# Patient Record
Sex: FEMALE | Race: WHITE | NOT HISPANIC OR LATINO | Employment: OTHER | URBAN - METROPOLITAN AREA
[De-identification: names, ages, dates, MRNs, and addresses within clinical notes are randomized per-mention and may not be internally consistent; named-entity substitution may affect disease eponyms.]

---

## 2017-04-03 ENCOUNTER — HOSPITAL ENCOUNTER (OUTPATIENT)
Dept: URGENT CARE | Facility: CLINIC | Age: 52
Discharge: HOME | End: 2017-04-03
Attending: PREVENTIVE MEDICINE | Admitting: PREVENTIVE MEDICINE

## 2017-04-04 ENCOUNTER — HOSPITAL ENCOUNTER (OUTPATIENT)
Dept: FAMILY MEDICINE CLINIC | Facility: CLINIC | Age: 52
Setting detail: SPECIMEN
Discharge: HOME | End: 2017-04-04
Attending: PREVENTIVE MEDICINE | Admitting: PREVENTIVE MEDICINE

## 2017-04-04 LAB
ALBUMIN SERPL-MCNC: 3.9 G/DL (ref 3.5–4.8)
ALBUMIN/GLOB SERPL: 1.2 {RATIO} (ref 1–1.7)
ALP SERPL-CCNC: 67 IU/L (ref 32–91)
ALT SERPL-CCNC: 19 IU/L (ref 14–54)
ANION GAP SERPL CALC-SCNC: 12.4 MMOL/L (ref 10–20)
AST SERPL-CCNC: 19 IU/L (ref 15–41)
BASOPHILS # BLD AUTO: 0.1 10*3/UL (ref 0–0.2)
BASOPHILS NFR BLD AUTO: 1 % (ref 0–2)
BILIRUB SERPL-MCNC: 0.5 MG/DL (ref 0.3–1.2)
BUN SERPL-MCNC: 13 MG/DL (ref 8–20)
BUN/CREAT SERPL: 18.6 (ref 5.4–26.2)
CALCIUM SERPL-MCNC: 9.5 MG/DL (ref 8.9–10.3)
CHLORIDE SERPL-SCNC: 105 MMOL/L (ref 101–111)
CHOLEST SERPL-MCNC: 249 MG/DL
CHOLEST/HDLC SERPL: 4.3 {RATIO}
CONV CO2: 26 MMOL/L (ref 22–32)
CONV LDL CHOLESTEROL DIRECT: 173 MG/DL (ref 0–100)
CONV TOTAL PROTEIN: 7.1 G/DL (ref 6.1–7.9)
CREAT UR-MCNC: 0.7 MG/DL (ref 0.4–1)
DIFFERENTIAL METHOD BLD: (no result)
EOSINOPHIL # BLD AUTO: 0.1 10*3/UL (ref 0–0.3)
EOSINOPHIL # BLD AUTO: 2 % (ref 0–3)
ERYTHROCYTE [DISTWIDTH] IN BLOOD BY AUTOMATED COUNT: 12.9 % (ref 11.5–14.5)
GLOBULIN UR ELPH-MCNC: 3.2 G/DL (ref 2.5–3.8)
GLUCOSE SERPL-MCNC: 91 MG/DL (ref 65–99)
HCT VFR BLD AUTO: 37.7 % (ref 35–49)
HDLC SERPL-MCNC: 58 MG/DL
HGB BLD-MCNC: 13.3 G/DL (ref 12–15)
LDLC/HDLC SERPL: 3 {RATIO}
LIPID INTERPRETATION: ABNORMAL
LYMPHOCYTES # BLD AUTO: 2.3 10*3/UL (ref 0.8–4.8)
LYMPHOCYTES NFR BLD AUTO: 30 % (ref 18–42)
MCH RBC QN AUTO: 35.8 PG (ref 26–32)
MCHC RBC AUTO-ENTMCNC: 35.2 G/DL (ref 32–36)
MCV RBC AUTO: 101.6 FL (ref 80–94)
MONOCYTES # BLD AUTO: 0.6 10*3/UL (ref 0.1–1.3)
MONOCYTES NFR BLD AUTO: 8 % (ref 2–11)
NEUTROPHILS # BLD AUTO: 4.5 10*3/UL (ref 2.3–8.6)
NEUTROPHILS NFR BLD AUTO: 59 % (ref 50–75)
NRBC BLD AUTO-RTO: 0 /100{WBCS}
NRBC/RBC NFR BLD MANUAL: 0 10*3/UL
PLATELET # BLD AUTO: 385 10*3/UL (ref 150–450)
PMV BLD AUTO: 7.3 FL (ref 7.4–10.4)
POTASSIUM SERPL-SCNC: 4.4 MMOL/L (ref 3.6–5.1)
RBC # BLD AUTO: 3.71 10*6/UL (ref 4–5.4)
SODIUM SERPL-SCNC: 139 MMOL/L (ref 136–144)
TRIGL SERPL-MCNC: 115 MG/DL
TSH SERPL-ACNC: 1.7 UIU/ML (ref 0.34–5.6)
VIT B12 SERPL-MCNC: 538 PG/ML (ref 180–914)
VLDLC SERPL CALC-MCNC: 17.5 MG/DL
WBC # BLD AUTO: 7.5 10*3/UL (ref 4.5–11.5)

## 2017-04-05 ENCOUNTER — HOSPITAL ENCOUNTER (OUTPATIENT)
Dept: PHYSICAL THERAPY | Facility: HOSPITAL | Age: 52
Setting detail: RECURRING SERIES
Discharge: HOME | End: 2017-05-11
Attending: PREVENTIVE MEDICINE | Admitting: PREVENTIVE MEDICINE

## 2017-04-05 LAB
MEV IGG SER IA-ACNC: NORMAL
MUV IGG SER IA-ACNC: NORMAL
RUBV AB SER QL: NORMAL

## 2017-04-19 ENCOUNTER — HOSPITAL ENCOUNTER (OUTPATIENT)
Dept: FAMILY MEDICINE CLINIC | Facility: CLINIC | Age: 52
Setting detail: SPECIMEN
Discharge: HOME | End: 2017-04-19
Attending: PREVENTIVE MEDICINE | Admitting: PREVENTIVE MEDICINE

## 2017-04-19 LAB
MAGNESIUM SERPL-MCNC: 2.1 MG/DL (ref 1.8–2.5)
URATE SERPL-MCNC: 5 MG/DL (ref 2.6–8)

## 2017-05-22 ENCOUNTER — HOSPITAL ENCOUNTER (OUTPATIENT)
Dept: FAMILY MEDICINE CLINIC | Facility: CLINIC | Age: 52
Setting detail: SPECIMEN
Discharge: HOME | End: 2017-05-22
Attending: PREVENTIVE MEDICINE | Admitting: PREVENTIVE MEDICINE

## 2017-05-22 LAB
CHOLEST SERPL-MCNC: 249 MG/DL
CHOLEST/HDLC SERPL: 4.6 {RATIO}
CONV LDL CHOLESTEROL DIRECT: 173 MG/DL (ref 0–100)
FERRITIN SERPL-MCNC: 142 NG/ML (ref 11–307)
FOLATE SERPL-MCNC: >24.8 NG/ML (ref 5.9–24.8)
HDLC SERPL-MCNC: 54 MG/DL
IRON SATN MFR SERPL: 69 % (ref 15–50)
IRON SERPL-MCNC: 195 UG/DL (ref 28–170)
LDLC/HDLC SERPL: 3.2 {RATIO}
LIPID INTERPRETATION: ABNORMAL
MAGNESIUM UR-MCNC: 2.07 % (ref 0.5–1.5)
RETICS/RBC NFR MANUAL: 0.07 10*6/UL
TIBC SERPL-MCNC: 284 UG/DL (ref 228–428)
TRIGL SERPL-MCNC: 148 MG/DL
VIT B12 SERPL-MCNC: 541 PG/ML (ref 180–914)
VLDLC SERPL CALC-MCNC: 22.8 MG/DL

## 2017-06-22 ENCOUNTER — HOSPITAL ENCOUNTER (OUTPATIENT)
Dept: SLEEP MEDICINE | Facility: HOSPITAL | Age: 52
Discharge: HOME | End: 2017-06-22
Attending: PSYCHIATRY & NEUROLOGY | Admitting: PSYCHIATRY & NEUROLOGY

## 2017-07-03 ENCOUNTER — HOSPITAL ENCOUNTER (OUTPATIENT)
Dept: FAMILY MEDICINE CLINIC | Facility: CLINIC | Age: 52
Setting detail: SPECIMEN
Discharge: HOME | End: 2017-07-03
Attending: PREVENTIVE MEDICINE | Admitting: PREVENTIVE MEDICINE

## 2017-07-03 LAB
ALBUMIN SERPL-MCNC: 4 G/DL (ref 3.5–4.8)
ALBUMIN/GLOB SERPL: 1.5 {RATIO} (ref 1–1.7)
ALP SERPL-CCNC: 61 IU/L (ref 32–91)
ALT SERPL-CCNC: 15 IU/L (ref 14–54)
ANION GAP SERPL CALC-SCNC: 12.4 MMOL/L (ref 10–20)
AST SERPL-CCNC: 21 IU/L (ref 15–41)
BASOPHILS # BLD AUTO: 0.1 10*3/UL (ref 0–0.2)
BASOPHILS NFR BLD AUTO: 1 % (ref 0–2)
BILIRUB SERPL-MCNC: 0.5 MG/DL (ref 0.3–1.2)
BUN SERPL-MCNC: 13 MG/DL (ref 8–20)
BUN/CREAT SERPL: 16.3 (ref 5.4–26.2)
CALCIUM SERPL-MCNC: 9.4 MG/DL (ref 8.9–10.3)
CHLORIDE SERPL-SCNC: 106 MMOL/L (ref 101–111)
CONV CO2: 24 MMOL/L (ref 22–32)
CONV TOTAL PROTEIN: 6.7 G/DL (ref 6.1–7.9)
CREAT UR-MCNC: 0.8 MG/DL (ref 0.4–1)
DIFFERENTIAL METHOD BLD: (no result)
EOSINOPHIL # BLD AUTO: 0.2 10*3/UL (ref 0–0.3)
EOSINOPHIL # BLD AUTO: 3 % (ref 0–3)
ERYTHROCYTE [DISTWIDTH] IN BLOOD BY AUTOMATED COUNT: 12.7 % (ref 11.5–14.5)
FERRITIN SERPL-MCNC: 126 NG/ML (ref 11–307)
FOLATE SERPL-MCNC: >24.8 NG/ML (ref 5.9–24.8)
GLOBULIN UR ELPH-MCNC: 2.7 G/DL (ref 2.5–3.8)
GLUCOSE SERPL-MCNC: 92 MG/DL (ref 65–99)
HCT VFR BLD AUTO: 38.4 % (ref 35–49)
HGB BLD-MCNC: 13.1 G/DL (ref 12–15)
IRON SATN MFR SERPL: 55 % (ref 15–50)
IRON SERPL-MCNC: 154 UG/DL (ref 28–170)
LYMPHOCYTES # BLD AUTO: 2.7 10*3/UL (ref 0.8–4.8)
LYMPHOCYTES NFR BLD AUTO: 45 % (ref 18–42)
MAGNESIUM UR-MCNC: 1.67 % (ref 0.5–1.5)
MCH RBC QN AUTO: 35.2 PG (ref 26–32)
MCHC RBC AUTO-ENTMCNC: 34.1 G/DL (ref 32–36)
MCV RBC AUTO: 103.1 FL (ref 80–94)
MONOCYTES # BLD AUTO: 0.5 10*3/UL (ref 0.1–1.3)
MONOCYTES NFR BLD AUTO: 9 % (ref 2–11)
NEUTROPHILS # BLD AUTO: 2.5 10*3/UL (ref 2.3–8.6)
NEUTROPHILS NFR BLD AUTO: 42 % (ref 50–75)
NRBC BLD AUTO-RTO: 0 /100{WBCS}
NRBC/RBC NFR BLD MANUAL: 0 10*3/UL
PLATELET # BLD AUTO: 368 10*3/UL (ref 150–450)
PMV BLD AUTO: 7.8 FL (ref 7.4–10.4)
POTASSIUM SERPL-SCNC: 4.4 MMOL/L (ref 3.6–5.1)
RBC # BLD AUTO: 3.72 10*6/UL (ref 4–5.4)
RETICS/RBC NFR MANUAL: 0.06 10*6/UL
SODIUM SERPL-SCNC: 138 MMOL/L (ref 136–144)
TIBC SERPL-MCNC: 281 UG/DL (ref 228–428)
TSH SERPL-ACNC: 1.68 UIU/ML (ref 0.34–5.6)
VIT B12 SERPL-MCNC: 491 PG/ML (ref 180–914)
WBC # BLD AUTO: 5.9 10*3/UL (ref 4.5–11.5)

## 2017-07-05 LAB
CHOLEST SERPL-MCNC: 217 MG/DL
CHOLEST/HDLC SERPL: 3.8 {RATIO}
CONV LDL CHOLESTEROL DIRECT: 144 MG/DL (ref 0–100)
HDLC SERPL-MCNC: 57 MG/DL
LDLC/HDLC SERPL: 2.5 {RATIO}
LIPID INTERPRETATION: ABNORMAL
TRIGL SERPL-MCNC: 119 MG/DL
VLDLC SERPL CALC-MCNC: 15.2 MG/DL

## 2017-07-22 ENCOUNTER — HOSPITAL ENCOUNTER (OUTPATIENT)
Dept: SLEEP MEDICINE | Facility: HOSPITAL | Age: 52
Discharge: HOME | End: 2017-07-22
Attending: PSYCHIATRY & NEUROLOGY | Admitting: PSYCHIATRY & NEUROLOGY

## 2017-08-15 ENCOUNTER — HOSPITAL ENCOUNTER (OUTPATIENT)
Dept: SLEEP MEDICINE | Facility: HOSPITAL | Age: 52
Discharge: HOME | End: 2017-08-15
Attending: PSYCHIATRY & NEUROLOGY | Admitting: PSYCHIATRY & NEUROLOGY

## 2017-08-24 ENCOUNTER — HOSPITAL ENCOUNTER (OUTPATIENT)
Dept: FAMILY MEDICINE CLINIC | Facility: CLINIC | Age: 52
Setting detail: SPECIMEN
Discharge: HOME | End: 2017-08-24
Attending: PREVENTIVE MEDICINE | Admitting: PREVENTIVE MEDICINE

## 2017-08-24 LAB
BASOPHILS # BLD AUTO: 0 10*3/UL (ref 0–0.2)
BASOPHILS NFR BLD AUTO: 1 % (ref 0–2)
CHOLEST SERPL-MCNC: 238 MG/DL
CHOLEST/HDLC SERPL: 4 {RATIO}
CONV LDL CHOLESTEROL DIRECT: 166 MG/DL (ref 0–100)
DIFFERENTIAL METHOD BLD: (no result)
EOSINOPHIL # BLD AUTO: 0.2 10*3/UL (ref 0–0.3)
EOSINOPHIL # BLD AUTO: 3 % (ref 0–3)
ERYTHROCYTE [DISTWIDTH] IN BLOOD BY AUTOMATED COUNT: 12.5 % (ref 11.5–14.5)
FOLATE SERPL-MCNC: >24.8 NG/ML (ref 5.9–24.8)
GGT SERPL-CCNC: 26 [IU]/L (ref 7–50)
HCT VFR BLD AUTO: 41.3 % (ref 35–49)
HDLC SERPL-MCNC: 59 MG/DL
HGB BLD-MCNC: 14.3 G/DL (ref 12–15)
LDLC/HDLC SERPL: 2.8 {RATIO}
LIPID INTERPRETATION: ABNORMAL
LYMPHOCYTES # BLD AUTO: 2.5 10*3/UL (ref 0.8–4.8)
LYMPHOCYTES NFR BLD AUTO: 43 % (ref 18–42)
MCH RBC QN AUTO: 35.6 PG (ref 26–32)
MCHC RBC AUTO-ENTMCNC: 34.6 G/DL (ref 32–36)
MCV RBC AUTO: 102.9 FL (ref 80–94)
MONOCYTES # BLD AUTO: 0.5 10*3/UL (ref 0.1–1.3)
MONOCYTES NFR BLD AUTO: 9 % (ref 2–11)
NEUTROPHILS # BLD AUTO: 2.5 10*3/UL (ref 2.3–8.6)
NEUTROPHILS NFR BLD AUTO: 44 % (ref 50–75)
NRBC BLD AUTO-RTO: 0 /100{WBCS}
NRBC/RBC NFR BLD MANUAL: 0 10*3/UL
PLATELET # BLD AUTO: 403 10*3/UL (ref 150–450)
PMV BLD AUTO: 7.4 FL (ref 7.4–10.4)
RBC # BLD AUTO: 4.01 10*6/UL (ref 4–5.4)
TRIGL SERPL-MCNC: 109 MG/DL
VIT B12 SERPL-MCNC: 486 PG/ML (ref 180–914)
VLDLC SERPL CALC-MCNC: 13 MG/DL
WBC # BLD AUTO: 5.7 10*3/UL (ref 4.5–11.5)

## 2018-05-11 ENCOUNTER — HOSPITAL ENCOUNTER (OUTPATIENT)
Dept: FAMILY MEDICINE CLINIC | Facility: CLINIC | Age: 53
Setting detail: SPECIMEN
Discharge: HOME | End: 2018-05-11
Attending: PREVENTIVE MEDICINE | Admitting: PREVENTIVE MEDICINE

## 2018-05-11 LAB
ALBUMIN SERPL-MCNC: 4.3 G/DL (ref 3.5–4.8)
ALBUMIN/GLOB SERPL: 1.3 {RATIO} (ref 1–1.7)
ALP SERPL-CCNC: 68 IU/L (ref 32–91)
ALT SERPL-CCNC: 20 IU/L (ref 14–54)
ANION GAP SERPL CALC-SCNC: 11.1 MMOL/L (ref 10–20)
AST SERPL-CCNC: 23 IU/L (ref 15–41)
BASOPHILS # BLD AUTO: 0.1 10*3/UL (ref 0–0.2)
BASOPHILS NFR BLD AUTO: 1 % (ref 0–2)
BILIRUB SERPL-MCNC: 0.5 MG/DL (ref 0.3–1.2)
BUN SERPL-MCNC: 11 MG/DL (ref 8–20)
BUN/CREAT SERPL: 13.8 (ref 5.4–26.2)
CALCIUM SERPL-MCNC: 9.9 MG/DL (ref 8.9–10.3)
CHLORIDE SERPL-SCNC: 103 MMOL/L (ref 101–111)
CONV CO2: 26 MMOL/L (ref 22–32)
CONV TOTAL PROTEIN: 7.5 G/DL (ref 6.1–7.9)
CREAT UR-MCNC: 0.8 MG/DL (ref 0.4–1)
DIFFERENTIAL METHOD BLD: (no result)
EOSINOPHIL # BLD AUTO: 0.1 10*3/UL (ref 0–0.3)
EOSINOPHIL # BLD AUTO: 2 % (ref 0–3)
ERYTHROCYTE [DISTWIDTH] IN BLOOD BY AUTOMATED COUNT: 12.9 % (ref 11.5–14.5)
ERYTHROCYTE [SEDIMENTATION RATE] IN BLOOD BY WESTERGREN METHOD: 42 MM/HR (ref 0–30)
GLOBULIN UR ELPH-MCNC: 3.2 G/DL (ref 2.5–3.8)
GLUCOSE SERPL-MCNC: 88 MG/DL (ref 65–99)
HCT VFR BLD AUTO: 41 % (ref 35–49)
HGB BLD-MCNC: 14.1 G/DL (ref 12–15)
LYMPHOCYTES # BLD AUTO: 2.2 10*3/UL (ref 0.8–4.8)
LYMPHOCYTES NFR BLD AUTO: 36 % (ref 18–42)
MCH RBC QN AUTO: 35.3 PG (ref 26–32)
MCHC RBC AUTO-ENTMCNC: 34.5 G/DL (ref 32–36)
MCV RBC AUTO: 102.5 FL (ref 80–94)
MONOCYTES # BLD AUTO: 0.5 10*3/UL (ref 0.1–1.3)
MONOCYTES NFR BLD AUTO: 8 % (ref 2–11)
NEUTROPHILS # BLD AUTO: 3.2 10*3/UL (ref 2.3–8.6)
NEUTROPHILS NFR BLD AUTO: 53 % (ref 50–75)
NRBC BLD AUTO-RTO: 0 /100{WBCS}
NRBC/RBC NFR BLD MANUAL: 0 10*3/UL
PLATELET # BLD AUTO: 440 10*3/UL (ref 150–450)
PMV BLD AUTO: 7.2 FL (ref 7.4–10.4)
POTASSIUM SERPL-SCNC: 4.1 MMOL/L (ref 3.6–5.1)
RBC # BLD AUTO: 4 10*6/UL (ref 4–5.4)
SODIUM SERPL-SCNC: 136 MMOL/L (ref 136–144)
WBC # BLD AUTO: 6.2 10*3/UL (ref 4.5–11.5)

## 2018-05-14 ENCOUNTER — HOSPITAL ENCOUNTER (OUTPATIENT)
Dept: FAMILY MEDICINE CLINIC | Facility: CLINIC | Age: 53
Setting detail: SPECIMEN
Discharge: HOME | End: 2018-05-14
Attending: PREVENTIVE MEDICINE | Admitting: PREVENTIVE MEDICINE

## 2018-05-14 LAB
GGT SERPL-CCNC: 32 [IU]/L (ref 7–50)
HAV IGM SERPL QL IA: NONREACTIVE
HBV CORE IGM SERPL QL IA: NONREACTIVE
HBV SURFACE AG SERPL QL IA: NONREACTIVE
HCV AB SER DONR QL: NORMAL
HCV AB SER DONR QL: NORMAL

## 2019-02-14 ENCOUNTER — HOSPITAL ENCOUNTER (OUTPATIENT)
Dept: FAMILY MEDICINE CLINIC | Facility: CLINIC | Age: 54
Setting detail: SPECIMEN
Discharge: HOME | End: 2019-02-14
Attending: PREVENTIVE MEDICINE | Admitting: PREVENTIVE MEDICINE

## 2019-02-14 LAB
BACTERIA SPEC AEROBE CULT: NORMAL
CASTS URNS QL MICRO: ABNORMAL /[LPF]
CONV BACTERIA IN URINE MICRO: NEGATIVE
CONV HYALINE CASTS IN URINE MICRO: 2 /[LPF] (ref 0–5)
CONV SMALL ROUND CELLS: ABNORMAL /[HPF]
Lab: NORMAL
MICRO REPORT STATUS: NORMAL
RBC #/AREA URNS HPF: 1 /[HPF] (ref 0–3)
SPECIMEN SOURCE: NORMAL
SPERM URNS QL MICRO: ABNORMAL /[HPF]
SQUAMOUS SPT QL MICRO: 4 /[HPF] (ref 0–5)
UNIDENT CRYS URNS QL MICRO: ABNORMAL /[HPF]
WBC #/AREA URNS HPF: 9 /[HPF] (ref 0–5)
YEAST SPEC QL WET PREP: ABNORMAL /[HPF]

## 2019-02-18 ENCOUNTER — HOSPITAL ENCOUNTER (OUTPATIENT)
Dept: GENERAL RADIOLOGY | Facility: HOSPITAL | Age: 54
Discharge: HOME | End: 2019-02-18
Attending: PREVENTIVE MEDICINE | Admitting: PREVENTIVE MEDICINE

## 2019-02-25 ENCOUNTER — HOSPITAL ENCOUNTER (OUTPATIENT)
Dept: LAB | Facility: HOSPITAL | Age: 54
Discharge: HOME | End: 2019-02-25
Attending: PREVENTIVE MEDICINE | Admitting: PREVENTIVE MEDICINE

## 2019-02-25 LAB
B PERT DNA SPEC QL NAA+PROBE: NOT DETECTED
C PNEUM DNA NPH QL NAA+NON-PROBE: NOT DETECTED
CONV RESPNL SPECIMEN: NORMAL
FLUAV H1 2009 PAND RNA NPH QL NAA+PROBE: NOT DETECTED
FLUAV H1 HA GENE NPH QL NAA+PROBE: NOT DETECTED
FLUAV H3 RNA NPH QL NAA+PROBE: NOT DETECTED
FLUAV SUBTYP SPEC NAA+PROBE: NOT DETECTED
FLUBV RNA ISLT QL NAA+PROBE: NOT DETECTED
HADV DNA SPEC NAA+PROBE: NOT DETECTED
HCOV 229E RNA SPEC QL NAA+PROBE: NOT DETECTED
HCOV HKU1 RNA SPEC QL NAA+PROBE: NOT DETECTED
HCOV NL63 RNA SPEC QL NAA+PROBE: NOT DETECTED
HCOV OC43 RNA SPEC QL NAA+PROBE: NOT DETECTED
HMPV RNA NPH QL NAA+NON-PROBE: NOT DETECTED
HPIV1 RNA ISLT QL NAA+PROBE: NOT DETECTED
HPIV2 RNA SPEC QL NAA+PROBE: NOT DETECTED
HPIV3 RNA NPH QL NAA+PROBE: NOT DETECTED
HPIV4 P GENE NPH QL NAA+PROBE: NOT DETECTED
M PNEUMO IGG SER IA-ACNC: NOT DETECTED
RHINOVIRUS RNA SPEC NAA+PROBE: NOT DETECTED
RSV RNA NPH QL NAA+NON-PROBE: NOT DETECTED

## 2019-02-26 ENCOUNTER — HOSPITAL ENCOUNTER (OUTPATIENT)
Dept: CT IMAGING | Facility: HOSPITAL | Age: 54
Discharge: HOME | End: 2019-02-26
Attending: PREVENTIVE MEDICINE | Admitting: PREVENTIVE MEDICINE

## 2019-02-26 LAB — CREAT BLDA-MCNC: 0.8 MG/DL (ref 0.6–1.3)

## 2019-02-27 ENCOUNTER — HOSPITAL ENCOUNTER (OUTPATIENT)
Dept: FAMILY MEDICINE CLINIC | Facility: CLINIC | Age: 54
Setting detail: SPECIMEN
Discharge: HOME | End: 2019-02-27
Attending: PREVENTIVE MEDICINE | Admitting: PREVENTIVE MEDICINE

## 2019-02-27 LAB
BACTERIA SPEC AEROBE CULT: NORMAL
Lab: NORMAL
MICRO REPORT STATUS: NORMAL
SPECIMEN SOURCE: NORMAL

## 2019-03-05 ENCOUNTER — HOSPITAL ENCOUNTER (OUTPATIENT)
Dept: FAMILY MEDICINE CLINIC | Facility: CLINIC | Age: 54
Setting detail: SPECIMEN
Discharge: HOME | End: 2019-03-05
Attending: PREVENTIVE MEDICINE | Admitting: PREVENTIVE MEDICINE

## 2019-03-05 LAB
ALBUMIN SERPL-MCNC: 4 G/DL (ref 3.5–4.8)
ALBUMIN/GLOB SERPL: 1.3 {RATIO} (ref 1–1.7)
ALP SERPL-CCNC: 74 IU/L (ref 32–91)
ALT SERPL-CCNC: 21 IU/L (ref 14–54)
ANION GAP SERPL CALC-SCNC: 15.1 MMOL/L (ref 10–20)
AST SERPL-CCNC: 25 IU/L (ref 15–41)
BASOPHILS # BLD AUTO: 0 10*3/UL (ref 0–0.2)
BASOPHILS NFR BLD AUTO: 0 % (ref 0–2)
BILIRUB SERPL-MCNC: 0.8 MG/DL (ref 0.3–1.2)
BUN SERPL-MCNC: 10 MG/DL (ref 8–20)
BUN/CREAT SERPL: 12.5 (ref 5.4–26.2)
CALCIUM SERPL-MCNC: 9.5 MG/DL (ref 8.9–10.3)
CHLORIDE SERPL-SCNC: 99 MMOL/L (ref 101–111)
CHOLEST SERPL-MCNC: 271 MG/DL
CHOLEST/HDLC SERPL: 4.9 {RATIO}
CONV CO2: 23 MMOL/L (ref 22–32)
CONV LDL CHOLESTEROL DIRECT: 209 MG/DL (ref 0–100)
CONV TOTAL PROTEIN: 7.1 G/DL (ref 6.1–7.9)
CREAT UR-MCNC: 0.8 MG/DL (ref 0.4–1)
DIFFERENTIAL METHOD BLD: (no result)
EOSINOPHIL # BLD AUTO: 0 % (ref 0–3)
EOSINOPHIL # BLD AUTO: 0 10*3/UL (ref 0–0.3)
ERYTHROCYTE [DISTWIDTH] IN BLOOD BY AUTOMATED COUNT: 12.7 % (ref 11.5–14.5)
ERYTHROCYTE [SEDIMENTATION RATE] IN BLOOD BY WESTERGREN METHOD: 35 MM/HR (ref 0–30)
GLOBULIN UR ELPH-MCNC: 3.1 G/DL (ref 2.5–3.8)
GLUCOSE SERPL-MCNC: 95 MG/DL (ref 65–99)
HCT VFR BLD AUTO: 41.6 % (ref 35–49)
HDLC SERPL-MCNC: 55 MG/DL
HGB BLD-MCNC: 14.1 G/DL (ref 12–15)
LDLC/HDLC SERPL: 3.8 {RATIO}
LIPID INTERPRETATION: ABNORMAL
LYMPHOCYTES # BLD AUTO: 1.8 10*3/UL (ref 0.8–4.8)
LYMPHOCYTES NFR BLD AUTO: 39 % (ref 18–42)
MAGNESIUM SERPL-MCNC: 2 MG/DL (ref 1.8–2.5)
MCH RBC QN AUTO: 35 PG (ref 26–32)
MCHC RBC AUTO-ENTMCNC: 33.9 G/DL (ref 32–36)
MCV RBC AUTO: 103 FL (ref 80–94)
MONOCYTES # BLD AUTO: 0.6 10*3/UL (ref 0.1–1.3)
MONOCYTES NFR BLD AUTO: 13 % (ref 2–11)
NEUTROPHILS # BLD AUTO: 2.3 10*3/UL (ref 2.3–8.6)
NEUTROPHILS NFR BLD AUTO: 48 % (ref 50–75)
NRBC BLD AUTO-RTO: 0 /100{WBCS}
NRBC/RBC NFR BLD MANUAL: 0 10*3/UL
PLATELET # BLD AUTO: 592 10*3/UL (ref 150–450)
PMV BLD AUTO: 6.8 FL (ref 7.4–10.4)
POTASSIUM SERPL-SCNC: 4.1 MMOL/L (ref 3.6–5.1)
RBC # BLD AUTO: 4.04 10*6/UL (ref 4–5.4)
SODIUM SERPL-SCNC: 133 MMOL/L (ref 136–144)
TRIGL SERPL-MCNC: 155 MG/DL
VLDLC SERPL CALC-MCNC: 6.8 MG/DL
WBC # BLD AUTO: 4.7 10*3/UL (ref 4.5–11.5)

## 2019-03-13 ENCOUNTER — HOSPITAL ENCOUNTER (OUTPATIENT)
Dept: ONCOLOGY | Facility: CLINIC | Age: 54
Setting detail: INFUSION SERIES
Discharge: HOME | End: 2019-03-13
Attending: INTERNAL MEDICINE | Admitting: INTERNAL MEDICINE

## 2019-03-13 ENCOUNTER — HOSPITAL ENCOUNTER (OUTPATIENT)
Dept: ONCOLOGY | Facility: HOSPITAL | Age: 54
Discharge: HOME | End: 2019-03-13
Attending: INTERNAL MEDICINE | Admitting: INTERNAL MEDICINE

## 2019-03-13 ENCOUNTER — CLINICAL SUPPORT (OUTPATIENT)
Dept: ONCOLOGY | Facility: HOSPITAL | Age: 54
End: 2019-03-13

## 2019-03-13 LAB
ALBUMIN SERPL-MCNC: 3.9 G/DL (ref 3.5–4.8)
ALBUMIN/GLOB SERPL: 1.5 {RATIO} (ref 1–1.7)
ALP SERPL-CCNC: 71 IU/L (ref 32–91)
ALT SERPL-CCNC: 19 IU/L (ref 14–54)
ANION GAP SERPL CALC-SCNC: 16.5 MMOL/L (ref 10–20)
AST SERPL-CCNC: 19 IU/L (ref 15–41)
BILIRUB SERPL-MCNC: 0.5 MG/DL (ref 0.3–1.2)
BUN SERPL-MCNC: 12 MG/DL (ref 8–20)
BUN/CREAT SERPL: 17.1 (ref 5.4–26.2)
CALCIUM SERPL-MCNC: 10 MG/DL (ref 8.9–10.3)
CHLORIDE SERPL-SCNC: 99 MMOL/L (ref 101–111)
CONV CO2: 25 MMOL/L (ref 22–32)
CONV TOTAL PROTEIN: 6.5 G/DL (ref 6.1–7.9)
CREAT UR-MCNC: 0.7 MG/DL (ref 0.4–1)
GLOBULIN UR ELPH-MCNC: 2.6 G/DL (ref 2.5–3.8)
GLUCOSE SERPL-MCNC: 99 MG/DL (ref 65–99)
IRON SATN MFR SERPL: 61 % (ref 15–50)
IRON SERPL-MCNC: 156 UG/DL (ref 28–170)
POTASSIUM SERPL-SCNC: 4.5 MMOL/L (ref 3.6–5.1)
SODIUM SERPL-SCNC: 136 MMOL/L (ref 136–144)
TIBC SERPL-MCNC: 257 UG/DL (ref 228–428)

## 2019-03-13 NOTE — PROGRESS NOTES
PATIENTS ONCOLOGY RECORD LOCATED IN [facility]      Subjective     Name:  MAURISIO TAPIA     Date:  2019  Address:  [address] [address] [facility] [address] [facility] [place] [address]  Home: [unfilled]  :  1965 AGE:  53 y.o.        RECORDS OBTAINED:  Patients Oncology Record is located in [facility]

## 2019-03-27 ENCOUNTER — HOSPITAL ENCOUNTER (OUTPATIENT)
Dept: OTHER | Facility: HOSPITAL | Age: 54
Discharge: HOME | End: 2019-03-27
Attending: INTERNAL MEDICINE | Admitting: INTERNAL MEDICINE

## 2019-03-27 ENCOUNTER — HOSPITAL ENCOUNTER (OUTPATIENT)
Dept: ONCOLOGY | Facility: CLINIC | Age: 54
Setting detail: INFUSION SERIES
Discharge: HOME | End: 2019-03-27
Attending: INTERNAL MEDICINE | Admitting: INTERNAL MEDICINE

## 2019-03-27 ENCOUNTER — CLINICAL SUPPORT (OUTPATIENT)
Dept: ONCOLOGY | Facility: HOSPITAL | Age: 54
End: 2019-03-27

## 2019-03-27 NOTE — PROGRESS NOTES
PATIENTS ONCOLOGY RECORD LOCATED IN [facility]      Subjective     Name:  MAURISIO TAPIA     Date:  2019  Address:  [address] [address] [place] [address]  Home: [unfilled]  :  1965 AGE:  53 y.o.        RECORDS OBTAINED:  Patients Oncology Record is located in [facility]

## 2019-05-23 ENCOUNTER — HOSPITAL ENCOUNTER (OUTPATIENT)
Dept: MAMMOGRAPHY | Facility: HOSPITAL | Age: 54
Discharge: HOME | End: 2019-05-23
Attending: PREVENTIVE MEDICINE | Admitting: PREVENTIVE MEDICINE

## 2019-06-04 ENCOUNTER — HOSPITAL ENCOUNTER (OUTPATIENT)
Dept: MAMMOGRAPHY | Facility: HOSPITAL | Age: 54
Discharge: HOME | End: 2019-06-04
Attending: PREVENTIVE MEDICINE | Admitting: PREVENTIVE MEDICINE

## 2019-06-06 ENCOUNTER — HOSPITAL ENCOUNTER (OUTPATIENT)
Dept: ULTRASOUND IMAGING | Facility: HOSPITAL | Age: 54
Discharge: HOME | End: 2019-06-06
Attending: PREVENTIVE MEDICINE | Admitting: PREVENTIVE MEDICINE

## 2019-07-08 ENCOUNTER — TELEPHONE (OUTPATIENT)
Dept: ONCOLOGY | Facility: CLINIC | Age: 54
End: 2019-07-08

## 2019-07-08 DIAGNOSIS — E83.119 HEMOCHROMATOSIS, UNSPECIFIED HEMOCHROMATOSIS TYPE: Primary | ICD-10-CM

## 2019-07-08 NOTE — TELEPHONE ENCOUNTER
Patient states she has appt on 7-10-19 for lab draw wants to know if she needs to fast.  Chart reviewed.  Patient does not need to be fasting.  Attempted to contact patient but had to LM on VM asking patient to call back. girish Ballesterosa

## 2019-07-10 ENCOUNTER — LAB (OUTPATIENT)
Dept: LAB | Facility: HOSPITAL | Age: 54
End: 2019-07-10

## 2019-07-10 DIAGNOSIS — E83.119 HEMOCHROMATOSIS, UNSPECIFIED HEMOCHROMATOSIS TYPE: ICD-10-CM

## 2019-07-10 LAB
ALBUMIN SERPL-MCNC: 3.9 G/DL (ref 3.5–4.8)
ALBUMIN/GLOB SERPL: 1.3 G/DL (ref 1–1.7)
ALP SERPL-CCNC: 63 U/L (ref 32–91)
ALT SERPL W P-5'-P-CCNC: 17 U/L (ref 14–54)
ANION GAP SERPL CALCULATED.3IONS-SCNC: 13.8 MMOL/L (ref 5–15)
AST SERPL-CCNC: 14 U/L (ref 15–41)
BILIRUB SERPL-MCNC: 0.6 MG/DL (ref 0.3–1.2)
BUN BLD-MCNC: 22 MG/DL (ref 8–20)
BUN/CREAT SERPL: 27.5 (ref 5.4–26.2)
CALCIUM SPEC-SCNC: 9.5 MG/DL (ref 8.9–10.3)
CHLORIDE SERPL-SCNC: 105 MMOL/L (ref 101–111)
CO2 SERPL-SCNC: 23 MMOL/L (ref 22–32)
CREAT BLD-MCNC: 0.8 MG/DL (ref 0.4–1)
FERRITIN SERPL-MCNC: 119 NG/ML (ref 11–307)
GFR SERPL CREATININE-BSD FRML MDRD: 74 ML/MIN/1.73
GLOBULIN UR ELPH-MCNC: 3.1 GM/DL (ref 2.5–3.8)
GLUCOSE BLD-MCNC: 112 MG/DL (ref 65–99)
IRON 24H UR-MRATE: 115 MCG/DL (ref 28–170)
IRON SATN MFR SERPL: 40 % (ref 15–50)
POTASSIUM BLD-SCNC: 3.8 MMOL/L (ref 3.6–5.1)
PROT SERPL-MCNC: 7 G/DL (ref 6.1–7.9)
SODIUM BLD-SCNC: 138 MMOL/L (ref 136–144)
TIBC SERPL-MCNC: 284 MCG/DL (ref 228–428)
TRANSFERRIN SERPL-MCNC: 203 MG/DL (ref 190–380)

## 2019-07-10 PROCEDURE — 84466 ASSAY OF TRANSFERRIN: CPT | Performed by: NURSE PRACTITIONER

## 2019-07-10 PROCEDURE — 80053 COMPREHEN METABOLIC PANEL: CPT | Performed by: NURSE PRACTITIONER

## 2019-07-10 PROCEDURE — 36415 COLL VENOUS BLD VENIPUNCTURE: CPT | Performed by: INTERNAL MEDICINE

## 2019-07-10 PROCEDURE — 83540 ASSAY OF IRON: CPT | Performed by: NURSE PRACTITIONER

## 2019-07-10 PROCEDURE — 82728 ASSAY OF FERRITIN: CPT | Performed by: NURSE PRACTITIONER

## 2019-07-17 ENCOUNTER — OFFICE VISIT (OUTPATIENT)
Dept: ORTHOPEDIC SURGERY | Facility: CLINIC | Age: 54
End: 2019-07-17

## 2019-07-17 VITALS
WEIGHT: 211 LBS | HEIGHT: 59 IN | BODY MASS INDEX: 42.54 KG/M2 | SYSTOLIC BLOOD PRESSURE: 118 MMHG | HEART RATE: 74 BPM | DIASTOLIC BLOOD PRESSURE: 78 MMHG

## 2019-07-17 DIAGNOSIS — M17.11 PRIMARY OSTEOARTHRITIS OF RIGHT KNEE: ICD-10-CM

## 2019-07-17 DIAGNOSIS — M25.561 ACUTE PAIN OF RIGHT KNEE: Primary | ICD-10-CM

## 2019-07-17 DIAGNOSIS — M25.531 RIGHT WRIST PAIN: ICD-10-CM

## 2019-07-17 PROCEDURE — 20610 DRAIN/INJ JOINT/BURSA W/O US: CPT | Performed by: ORTHOPAEDIC SURGERY

## 2019-07-17 PROCEDURE — 99203 OFFICE O/P NEW LOW 30 MIN: CPT | Performed by: ORTHOPAEDIC SURGERY

## 2019-07-17 RX ORDER — OMEGA-3 FATTY ACIDS/FISH OIL 300-1000MG
CAPSULE ORAL
COMMUNITY
Start: 2017-04-03 | End: 2019-09-09

## 2019-07-17 RX ORDER — TRIAMCINOLONE ACETONIDE 40 MG/ML
80 INJECTION, SUSPENSION INTRA-ARTICULAR; INTRAMUSCULAR ONCE
Status: COMPLETED | OUTPATIENT
Start: 2019-07-17 | End: 2019-07-17

## 2019-07-17 RX ORDER — CYANOCOBALAMIN (VITAMIN B-12) 500 MCG
500 TABLET ORAL 3 TIMES WEEKLY
COMMUNITY
Start: 2017-08-28 | End: 2021-05-26

## 2019-07-17 RX ORDER — RANITIDINE 150 MG/1
TABLET ORAL EVERY 24 HOURS
COMMUNITY
Start: 2019-03-08 | End: 2020-06-18

## 2019-07-17 RX ORDER — NAPROXEN SODIUM 220 MG
TABLET ORAL
COMMUNITY
Start: 2017-04-03 | End: 2019-09-09

## 2019-07-17 RX ORDER — ACETAMINOPHEN 500 MG
500 TABLET ORAL EVERY 4 HOURS PRN
COMMUNITY
Start: 2017-04-03

## 2019-07-17 RX ORDER — VORTIOXETINE 10 MG/1
TABLET, FILM COATED ORAL
Refills: 3 | COMMUNITY
Start: 2019-06-18 | End: 2019-07-23 | Stop reason: SDUPTHER

## 2019-07-17 RX ADMIN — TRIAMCINOLONE ACETONIDE 80 MG: 40 INJECTION, SUSPENSION INTRA-ARTICULAR; INTRAMUSCULAR at 10:16

## 2019-07-17 NOTE — PROGRESS NOTES
"     Patient ID: Merissa Yusuf is a 54 y.o. female.    Chief Complaint:    Chief Complaint   Patient presents with   • Right Knee - Consult   • Consult     right knee pain       HPI:  Merissa is a 54-year-old female here with about 6 months of right knee pain.  There is no injury.  She has pain over the medial joint line with reduced range of motion.  There are no mechanical complaints.  Pain is dull and a 5/10 and worse with walking or bending.  Naproxen has not helped much.  She also has pain to the right thumb area.  It is worse with typing.  It also has not responded to oral medication.  It is dull and a 3/10  Past Medical History:   Diagnosis Date   • Arthritis    • COPD (chronic obstructive pulmonary disease) (CMS/HCC)    • Depression    • Hereditary hemochromatosis (CMS/HCC)    • Iron overload    • Macrocytosis    • Seasonal allergies    • Sleep apnea        Past Surgical History:   Procedure Laterality Date   • BLADDER SUSPENSION     • BREAST BIOPSY Right    • PARTIAL HYSTERECTOMY         Family History   Problem Relation Age of Onset   • Coronary artery disease Other    • Diabetes Other    • Dementia Other           Social History     Occupational History   • Not on file   Tobacco Use   • Smoking status: Never Smoker   Substance and Sexual Activity   • Alcohol use: Yes     Frequency: Never     Comment: rarely   • Drug use: No   • Sexual activity: Not on file      Review of Systems   Cardiovascular: Negative for chest pain.   Musculoskeletal: Positive for arthralgias.       Objective:    /78   Pulse 74   Ht 149.9 cm (59\")   Wt 95.7 kg (211 lb)   BMI 42.62 kg/m²     Physical Examination:  She is a pleasant female in no distress. She is alert and oriented x3 and appears her stated age.  Right knee demonstrates no scars and no atrophy.  There is a trace effusion with medial joint line tenderness.  Range of motion is 0 to 110 degrees.  No instability with a positive medial Yin.Sensory and motor " exam are intact all distributions. Dorsalis pedis and posterior tibialis pulses are palpable and capillary refill is less than two seconds to all digits    Right thumb demonstrates no scars and no atrophy.  There are no areas of tenderness.  Finkelstein and CMC grind are negative.  She has full range of motion of the wrist and digits.Sensory and motor exam are intact all distributions. Radial pulse is palpable and capillary refill is less than two seconds to all digits    Imaging:  Moderate degenerative joint disease    Assessment:  Right knee pain from arthritis  Right thumb pain    Plan: Treatment options discussed  I recommend injection after today's eval.  Risks and benefits were discussed. Under sterile technique and written consent I injected 80mg of Kenalog and 2cc of 1% Lidocaine plain into the knee. It was well tolerated. Postinjection instructions were given.  If no better call to proceed with Visco supplementation or MRI    Recommend topical medication for the thumb, see me as needed

## 2019-07-23 RX ORDER — VORTIOXETINE 10 MG/1
TABLET, FILM COATED ORAL
Qty: 30 TABLET | Refills: 0 | Status: SHIPPED | OUTPATIENT
Start: 2019-07-23 | End: 2019-09-05 | Stop reason: SINTOL

## 2019-07-24 ENCOUNTER — OFFICE VISIT (OUTPATIENT)
Dept: ONCOLOGY | Facility: CLINIC | Age: 54
End: 2019-07-24

## 2019-07-24 ENCOUNTER — APPOINTMENT (OUTPATIENT)
Dept: LAB | Facility: HOSPITAL | Age: 54
End: 2019-07-24

## 2019-07-24 VITALS
SYSTOLIC BLOOD PRESSURE: 117 MMHG | BODY MASS INDEX: 41.33 KG/M2 | WEIGHT: 205 LBS | HEIGHT: 59 IN | RESPIRATION RATE: 18 BRPM | DIASTOLIC BLOOD PRESSURE: 78 MMHG | TEMPERATURE: 97.7 F | HEART RATE: 65 BPM

## 2019-07-24 DIAGNOSIS — E83.19 IRON OVERLOAD: ICD-10-CM

## 2019-07-24 DIAGNOSIS — D64.9 ANEMIA, UNSPECIFIED TYPE: Primary | ICD-10-CM

## 2019-07-24 PROCEDURE — 99214 OFFICE O/P EST MOD 30 MIN: CPT | Performed by: INTERNAL MEDICINE

## 2019-07-24 PROCEDURE — G0463 HOSPITAL OUTPT CLINIC VISIT: HCPCS | Performed by: INTERNAL MEDICINE

## 2019-07-26 NOTE — PROGRESS NOTES
Hematology/Oncology Outpatient Follow Up    Merissa Yusuf  1965    Primary Care Physician: Melissa Duarte MD  Referring Physician: Melissa Duarte MD    Chief complaint:  Hereditary hemochromatosis  Iron overload  Macrocytosis  History of Present Illness:   Ms. Yusuf has a history of COPD and also depression.  Laboratory   work up was obtained in March 2019 which was abnormal.  Patient was sent to the [facility] and seen initially on 3/13/19.   · 3/5/19 - WBC 4.7, hemoglobin 14.1, MCV elevated to 103 and platelet count of 592,000.  Vitamin D   20.  Sed rate 35.  TSH 1.62.  Vitamin B12 476.  Creatinine 0.8.    · 3/13/19 - ZACKERY-2 mutation negative.  Hemochromatosis gene mutation, homozygous mutation in H63D.  · 3/13/19 - Vitamin B12 507.  Ferritin 178.  Creatinine 0.7.  Iron level 156.  Iron binding   capacity 257.  Percentage iron saturation 61.   2.  6/6/2019 abnormal mammogram with a mass in the right breast which was biopsied which was benign breast tissue with fibrocystic changes no malignancy identified      Past Medical History:   Diagnosis Date   • Arthritis    • COPD (chronic obstructive pulmonary disease) (CMS/HCC)    • Depression    • Hereditary hemochromatosis (CMS/HCC)    • Iron overload    • Macrocytosis    • Seasonal allergies    • Sleep apnea        Past Surgical History:   Procedure Laterality Date   • BLADDER SUSPENSION     • BREAST BIOPSY Right    • PARTIAL HYSTERECTOMY           Current Outpatient Medications:   •  acetaminophen (TYLENOL) 500 MG tablet, TYLENOL EXTRA STRENGTH 500 MG TABS, Disp: , Rfl:   •  CBD (cannabidiol) oral oil, Take  by mouth., Disp: , Rfl:   •  Cholecalciferol (VITAMIN D-3) 5000 units tablet, Daily., Disp: , Rfl:   •  cyanocobalamin (VITAMIN B-12) 500 MCG tablet, B-12 500 MCG TABS, Disp: , Rfl:   •  Ibuprofen (ADVIL) 200 MG capsule, ADVIL 200 MG CAPS, Disp: , Rfl:   •  naproxen sodium (ALEVE) 220 MG tablet, NAPROXEN SODIUM 220 MG  TABS, Disp: , Rfl:   •  raNITIdine (ZANTAC) 150 MG tablet, Daily., Disp: , Rfl:   •  Sodium Bicarbonate-Citric Acid 9888-0451 MG effervescent tablet, KAYKAY-SELTZER HEARTBURN 8973-4999 MG TBEF, Disp: , Rfl:   •  TRINTELLIX 10 MG tablet, TAKE 1 TABLET BY MOUTH DAILY, Disp: 30 tablet, Rfl: 0    Allergies   Allergen Reactions   • Amoxicillin Swelling   • Erythromycin GI Intolerance   • Escitalopram Anxiety   • Prozac  [Fluoxetine Hcl] Rash       Family History   Problem Relation Age of Onset   • Coronary artery disease Other    • Diabetes Other    • Dementia Other        Cancer-related family history is not on file.    Social History     Tobacco Use   • Smoking status: Never Smoker   Substance Use Topics   • Alcohol use: Yes     Frequency: Never     Comment: rarely   • Drug use: No       I have reviewed the history of present illness, past medical history, family history, social history, lab results, all notes and other records since the patient was last seen on 7/8/2019    SUBJECTIVE:    Patient is in office for follow-up.  Has back pain doing well otherwise no fevers night sweats or weight loss      ROS:       Review of Systems   Constitutional: Negative for fever.   HENT: Negative for nosebleeds and trouble swallowing.    Eyes: Negative for visual disturbance.   Respiratory: Negative for cough, shortness of breath and wheezing.    Cardiovascular: Negative for chest pain.   Gastrointestinal: Negative for abdominal pain and blood in stool.   Endocrine: Negative for cold intolerance.   Genitourinary: Negative for dysuria and hematuria.   Musculoskeletal: Negative for joint swelling.   Skin: Negative for rash.   Allergic/Immunologic: Negative for environmental allergies.   Neurological: Negative for seizures.   Hematological: Does not bruise/bleed easily.   Psychiatric/Behavioral: The patient is not nervous/anxious.          Objective:       Vitals:    07/24/19 1303   BP: 117/78   Pulse: 65   Resp: 18   Temp: 97.7 °F  "(36.5 °C)   Weight: 93 kg (205 lb)   Height: 149.9 cm (59\")   PainSc:   2   PainLoc: Head  Comment: 3 day headache         PHYSICAL EXAM:       Physical Exam   Constitutional: She is oriented to person, place, and time. No distress.   HENT:   Head: Normocephalic and atraumatic.   Eyes: Conjunctivae and EOM are normal. Right eye exhibits no discharge. Left eye exhibits no discharge. No scleral icterus.   Neck: Normal range of motion. Neck supple. No thyromegaly present.   Cardiovascular: Normal rate, regular rhythm and normal heart sounds. Exam reveals no gallop and no friction rub.   Pulmonary/Chest: Effort normal. No stridor. No respiratory distress. She has no wheezes.   Abdominal: Soft. Bowel sounds are normal. She exhibits no mass. There is no tenderness. There is no rebound and no guarding.   Musculoskeletal: Normal range of motion. She exhibits no tenderness.   Lymphadenopathy:     She has no cervical adenopathy.   Neurological: She is alert and oriented to person, place, and time. She exhibits normal muscle tone.   Skin: Skin is warm. No rash noted. She is not diaphoretic. No erythema.   Psychiatric: She has a normal mood and affect. Her behavior is normal.   Nursing note and vitals reviewed.       RECENT LABS:     WBC   Date Value Ref Range Status   03/05/2019 4.7 4.5 - 11.5 10*3/uL Final     RBC   Date Value Ref Range Status   03/05/2019 4.04 4.00 - 5.40 10*6/uL Final     Hemoglobin   Date Value Ref Range Status   03/05/2019 14.1 12.0 - 15.0 g/dL Final     Hematocrit   Date Value Ref Range Status   03/05/2019 41.6 35 - 49 % Final     MCV   Date Value Ref Range Status   03/05/2019 103.0 (H) 80 - 94 fL Final     MCH   Date Value Ref Range Status   03/05/2019 35.0 (H) 26 - 32 pg Final     MCHC   Date Value Ref Range Status   03/05/2019 33.9 32 - 36 g/dL Final     RDW   Date Value Ref Range Status   03/05/2019 12.7 11.5 - 14.5 % Final     MPV   Date Value Ref Range Status   03/05/2019 6.8 (L) 7.4 - 10.4 fL " Final     Platelets   Date Value Ref Range Status   03/05/2019 592 (H) 150 - 450 10*3/uL Final     Neutrophil Rel %   Date Value Ref Range Status   03/05/2019 48 (L) 50 - 75 % Final     Lymphocyte Rel %   Date Value Ref Range Status   03/05/2019 39 18 - 42 % Final     Monocyte Rel %   Date Value Ref Range Status   03/05/2019 13 (H) 2 - 11 % Final     Eosinophil Rel %   Date Value Ref Range Status   03/05/2019 0 0 - 3 % Final     Basophil Rel %   Date Value Ref Range Status   03/05/2019 0 0 - 2 % Final     Neutrophils Absolute   Date Value Ref Range Status   03/05/2019 2.3 2.3 - 8.6 10*3/uL Final     Lymphocytes Absolute   Date Value Ref Range Status   03/05/2019 1.8 0.8 - 4.8 10*3/uL Final     Monocytes Absolute   Date Value Ref Range Status   03/05/2019 0.6 0.1 - 1.3 10*3/uL Final     Eosinophils Absolute   Date Value Ref Range Status   03/05/2019 0.0 0.0 - 0.3 10*3/uL Final     Basophils Absolute   Date Value Ref Range Status   03/05/2019 0.0 0 - 0.2 10*3/uL Final     nRBC   Date Value Ref Range Status   03/05/2019 0 0 /100[WBCs] Final       Lab Results   Component Value Date    GLUCOSE 112 (H) 07/10/2019    BUN 22 (H) 07/10/2019    CREATININE 0.80 07/10/2019    EGFRIFNONA 74 07/10/2019    EGFRIFAFRI >60 02/26/2019    BCR 27.5 (H) 07/10/2019    K 3.8 07/10/2019    CO2 23.0 07/10/2019    CALCIUM 9.5 07/10/2019    ALBUMIN 3.90 07/10/2019    LABIL2 1.5 03/13/2019    AST 14 (L) 07/10/2019    ALT 17 07/10/2019         Assessment/Plan      ASSESSMENT:     1. Hereditary hemochromatosis  2. Iron overload  3. Macrocytosis  4. ECOG 1    PLAN:      1. Reviewed the laboratory results with the patient iron studies from 7/10/2019 shows a ferritin level of 119 iron of 115 iron saturation 40 iron binding capacity 284 she does not require phlebotomies at this time will repeat labs in 6 months.  2. No evidence of iron overload at this time no phlebotomies.  3.   The new B12 macrocytosis is improving  4.    I will see her back in my  office in 6 months with prior labs    I have reviewed labs results, imaging, vitals, and medications with the patient today.     I counselled Merissa of the risks of continuing to use tobacco and cessation.                This report was compiled using Dragon voice recognition software. I have made every effort to proof read this document; however, typographical errors may persist.

## 2019-09-05 ENCOUNTER — OFFICE VISIT (OUTPATIENT)
Dept: FAMILY MEDICINE CLINIC | Facility: CLINIC | Age: 54
End: 2019-09-05

## 2019-09-05 VITALS
BODY MASS INDEX: 41.2 KG/M2 | TEMPERATURE: 98.3 F | SYSTOLIC BLOOD PRESSURE: 127 MMHG | DIASTOLIC BLOOD PRESSURE: 85 MMHG | WEIGHT: 204.4 LBS | RESPIRATION RATE: 16 BRPM | HEART RATE: 68 BPM | OXYGEN SATURATION: 98 % | HEIGHT: 59 IN

## 2019-09-05 DIAGNOSIS — K43.6 VENTRAL HERNIA WITH OBSTRUCTION AND WITHOUT GANGRENE: Primary | ICD-10-CM

## 2019-09-05 PROBLEM — R41.3 MEMORY LOSS: Status: ACTIVE | Noted: 2017-04-03

## 2019-09-05 PROBLEM — F41.1 ANXIETY STATE: Status: ACTIVE | Noted: 2019-03-08

## 2019-09-05 PROBLEM — E66.9 OBESITY WITH BODY MASS INDEX 30 OR GREATER: Status: ACTIVE | Noted: 2017-04-03

## 2019-09-05 PROBLEM — E78.5 HYPERLIPIDEMIA: Status: ACTIVE | Noted: 2017-04-03

## 2019-09-05 PROBLEM — R92.8 OTHER ABNORMAL AND INCONCLUSIVE FINDINGS ON DIAGNOSTIC IMAGING OF BREAST: Status: ACTIVE | Noted: 2019-05-24

## 2019-09-05 PROBLEM — R91.8 LUNG MASS: Status: ACTIVE | Noted: 2019-02-18

## 2019-09-05 PROBLEM — E66.9 OBESITY: Status: ACTIVE | Noted: 2017-04-03

## 2019-09-05 PROBLEM — H91.90 HEARING DEFICIT: Status: ACTIVE | Noted: 2017-04-03

## 2019-09-05 PROBLEM — R79.89 ABNORMAL COMPLETE BLOOD COUNT: Status: ACTIVE | Noted: 2017-04-04

## 2019-09-05 PROBLEM — E55.9 VITAMIN D DEFICIENCY: Status: ACTIVE | Noted: 2019-03-04

## 2019-09-05 PROBLEM — M54.50 LOW BACK PAIN: Status: ACTIVE | Noted: 2017-05-15

## 2019-09-05 PROBLEM — F41.8 MIXED ANXIETY DEPRESSIVE DISORDER: Status: ACTIVE | Noted: 2017-04-03

## 2019-09-05 PROBLEM — G47.33 OBSTRUCTIVE SLEEP APNEA: Status: ACTIVE | Noted: 2017-05-04

## 2019-09-05 PROBLEM — D75.839 THROMBOCYTOSIS: Status: ACTIVE | Noted: 2019-03-05

## 2019-09-05 PROBLEM — M25.569 KNEE PAIN: Status: ACTIVE | Noted: 2019-05-21

## 2019-09-05 PROBLEM — R03.0 ELEVATED BLOOD PRESSURE READING WITHOUT DIAGNOSIS OF HYPERTENSION: Status: ACTIVE | Noted: 2017-04-03

## 2019-09-05 PROCEDURE — 99214 OFFICE O/P EST MOD 30 MIN: CPT | Performed by: PREVENTIVE MEDICINE

## 2019-09-05 NOTE — PROGRESS NOTES
"Subjective   Merissa Yusuf is a 54 y.o. female presents for   Chief Complaint   Patient presents with   • Depression     discuss medications   • Spasms     back      RLS while on Antidepressant and has stopped and depression back.  No SI and HI  Health Maintenance Due   Topic Date Due   • TDAP/TD VACCINES (1 - Tdap) 07/14/1982   • ZOSTER VACCINE (1 of 2) 07/14/2013   • PAP SMEAR  03/13/2019   • INFLUENZA VACCINE  08/01/2019       History of Present Illness     Vitals:    09/05/19 0914 09/05/19 0919   BP: 132/82 127/85   BP Location: Right arm Left arm   Patient Position: Sitting Sitting   Cuff Size: Adult Adult   Pulse: 68    Resp: 16    Temp: 98.3 °F (36.8 °C)    TempSrc: Oral    SpO2: 98%    Weight: 92.7 kg (204 lb 6.4 oz)    Height: 149.9 cm (59\")        Current Outpatient Medications on File Prior to Visit   Medication Sig Dispense Refill   • acetaminophen (TYLENOL) 500 MG tablet TYLENOL EXTRA STRENGTH 500 MG TABS     • CBD (cannabidiol) oral oil Take  by mouth.     • Cholecalciferol (VITAMIN D-3) 5000 units tablet Daily.     • cyanocobalamin (VITAMIN B-12) 500 MCG tablet B-12 500 MCG TABS     • Ibuprofen (ADVIL) 200 MG capsule ADVIL 200 MG CAPS     • naproxen sodium (ALEVE) 220 MG tablet NAPROXEN SODIUM 220 MG TABS     • raNITIdine (ZANTAC) 150 MG tablet Daily.     • Sodium Bicarbonate-Citric Acid 7722-9901 MG effervescent tablet KAYKAY-SELTZER HEARTBURN 3421-3985 MG TBEF     • TRINTELLIX 10 MG tablet TAKE 1 TABLET BY MOUTH DAILY 30 tablet 0     No current facility-administered medications on file prior to visit.        The following portions of the patient's history were reviewed and updated as appropriate: allergies, current medications, past family history, past medical history, past social history, past surgical history and problem list.    Review of Systems   Constitutional: Negative.    HENT: Positive for hearing loss. Negative for sinus pressure and sore throat.    Eyes: Negative.    Respiratory: " Negative.  Negative for cough.    Cardiovascular: Negative.    Gastrointestinal: Negative.    Endocrine: Negative.    Genitourinary: Negative.    Musculoskeletal: Positive for myalgias.   Skin: Negative.    Allergic/Immunologic: Positive for environmental allergies.   Neurological: Negative.    Hematological: Negative.    Psychiatric/Behavioral: Positive for depressed mood. The patient is nervous/anxious.        Objective   Physical Exam   Constitutional: She is oriented to person, place, and time. She appears well-developed.   Obese-continue weightloss with WW   HENT:   Head: Normocephalic and atraumatic.   Eyes: Conjunctivae and EOM are normal. Pupils are equal, round, and reactive to light.   Neck: Normal range of motion.   Cardiovascular: Normal rate and regular rhythm.   Pulmonary/Chest: Effort normal and breath sounds normal.   Abdominal: Soft. Bowel sounds are normal. A hernia is present.   Musculoskeletal: Normal range of motion.   Lymphadenopathy:     She has no cervical adenopathy.   Neurological: She is alert and oriented to person, place, and time.   Skin: Skin is warm and dry.   Psychiatric: She has a normal mood and affect.   Nursing note and vitals reviewed.    PHQ-9 Total Score:      Assessment/Plan   There are no diagnoses linked to this encounter.    There are no Patient Instructions on file for this visit.

## 2019-09-05 NOTE — PATIENT INSTRUCTIONS
Get Measles shot [facility] now.    Consider SHINGRIX, Pneumonia 23  and flu in  2 months after measels

## 2019-09-06 ENCOUNTER — TELEPHONE (OUTPATIENT)
Dept: ORTHOPEDIC SURGERY | Facility: CLINIC | Age: 54
End: 2019-09-06

## 2019-09-09 ENCOUNTER — TELEPHONE (OUTPATIENT)
Dept: FAMILY MEDICINE CLINIC | Facility: CLINIC | Age: 54
End: 2019-09-09

## 2019-09-09 NOTE — TELEPHONE ENCOUNTER
Patient called and states that the pharmacy does not have the starter packet of her new antidepressant meds. She states they only have the 10mgs. She want to know you would just send that in for her so she could go ahead and start the meds or she is going to have to wait to start it. Please advise.

## 2019-09-10 NOTE — TELEPHONE ENCOUNTER
She is not going to be able to take due to the after 2 hours having diarrhea and vomitting with it

## 2019-09-12 DIAGNOSIS — F32.A DEPRESSION, UNSPECIFIED DEPRESSION TYPE: Primary | ICD-10-CM

## 2019-09-12 NOTE — TELEPHONE ENCOUNTER
Spoke with pt. She has to call and get her  fixed with us being pcm. And then I can do auth for psych. Stated understood.

## 2019-09-13 ENCOUNTER — TELEPHONE (OUTPATIENT)
Dept: ORTHOPEDIC SURGERY | Facility: CLINIC | Age: 54
End: 2019-09-13

## 2019-09-13 NOTE — TELEPHONE ENCOUNTER
Spoke with patient. Wants to wait tell after vacation to maybe proceed with inj. Right know BC/BS is still listed as primary and it is in active. Is in process of trying to change. Will call if knee is worse after vacation if wants to proceed.

## 2019-09-23 ENCOUNTER — OFFICE VISIT (OUTPATIENT)
Dept: SURGERY | Facility: CLINIC | Age: 54
End: 2019-09-23

## 2019-09-23 DIAGNOSIS — K42.9 UMBILICAL HERNIA WITHOUT OBSTRUCTION AND WITHOUT GANGRENE: Primary | ICD-10-CM

## 2019-09-23 PROCEDURE — 99204 OFFICE O/P NEW MOD 45 MIN: CPT | Performed by: SURGERY

## 2019-09-24 NOTE — PROGRESS NOTES
GENERAL SURGERY CONSULTATION NOTE    Consult requested by: Dr. Sommer    Patient Care Team:  Melissa Duarte MD as PCP - General  Melissa Duarte MD as PCP - Family Medicine    Reason for consult: ventral hernia    Subjective     Patient is a 54 y.o. female presents with a bulge which is been present in the supraumbilical area for the last 2 to 3 years.  Patient has noticed this bulge when she looks down towards her feet, but it was not necessarily bothering her until a few months ago.  She had been meaning to ask her primary care physician about this area, but continually forgot.  She reports that sometimes when she bends over this area causes her discomfort.  She also was concerned because a few weeks ago she had a episode of nausea, vomiting, and watery diarrhea.  She was told by her primary care physician that this could be a ventral hernia and that she should see a surgeon to discuss surgical intervention for the area.  In her bellybutton, the patient also has a swelling protrusion which is less noticeable due to her body habitus.  This area is painful to palpation.    Review of Systems   Constitutional: Negative for appetite change, chills and fever.   HENT: Negative for congestion and sore throat.    Respiratory: Positive for shortness of breath. Negative for cough.    Cardiovascular: Positive for leg swelling. Negative for chest pain and palpitations.   Gastrointestinal: Positive for abdominal distention, abdominal pain, constipation and diarrhea. Negative for nausea, vomiting and GERD.   Genitourinary: Negative for difficulty urinating, dysuria and frequency.   Musculoskeletal: Positive for arthralgias and back pain.   Skin: Negative for rash and skin lesions.   Neurological: Negative for dizziness, seizures and memory problem.   Hematological: Negative for adenopathy. Does not bruise/bleed easily.   Psychiatric/Behavioral: Negative for sleep disturbance and depressed mood.        History  Past  Medical History:   Diagnosis Date   • Arthritis    • COPD (chronic obstructive pulmonary disease) (CMS/HCC)    • Depression    • Hereditary hemochromatosis (CMS/HCC)    • Iron overload    • Macrocytosis    • Seasonal allergies    • Sleep apnea      Past Surgical History:   Procedure Laterality Date   • BLADDER SUSPENSION     • BREAST BIOPSY Right    • PARTIAL HYSTERECTOMY       Family History   Problem Relation Age of Onset   • Coronary artery disease Other    • Diabetes Other    • Dementia Other    • Diabetes Mother    • Cancer Mother    • Heart disease Father    • Liver disease Father    • Hypertension Father    • Cancer Brother      Social History     Tobacco Use   • Smoking status: Never Smoker   • Smokeless tobacco: Never Used   Substance Use Topics   • Alcohol use: Yes     Frequency: Never     Drinks per session: 1 or 2     Binge frequency: Never     Comment: rarely   • Drug use: No       (Not in a hospital admission)  Allergies:  Amoxicillin; Erythromycin; Escitalopram; and Prozac  [fluoxetine hcl]    Objective     Vital Signs       Physical Exam   Constitutional: She is oriented to person, place, and time. She appears well-developed and well-nourished.   HENT:   Head: Normocephalic and atraumatic.   Eyes: Pupils are equal, round, and reactive to light.   Neck: Normal range of motion.   Cardiovascular: Normal rate and regular rhythm.   Pulmonary/Chest: Effort normal and breath sounds normal.   Abdominal: Soft. She exhibits no distension. There is tenderness in the epigastric area. No hernia.       Obese   Musculoskeletal: Normal range of motion. She exhibits no edema.   Lymphadenopathy:     She has no cervical adenopathy.     She has no axillary adenopathy.   Neurological: She is alert and oriented to person, place, and time.   Skin: Skin is warm and dry. No rash noted.   Psychiatric: She has a normal mood and affect.       Results Review:   Lab Results (last 24 hours)     ** No results found for the last 24  "hours. **        No radiology results for the last day      I reviewed the patient's new imaging results and agree with the interpretation.  I reviewed the patient's other test results and agree with the interpretation    Assessment/Plan     Active Problems:  Umbilical hernia    There is concern for possible epigastric hernia along with the patient's umbilical hernia.  We discussed that she clearly does have an umbilical hernia, and that this will not resolve on its own.  I would be happy to repair her umbilical hernia.  We discussed that if the defect is greater than 2 cm, then mesh would likely need to be used to prevent recurrence in the future.  Because there is concern of a possible epigastric hernia, will perform a CT scan of the abdomen and pelvis to rule out additional hernias other than the umbilical hernia.  Once the CT scan is performed, we can proceed with surgical intervention on her umbilical hernia.      Reuben Molina MD  09/24/19  9:36 AM    CT ABD PELVIS reviewed. Demonstrated right paraumbilical hernia approximately 3 cm. D/w patient, will perform open umbilical hernia repair with possible mesh. Patient was initially reluctant for me to use mesh \"with on-going lawsuits\", but after I described that this mesh is #1 a different mesh and #2 used in a different location #3 for a different indication, she was accepting of potential mesh placement. We discussed that if the hernia is >2cm primary repair portends a higher risk of recurrence.  Will schedule at her convenience.    Reuben Molina MD  9/27/2019  11:07 AM      "

## 2019-09-27 ENCOUNTER — HOSPITAL ENCOUNTER (OUTPATIENT)
Dept: CT IMAGING | Facility: HOSPITAL | Age: 54
Discharge: HOME | End: 2019-09-27
Admitting: SURGERY

## 2019-09-27 DIAGNOSIS — K43.9 VENTRAL HERNIA WITHOUT OBSTRUCTION OR GANGRENE: Primary | ICD-10-CM

## 2019-09-27 DIAGNOSIS — N83.201 BILATERAL OVARIAN CYSTS: ICD-10-CM

## 2019-09-27 DIAGNOSIS — N83.202 BILATERAL OVARIAN CYSTS: ICD-10-CM

## 2019-09-27 DIAGNOSIS — K42.9 UMBILICAL HERNIA WITHOUT OBSTRUCTION AND WITHOUT GANGRENE: ICD-10-CM

## 2019-09-27 PROCEDURE — 74176 CT ABD & PELVIS W/O CONTRAST: CPT

## 2019-09-27 RX ORDER — SODIUM CHLORIDE 9 MG/ML
100 INJECTION, SOLUTION INTRAVENOUS CONTINUOUS
Status: CANCELLED | OUTPATIENT
Start: 2019-09-27

## 2019-09-27 RX ORDER — CHLORHEXIDINE GLUCONATE 0.12 MG/ML
15 RINSE ORAL EVERY 12 HOURS SCHEDULED
Status: CANCELLED | OUTPATIENT
Start: 2019-09-27

## 2019-10-07 ENCOUNTER — APPOINTMENT (OUTPATIENT)
Dept: PREADMISSION TESTING | Facility: HOSPITAL | Age: 54
End: 2019-10-07

## 2020-01-07 ENCOUNTER — TELEPHONE (OUTPATIENT)
Dept: ONCOLOGY | Facility: CLINIC | Age: 55
End: 2020-01-07

## 2020-01-08 ENCOUNTER — LAB (OUTPATIENT)
Dept: LAB | Facility: HOSPITAL | Age: 55
End: 2020-01-08

## 2020-01-08 DIAGNOSIS — D64.9 ANEMIA, UNSPECIFIED TYPE: ICD-10-CM

## 2020-01-08 DIAGNOSIS — E83.19 IRON OVERLOAD: ICD-10-CM

## 2020-01-08 LAB
BASOPHILS # BLD AUTO: 0.06 10*3/MM3 (ref 0–0.2)
BASOPHILS NFR BLD AUTO: 0.9 % (ref 0–1.5)
DEPRECATED RDW RBC AUTO: 45.3 FL (ref 37–54)
EOSINOPHIL # BLD AUTO: 0.2 10*3/MM3 (ref 0–0.4)
EOSINOPHIL NFR BLD AUTO: 3 % (ref 0.3–6.2)
ERYTHROCYTE [DISTWIDTH] IN BLOOD BY AUTOMATED COUNT: 12.1 % (ref 12.3–15.4)
FERRITIN SERPL-MCNC: 304.4 NG/ML (ref 13–150)
HCT VFR BLD AUTO: 41.2 % (ref 34–46.6)
HGB BLD-MCNC: 13.9 G/DL (ref 12–15.9)
IRON 24H UR-MRATE: 177 MCG/DL (ref 37–145)
IRON SATN MFR SERPL: 56 % (ref 20–50)
LYMPHOCYTES # BLD AUTO: 2.28 10*3/MM3 (ref 0.7–3.1)
LYMPHOCYTES NFR BLD AUTO: 34.5 % (ref 19.6–45.3)
MCH RBC QN AUTO: 34.9 PG (ref 26.6–33)
MCHC RBC AUTO-ENTMCNC: 33.7 G/DL (ref 31.5–35.7)
MCV RBC AUTO: 103.5 FL (ref 79–97)
MONOCYTES # BLD AUTO: 0.67 10*3/MM3 (ref 0.1–0.9)
MONOCYTES NFR BLD AUTO: 10.1 % (ref 5–12)
NEUTROPHILS # BLD AUTO: 3.4 10*3/MM3 (ref 1.7–7)
NEUTROPHILS NFR BLD AUTO: 51.5 % (ref 42.7–76)
PLATELET # BLD AUTO: 382 10*3/MM3 (ref 140–450)
PMV BLD AUTO: 8.5 FL (ref 6–12)
RBC # BLD AUTO: 3.98 10*6/MM3 (ref 3.77–5.28)
TIBC SERPL-MCNC: 316 MCG/DL (ref 298–536)
TRANSFERRIN SERPL-MCNC: 212 MG/DL (ref 200–360)
WBC NRBC COR # BLD: 6.61 10*3/MM3 (ref 3.4–10.8)

## 2020-01-08 PROCEDURE — 85025 COMPLETE CBC W/AUTO DIFF WBC: CPT

## 2020-01-08 PROCEDURE — 82728 ASSAY OF FERRITIN: CPT | Performed by: NURSE PRACTITIONER

## 2020-01-08 PROCEDURE — 36415 COLL VENOUS BLD VENIPUNCTURE: CPT

## 2020-01-08 PROCEDURE — 83540 ASSAY OF IRON: CPT | Performed by: NURSE PRACTITIONER

## 2020-01-08 PROCEDURE — 84466 ASSAY OF TRANSFERRIN: CPT | Performed by: NURSE PRACTITIONER

## 2020-01-15 ENCOUNTER — OFFICE VISIT (OUTPATIENT)
Dept: PSYCHIATRY | Facility: CLINIC | Age: 55
End: 2020-01-15

## 2020-01-15 DIAGNOSIS — F41.8 MIXED ANXIETY DEPRESSIVE DISORDER: Primary | ICD-10-CM

## 2020-01-15 PROCEDURE — 90792 PSYCH DIAG EVAL W/MED SRVCS: CPT | Performed by: PHYSICIAN ASSISTANT

## 2020-01-15 RX ORDER — BUSPIRONE HYDROCHLORIDE 10 MG/1
TABLET ORAL
Qty: 60 TABLET | Refills: 3 | Status: SHIPPED | OUTPATIENT
Start: 2020-01-15 | End: 2020-03-18 | Stop reason: SDUPTHER

## 2020-01-15 NOTE — PROGRESS NOTES
Subjective   Merissa Yusuf is a 54 y.o.white female who presents today for initial evaluation     Chief Complaint:  Anxiety and depression    History of Present Illness:   Dr. Sommer referred her here for anxiety and depression, did [facility] in March 2019  Mom has Alzheimer's demenita, patient lives the closest to her, has 6 siblings  Her  is a disabled vet, they are doing family counseling at the [facility] now  She has two kids and 5 grandkids  Anxiety, feels like she can't catch her breath 3/10 on average but gets up to 6/10 quickly  Depression 3/10  Low motivation, low energy  Denies SI/HI  No tiff or hypomania  No hospitalizations  NO suicide attempts  Trintellix 10mg was helpful but gave her restless legs    The following portions of the patient's history were reviewed and updated as appropriate: allergies, current medications, past family history, past medical history, past social history, past surgical history and problem list.    PAST PSYCHIATRIC HISTORY  Axis I  Affective/Bipoloar Disorder, Anxiety/Panic Disorder  Axis II  None    PAST OUTPATIENT TREATMENT  Diagnosis treated:  Affective Disorder, Anxiety/Panic Disorder  Treatment Type:  Family Therapy, Medication Management  Prior Psychiatric Medications:  Lexapro increased anxiety  Prozac, hives  Trintellix helped but gave her restless legs  Buspar   Viibryd, could not take  Support Groups:  None  Sequelae Of Mental Disorder:  social isolation, family disruption, emotional distress      Interval History  No Change    Side Effects  Restless legs      Past Medical History:  Past Medical History:   Diagnosis Date   • Arthritis    • COPD (chronic obstructive pulmonary disease) (CMS/HCC)    • Depression    • Hereditary hemochromatosis (CMS/HCC)    • Iron overload    • Macrocytosis    • Seasonal allergies    • Sleep apnea        Social History:  Social History     Socioeconomic History   • Marital status:      Spouse name: Not on file   • Number  of children: Not on file   • Years of education: Not on file   • Highest education level: Not on file   Tobacco Use   • Smoking status: Never Smoker   • Smokeless tobacco: Never Used   Substance and Sexual Activity   • Alcohol use: Yes     Frequency: Never     Drinks per session: 1 or 2     Binge frequency: Never     Comment: rarely   • Drug use: No   • Sexual activity: Never       Family History:  Family History   Problem Relation Age of Onset   • Coronary artery disease Other    • Diabetes Other    • Dementia Other    • Diabetes Mother    • Cancer Mother    • Heart disease Father    • Liver disease Father    • Hypertension Father    • Cancer Brother        Past Surgical History:  Past Surgical History:   Procedure Laterality Date   • BLADDER SUSPENSION     • BREAST BIOPSY Right    • PARTIAL HYSTERECTOMY         Problem List:  Patient Active Problem List   Diagnosis   • Abnormal complete blood count   • Anxiety state   • Elevated blood pressure reading without diagnosis of hypertension   • Hearing deficit   • Hyperlipidemia   • Knee pain   • Low back pain   • Lung mass   • Memory loss   • Mixed anxiety depressive disorder   • Obesity with body mass index 30 or greater   • Obesity   • Other abnormal and inconclusive findings on diagnostic imaging of breast   • Obstructive sleep apnea   • Thrombocytosis (CMS/HCC)   • Vitamin D deficiency   • Umbilical hernia without obstruction and without gangrene       Allergy:   Allergies   Allergen Reactions   • Amoxicillin Swelling   • Erythromycin GI Intolerance   • Escitalopram Anxiety   • Prozac  [Fluoxetine Hcl] Rash        Discontinued Medications:  There are no discontinued medications.    Current Medications:   Current Outpatient Medications   Medication Sig Dispense Refill   • acetaminophen (TYLENOL) 500 MG tablet TYLENOL EXTRA STRENGTH 500 MG TABS     • busPIRone (BUSPAR) 10 MG tablet Take one tablet twice a day as needed for anxiety 60 tablet 3   • CBD (cannabidiol)  oral oil Take  by mouth.     • Cholecalciferol (VITAMIN D-3) 5000 units tablet Daily.     • cyanocobalamin (VITAMIN B-12) 500 MCG tablet B-12 500 MCG TABS     • diclofenac (VOLTAREN) 1 % gel gel Apply 4 g topically to the appropriate area as directed 4 (Four) Times a Day As Needed.     • raNITIdine (ZANTAC) 150 MG tablet Daily.     • Sodium Bicarbonate-Citric Acid 5481-2510 MG effervescent tablet KAYKAY-SELTZER HEARTBURN 4661-3249 MG TBEF       No current facility-administered medications for this visit.          Review of Symptoms:    Psychiatric/Behavioral: Negative for agitation, behavioral problems, confusion, decreased concentration, dysphoric mood, hallucinations, self-injury, sleep disturbance and suicidal ideas. The patient is not nervous/anxious and is not hyperactive.        Physical Exam:   not currently breastfeeding.    Mental Status Exam:   Hygiene:   good  Cooperation:  Cooperative  Eye Contact:  Good  Psychomotor Behavior:  Appropriate  Affect:  Appropriate  Mood: anxious  Hopelessness: Denies  Speech:  Normal  Thought Process:  Goal directed  Thought Content:  Normal  Suicidal:  None  Homicidal:  None  Hallucinations:  None  Delusion:  None  Memory:  Intact  Orientation:  Person, Place, Time and Situation  Reliability:  good  Insight:  Good  Judgement:  Good  Impulse Control:  Good  Physical/Medical Issues:  No        PHQ-9 Depression Screening  Little interest or pleasure in doing things? 2   Feeling down, depressed, or hopeless? 2   Trouble falling or staying asleep, or sleeping too much? 2   Feeling tired or having little energy? 2   Poor appetite or overeating? 0   Feeling bad about yourself - or that you are a failure or have let yourself or your family down? 1   Trouble concentrating on things, such as reading the newspaper or watching television? 1   Moving or speaking so slowly that other people could have noticed? Or the opposite - being so fidgety or restless that you have been moving around  a lot more than usual? 0   Thoughts that you would be better off dead, or of hurting yourself in some way? 0   PHQ-9 Total Score 10   If you checked off any problems, how difficult have these problems made it for you to do your work, take care of things at home, or get along with other people? Somewhat difficult           Never smoker    I advised Merissa of the risks of tobacco use.     Lab Results:   Lab on 01/08/2020   Component Date Value Ref Range Status   • Ferritin 01/08/2020 304.40* 13.00 - 150.00 ng/mL Final   • Iron 01/08/2020 177* 37 - 145 mcg/dL Final   • Iron Saturation 01/08/2020 56* 20 - 50 % Final   • Transferrin 01/08/2020 212  200 - 360 mg/dL Final   • TIBC 01/08/2020 316  298 - 536 mcg/dL Final   • WBC 01/08/2020 6.61  3.40 - 10.80 10*3/mm3 Final   • RBC 01/08/2020 3.98  3.77 - 5.28 10*6/mm3 Final   • Hemoglobin 01/08/2020 13.9  12.0 - 15.9 g/dL Final   • Hematocrit 01/08/2020 41.2  34.0 - 46.6 % Final   • MCV 01/08/2020 103.5* 79.0 - 97.0 fL Final   • MCH 01/08/2020 34.9* 26.6 - 33.0 pg Final   • MCHC 01/08/2020 33.7  31.5 - 35.7 g/dL Final   • RDW 01/08/2020 12.1* 12.3 - 15.4 % Final   • RDW-SD 01/08/2020 45.3  37.0 - 54.0 fl Final   • MPV 01/08/2020 8.5  6.0 - 12.0 fL Final   • Platelets 01/08/2020 382  140 - 450 10*3/mm3 Final   • Neutrophil % 01/08/2020 51.5  42.7 - 76.0 % Final   • Lymphocyte % 01/08/2020 34.5  19.6 - 45.3 % Final   • Monocyte % 01/08/2020 10.1  5.0 - 12.0 % Final   • Eosinophil % 01/08/2020 3.0  0.3 - 6.2 % Final   • Basophil % 01/08/2020 0.9  0.0 - 1.5 % Final   • Neutrophils, Absolute 01/08/2020 3.40  1.70 - 7.00 10*3/mm3 Final   • Lymphocytes, Absolute 01/08/2020 2.28  0.70 - 3.10 10*3/mm3 Final   • Monocytes, Absolute 01/08/2020 0.67  0.10 - 0.90 10*3/mm3 Final   • Eosinophils, Absolute 01/08/2020 0.20  0.00 - 0.40 10*3/mm3 Final   • Basophils, Absolute 01/08/2020 0.06  0.00 - 0.20 10*3/mm3 Final       Assessment/Plan   Problems Addressed this Visit        Other    Mixed  anxiety depressive disorder - Primary    Relevant Medications    busPIRone (BUSPAR) 10 MG tablet          Visit Diagnoses:    ICD-10-CM ICD-9-CM   1. Mixed anxiety depressive disorder F41.8 300.4       TREATMENT PLAN/GOALS: Continue supportive psychotherapy efforts and medications as indicated. Treatment and medication options discussed during today's visit. Patient ackowledged and verbally consented to continue with current treatment plan and was educated on the importance of compliance with treatment and follow-up appointments.    MEDICATION ISSUES:  INSPECT reviewed as expected  Discussed medication options and treatment plan of prescribed medication as well as the risks, benefits, and side effects including potential falls, possible impaired driving and metabolic adversities among others. Patient is agreeable to call the office with any worsening of symptoms or onset of side effects. Patient is agreeable to call 911 or go to the nearest ER should he/she begin having SI/HI. No medication side effects or related complaints today.     Patient is very nervous about taking medications, worries about side effects.  She did well on Trintellix but at 10 mg had restless legs.  Since she is familiar with the medication, she would rather try it again but only at 5 mg to see if it is both helpful and does not have the restless leg issues.  If she has restless legs even at 5 mg, will change her to Pristiq 25 mg daily.  BuSpar 10 mg 1 tablet twice daily as needed for the anxiety.  Dr. Julian badillo had given her the 7-1/2 mg tablets to take as needed in the past and was fairly helpful.  She will call in 3 weeks with a progress report and follow-up in 8 weeks.    MEDS ORDERED DURING VISIT:  New Medications Ordered This Visit   Medications   • busPIRone (BUSPAR) 10 MG tablet     Sig: Take one tablet twice a day as needed for anxiety     Dispense:  60 tablet     Refill:  3       Return in about 2 months (around 3/15/2020).          This document has been electronically signed by Laisha Mendiola PA-C  January 15, 2020 3:10 PM

## 2020-01-22 ENCOUNTER — OFFICE VISIT (OUTPATIENT)
Dept: LAB | Facility: HOSPITAL | Age: 55
End: 2020-01-22

## 2020-01-22 ENCOUNTER — OFFICE VISIT (OUTPATIENT)
Dept: ONCOLOGY | Facility: CLINIC | Age: 55
End: 2020-01-22

## 2020-01-22 VITALS
DIASTOLIC BLOOD PRESSURE: 79 MMHG | SYSTOLIC BLOOD PRESSURE: 140 MMHG | TEMPERATURE: 97.7 F | RESPIRATION RATE: 24 BRPM | HEART RATE: 75 BPM | WEIGHT: 203.2 LBS | HEIGHT: 59 IN | BODY MASS INDEX: 40.96 KG/M2

## 2020-01-22 DIAGNOSIS — E83.119 HEMOCHROMATOSIS, UNSPECIFIED HEMOCHROMATOSIS TYPE: ICD-10-CM

## 2020-01-22 DIAGNOSIS — G62.9 NEUROPATHY: Primary | ICD-10-CM

## 2020-01-22 DIAGNOSIS — D64.9 ANEMIA, UNSPECIFIED TYPE: Primary | ICD-10-CM

## 2020-01-22 LAB
BASOPHILS # BLD AUTO: 0.02 10*3/MM3 (ref 0–0.2)
BASOPHILS NFR BLD AUTO: 0.3 % (ref 0–1.5)
DEPRECATED RDW RBC AUTO: 46.4 FL (ref 37–54)
EOSINOPHIL # BLD AUTO: 0.08 10*3/MM3 (ref 0–0.4)
EOSINOPHIL NFR BLD AUTO: 1.2 % (ref 0.3–6.2)
ERYTHROCYTE [DISTWIDTH] IN BLOOD BY AUTOMATED COUNT: 12.4 % (ref 12.3–15.4)
HBA1C MFR BLD: 5.2 % (ref 3.5–5.6)
HCT VFR BLD AUTO: 39.9 % (ref 34–46.6)
HGB BLD-MCNC: 14.4 G/DL (ref 12–15.9)
LYMPHOCYTES # BLD AUTO: 2.11 10*3/MM3 (ref 0.7–3.1)
LYMPHOCYTES NFR BLD AUTO: 31.2 % (ref 19.6–45.3)
MCH RBC QN AUTO: 36.1 PG (ref 26.6–33)
MCHC RBC AUTO-ENTMCNC: 36.1 G/DL (ref 31.5–35.7)
MCV RBC AUTO: 100 FL (ref 79–97)
MONOCYTES # BLD AUTO: 0.61 10*3/MM3 (ref 0.1–0.9)
MONOCYTES NFR BLD AUTO: 9 % (ref 5–12)
NEUTROPHILS # BLD AUTO: 3.95 10*3/MM3 (ref 1.7–7)
NEUTROPHILS NFR BLD AUTO: 58.3 % (ref 42.7–76)
PLATELET # BLD AUTO: 418 10*3/MM3 (ref 140–450)
PMV BLD AUTO: 8.7 FL (ref 6–12)
RBC # BLD AUTO: 3.99 10*6/MM3 (ref 3.77–5.28)
WBC NRBC COR # BLD: 6.77 10*3/MM3 (ref 3.4–10.8)

## 2020-01-22 PROCEDURE — 83036 HEMOGLOBIN GLYCOSYLATED A1C: CPT | Performed by: NURSE PRACTITIONER

## 2020-01-22 PROCEDURE — 99214 OFFICE O/P EST MOD 30 MIN: CPT | Performed by: INTERNAL MEDICINE

## 2020-01-22 PROCEDURE — 85025 COMPLETE CBC W/AUTO DIFF WBC: CPT

## 2020-01-22 PROCEDURE — 36415 COLL VENOUS BLD VENIPUNCTURE: CPT

## 2020-01-22 NOTE — PROGRESS NOTES
Hematology/Oncology Outpatient Follow Up    Merissa Yusuf  1965    Primary Care Physician: Melissa Duarte MD  Referring Physician: Melissa Duarte MD  Chief complaint:  Hereditary hemochromatosis, homozygous mutation in H63D.  Iron overload  Macrocytosis  History of Present Illness:   · Ms. Yusuf has a history of COPD and also depression.  Laboratory work up was obtained in March 2019 which was abnormal.  Patient was sent to the [facility] and seen initially on 3/13/19.   · 3/5/19 - WBC 4.7, hemoglobin 14.1, MCV elevated to 103 and platelet count of 592,000.  Vitamin D  20.  Sed rate 35.  TSH 1.62.  Vitamin B12 476.  Creatinine 0.8.    · 3/13/19 - ZACKERY-2 mutation negative.  Hemochromatosis gene mutation, homozygous mutation in H63D.  · 3/13/19 - Vitamin B12 507.  Ferritin 178.  Creatinine 0.7.  Iron level 156.  Iron binding  capacity 257.  Percentage iron saturation 61.   · 1/8/2020 Ferritin 304 iron 177 iron saturation 56 TIBC 316  ·   · 2.  6/6/2019 abnormal mammogram with a mass in the right breast which was biopsied which was benign breast tissue with fibrocystic changes no malignancy identified      Past Medical History:   Diagnosis Date   • Arthritis    • COPD (chronic obstructive pulmonary disease) (CMS/HCC)    • Depression    • Hereditary hemochromatosis (CMS/HCC)    • Iron overload    • Macrocytosis    • Seasonal allergies    • Sleep apnea        Past Surgical History:   Procedure Laterality Date   • BLADDER SUSPENSION     • BREAST BIOPSY Right    • SUBTOTAL HYSTERECTOMY           Current Outpatient Medications:   •  acetaminophen (TYLENOL) 500 MG tablet, TYLENOL EXTRA STRENGTH 500 MG TABS, Disp: , Rfl:   •  busPIRone (BUSPAR) 10 MG tablet, Take one tablet twice a day as needed for anxiety, Disp: 60 tablet, Rfl: 3  •  diclofenac (VOLTAREN) 1 % gel gel, Apply 4 g topically to the appropriate area as directed 4 (Four) Times a Day As Needed., Disp: , Rfl:   •   Vortioxetine HBr (TRINTELLIX) 5 MG tablet, Take 5 mg by mouth Daily., Disp: , Rfl:   •  CBD (cannabidiol) oral oil, Take  by mouth., Disp: , Rfl:   •  Cholecalciferol (VITAMIN D-3) 5000 units tablet, Daily., Disp: , Rfl:   •  cyanocobalamin (VITAMIN B-12) 500 MCG tablet, B-12 500 MCG TABS, Disp: , Rfl:   •  raNITIdine (ZANTAC) 150 MG tablet, Daily., Disp: , Rfl:   •  Sodium Bicarbonate-Citric Acid 3836-9650 MG effervescent tablet, KAYKAY-SELTZER HEARTBURN 4271-7298 MG TBEF, Disp: , Rfl:     Allergies   Allergen Reactions   • Amoxicillin Swelling   • Erythromycin GI Intolerance   • Escitalopram Anxiety   • Prozac  [Fluoxetine Hcl] Rash       Family History   Problem Relation Age of Onset   • Coronary artery disease Other    • Diabetes Other    • Dementia Other    • Diabetes Mother    • Cancer Mother    • Heart disease Father    • Liver disease Father    • Hypertension Father    • Cancer Brother        Cancer-related family history includes Cancer in her brother and mother.    Social History     Tobacco Use   • Smoking status: Never Smoker   • Smokeless tobacco: Never Used   Substance Use Topics   • Alcohol use: Yes     Frequency: Never     Drinks per session: 1 or 2     Binge frequency: Never     Comment: rarely   • Drug use: No       I have reviewed the history of present illness, past medical history, family history, social history, lab results, all notes and other records since the patient was last seen on 7/8/2019    SUBJECTIVE:    Patient is in office for follow-up.  Has back pain doing well otherwise no fevers night sweats or weight loss      ROS:       Review of Systems   Constitutional: Negative for fever.   HENT: Negative for nosebleeds and trouble swallowing.    Eyes: Negative for visual disturbance.   Respiratory: Negative for cough, shortness of breath and wheezing.    Cardiovascular: Negative for chest pain.   Gastrointestinal: Negative for abdominal pain and blood in stool.   Endocrine: Negative for cold  "intolerance.   Genitourinary: Negative for dysuria and hematuria.   Musculoskeletal: Negative for joint swelling.   Skin: Negative for rash.   Allergic/Immunologic: Negative for environmental allergies.   Neurological: Negative for seizures.   Hematological: Does not bruise/bleed easily.   Psychiatric/Behavioral: The patient is not nervous/anxious.          Objective:       Vitals:    01/22/20 1036   BP: 140/79   Pulse: 75   Resp: 24   Temp: 97.7 °F (36.5 °C)   Weight: 92.2 kg (203 lb 3.2 oz)   Height: 149.9 cm (59\")         PHYSICAL EXAM:       Physical Exam   Constitutional: She is oriented to person, place, and time. No distress.   Moderately built well-nourished   HENT:   Head: Normocephalic and atraumatic.   Eyes: Conjunctivae and EOM are normal. Right eye exhibits no discharge. Left eye exhibits no discharge. No scleral icterus.   Neck: Normal range of motion. Neck supple. No thyromegaly present.   Cardiovascular: Normal rate, regular rhythm and normal heart sounds. Exam reveals no gallop and no friction rub.   Pulmonary/Chest: Effort normal. No stridor. No respiratory distress. She has no wheezes.   Abdominal: Soft. Bowel sounds are normal. She exhibits no mass. There is no tenderness. There is no rebound and no guarding.   Soft distended no palpable organomegaly   Musculoskeletal: Normal range of motion. She exhibits no tenderness.   Trace bilateral lower extremity edema   Lymphadenopathy:     She has no cervical adenopathy.   Neurological: She is alert and oriented to person, place, and time. She exhibits normal muscle tone.   Skin: Skin is warm. No rash noted. She is not diaphoretic. No erythema.   Psychiatric: She has a normal mood and affect. Her behavior is normal.   Nursing note and vitals reviewed.       RECENT LABS:     WBC   Date Value Ref Range Status   01/22/2020 6.77 3.40 - 10.80 10*3/mm3 Final     RBC   Date Value Ref Range Status   01/22/2020 3.99 3.77 - 5.28 10*6/mm3 Final     Hemoglobin "   Date Value Ref Range Status   01/22/2020 14.4 12.0 - 15.9 g/dL Final     Hematocrit   Date Value Ref Range Status   01/22/2020 39.9 34.0 - 46.6 % Final     MCV   Date Value Ref Range Status   01/22/2020 100.0 (H) 79.0 - 97.0 fL Final     MCH   Date Value Ref Range Status   01/22/2020 36.1 (H) 26.6 - 33.0 pg Final     MCHC   Date Value Ref Range Status   01/22/2020 36.1 (H) 31.5 - 35.7 g/dL Final     RDW   Date Value Ref Range Status   01/22/2020 12.4 12.3 - 15.4 % Final     RDW-SD   Date Value Ref Range Status   01/22/2020 46.4 37.0 - 54.0 fl Final     MPV   Date Value Ref Range Status   01/22/2020 8.7 6.0 - 12.0 fL Final     Platelets   Date Value Ref Range Status   01/22/2020 418 140 - 450 10*3/mm3 Final     Neutrophil %   Date Value Ref Range Status   01/22/2020 58.3 42.7 - 76.0 % Final     Lymphocyte %   Date Value Ref Range Status   01/22/2020 31.2 19.6 - 45.3 % Final     Monocyte %   Date Value Ref Range Status   01/22/2020 9.0 5.0 - 12.0 % Final     Eosinophil %   Date Value Ref Range Status   01/22/2020 1.2 0.3 - 6.2 % Final     Basophil %   Date Value Ref Range Status   01/22/2020 0.3 0.0 - 1.5 % Final     Neutrophils, Absolute   Date Value Ref Range Status   01/22/2020 3.95 1.70 - 7.00 10*3/mm3 Final     Lymphocytes, Absolute   Date Value Ref Range Status   01/22/2020 2.11 0.70 - 3.10 10*3/mm3 Final     Monocytes, Absolute   Date Value Ref Range Status   01/22/2020 0.61 0.10 - 0.90 10*3/mm3 Final     Eosinophils, Absolute   Date Value Ref Range Status   01/22/2020 0.08 0.00 - 0.40 10*3/mm3 Final     Basophils, Absolute   Date Value Ref Range Status   01/22/2020 0.02 0.00 - 0.20 10*3/mm3 Final     nRBC   Date Value Ref Range Status   03/05/2019 0 0 /100[WBCs] Final       Lab Results   Component Value Date    GLUCOSE 112 (H) 07/10/2019    BUN 22 (H) 07/10/2019    CREATININE 0.80 07/10/2019    EGFRIFNONA 74 07/10/2019    EGFRIFAFRI >60 02/26/2019    BCR 27.5 (H) 07/10/2019    K 3.8 07/10/2019    CO2 23.0  07/10/2019    CALCIUM 9.5 07/10/2019    ALBUMIN 3.90 07/10/2019    LABIL2 1.5 03/13/2019    AST 14 (L) 07/10/2019    ALT 17 07/10/2019         Assessment/Plan      ASSESSMENT:     1. Hereditary hemochromatosis  2. Iron overload  3. Macrocytosis  4. ECOG 1    PLAN:      1. Reviewed the laboratory results with the patient iron studies from January 2020 shows increased ferritin level and iron level.  Patient does admit to hemochromatosis but only homozygous with mutation H63D which does not manifest hemochromatosis disease.   2. Phlebotomy is not indicated at this time.    3. No evidence of iron overload at this time no phlebotomies.  3.   Normal B12 macrocytosis is improving  4.   Check hemoglobin A1c  4.   I will see her back in my office in 6 months with prior iron studies.  I have reviewed labs results, imaging, vitals, and medications with the patient today.     I counselled Merissa of the risks of continuing to use tobacco and cessation.                This report was compiled using Dragon voice recognition software. I have made every effort to proof read this document; however, typographical errors may persist.

## 2020-02-11 ENCOUNTER — OFFICE VISIT (OUTPATIENT)
Dept: NEUROLOGY | Facility: CLINIC | Age: 55
End: 2020-02-11

## 2020-02-11 ENCOUNTER — TELEPHONE (OUTPATIENT)
Dept: PSYCHIATRY | Facility: CLINIC | Age: 55
End: 2020-02-11

## 2020-02-11 VITALS
BODY MASS INDEX: 41.77 KG/M2 | HEIGHT: 59 IN | WEIGHT: 207.2 LBS | HEART RATE: 73 BPM | SYSTOLIC BLOOD PRESSURE: 124 MMHG | DIASTOLIC BLOOD PRESSURE: 77 MMHG

## 2020-02-11 DIAGNOSIS — G47.33 OBSTRUCTIVE SLEEP APNEA: Primary | ICD-10-CM

## 2020-02-11 DIAGNOSIS — E66.01 CLASS 3 SEVERE OBESITY DUE TO EXCESS CALORIES WITH BODY MASS INDEX (BMI) OF 40.0 TO 44.9 IN ADULT, UNSPECIFIED WHETHER SERIOUS COMORBIDITY PRESENT (HCC): ICD-10-CM

## 2020-02-11 DIAGNOSIS — F41.8 MIXED ANXIETY DEPRESSIVE DISORDER: Primary | ICD-10-CM

## 2020-02-11 PROCEDURE — 99213 OFFICE O/P EST LOW 20 MIN: CPT | Performed by: PSYCHIATRY & NEUROLOGY

## 2020-02-11 NOTE — TELEPHONE ENCOUNTER
PATIENT STATES THAT TRINTELLIX IS NOT WORKING FOR HER AND GIVING HER RESTLESS LEG. SHE WOULD LIKE TO SWITCH TO SOMETHING ELSE IF POSSIBLE

## 2020-02-11 NOTE — PROGRESS NOTES
Sleep medicine follow-up visit    Merissa Yusuf   1965  54 y.o. female   DATE OF SERVICE: 2/11/2020     Yearly f/u for central sleep apnea,, complex sleep apnea on auto bipap SV.     pt. doing well with pap compliance. Patient uses a nasal mask mask and goes through [employer] for supplies. on auto bipapsv , max pressure 25, epap 10-15, ps 0-15, auto rate      Mild memory impairment, improved with pap therapy but worse recently with stress, still forgets easy, patient mother has alzheimers. unchanged    On NPSG at [facility] , 08/15/2017 patient had Severe obstructive sleep apnea syndrome with apnea-hypopnea index of 28.8 per sleep hour, minimum SpO2 of 85%    The compliance data reviewed and the patient is on PAP therapy at 10-15epap ps 0-15. Max 25 cm/H2O     compliance 16% usage for more than 4 hours with an average usage of 2 hours 36 minutes. AHI down to 3.4   .  The patient's hypersomnia also resolved with [place] Sleepiness Scale score of 3 with CPAP therapy.    Review of Systems   Constitutional: Negative for appetite change and fatigue.   HENT: Negative for sinus pressure and sinus pain.    Eyes: Negative for redness and itching.   Respiratory: Negative for cough and shortness of breath.    Cardiovascular: Negative for chest pain and palpitations.   Gastrointestinal: Negative for constipation and diarrhea.   Endocrine: Negative for cold intolerance and heat intolerance.   Genitourinary: Negative for difficulty urinating and frequency.   Musculoskeletal: Positive for back pain. Negative for neck pain.   Allergic/Immunologic: Negative for environmental allergies.   Neurological: Positive for numbness. Negative for dizziness, tremors, seizures, syncope, facial asymmetry, speech difficulty, weakness, light-headedness and headaches.   Psychiatric/Behavioral: Negative for agitation and confusion.     I reviewed and addressed ROS entered by MA.        The following portions of the patient's history were reviewed and  updated as appropriate: allergies, current medications, past family history, past medical history, past social history, past surgical history and problem list.      Family History   Problem Relation Age of Onset   • Coronary artery disease Other    • Diabetes Other    • Dementia Other    • Diabetes Mother    • Cancer Mother    • Heart disease Father    • Liver disease Father    • Hypertension Father    • Cancer Brother        Past Medical History:   Diagnosis Date   • Arthritis    • COPD (chronic obstructive pulmonary disease) (CMS/HCC)    • Depression    • Hereditary hemochromatosis (CMS/HCC)    • Iron overload    • Macrocytosis    • Seasonal allergies    • Sleep apnea        Social History     Socioeconomic History   • Marital status:      Spouse name: Not on file   • Number of children: Not on file   • Years of education: Not on file   • Highest education level: Not on file   Tobacco Use   • Smoking status: Never Smoker   • Smokeless tobacco: Never Used   Substance and Sexual Activity   • Alcohol use: Yes     Frequency: Never     Drinks per session: 1 or 2     Binge frequency: Never     Comment: rarely   • Drug use: No   • Sexual activity: Never         Current Outpatient Medications:   •  acetaminophen (TYLENOL) 500 MG tablet, TYLENOL EXTRA STRENGTH 500 MG TABS, Disp: , Rfl:   •  busPIRone (BUSPAR) 10 MG tablet, Take one tablet twice a day as needed for anxiety, Disp: 60 tablet, Rfl: 3  •  CBD (cannabidiol) oral oil, Take  by mouth., Disp: , Rfl:   •  Cholecalciferol (VITAMIN D-3) 5000 units tablet, Daily., Disp: , Rfl:   •  cyanocobalamin (VITAMIN B-12) 500 MCG tablet, B-12 500 MCG TABS, Disp: , Rfl:   •  diclofenac (VOLTAREN) 1 % gel gel, Apply 4 g topically to the appropriate area as directed 4 (Four) Times a Day As Needed., Disp: , Rfl:   •  raNITIdine (ZANTAC) 150 MG tablet, Daily., Disp: , Rfl:   •  Sodium Bicarbonate-Citric Acid 3664-0053 MG effervescent tablet, KAYKAY-SELTZER HEARTBURN 8268-7520 MG  TBEF, Disp: , Rfl:   •  Vortioxetine HBr (TRINTELLIX) 5 MG tablet, Take 5 mg by mouth Daily., Disp: , Rfl:     Allergies   Allergen Reactions   • Amoxicillin Swelling   • Erythromycin GI Intolerance   • Escitalopram Anxiety   • Prozac  [Fluoxetine Hcl] Rash        PHYSICAL EXAMINATION:  Vitals:    02/11/20 1538   BP: 124/77   Pulse: 73      Body mass index is 41.85 kg/m².       HEENT: Normal.    CARDIAC: Normal.   LUNGS: Clear to auscultation.   EXTREMITIES: No edema.     EPWORTH SLEEPINESS SCALE  Sitting and reading  0  WatchingTV  3  Sitting, inactive, in a public place  0  As a passenger in a car for 1 hour w/o a break  0  Lying down to rest in the afternoon  0  Sitting and talking to someone  0  Sitting quietly after a lunch  0  In a car, while stopped for traffic or a light  0  Total 3          IMPRESSION:     Patient with complex sleep apnea, with good control of events with bipap SV  Pt encouraged to use at least 4 hours per night    Obesity  Pt encouraged to lose weight    Fu in one year    This document has been electronically signed by Joseph Seipel, MD on February 11, 2020 4:36 PM

## 2020-02-12 RX ORDER — DESVENLAFAXINE 25 MG/1
25 TABLET, EXTENDED RELEASE ORAL EVERY MORNING
Qty: 30 TABLET | Refills: 3 | Status: SHIPPED | OUTPATIENT
Start: 2020-02-12 | End: 2020-06-02

## 2020-02-12 NOTE — TELEPHONE ENCOUNTER
She can stop the Trintellix.  I have sent a prescription of Pristiq 25 mg to take every morning instead.  After a month, if she has not noticed any improvement, she can call and we can increase it to 50 mg

## 2020-02-13 ENCOUNTER — TELEPHONE (OUTPATIENT)
Dept: NEUROLOGY | Facility: CLINIC | Age: 55
End: 2020-02-13

## 2020-02-13 DIAGNOSIS — G47.33 OBSTRUCTIVE SLEEP APNEA: Primary | ICD-10-CM

## 2020-02-13 NOTE — TELEPHONE ENCOUNTER
LEFT MESSAGE TO STOP TRINTELLIX AND START PRISTIQ THAT WAS SENT TO PHARMACY AND CAN BE INCREASED IN A COUPLE WEEKS IF NEEDED

## 2020-02-28 ENCOUNTER — TELEPHONE (OUTPATIENT)
Dept: SURGERY | Facility: CLINIC | Age: 55
End: 2020-02-28

## 2020-02-28 NOTE — TELEPHONE ENCOUNTER
Thank you.    Contacted pt and explained that Dr Molina would like to re-eval before scheduled due to the 5 month time frame.      Patient was agreeable.  Scheduled for Monday 3/2/20

## 2020-02-28 NOTE — TELEPHONE ENCOUNTER
I would like to see her back in my office to re-evaluate her umbilical hernia prior to proceeding to the OR, since it has been 5 months since I last saw her.

## 2020-02-28 NOTE — TELEPHONE ENCOUNTER
Patient contacted the office asking to reschedule surgery.  Pt was scheduled for surgery Sept 2019, but had to cancel.      Is it ok to rescheduled surgery, or would you like to see the patient in the office prior to scheduling?       Please note, a new surgery case request will have to be re-entered.     Please advice.    Thank you

## 2020-03-02 ENCOUNTER — HOSPITAL ENCOUNTER (OUTPATIENT)
Facility: HOSPITAL | Age: 55
Setting detail: HOSPITAL OUTPATIENT SURGERY
End: 2020-03-02
Attending: SURGERY | Admitting: SURGERY

## 2020-03-02 ENCOUNTER — TELEPHONE (OUTPATIENT)
Dept: SURGERY | Facility: CLINIC | Age: 55
End: 2020-03-02

## 2020-03-02 ENCOUNTER — OFFICE VISIT (OUTPATIENT)
Dept: SURGERY | Facility: CLINIC | Age: 55
End: 2020-03-02

## 2020-03-02 VITALS
OXYGEN SATURATION: 98 % | DIASTOLIC BLOOD PRESSURE: 77 MMHG | WEIGHT: 205.8 LBS | BODY MASS INDEX: 41.49 KG/M2 | HEART RATE: 60 BPM | TEMPERATURE: 97 F | SYSTOLIC BLOOD PRESSURE: 135 MMHG | HEIGHT: 59 IN

## 2020-03-02 DIAGNOSIS — K42.9 UMBILICAL HERNIA WITHOUT OBSTRUCTION AND WITHOUT GANGRENE: Primary | ICD-10-CM

## 2020-03-02 PROBLEM — H25.813 COMBINED FORMS OF AGE-RELATED CATARACT, BILATERAL: Status: ACTIVE | Noted: 2020-03-02

## 2020-03-02 PROBLEM — H52.4 BILATERAL PRESBYOPIA: Status: ACTIVE | Noted: 2020-03-02

## 2020-03-02 PROBLEM — H16.421 CORNEAL PANNUS OF RIGHT EYE: Status: ACTIVE | Noted: 2020-03-02

## 2020-03-02 PROBLEM — H16.429 CORNEAL PANNUS: Status: ACTIVE | Noted: 2017-11-21

## 2020-03-02 PROBLEM — H02.883 MEIBOMIAN GLAND DYSFUNCTION (MGD) OF BOTH EYES: Status: ACTIVE | Noted: 2017-11-21

## 2020-03-02 PROBLEM — H02.886 MEIBOMIAN GLAND DYSFUNCTION (MGD) OF BOTH EYES: Status: ACTIVE | Noted: 2017-11-21

## 2020-03-02 PROCEDURE — 99213 OFFICE O/P EST LOW 20 MIN: CPT | Performed by: SURGERY

## 2020-03-02 RX ORDER — CHLORHEXIDINE GLUCONATE 0.12 MG/ML
15 RINSE ORAL EVERY 12 HOURS SCHEDULED
Status: CANCELLED | OUTPATIENT
Start: 2020-03-02

## 2020-03-02 RX ORDER — SODIUM CHLORIDE 9 MG/ML
100 INJECTION, SOLUTION INTRAVENOUS CONTINUOUS
Status: CANCELLED | OUTPATIENT
Start: 2020-03-02

## 2020-03-02 RX ORDER — LORATADINE 10 MG/1
1 CAPSULE, LIQUID FILLED ORAL DAILY PRN
COMMUNITY
End: 2021-05-26

## 2020-03-04 NOTE — PROGRESS NOTES
GENERAL SURGERY PROGRESS NOTE    3/4/2020    Patient Care Team:  Melissa Duarte MD as PCP - General    Chief Complaint: Umbilical hernia    Subjective   HPI: Patient is a 54 y.o. female presents with a bulge which is been present in the supraumbilical area for the last 2 to 3 years.  Patient has noticed this bulge when she looks down towards her feet, but it was not necessarily bothering her until a few months ago.  She had been meaning to ask her primary care physician about this area, but continually forgot.  She reports that sometimes when she bends over this area causes her discomfort.  She also was concerned because a few weeks ago she had a episode of nausea, vomiting, and watery diarrhea.  She was told by her primary care physician that this could be a ventral hernia and that she should see a surgeon to discuss surgical intervention for the area.  In her bellybutton, the patient also has a swelling protrusion which is less noticeable due to her body habitus.  This area is painful to palpation.  A CT scan of the abdomen and pelvis was performed which demonstrated an approximately 3 cm umbilical hernia.  The patient was scheduled for surgery, but had to reschedule due to personal issues.  It had been greater than 3 months since I last saw the patient so I asked her to please return to the office so that we could further discuss her surgery in detail in a face-to-face manner.    Interval History:   Patient has not had any new symptoms since she last saw me.  No evidence of obstruction, the area does not appear to have gotten any larger to her.    Objective     Vital Signs       Physical Exam   Constitutional: She is oriented to person, place, and time. She appears well-developed and well-nourished.   HENT:   Head: Normocephalic and atraumatic.   Eyes: Pupils are equal, round, and reactive to light.   Neck: Normal range of motion.   Cardiovascular: Normal rate and regular rhythm.   Pulmonary/Chest: Effort  normal and breath sounds normal.   Abdominal: Soft. She exhibits no distension. There is no tenderness. A hernia is present.   Abdomen is obese, umbilical hernia mildly tender to palpation   Musculoskeletal: Normal range of motion. She exhibits no edema.   Lymphadenopathy:     She has no cervical adenopathy.     She has no axillary adenopathy.   Neurological: She is alert and oriented to person, place, and time.   Skin: Skin is warm and dry. No rash noted.   Psychiatric: She has a normal mood and affect.   Vitals reviewed.       Results Review:    Lab Results (last 24 hours)     ** No results found for the last 24 hours. **        Imaging Results (Last 24 Hours)     ** No results found for the last 24 hours. **           I have reviewed the above results and noted them below    Medication Review:    Current Outpatient Medications:   •  acetaminophen (TYLENOL) 500 MG tablet, TYLENOL EXTRA STRENGTH 500 MG TABS, Disp: , Rfl:   •  busPIRone (BUSPAR) 10 MG tablet, Take one tablet twice a day as needed for anxiety, Disp: 60 tablet, Rfl: 3  •  Cholecalciferol (VITAMIN D-3) 5000 units tablet, Daily., Disp: , Rfl:   •  cyanocobalamin (VITAMIN B-12) 500 MCG tablet, B-12 500 MCG TABS, Disp: , Rfl:   •  Desvenlafaxine Succinate ER 25 MG tablet sustained-release 24 hour, Take 1 tablet by mouth Every Morning., Disp: 30 tablet, Rfl: 3  •  diclofenac (VOLTAREN) 1 % gel gel, Apply 4 g topically to the appropriate area as directed 4 (Four) Times a Day As Needed., Disp: , Rfl:   •  Loratadine 10 MG capsule, Take  by mouth., Disp: , Rfl:   •  raNITIdine (ZANTAC) 150 MG tablet, Daily., Disp: , Rfl:   •  Sodium Bicarbonate-Citric Acid 5381-7065 MG effervescent tablet, KAYKAY-SELTZER HEARTBURN 4016-8105 MG TBEF, Disp: , Rfl:   •  CBD (cannabidiol) oral oil, Take  by mouth., Disp: , Rfl:     Assessment/Plan     Active Problems:  Umbilical hernia    Patient was extremely concerned about the use of mesh when I met her the first time.  We  further discussed the risk, benefits, and alternatives to hernia repair, and the indications for possible mesh placement.  It is unclear to me right now whether or not I will need to use mesh however we did discuss that if the size of her hernia is greater than 2 cm there is a benefit to using mesh to bolster the repair in order to prevent recurrence in the future.  The patient understands this and agrees to proceed to the operating room for open umbilical hernia repair with possible mesh.  The risk, benefits, and alternatives were discussed which include bleeding, infection, pain, recurrence, and mesh infection.    Reuben Molina MD  03/04/20  3:03 PM

## 2020-03-11 ENCOUNTER — HOSPITAL ENCOUNTER (OUTPATIENT)
Dept: GENERAL RADIOLOGY | Facility: HOSPITAL | Age: 55
Discharge: HOME | End: 2020-03-11
Admitting: SURGERY

## 2020-03-11 ENCOUNTER — APPOINTMENT (OUTPATIENT)
Dept: PREADMISSION TESTING | Facility: HOSPITAL | Age: 55
End: 2020-03-11

## 2020-03-11 VITALS
DIASTOLIC BLOOD PRESSURE: 81 MMHG | WEIGHT: 209 LBS | TEMPERATURE: 97.9 F | SYSTOLIC BLOOD PRESSURE: 119 MMHG | BODY MASS INDEX: 42.13 KG/M2 | HEIGHT: 59 IN | RESPIRATION RATE: 14 BRPM | OXYGEN SATURATION: 96 % | HEART RATE: 75 BPM

## 2020-03-11 DIAGNOSIS — K42.9 UMBILICAL HERNIA WITHOUT OBSTRUCTION AND WITHOUT GANGRENE: ICD-10-CM

## 2020-03-11 LAB
ABO GROUP BLD: NORMAL
ALBUMIN SERPL-MCNC: 4.2 G/DL (ref 3.5–5.2)
ALBUMIN/GLOB SERPL: 1.4 G/DL
ALP SERPL-CCNC: 73 U/L (ref 39–117)
ALT SERPL W P-5'-P-CCNC: 17 U/L (ref 1–33)
ANION GAP SERPL CALCULATED.3IONS-SCNC: 12 MMOL/L (ref 5–15)
AST SERPL-CCNC: 19 U/L (ref 1–32)
BASOPHILS # BLD AUTO: 0.1 10*3/MM3 (ref 0–0.2)
BASOPHILS NFR BLD AUTO: 1.2 % (ref 0–1.5)
BILIRUB SERPL-MCNC: 0.3 MG/DL (ref 0.2–1.2)
BILIRUB UR QL STRIP: NEGATIVE
BLD GP AB SCN SERPL QL: NEGATIVE
BUN BLD-MCNC: 13 MG/DL (ref 6–20)
BUN/CREAT SERPL: 18.3 (ref 7–25)
CALCIUM SPEC-SCNC: 9.9 MG/DL (ref 8.6–10.5)
CHLORIDE SERPL-SCNC: 103 MMOL/L (ref 98–107)
CLARITY UR: ABNORMAL
CO2 SERPL-SCNC: 24 MMOL/L (ref 22–29)
COLOR UR: YELLOW
CREAT BLD-MCNC: 0.71 MG/DL (ref 0.57–1)
DEPRECATED RDW RBC AUTO: 45.9 FL (ref 37–54)
EOSINOPHIL # BLD AUTO: 0.2 10*3/MM3 (ref 0–0.4)
EOSINOPHIL NFR BLD AUTO: 3.5 % (ref 0.3–6.2)
ERYTHROCYTE [DISTWIDTH] IN BLOOD BY AUTOMATED COUNT: 12.8 % (ref 12.3–15.4)
GFR SERPL CREATININE-BSD FRML MDRD: 85 ML/MIN/1.73
GLOBULIN UR ELPH-MCNC: 2.9 GM/DL
GLUCOSE BLD-MCNC: 91 MG/DL (ref 65–99)
GLUCOSE UR STRIP-MCNC: NEGATIVE MG/DL
HCT VFR BLD AUTO: 41.1 % (ref 34–46.6)
HGB BLD-MCNC: 14.5 G/DL (ref 12–15.9)
HGB UR QL STRIP.AUTO: NEGATIVE
KETONES UR QL STRIP: NEGATIVE
LEUKOCYTE ESTERASE UR QL STRIP.AUTO: NEGATIVE
LYMPHOCYTES # BLD AUTO: 1.9 10*3/MM3 (ref 0.7–3.1)
LYMPHOCYTES NFR BLD AUTO: 31.6 % (ref 19.6–45.3)
MCH RBC QN AUTO: 36.2 PG (ref 26.6–33)
MCHC RBC AUTO-ENTMCNC: 35.2 G/DL (ref 31.5–35.7)
MCV RBC AUTO: 102.8 FL (ref 79–97)
MONOCYTES # BLD AUTO: 0.5 10*3/MM3 (ref 0.1–0.9)
MONOCYTES NFR BLD AUTO: 8.9 % (ref 5–12)
NEUTROPHILS # BLD AUTO: 3.3 10*3/MM3 (ref 1.7–7)
NEUTROPHILS NFR BLD AUTO: 54.8 % (ref 42.7–76)
NITRITE UR QL STRIP: NEGATIVE
NRBC BLD AUTO-RTO: 0 /100 WBC (ref 0–0.2)
PH UR STRIP.AUTO: 5.5 [PH] (ref 5–8)
PLATELET # BLD AUTO: 427 10*3/MM3 (ref 140–450)
PMV BLD AUTO: 7.2 FL (ref 6–12)
POTASSIUM BLD-SCNC: 4.3 MMOL/L (ref 3.5–5.2)
PROT SERPL-MCNC: 7.1 G/DL (ref 6–8.5)
PROT UR QL STRIP: NEGATIVE
RBC # BLD AUTO: 4 10*6/MM3 (ref 3.77–5.28)
RH BLD: POSITIVE
SODIUM BLD-SCNC: 139 MMOL/L (ref 136–145)
SP GR UR STRIP: >=1.03 (ref 1–1.03)
T&S EXPIRATION DATE: NORMAL
UROBILINOGEN UR QL STRIP: ABNORMAL
WBC NRBC COR # BLD: 5.9 10*3/MM3 (ref 3.4–10.8)

## 2020-03-11 PROCEDURE — 85025 COMPLETE CBC W/AUTO DIFF WBC: CPT | Performed by: SURGERY

## 2020-03-11 PROCEDURE — 93010 ELECTROCARDIOGRAM REPORT: CPT | Performed by: INTERNAL MEDICINE

## 2020-03-11 PROCEDURE — 86900 BLOOD TYPING SEROLOGIC ABO: CPT | Performed by: SURGERY

## 2020-03-11 PROCEDURE — 86901 BLOOD TYPING SEROLOGIC RH(D): CPT | Performed by: SURGERY

## 2020-03-11 PROCEDURE — 86850 RBC ANTIBODY SCREEN: CPT | Performed by: SURGERY

## 2020-03-11 PROCEDURE — 36415 COLL VENOUS BLD VENIPUNCTURE: CPT

## 2020-03-11 PROCEDURE — 86900 BLOOD TYPING SEROLOGIC ABO: CPT

## 2020-03-11 PROCEDURE — 80053 COMPREHEN METABOLIC PANEL: CPT | Performed by: SURGERY

## 2020-03-11 PROCEDURE — 81003 URINALYSIS AUTO W/O SCOPE: CPT | Performed by: SURGERY

## 2020-03-11 PROCEDURE — 71046 X-RAY EXAM CHEST 2 VIEWS: CPT

## 2020-03-11 PROCEDURE — 86901 BLOOD TYPING SEROLOGIC RH(D): CPT

## 2020-03-11 PROCEDURE — 93005 ELECTROCARDIOGRAM TRACING: CPT

## 2020-03-12 ENCOUNTER — TELEPHONE (OUTPATIENT)
Dept: SURGERY | Facility: CLINIC | Age: 55
End: 2020-03-12

## 2020-03-12 NOTE — TELEPHONE ENCOUNTER
Patient had pre op testing yesterday for Umbilical hernia repair on 03/19/20. Her PCP called her and told her that her EKG was abnormal and that she needed to see her cardiologist for clearance. She wants to know if we agree with that. I told patient she would need to be cleared bu cardiology before surgery. She will call to get an appointment today.

## 2020-03-13 ENCOUNTER — TELEPHONE (OUTPATIENT)
Dept: SURGERY | Facility: CLINIC | Age: 55
End: 2020-03-13

## 2020-03-13 NOTE — TELEPHONE ENCOUNTER
Spoke with pt.  She has asked to cancel her surgery for now.  She is still working on getting clearance for surgery.  Patient states that she wants to wait until the Virus is better in the area.     Patient will call back to reschedule.

## 2020-03-18 ENCOUNTER — OFFICE VISIT (OUTPATIENT)
Dept: PSYCHIATRY | Facility: CLINIC | Age: 55
End: 2020-03-18

## 2020-03-18 DIAGNOSIS — F41.8 MIXED ANXIETY DEPRESSIVE DISORDER: ICD-10-CM

## 2020-03-18 PROCEDURE — 99213 OFFICE O/P EST LOW 20 MIN: CPT | Performed by: PHYSICIAN ASSISTANT

## 2020-03-18 RX ORDER — BUSPIRONE HYDROCHLORIDE 15 MG/1
TABLET ORAL
Qty: 90 TABLET | Refills: 2 | Status: SHIPPED | OUTPATIENT
Start: 2020-03-18 | End: 2020-09-15

## 2020-03-18 NOTE — PROGRESS NOTES
Subjective   Merissa Yusuf is a 54 y.o.white female who presents today for follow up    Chief Complaint:  Anxiety and depression    History of Present Illness:   Feels overwhelmed with caring for everyone else, her , her grandkids while her daughter works from home during COVID, her mother  Mom has Alzheimer's demenita, patient lives the closest to her, has 6 siblings  Anxiety 6/10, buspar does help  Depression 5/10  She is tolerating Pristiq 25mg well, does not want to increase, not crying like she was  Energy and motivation have improved.  Denies SI/HI    The following portions of the patient's history were reviewed and updated as appropriate: allergies, current medications, past family history, past medical history, past social history, past surgical history and problem list.    PAST PSYCHIATRIC HISTORY  Axis I  Affective/Bipoloar Disorder, Anxiety/Panic Disorder  Axis II  None    PAST OUTPATIENT TREATMENT  Diagnosis treated:  Affective Disorder, Anxiety/Panic Disorder  Treatment Type:  Family Therapy, Medication Management  No hospitalizations  Prior Psychiatric Medications:  Lexapro increased anxiety  Prozac, hives  Trintellix helped but gave her restless legs  Buspar   Viibryd, could not take  Pristiq  Support Groups:  None  Sequelae Of Mental Disorder:  social isolation, family disruption, emotional distress      Interval History  No Change    Side Effects  Restless legs    Past psychiatric history was reviewed and compared to 1/15/2020 visit and appropriate updates were made.    Past Medical History:  Past Medical History:   Diagnosis Date   • Arthritis    • COPD (chronic obstructive pulmonary disease) (CMS/HCC)    • Depression    • GERD (gastroesophageal reflux disease)    • Hereditary hemochromatosis (CMS/HCC)    • Iron overload    • Macrocytosis    • Seasonal allergies    • Sleep apnea        Social History:  Social History     Socioeconomic History   • Marital status:      Spouse name:  Not on file   • Number of children: Not on file   • Years of education: Not on file   • Highest education level: Not on file   Tobacco Use   • Smoking status: Never Smoker   • Smokeless tobacco: Never Used   Substance and Sexual Activity   • Alcohol use: Yes     Frequency: Never     Drinks per session: 1 or 2     Binge frequency: Never     Comment: rarely   • Drug use: No   • Sexual activity: Defer       Family History:  Family History   Problem Relation Age of Onset   • Coronary artery disease Other    • Diabetes Other    • Dementia Other    • Diabetes Mother    • Cancer Mother    • Heart disease Father    • Liver disease Father    • Hypertension Father    • Cancer Brother        Past Surgical History:  Past Surgical History:   Procedure Laterality Date   • BLADDER SUSPENSION     • BREAST BIOPSY Right    • SUBTOTAL HYSTERECTOMY     • TUBAL ABDOMINAL LIGATION     • WISDOM TOOTH EXTRACTION         Problem List:  Patient Active Problem List   Diagnosis   • Abnormal complete blood count   • Anxiety state   • Elevated blood pressure reading without diagnosis of hypertension   • Hearing deficit   • Hyperlipidemia   • Knee pain   • Low back pain   • Lung mass   • Memory loss   • Mixed anxiety depressive disorder   • Obesity with body mass index 30 or greater   • Obesity   • Other abnormal and inconclusive findings on diagnostic imaging of breast   • Obstructive sleep apnea   • Thrombocytosis (CMS/HCC)   • Vitamin D deficiency   • Umbilical hernia without obstruction and without gangrene   • Combined forms of age-related cataract, bilateral   • Convergence insufficiency   • Corneal pannus of right eye   • Corneal pannus   • Meibomian gland dysfunction (MGD) of both eyes   • Bilateral presbyopia   • Presbyopia   • Vitreous floaters       Allergy:   Allergies   Allergen Reactions   • Amoxicillin Swelling   • Erythromycin GI Intolerance   • Escitalopram Anxiety   • Prozac  [Fluoxetine Hcl] Rash   • Penicillin G Rash         Discontinued Medications:  Medications Discontinued During This Encounter   Medication Reason   • busPIRone (BUSPAR) 10 MG tablet Reorder       Current Medications:   Current Outpatient Medications   Medication Sig Dispense Refill   • acetaminophen (TYLENOL) 500 MG tablet TYLENOL EXTRA STRENGTH 500 MG TABS     • busPIRone (BUSPAR) 15 MG tablet Take one tablet three times a day for anxiety 90 tablet 2   • Calcium Carbonate Antacid (TUMS PO) Take 2,000 mg by mouth At Night As Needed.     • CBD (cannabidiol) oral oil Take  by mouth.     • Cholecalciferol (VITAMIN D-3) 5000 units tablet Daily.     • cyanocobalamin (VITAMIN B-12) 500 MCG tablet B-12 500 MCG TABS     • Desvenlafaxine Succinate ER 25 MG tablet sustained-release 24 hour Take 1 tablet by mouth Every Morning. 30 tablet 3   • diclofenac (VOLTAREN) 1 % gel gel Apply 4 g topically to the appropriate area as directed 4 (Four) Times a Day As Needed.     • Loratadine 10 MG capsule Take 1 tablet by mouth Daily As Needed.     • Phenylephrine-APAP-guaiFENesin (MUCINEX FAST-MAX COLD & SINUS) -400 MG/20ML liquid Take 20 mL by mouth 2 (Two) Times a Day.     • raNITIdine (ZANTAC) 150 MG tablet Daily.     • Sodium Bicarbonate-Citric Acid 3544-8518 MG effervescent tablet KAYKAY-SELTZER HEARTBURN 7731-2406 MG TBEF       No current facility-administered medications for this visit.          Review of Symptoms:    Psychiatric/Behavioral: Negative for agitation, behavioral problems, confusion, decreased concentration, dysphoric mood, hallucinations, self-injury, sleep disturbance and suicidal ideas. The patient is nervous/anxious and is not hyperactive.        Physical Exam:   not currently breastfeeding.    Mental Status Exam:   Hygiene:   good  Cooperation:  Cooperative  Eye Contact:  Good  Psychomotor Behavior:  Appropriate  Affect:  Appropriate  Mood: anxious  Hopelessness: Denies  Speech:  Normal  Thought Process:  Goal directed  Thought Content:  Normal  Suicidal:   None  Homicidal:  None  Hallucinations:  None  Delusion:  None  Memory:  Intact  Orientation:  Person, Place, Time and Situation  Reliability:  good  Insight:  Good  Judgement:  Good  Impulse Control:  Good  Physical/Medical Issues:  No      Mental status exam was reviewed and compared to 1/15/2020 visit and no changes were necessary, the exam was the same.    PHQ-9 Depression Screening  Little interest or pleasure in doing things? 1   Feeling down, depressed, or hopeless? 2   Trouble falling or staying asleep, or sleeping too much? 2   Feeling tired or having little energy? 2   Poor appetite or overeating? 0   Feeling bad about yourself - or that you are a failure or have let yourself or your family down? 1   Trouble concentrating on things, such as reading the newspaper or watching television? 1   Moving or speaking so slowly that other people could have noticed? Or the opposite - being so fidgety or restless that you have been moving around a lot more than usual? 0   Thoughts that you would be better off dead, or of hurting yourself in some way? 0   PHQ-9 Total Score 9   If you checked off any problems, how difficult have these problems made it for you to do your work, take care of things at home, or get along with other people? Somewhat difficult           Never smoker    I advised Merissa of the risks of tobacco use.     Lab Results:   Appointment on 03/11/2020   Component Date Value Ref Range Status   • Color, UA 03/11/2020 Yellow  Yellow, Straw Final   • Appearance, UA 03/11/2020 Hazy* Clear Final    Result checked    • pH, UA 03/11/2020 5.5  5.0 - 8.0 Final   • Specific Gravity, UA 03/11/2020 >=1.030  1.005 - 1.030 Final   • Glucose, UA 03/11/2020 Negative  Negative Final   • Ketones, UA 03/11/2020 Negative  Negative Final   • Bilirubin, UA 03/11/2020 Negative  Negative Final   • Blood, UA 03/11/2020 Negative  Negative Final   • Protein, UA 03/11/2020 Negative  Negative Final   • Leuk Esterase, UA 03/11/2020  Negative  Negative Final   • Nitrite, UA 03/11/2020 Negative  Negative Final   • Urobilinogen, UA 03/11/2020 0.2 E.U./dL  0.2 - 1.0 E.U./dL Final   • Glucose 03/11/2020 91  65 - 99 mg/dL Final   • BUN 03/11/2020 13  6 - 20 mg/dL Final   • Creatinine 03/11/2020 0.71  0.57 - 1.00 mg/dL Final   • Sodium 03/11/2020 139  136 - 145 mmol/L Final   • Potassium 03/11/2020 4.3  3.5 - 5.2 mmol/L Final   • Chloride 03/11/2020 103  98 - 107 mmol/L Final   • CO2 03/11/2020 24.0  22.0 - 29.0 mmol/L Final   • Calcium 03/11/2020 9.9  8.6 - 10.5 mg/dL Final   • Total Protein 03/11/2020 7.1  6.0 - 8.5 g/dL Final   • Albumin 03/11/2020 4.20  3.50 - 5.20 g/dL Final   • ALT (SGPT) 03/11/2020 17  1 - 33 U/L Final   • AST (SGOT) 03/11/2020 19  1 - 32 U/L Final   • Alkaline Phosphatase 03/11/2020 73  39 - 117 U/L Final   • Total Bilirubin 03/11/2020 0.3  0.2 - 1.2 mg/dL Final   • eGFR Non African Amer 03/11/2020 85  >60 mL/min/1.73 Final   • Globulin 03/11/2020 2.9  gm/dL Final   • A/G Ratio 03/11/2020 1.4  g/dL Final   • BUN/Creatinine Ratio 03/11/2020 18.3  7.0 - 25.0 Final   • Anion Gap 03/11/2020 12.0  5.0 - 15.0 mmol/L Final   • ABO Type 03/11/2020 A   Final   • RH type 03/11/2020 Positive   Final   • Antibody Screen 03/11/2020 Negative   Final   • T&S Expiration Date 03/11/2020 3/21/2020 11:59:00 PM   Final   • WBC 03/11/2020 5.90  3.40 - 10.80 10*3/mm3 Final   • RBC 03/11/2020 4.00  3.77 - 5.28 10*6/mm3 Final   • Hemoglobin 03/11/2020 14.5  12.0 - 15.9 g/dL Final   • Hematocrit 03/11/2020 41.1  34.0 - 46.6 % Final   • MCV 03/11/2020 102.8* 79.0 - 97.0 fL Final   • MCH 03/11/2020 36.2* 26.6 - 33.0 pg Final   • MCHC 03/11/2020 35.2  31.5 - 35.7 g/dL Final   • RDW 03/11/2020 12.8  12.3 - 15.4 % Final   • RDW-SD 03/11/2020 45.9  37.0 - 54.0 fl Final   • MPV 03/11/2020 7.2  6.0 - 12.0 fL Final   • Platelets 03/11/2020 427  140 - 450 10*3/mm3 Final   • Neutrophil % 03/11/2020 54.8  42.7 - 76.0 % Final   • Lymphocyte % 03/11/2020 31.6  19.6  - 45.3 % Final   • Monocyte % 03/11/2020 8.9  5.0 - 12.0 % Final   • Eosinophil % 03/11/2020 3.5  0.3 - 6.2 % Final   • Basophil % 03/11/2020 1.2  0.0 - 1.5 % Final   • Neutrophils, Absolute 03/11/2020 3.30  1.70 - 7.00 10*3/mm3 Final   • Lymphocytes, Absolute 03/11/2020 1.90  0.70 - 3.10 10*3/mm3 Final   • Monocytes, Absolute 03/11/2020 0.50  0.10 - 0.90 10*3/mm3 Final   • Eosinophils, Absolute 03/11/2020 0.20  0.00 - 0.40 10*3/mm3 Final   • Basophils, Absolute 03/11/2020 0.10  0.00 - 0.20 10*3/mm3 Final   • nRBC 03/11/2020 0.0  0.0 - 0.2 /100 WBC Final   Office Visit on 01/22/2020   Component Date Value Ref Range Status   • WBC 01/22/2020 6.77  3.40 - 10.80 10*3/mm3 Final   • RBC 01/22/2020 3.99  3.77 - 5.28 10*6/mm3 Final   • Hemoglobin 01/22/2020 14.4  12.0 - 15.9 g/dL Final   • Hematocrit 01/22/2020 39.9  34.0 - 46.6 % Final   • MCV 01/22/2020 100.0* 79.0 - 97.0 fL Final   • MCH 01/22/2020 36.1* 26.6 - 33.0 pg Final   • MCHC 01/22/2020 36.1* 31.5 - 35.7 g/dL Final   • RDW 01/22/2020 12.4  12.3 - 15.4 % Final   • RDW-SD 01/22/2020 46.4  37.0 - 54.0 fl Final   • MPV 01/22/2020 8.7  6.0 - 12.0 fL Final   • Platelets 01/22/2020 418  140 - 450 10*3/mm3 Final   • Neutrophil % 01/22/2020 58.3  42.7 - 76.0 % Final   • Lymphocyte % 01/22/2020 31.2  19.6 - 45.3 % Final   • Monocyte % 01/22/2020 9.0  5.0 - 12.0 % Final   • Eosinophil % 01/22/2020 1.2  0.3 - 6.2 % Final   • Basophil % 01/22/2020 0.3  0.0 - 1.5 % Final   • Neutrophils, Absolute 01/22/2020 3.95  1.70 - 7.00 10*3/mm3 Final   • Lymphocytes, Absolute 01/22/2020 2.11  0.70 - 3.10 10*3/mm3 Final   • Monocytes, Absolute 01/22/2020 0.61  0.10 - 0.90 10*3/mm3 Final   • Eosinophils, Absolute 01/22/2020 0.08  0.00 - 0.40 10*3/mm3 Final   • Basophils, Absolute 01/22/2020 0.02  0.00 - 0.20 10*3/mm3 Final   Office Visit on 01/22/2020   Component Date Value Ref Range Status   • Hemoglobin A1C 01/22/2020 5.2  3.5 - 5.6 % Final   Lab on 01/08/2020   Component Date Value Ref  Range Status   • Ferritin 01/08/2020 304.40* 13.00 - 150.00 ng/mL Final   • Iron 01/08/2020 177* 37 - 145 mcg/dL Final   • Iron Saturation 01/08/2020 56* 20 - 50 % Final   • Transferrin 01/08/2020 212  200 - 360 mg/dL Final   • TIBC 01/08/2020 316  298 - 536 mcg/dL Final   • WBC 01/08/2020 6.61  3.40 - 10.80 10*3/mm3 Final   • RBC 01/08/2020 3.98  3.77 - 5.28 10*6/mm3 Final   • Hemoglobin 01/08/2020 13.9  12.0 - 15.9 g/dL Final   • Hematocrit 01/08/2020 41.2  34.0 - 46.6 % Final   • MCV 01/08/2020 103.5* 79.0 - 97.0 fL Final   • MCH 01/08/2020 34.9* 26.6 - 33.0 pg Final   • MCHC 01/08/2020 33.7  31.5 - 35.7 g/dL Final   • RDW 01/08/2020 12.1* 12.3 - 15.4 % Final   • RDW-SD 01/08/2020 45.3  37.0 - 54.0 fl Final   • MPV 01/08/2020 8.5  6.0 - 12.0 fL Final   • Platelets 01/08/2020 382  140 - 450 10*3/mm3 Final   • Neutrophil % 01/08/2020 51.5  42.7 - 76.0 % Final   • Lymphocyte % 01/08/2020 34.5  19.6 - 45.3 % Final   • Monocyte % 01/08/2020 10.1  5.0 - 12.0 % Final   • Eosinophil % 01/08/2020 3.0  0.3 - 6.2 % Final   • Basophil % 01/08/2020 0.9  0.0 - 1.5 % Final   • Neutrophils, Absolute 01/08/2020 3.40  1.70 - 7.00 10*3/mm3 Final   • Lymphocytes, Absolute 01/08/2020 2.28  0.70 - 3.10 10*3/mm3 Final   • Monocytes, Absolute 01/08/2020 0.67  0.10 - 0.90 10*3/mm3 Final   • Eosinophils, Absolute 01/08/2020 0.20  0.00 - 0.40 10*3/mm3 Final   • Basophils, Absolute 01/08/2020 0.06  0.00 - 0.20 10*3/mm3 Final       Assessment/Plan   Problems Addressed this Visit        Other    Mixed anxiety depressive disorder    Relevant Medications    busPIRone (BUSPAR) 15 MG tablet          Visit Diagnoses:    ICD-10-CM ICD-9-CM   1. Mixed anxiety depressive disorder F41.8 300.4       TREATMENT PLAN/GOALS: Continue supportive psychotherapy efforts and medications as indicated. Treatment and medication options discussed during today's visit. Patient ackowledged and verbally consented to continue with current treatment plan and was  educated on the importance of compliance with treatment and follow-up appointments.    MEDICATION ISSUES:  INSPECT reviewed as expected  Discussed medication options and treatment plan of prescribed medication as well as the risks, benefits, and side effects including potential falls, possible impaired driving and metabolic adversities among others. Patient is agreeable to call the office with any worsening of symptoms or onset of side effects. Patient is agreeable to call 911 or go to the nearest ER should he/she begin having SI/HI. No medication side effects or related complaints today.     Patient is doing better since starting Pristiq and BuSpar.  She does not want to increase the Pristiq dosage today.  She does think an increase of the BuSpar might help.  Continue Pristiq 25 mg daily, no refills needed.  Increase BuSpar to 15 mg tablets, take one tab 3 times daily    MEDS ORDERED DURING VISIT:  New Medications Ordered This Visit   Medications   • busPIRone (BUSPAR) 15 MG tablet     Sig: Take one tablet three times a day for anxiety     Dispense:  90 tablet     Refill:  2       Return in about 3 months (around 6/18/2020).         This document has been electronically signed by Laisha Mendiola PA-C  March 18, 2020 12:41

## 2020-03-19 ENCOUNTER — OFFICE VISIT (OUTPATIENT)
Dept: CARDIOLOGY | Facility: CLINIC | Age: 55
End: 2020-03-19

## 2020-03-19 VITALS
HEART RATE: 91 BPM | DIASTOLIC BLOOD PRESSURE: 82 MMHG | BODY MASS INDEX: 41.12 KG/M2 | SYSTOLIC BLOOD PRESSURE: 132 MMHG | OXYGEN SATURATION: 98 % | HEIGHT: 59 IN | WEIGHT: 204 LBS

## 2020-03-19 DIAGNOSIS — E78.5 HYPERLIPIDEMIA, UNSPECIFIED HYPERLIPIDEMIA TYPE: Primary | ICD-10-CM

## 2020-03-19 DIAGNOSIS — E66.01 CLASS 3 SEVERE OBESITY DUE TO EXCESS CALORIES WITH BODY MASS INDEX (BMI) OF 40.0 TO 44.9 IN ADULT, UNSPECIFIED WHETHER SERIOUS COMORBIDITY PRESENT (HCC): ICD-10-CM

## 2020-03-19 DIAGNOSIS — F41.1 ANXIETY STATE: ICD-10-CM

## 2020-03-19 DIAGNOSIS — G47.33 OBSTRUCTIVE SLEEP APNEA: ICD-10-CM

## 2020-03-19 PROCEDURE — 93000 ELECTROCARDIOGRAM COMPLETE: CPT | Performed by: NURSE PRACTITIONER

## 2020-03-19 PROCEDURE — 99213 OFFICE O/P EST LOW 20 MIN: CPT | Performed by: NURSE PRACTITIONER

## 2020-03-19 NOTE — PROGRESS NOTES
[facility] CARDIOLOGY      REASON FOR FOLLOW-UP:  Chest pain-noncardiac  Abnormal EKG  Presurgical cardiac risk assessment to undergo hernia repair          Chief Complaint   Patient presents with   • Abnormal ECG     Pre-op  clearance for umbilical hernia  repair (no clearance request sent yet).  Occ episodes of chest discomfort.         Dear Dr. Sommer,      History of Present Illness     It was my pleasure to see Merissa in the office today.  As you are aware, she is a 54-year-old  female with no known history of coronary occlusive disease.  She had complete ischemic work-up performed March 2019 with nuclear stress testing demonstrating resting inferior apical defect likely technical factors.  No evidence of inducible ischemia.  2D echocardiogram performed at that time showed normal LV function with EF 55-60%.  Evidence of impaired relaxation-grade 1, trace mitral regurgitation.  She was referred to the office for evaluation further episodes of chest discomfort and abnormal EKG findings.     The patient reports in the last 6 weeks she has been having multiple episodes of chest pressure and burning sensation.  This lasts for several hours at a time.  She denies any shortness of breath, dyspnea with exertion, orthopnea or PND.  She has had no dizziness, lightheadedness, presyncopal or syncopal episodes.  She states she has been prescribed BuSpar and that dosage was increased to 15 mg 3 times daily.  She admits to increased environmental stressors at home and with family causing her significant anxiety.  She has had 24 hour Holter monitor in the past with no significant pathology.  EKG performed in the office today shows nonspecific anterior septal T wave changes that are unchanged from last EKG in 2018.  Her blood pressure is under excellent control at 132/82.    Assessment:  Chest discomfort-noncardiac  Abnormal EKG-unchanged from prior  Anxiety  Hemochromatosis    Plan:  I  discussed with patient some stress management strategies.  Has been in the room will also encourage stress management.  I do not believe the patient needs any further ischemic work-up and her symptoms are likely noncardiac.  Recommend aggressive risk factor modifications and healthy lifestyle changes.  We will see the patient back in 6 months or sooner if needed.        The following portions of the patient's history were reviewed and updated as appropriate: allergies, current medications, past family history, past medical history, past social history, past surgical history and problem list.    REVIEW OF SYSTEMS:    Review of Systems   Cardiovascular:        Chest pressure   All other systems reviewed and are negative.      Vitals:    03/19/20 1500   BP: 132/82   Pulse: 91   SpO2: 98%         PHYSICAL EXAM:    General: Alert, cooperative, no distress, appears stated age  Head:  Normocephalic, atraumatic, mucous membranes moist  Eyes:  Conjunctiva/corneas clear, EOM's intact     Neck:  Supple,  no JVD or bruit     Lungs: Clear to auscultation bilaterally, no wheezes rhonchi rales are noted  Chest wall: No tenderness  Musculoskeletal:   Ambulates freely without assistance  Heart::  Regular rate and rhythm, S1 and S2 normal, no murmur, rub or gallop  Abdomen: Soft, non-tender, nondistended bowel sounds active, no abdominal bruit  Extremities: No cyanosis, clubbing, or edema   Pulses: 2+ and symmetric all extremities  Skin:  No rashes or lesions  Neuro/psych: A&O x3. CN II through XII are grossly intact with appropriate affect        Past Medical History:   Diagnosis Date   • Arthritis    • COPD (chronic obstructive pulmonary disease) (CMS/HCC)    • Depression    • GERD (gastroesophageal reflux disease)    • Hereditary hemochromatosis (CMS/HCC)    • Iron overload    • Macrocytosis    • Seasonal allergies    • Sleep apnea        Past Surgical History:   Procedure Laterality Date   • BLADDER SUSPENSION     • BREAST BIOPSY  Right    • SUBTOTAL HYSTERECTOMY     • TUBAL ABDOMINAL LIGATION     • WISDOM TOOTH EXTRACTION           Current Outpatient Medications:   •  acetaminophen (TYLENOL) 500 MG tablet, TYLENOL EXTRA STRENGTH 500 MG TABS, Disp: , Rfl:   •  busPIRone (BUSPAR) 15 MG tablet, Take one tablet three times a day for anxiety, Disp: 90 tablet, Rfl: 2  •  Calcium Carbonate Antacid (TUMS PO), Take 2,000 mg by mouth At Night As Needed., Disp: , Rfl:   •  CBD (cannabidiol) oral oil, Take  by mouth., Disp: , Rfl:   •  Cholecalciferol (VITAMIN D-3) 5000 units tablet, Daily., Disp: , Rfl:   •  cyanocobalamin (VITAMIN B-12) 500 MCG tablet, B-12 500 MCG TABS, Disp: , Rfl:   •  Desvenlafaxine Succinate ER 25 MG tablet sustained-release 24 hour, Take 1 tablet by mouth Every Morning., Disp: 30 tablet, Rfl: 3  •  diclofenac (VOLTAREN) 1 % gel gel, Apply 4 g topically to the appropriate area as directed 4 (Four) Times a Day As Needed., Disp: , Rfl:   •  Loratadine 10 MG capsule, Take 1 tablet by mouth Daily As Needed., Disp: , Rfl:   •  Phenylephrine-APAP-guaiFENesin (MUCINEX FAST-MAX COLD & SINUS) -400 MG/20ML liquid, Take 20 mL by mouth 2 (Two) Times a Day., Disp: , Rfl:   •  raNITIdine (ZANTAC) 150 MG tablet, Daily., Disp: , Rfl:   •  Sodium Bicarbonate-Citric Acid 3406-9278 MG effervescent tablet, KAYKAY-SELTZER HEARTBURN 5823-2150 MG TBEF, Disp: , Rfl:     Allergies   Allergen Reactions   • Amoxicillin Swelling   • Erythromycin GI Intolerance   • Escitalopram Anxiety   • Prozac  [Fluoxetine Hcl] Rash   • Penicillin G Rash       Family History   Problem Relation Age of Onset   • Coronary artery disease Other    • Diabetes Other    • Dementia Other    • Diabetes Mother    • Cancer Mother    • Heart disease Father    • Liver disease Father    • Hypertension Father    • Cancer Brother        Social History     Tobacco Use   • Smoking status: Never Smoker   • Smokeless tobacco: Never Used   Substance Use Topics   • Alcohol use: Yes      Frequency: Never     Drinks per session: 1 or 2     Binge frequency: Never     Comment: rarely           Current Electrocardiogram:    ECG 12 Lead  Date/Time: 3/19/2020 4:31 PM  Performed by: Preeti Mason APRN  Authorized by: Preeti Mason APRN   Comparison: compared with previous ECG   Similar to previous ECG  Comparison to previous ECG: Previous EKG in 2018-unchanged  Rhythm: sinus rhythm  BPM: 91                CHARLINE Torers  03/19/20  16:31

## 2020-03-20 ENCOUNTER — TELEPHONE (OUTPATIENT)
Dept: SURGERY | Facility: CLINIC | Age: 55
End: 2020-03-20

## 2020-03-20 NOTE — TELEPHONE ENCOUNTER
PT LM ON VM TO PLEASE RETURN HER CALL IN REF TO CARDIAC CLEARANCE.  CALLED PT AND LM ON HER VM TO PLEASE RETURN MY CALL.

## 2020-05-19 ENCOUNTER — TELEMEDICINE (OUTPATIENT)
Dept: PSYCHIATRY | Facility: CLINIC | Age: 55
End: 2020-05-19

## 2020-05-19 DIAGNOSIS — F41.8 MIXED ANXIETY DEPRESSIVE DISORDER: Primary | ICD-10-CM

## 2020-05-19 PROCEDURE — 99213 OFFICE O/P EST LOW 20 MIN: CPT | Performed by: PHYSICIAN ASSISTANT

## 2020-05-19 NOTE — PROGRESS NOTES
Subjective   Merissa Yusuf is a 54 y.o.white female who presents today for follow up via video x 15 mintues    Chief Complaint:  Anxiety and depression    History of Present Illness:   She is doing very well  She went to her sister's home in [place] for a wk but otherwise has stayed put during COVID isolation  Mom has Alzheimer's demenita, they had to put her in [place] in March, now recovering from COVID, moved her step-dad in with her and that has decreased her anxiety.  Patient lives the closest to Mom, has 6 siblings  Anxiety 2/10, has decreased her Buspar to one daily since she has not had to worry about her mom and step-dad  Depression 0/10  Denies SI/HI  She is tolerating Pristiq 25mg well, does not want to increase  Energy and motivation are good    The following portions of the patient's history were reviewed and updated as appropriate: allergies, current medications, past family history, past medical history, past social history, past surgical history and problem list.    PAST PSYCHIATRIC HISTORY  Axis I  Affective/Bipoloar Disorder, Anxiety/Panic Disorder  Axis II  None    PAST OUTPATIENT TREATMENT  Diagnosis treated:  Affective Disorder, Anxiety/Panic Disorder  Treatment Type:  Family Therapy, Medication Management  No hospitalizations  Prior Psychiatric Medications:  Lexapro increased anxiety  Prozac, hives  Trintellix helped but gave her restless legs  Buspar   Viibryd, could not take  Pristiq  Support Groups:  None  Sequelae Of Mental Disorder:  social isolation, family disruption, emotional distress      Interval History  Improved    Side Effects  Restless legs with Trintellix    Past psychiatric history was reviewed and compared to 3/18/2020 visit and appropriate updates were made.    Past Medical History:  Past Medical History:   Diagnosis Date   • Arthritis    • COPD (chronic obstructive pulmonary disease) (CMS/[facility])    • Depression    • GERD (gastroesophageal reflux disease)    •  Hereditary hemochromatosis (CMS/HCC)    • Iron overload    • Macrocytosis    • Seasonal allergies    • Sleep apnea        Social History:  Social History     Socioeconomic History   • Marital status:      Spouse name: Not on file   • Number of children: Not on file   • Years of education: Not on file   • Highest education level: Not on file   Tobacco Use   • Smoking status: Never Smoker   • Smokeless tobacco: Never Used   Substance and Sexual Activity   • Alcohol use: Yes     Frequency: Never     Drinks per session: 1 or 2     Binge frequency: Never     Comment: rarely   • Drug use: No   • Sexual activity: Defer       Family History:  Family History   Problem Relation Age of Onset   • Coronary artery disease Other    • Diabetes Other    • Dementia Other    • Diabetes Mother    • Cancer Mother    • Heart disease Father    • Liver disease Father    • Hypertension Father    • Cancer Brother        Past Surgical History:  Past Surgical History:   Procedure Laterality Date   • BLADDER SUSPENSION     • BREAST BIOPSY Right    • SUBTOTAL HYSTERECTOMY     • TUBAL ABDOMINAL LIGATION     • WISDOM TOOTH EXTRACTION         Problem List:  Patient Active Problem List   Diagnosis   • Abnormal complete blood count   • Anxiety state   • Elevated blood pressure reading without diagnosis of hypertension   • Hearing deficit   • Hyperlipidemia   • Knee pain   • Low back pain   • Lung mass   • Memory loss   • Mixed anxiety depressive disorder   • Obesity with body mass index 30 or greater   • Obesity   • Other abnormal and inconclusive findings on diagnostic imaging of breast   • Obstructive sleep apnea   • Thrombocytosis (CMS/HCC)   • Vitamin D deficiency   • Umbilical hernia without obstruction and without gangrene   • Combined forms of age-related cataract, bilateral   • Convergence insufficiency   • Corneal pannus of right eye   • Corneal pannus   • Meibomian gland dysfunction (MGD) of both eyes   • Bilateral presbyopia   •  Presbyopia   • Vitreous floaters       Allergy:   Allergies   Allergen Reactions   • Amoxicillin Swelling   • Erythromycin GI Intolerance   • Escitalopram Anxiety   • Prozac  [Fluoxetine Hcl] Rash   • Penicillin G Rash        Discontinued Medications:  There are no discontinued medications.    Current Medications:   Current Outpatient Medications   Medication Sig Dispense Refill   • acetaminophen (TYLENOL) 500 MG tablet TYLENOL EXTRA STRENGTH 500 MG TABS     • busPIRone (BUSPAR) 15 MG tablet Take one tablet three times a day for anxiety 90 tablet 2   • Calcium Carbonate Antacid (TUMS PO) Take 2,000 mg by mouth At Night As Needed.     • CBD (cannabidiol) oral oil Take  by mouth.     • Cholecalciferol (VITAMIN D-3) 5000 units tablet Daily.     • cyanocobalamin (VITAMIN B-12) 500 MCG tablet B-12 500 MCG TABS     • Desvenlafaxine Succinate ER 25 MG tablet sustained-release 24 hour Take 1 tablet by mouth Every Morning. 30 tablet 3   • diclofenac (VOLTAREN) 1 % gel gel Apply 4 g topically to the appropriate area as directed 4 (Four) Times a Day As Needed.     • Loratadine 10 MG capsule Take 1 tablet by mouth Daily As Needed.     • Phenylephrine-APAP-guaiFENesin (MUCINEX FAST-MAX COLD & SINUS) -400 MG/20ML liquid Take 20 mL by mouth 2 (Two) Times a Day.     • raNITIdine (ZANTAC) 150 MG tablet Daily.     • Sodium Bicarbonate-Citric Acid 0332-5599 MG effervescent tablet KAYKAY-SELTZER HEARTBURN 4926-2223 MG TBEF       No current facility-administered medications for this visit.          Review of Symptoms:    Psychiatric/Behavioral: Negative for agitation, behavioral problems, confusion, decreased concentration, dysphoric mood, hallucinations, self-injury, sleep disturbance and suicidal ideas. The patient not nervous/anxious and is not hyperactive.        Physical Exam:   not currently breastfeeding.    Mental Status Exam:   Hygiene:   good  Cooperation:  Cooperative  Eye Contact:  Good  Psychomotor Behavior:   Appropriate  Affect:  Appropriate  Mood: Normal  Hopelessness: Denies  Speech:  Normal  Thought Process:  Goal directed  Thought Content:  Normal  Suicidal:  None  Homicidal:  None  Hallucinations:  None  Delusion:  None  Memory:  Intact  Orientation:  Person, Place, Time and Situation  Reliability:  good  Insight:  Good  Judgement:  Good  Impulse Control:  Good  Physical/Medical Issues:  No      Mental status exam was reviewed and compared to 3/18/20 visit and appropriate updates were made.    PHQ-9 Depression Screening  Little interest or pleasure in doing things? 0   Feeling down, depressed, or hopeless? 0   Trouble falling or staying asleep, or sleeping too much?     Feeling tired or having little energy?     Poor appetite or overeating?     Feeling bad about yourself - or that you are a failure or have let yourself or your family down?     Trouble concentrating on things, such as reading the newspaper or watching television?     Moving or speaking so slowly that other people could have noticed? Or the opposite - being so fidgety or restless that you have been moving around a lot more than usual?     Thoughts that you would be better off dead, or of hurting yourself in some way?     PHQ-9 Total Score 0   If you checked off any problems, how difficult have these problems made it for you to do your work, take care of things at home, or get along with other people?             Never smoker    I advised Merissa of the risks of tobacco use.     Lab Results:   Appointment on 03/11/2020   Component Date Value Ref Range Status   • Color, UA 03/11/2020 Yellow  Yellow, Straw Final   • Appearance, UA 03/11/2020 Hazy* Clear Final    Result checked    • pH, UA 03/11/2020 5.5  5.0 - 8.0 Final   • Specific Gravity, UA 03/11/2020 >=1.030  1.005 - 1.030 Final   • Glucose, UA 03/11/2020 Negative  Negative Final   • Ketones, UA 03/11/2020 Negative  Negative Final   • Bilirubin, UA 03/11/2020 Negative  Negative Final   • Blood, UA  03/11/2020 Negative  Negative Final   • Protein, UA 03/11/2020 Negative  Negative Final   • Leuk Esterase, UA 03/11/2020 Negative  Negative Final   • Nitrite, UA 03/11/2020 Negative  Negative Final   • Urobilinogen, UA 03/11/2020 0.2 E.U./dL  0.2 - 1.0 E.U./dL Final   • Glucose 03/11/2020 91  65 - 99 mg/dL Final   • BUN 03/11/2020 13  6 - 20 mg/dL Final   • Creatinine 03/11/2020 0.71  0.57 - 1.00 mg/dL Final   • Sodium 03/11/2020 139  136 - 145 mmol/L Final   • Potassium 03/11/2020 4.3  3.5 - 5.2 mmol/L Final   • Chloride 03/11/2020 103  98 - 107 mmol/L Final   • CO2 03/11/2020 24.0  22.0 - 29.0 mmol/L Final   • Calcium 03/11/2020 9.9  8.6 - 10.5 mg/dL Final   • Total Protein 03/11/2020 7.1  6.0 - 8.5 g/dL Final   • Albumin 03/11/2020 4.20  3.50 - 5.20 g/dL Final   • ALT (SGPT) 03/11/2020 17  1 - 33 U/L Final   • AST (SGOT) 03/11/2020 19  1 - 32 U/L Final   • Alkaline Phosphatase 03/11/2020 73  39 - 117 U/L Final   • Total Bilirubin 03/11/2020 0.3  0.2 - 1.2 mg/dL Final   • eGFR Non African Amer 03/11/2020 85  >60 mL/min/1.73 Final   • Globulin 03/11/2020 2.9  gm/dL Final   • A/G Ratio 03/11/2020 1.4  g/dL Final   • BUN/Creatinine Ratio 03/11/2020 18.3  7.0 - 25.0 Final   • Anion Gap 03/11/2020 12.0  5.0 - 15.0 mmol/L Final   • ABO Type 03/11/2020 A   Final   • RH type 03/11/2020 Positive   Final   • Antibody Screen 03/11/2020 Negative   Final   • T&S Expiration Date 03/11/2020 3/21/2020 11:59:00 PM   Final   • WBC 03/11/2020 5.90  3.40 - 10.80 10*3/mm3 Final   • RBC 03/11/2020 4.00  3.77 - 5.28 10*6/mm3 Final   • Hemoglobin 03/11/2020 14.5  12.0 - 15.9 g/dL Final   • Hematocrit 03/11/2020 41.1  34.0 - 46.6 % Final   • MCV 03/11/2020 102.8* 79.0 - 97.0 fL Final   • MCH 03/11/2020 36.2* 26.6 - 33.0 pg Final   • MCHC 03/11/2020 35.2  31.5 - 35.7 g/dL Final   • RDW 03/11/2020 12.8  12.3 - 15.4 % Final   • RDW-SD 03/11/2020 45.9  37.0 - 54.0 fl Final   • MPV 03/11/2020 7.2  6.0 - 12.0 fL Final   • Platelets 03/11/2020  427  140 - 450 10*3/mm3 Final   • Neutrophil % 03/11/2020 54.8  42.7 - 76.0 % Final   • Lymphocyte % 03/11/2020 31.6  19.6 - 45.3 % Final   • Monocyte % 03/11/2020 8.9  5.0 - 12.0 % Final   • Eosinophil % 03/11/2020 3.5  0.3 - 6.2 % Final   • Basophil % 03/11/2020 1.2  0.0 - 1.5 % Final   • Neutrophils, Absolute 03/11/2020 3.30  1.70 - 7.00 10*3/mm3 Final   • Lymphocytes, Absolute 03/11/2020 1.90  0.70 - 3.10 10*3/mm3 Final   • Monocytes, Absolute 03/11/2020 0.50  0.10 - 0.90 10*3/mm3 Final   • Eosinophils, Absolute 03/11/2020 0.20  0.00 - 0.40 10*3/mm3 Final   • Basophils, Absolute 03/11/2020 0.10  0.00 - 0.20 10*3/mm3 Final   • nRBC 03/11/2020 0.0  0.0 - 0.2 /100 WBC Final       Assessment/Plan   Problems Addressed this Visit        Other    Mixed anxiety depressive disorder - Primary          Visit Diagnoses:    ICD-10-CM ICD-9-CM   1. Mixed anxiety depressive disorder F41.8 300.4       TREATMENT PLAN/GOALS: Continue supportive psychotherapy efforts and medications as indicated. Treatment and medication options discussed during today's visit. Patient ackowledged and verbally consented to continue with current treatment plan and was educated on the importance of compliance with treatment and follow-up appointments.    MEDICATION ISSUES:  INSPECT reviewed as expected  Discussed medication options and treatment plan of prescribed medication as well as the risks, benefits, and side effects including potential falls, possible impaired driving and metabolic adversities among others. Patient is agreeable to call the office with any worsening of symptoms or onset of side effects. Patient is agreeable to call 911 or go to the nearest ER should he/she begin having SI/HI. No medication side effects or related complaints today.     Patient is doing great with Pristiq 25mg daily and taking Buspar 15mg once daily, no need for changes and no refills needed.      MEDS ORDERED DURING VISIT:  No orders of the defined types were  placed in this encounter.      Return in about 6 months (around 11/19/2020) for Recheck.         This document has been electronically signed by Laisha Mendiola PA-C  May 19, 2020 10:04

## 2020-06-02 DIAGNOSIS — F41.8 MIXED ANXIETY DEPRESSIVE DISORDER: ICD-10-CM

## 2020-06-02 RX ORDER — DESVENLAFAXINE 25 MG/1
25 TABLET, EXTENDED RELEASE ORAL EVERY MORNING
Qty: 90 TABLET | Refills: 1 | Status: SHIPPED | OUTPATIENT
Start: 2020-06-02 | End: 2020-11-17 | Stop reason: DRUGHIGH

## 2020-06-18 ENCOUNTER — OFFICE VISIT (OUTPATIENT)
Dept: FAMILY MEDICINE CLINIC | Facility: CLINIC | Age: 55
End: 2020-06-18

## 2020-06-18 VITALS
HEART RATE: 66 BPM | OXYGEN SATURATION: 97 % | RESPIRATION RATE: 16 BRPM | TEMPERATURE: 98.1 F | WEIGHT: 215.8 LBS | SYSTOLIC BLOOD PRESSURE: 123 MMHG | HEIGHT: 59 IN | DIASTOLIC BLOOD PRESSURE: 81 MMHG | BODY MASS INDEX: 43.51 KG/M2

## 2020-06-18 DIAGNOSIS — Z00.01 ENCOUNTER FOR GENERAL ADULT MEDICAL EXAMINATION WITH ABNORMAL FINDINGS: Primary | ICD-10-CM

## 2020-06-18 DIAGNOSIS — M79.641 HAND PAIN, RIGHT: ICD-10-CM

## 2020-06-18 DIAGNOSIS — S86.911A KNEE STRAIN, RIGHT, INITIAL ENCOUNTER: ICD-10-CM

## 2020-06-18 PROCEDURE — 99396 PREV VISIT EST AGE 40-64: CPT | Performed by: NURSE PRACTITIONER

## 2020-06-18 RX ORDER — COVID-19 ANTIGEN TEST
1 KIT MISCELLANEOUS AS NEEDED
COMMUNITY

## 2020-06-18 NOTE — PATIENT INSTRUCTIONS
[facility] [place]        Arthritis  Arthritis means joint pain. It can also mean joint disease. A joint is a place where bones come together. There are more than 100 types of arthritis.  What are the causes?  This condition may be caused by:  · Wear and tear of a joint. This is the most common cause.  · A lot of acid in the blood, which leads to pain in the joint (gout).  · Pain and swelling (inflammation) in a joint.  · Infection of a joint.  · Injuries in the joint.  · A reaction to medicines (allergy).  In some cases, the cause may not be known.  What are the signs or symptoms?  Symptoms of this condition include:  · Redness at a joint.  · Swelling at a joint.  · Stiffness at a joint.  · Warmth coming from the joint.  · A fever.  · A feeling of being sick.  How is this treated?  This condition may be treated with:  · Treating the cause, if it is known.  · Rest.  · Raising (elevating) the joint.  · Putting cold or hot packs on the joint.  · Medicines to treat symptoms and reduce pain and swelling.  · Shots of medicines (cortisone) into the joint.  You may also be told to make changes in your life, such as doing exercises and losing weight.  Follow these instructions at home:  Medicines  · Take over-the-counter and prescription medicines only as told by your doctor.  · Do not take aspirin for pain if your doctor says that you may have gout.  Activity  · Rest your joint if your doctor tells you to.  · Avoid activities that make the pain worse.  · Exercise your joint regularly as told by your doctor. Try doing exercises like:  ? Swimming.  ? Water aerobics.  ? Biking.  ? Walking.  Managing pain, stiffness, and swelling         · If told, put ice on the affected area.  ? Put ice in a plastic bag.  ? Place a towel between your skin and the bag.  ? Leave the ice on for 20 minutes, 2-3 times per day.  · If your joint is swollen, raise (elevate) it above the level of your heart if told by your doctor.  · If your joint  feels stiff in the morning, try taking a warm shower.  · If told, put heat on the affected area. Do this as often as told by your doctor. Use the heat source that your doctor recommends, such as a moist heat pack or a heating pad. If you have diabetes, do not apply heat without asking your doctor. To apply heat:  ? Place a towel between your skin and the heat source.  ? Leave the heat on for 20-30 minutes.  ? Remove the heat if your skin turns bright red. This is very important if you are unable to feel pain, heat, or cold. You may have a greater risk of getting burned.  General instructions  · Do not use any products that contain nicotine or tobacco, such as cigarettes, e-cigarettes, and chewing tobacco. If you need help quitting, ask your doctor.  · Keep all follow-up visits as told by your doctor. This is important.  Contact a doctor if:  · The pain gets worse.  · You have a fever.  Get help right away if:  · You have very bad pain in your joint.  · You have swelling in your joint.  · Your joint is red.  · Many joints become painful and swollen.  · You have very bad back pain.  · Your leg is very weak.  · You cannot control your pee (urine) or poop (stool).  Summary  · Arthritis means joint pain. It can also mean joint disease. A joint is a place where bones come together.  · The most common cause of this condition is wear and tear of a joint.  · Symptoms of this condition include redness, swelling, or stiffness of the joint.  · This condition is treated with rest, raising the joint, medicines, and putting cold or hot packs on the joint.  · Follow your doctor's instructions about medicines, activity, exercises, and other home care treatments.  This information is not intended to replace advice given to you by your health care provider. Make sure you discuss any questions you have with your health care provider.  Document Released: 03/14/2011 Document Revised: 11/25/2019 Document Reviewed: 11/25/2019  [employer]  Patient Education © 2020 [employer]    Neck Exercises  Ask your health care provider which exercises are safe for you. Do exercises exactly as told by your health care provider and adjust them as directed. It is normal to feel mild stretching, pulling, tightness, or discomfort as you do these exercises. Stop right away if you feel sudden pain or your pain gets worse. Do not begin these exercises until told by your health care provider.  Neck exercises can be important for many reasons. They can improve strength and maintain flexibility in your neck, which will help your upper back and prevent neck pain.  Stretching exercises  Rotation neck stretching    1. Sit in a chair or stand up.  2. Place your feet flat on the floor, shoulder width apart.  3. Slowly turn your head (rotate) to the right until a slight stretch is felt. Turn it all the way to the right so you can look over your right shoulder. Do not tilt or tip your head.  4. Hold this position for 10-30 seconds.  5. Slowly turn your head (rotate) to the left until a slight stretch is felt. Turn it all the way to the left so you can look over your left shoulder. Do not tilt or tip your head.  6. Hold this position for 10-30 seconds.  Repeat __________ times. Complete this exercise __________ times a day.  Neck retraction  1. Sit in a sturdy chair or stand up.  2. Look straight ahead. Do not bend your neck.  3. Use your fingers to push your chin backward (retraction). Do not bend your neck for this movement. Continue to face straight ahead. If you are doing the exercise properly, you will feel a slight sensation in your throat and a stretch at the back of your neck.  4. Hold the stretch for 1-2 seconds.  Repeat __________ times. Complete this exercise __________ times a day.  Strengthening exercises  Neck press  1. Lie on your back on a firm bed or on the floor with a pillow under your head.  2. Use your neck muscles to push your head down on the pillow and  straighten your spine.  3. Hold the position as well as you can. Keep your head facing up (in a neutral position) and your chin tucked.  4. Slowly count to 5 while holding this position.  Repeat __________ times. Complete this exercise __________ times a day.  Isometrics  These are exercises in which you strengthen the muscles in your neck while keeping your neck still (isometrics).  1. Sit in a supportive chair and place your hand on your forehead.  2. Keep your head and face facing straight ahead. Do not flex or extend your neck while doing isometrics.  3. Push forward with your head and neck while pushing back with your hand. Hold for 10 seconds.  4. Do the sequence again, this time putting your hand against the back of your head. Use your head and neck to push backward against the hand pressure.  5. Finally, do the same exercise on either side of your head, pushing sideways against the pressure of your hand.  Repeat __________ times. Complete this exercise __________ times a day.  Prone head lifts  1. Lie face-down (prone position), resting on your elbows so that your chest and upper back are raised.  2. Start with your head facing downward, near your chest. Position your chin either on or near your chest.  3. Slowly lift your head upward. Lift until you are looking straight ahead. Then continue lifting your head as far back as you can comfortably stretch.  4. Hold your head up for 5 seconds. Then slowly lower it to your starting position.  Repeat __________ times. Complete this exercise __________ times a day.  Supine head lifts  1. Lie on your back (supine position), bending your knees to point to the ceiling and keeping your feet flat on the floor.  2. Lift your head slowly off the floor, raising your chin toward your chest.  3. Hold for 5 seconds.  Repeat __________ times. Complete this exercise __________ times a day.  Scapular retraction  1. Stand with your arms at your sides. Look straight  ahead.  2. Slowly pull both shoulders (scapulae) backward and downward (retraction) until you feel a stretch between your shoulder blades in your upper back.  3. Hold for 10-30 seconds.  4. Relax and repeat.  Repeat __________ times. Complete this exercise __________ times a day.  Contact a health care provider if:  · Your neck pain or discomfort gets much worse when you do an exercise.  · Your neck pain or discomfort does not improve within 2 hours after you exercise.  If you have any of these problems, stop exercising right away. Do not do the exercises again unless your health care provider says that you can.  Get help right away if:  · You develop sudden, severe neck pain.  If this happens, stop exercising right away. Do not do the exercises again unless your health care provider says that you can.  This information is not intended to replace advice given to you by your health care provider. Make sure you discuss any questions you have with your health care provider.  Document Released: 11/28/2016 Document Revised: 10/16/2019 Document Reviewed: 10/16/2019  [employer] Patient Education © 2020 [employer]    High Cholesterol    High cholesterol is a condition in which the blood has high levels of a white, waxy, fat-like substance (cholesterol). The human body needs small amounts of cholesterol. The liver makes all the cholesterol that the body needs. Extra (excess) cholesterol comes from the food that we eat.  Cholesterol is carried from the liver by the blood through the blood vessels. If you have high cholesterol, deposits (plaques) may build up on the walls of your blood vessels (arteries). Plaques make the arteries narrower and stiffer. Cholesterol plaques increase your risk for heart attack and stroke. Work with your health care provider to keep your cholesterol levels in a healthy range.  What increases the risk?  This condition is more likely to develop in people who:  · Eat foods that are high in animal  "fat (saturated fat) or cholesterol.  · Are overweight.  · Are not getting enough exercise.  · Have a family history of high cholesterol.  What are the signs or symptoms?  There are no symptoms of this condition.  How is this diagnosed?  This condition may be diagnosed from the results of a blood test.  · If you are older than age 20, your health care provider may check your cholesterol every 4-6 years.  · You may be checked more often if you already have high cholesterol or other risk factors for heart disease.  The blood test for cholesterol measures:  · \"Bad\" cholesterol (LDL cholesterol). This is the main type of cholesterol that causes heart disease. The desired level for LDL is less than 100.  · \"Good\" cholesterol (HDL cholesterol). This type helps to protect against heart disease by cleaning the arteries and carrying the LDL away. The desired level for HDL is 60 or higher.  · Triglycerides. These are fats that the body can store or burn for energy. The desired number for triglycerides is lower than 150.  · Total cholesterol. This is a measure of the total amount of cholesterol in your blood, including LDL cholesterol, HDL cholesterol, and triglycerides. A healthy number is less than 200.  How is this treated?  This condition is treated with diet changes, lifestyle changes, and medicines.  Diet changes  · This may include eating more whole grains, fruits, vegetables, nuts, and fish.  · This may also include cutting back on red meat and foods that have a lot of added sugar.  Lifestyle changes  · Changes may include getting at least 40 minutes of aerobic exercise 3 times a week. Aerobic exercises include walking, biking, and swimming. Aerobic exercise along with a healthy diet can help you maintain a healthy weight.  · Changes may also include quitting smoking.  Medicines  · Medicines are usually given if diet and lifestyle changes have failed to reduce your cholesterol to healthy levels.  · Your health care " provider may prescribe a statin medicine. Statin medicines have been shown to reduce cholesterol, which can reduce the risk of heart disease.  Follow these instructions at home:  Eating and drinking  If told by your health care provider:  · Eat chicken (without skin), fish, veal, shellfish, ground turkey breast, and round or loin cuts of red meat.  · Do not eat fried foods or fatty meats, such as hot dogs and salami.  · Eat plenty of fruits, such as apples.  · Eat plenty of vegetables, such as broccoli, potatoes, and carrots.  · Eat beans, peas, and lentils.  · Eat grains such as barley, rice, couscous, and bulgur wheat.  · Eat pasta without cream sauces.  · Use skim or nonfat milk, and eat low-fat or nonfat yogurt and cheeses.  · Do not eat or drink whole milk, cream, ice cream, egg yolks, or hard cheeses.  · Do not eat stick margarine or tub margarines that contain trans fats (also called partially hydrogenated oils).  · Do not eat saturated tropical oils, such as coconut oil and palm oil.  · Do not eat cakes, cookies, crackers, or other baked goods that contain trans fats.    General instructions  · Exercise as directed by your health care provider. Increase your activity level with activities such as gardening, walking, and taking the stairs.  · Take over-the-counter and prescription medicines only as told by your health care provider.  · Do not use any products that contain nicotine or tobacco, such as cigarettes and e-cigarettes. If you need help quitting, ask your health care provider.  · Keep all follow-up visits as told by your health care provider. This is important.  Contact a health care provider if:  · You are struggling to maintain a healthy diet or weight.  · You need help to start on an exercise program.  · You need help to stop smoking.  Get help right away if:  · You have chest pain.  · You have trouble breathing.  This information is not intended to replace advice given to you by your health care  provider. Make sure you discuss any questions you have with your health care provider.  Document Released: 12/18/2006 Document Revised: 12/21/2018 Document Reviewed: 06/17/2017  [employer] Patient Education © 2020 [employer]

## 2020-06-18 NOTE — PROGRESS NOTES
"Subjective   Merissa Yusuf is a 54 y.o. female presents for   Chief Complaint   Patient presents with   • Annual Exam   • Knee Injury     right        Health Maintenance Due   Topic Date Due   • ZOSTER VACCINE (1 of 2) 07/14/2013   • LIPID PANEL  03/05/2020   • ANNUAL PHYSICAL  05/22/2020   • MAMMOGRAM  06/04/2020       History of Present Illness   Pt present for annual exam and also reports Right knee pain x 3 days.  She states her large dog ran into her and knocked her backwards.  She was able to catch herself and denies falling onto her knee.  She has taken Aleve and tylenol with some relief.  She states pain worsened and felt like silvia horse yesterday.  She applied heat and had some relief but reports it feels like a pulled muscle in the back of her leg.  She also reports Right hand pain and dropping items, progressively worsening over the past few months.  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     Vitals:    06/18/20 1347 06/18/20 1353   BP: 117/73 123/81   BP Location: Right arm Left arm   Patient Position: Sitting Sitting   Cuff Size: Large Adult Large Adult   Pulse: 62 66   Resp: 16    Temp: 98.1 °F (36.7 °C)    TempSrc: Infrared    SpO2: 97%    Weight: 97.9 kg (215 lb 12.8 oz)    Height: 149.9 cm (59\")      Body mass index is 43.59 kg/m².    Current Outpatient Medications on File Prior to Visit   Medication Sig Dispense Refill   • acetaminophen (TYLENOL) 500 MG tablet TYLENOL EXTRA STRENGTH 500 MG TABS     • busPIRone (BUSPAR) 15 MG tablet Take one tablet three times a day for anxiety 90 tablet 2   • Calcium Carbonate Antacid (TUMS PO) Take 2,000 mg by mouth At Night As Needed.     • Cholecalciferol (VITAMIN D-3) 5000 units tablet Daily.     • cyanocobalamin (VITAMIN B-12) 500 MCG tablet B-12 500 MCG TABS     • Desvenlafaxine Succinate ER 25 MG tablet sustained-release 24 hour TAKE 1 TABLET BY MOUTH EVERY MORNING 90 tablet 1   • diclofenac (VOLTAREN) 1 % gel " gel Apply 4 g topically to the appropriate area as directed 4 (Four) Times a Day As Needed.     • Loratadine 10 MG capsule Take 1 tablet by mouth Daily As Needed.     • Naproxen Sodium 220 MG capsule Take 1 tablet by mouth As Needed.     • [DISCONTINUED] CBD (cannabidiol) oral oil Take  by mouth.     • [DISCONTINUED] Phenylephrine-APAP-guaiFENesin (MUCINEX FAST-MAX COLD & SINUS) -400 MG/20ML liquid Take 20 mL by mouth 2 (Two) Times a Day.     • [DISCONTINUED] raNITIdine (ZANTAC) 150 MG tablet Daily.     • [DISCONTINUED] Sodium Bicarbonate-Citric Acid 4935-1644 MG effervescent tablet KAYKAY-SELTZER HEARTBURN 8082-6335 MG TBEF       No current facility-administered medications on file prior to visit.        The following portions of the patient's history were reviewed and updated as appropriate: allergies, current medications, past family history, past medical history, past social history, past surgical history and problem list.    Review of Systems   Constitutional: Negative for chills and fever.   HENT: Negative for sinus pressure and sore throat.    Eyes: Negative for blurred vision.   Respiratory: Negative for cough and shortness of breath.    Cardiovascular: Negative for chest pain.   Gastrointestinal: Negative for abdominal pain.   Endocrine: Negative.    Genitourinary: Negative.    Musculoskeletal: Negative for arthralgias and joint swelling.        Right knee pain, right hand pain/weakness   Skin: Negative for color change.   Allergic/Immunologic: Negative.    Neurological: Negative for dizziness.   Hematological: Negative.    Psychiatric/Behavioral: Positive for stress. Negative for behavioral problems.       Objective   Physical Exam   Constitutional: She is oriented to person, place, and time. Vital signs are normal. She appears well-developed and well-nourished. She is obese.  HENT:   Head: Normocephalic and atraumatic.   Right Ear: External ear normal.   Left Ear: External ear normal.   Nose: Nose  normal.   Eyes: Pupils are equal, round, and reactive to light. Conjunctivae and EOM are normal.   Neck: Normal range of motion. Neck supple. No thyromegaly present.   Cardiovascular: Normal rate, regular rhythm, normal heart sounds and intact distal pulses.   Pulmonary/Chest: Effort normal and breath sounds normal.   Abdominal: Soft. Bowel sounds are normal.   Musculoskeletal: Normal range of motion. She exhibits tenderness (right posterior knee).   Right knee normal rom, pain with extension.    Right hand normal rom, tender CMC joint, weak    Lymphadenopathy:     She has no cervical adenopathy.   Neurological: She is alert and oriented to person, place, and time.   Skin: Skin is warm and dry.   Psychiatric: She has a normal mood and affect. Her behavior is normal. Judgment and thought content normal.   Nursing note and vitals reviewed.    PHQ-9 Total Score:      Assessment/Plan   Merissa was seen today for annual exam and knee injury.    Diagnoses and all orders for this visit:    Encounter for general adult medical examination with abnormal findings  -     Cancel: CBC Auto Differential  -     Cancel: Comprehensive Metabolic Panel  -     Cancel: Hemoglobin A1c  -     Cancel: Lipid Panel  -     Cancel: TSH  -     Cancel: Vitamin B12  -     Cancel: Vitamin D 25 Hydroxy  -     CBC Auto Differential; Future  -     Comprehensive Metabolic Panel; Future  -     Hemoglobin A1c; Future  -     Lipid Panel; Future  -     TSH; Future  -     Vitamin B12; Future  -     Vitamin D 25 Hydroxy; Future    Knee strain, right, initial encounter  Comments:  continue aleve prn, apply ice intermittently, light stretches, begin walking     Hand pain, right  -     Ambulatory Referral to Hand Surgery        Patient Instructions   Yoga with [place]        Arthritis  Arthritis means joint pain. It can also mean joint disease. A joint is a place where bones come together. There are more than 100 types of arthritis.  What are the  causes?  This condition may be caused by:  · Wear and tear of a joint. This is the most common cause.  · A lot of acid in the blood, which leads to pain in the joint (gout).  · Pain and swelling (inflammation) in a joint.  · Infection of a joint.  · Injuries in the joint.  · A reaction to medicines (allergy).  In some cases, the cause may not be known.  What are the signs or symptoms?  Symptoms of this condition include:  · Redness at a joint.  · Swelling at a joint.  · Stiffness at a joint.  · Warmth coming from the joint.  · A fever.  · A feeling of being sick.  How is this treated?  This condition may be treated with:  · Treating the cause, if it is known.  · Rest.  · Raising (elevating) the joint.  · Putting cold or hot packs on the joint.  · Medicines to treat symptoms and reduce pain and swelling.  · Shots of medicines (cortisone) into the joint.  You may also be told to make changes in your life, such as doing exercises and losing weight.  Follow these instructions at home:  Medicines  · Take over-the-counter and prescription medicines only as told by your doctor.  · Do not take aspirin for pain if your doctor says that you may have gout.  Activity  · Rest your joint if your doctor tells you to.  · Avoid activities that make the pain worse.  · Exercise your joint regularly as told by your doctor. Try doing exercises like:  ? Swimming.  ? Water aerobics.  ? Biking.  ? Walking.  Managing pain, stiffness, and swelling         · If told, put ice on the affected area.  ? Put ice in a plastic bag.  ? Place a towel between your skin and the bag.  ? Leave the ice on for 20 minutes, 2-3 times per day.  · If your joint is swollen, raise (elevate) it above the level of your heart if told by your doctor.  · If your joint feels stiff in the morning, try taking a warm shower.  · If told, put heat on the affected area. Do this as often as told by your doctor. Use the heat source that your doctor recommends, such as a moist  heat pack or a heating pad. If you have diabetes, do not apply heat without asking your doctor. To apply heat:  ? Place a towel between your skin and the heat source.  ? Leave the heat on for 20-30 minutes.  ? Remove the heat if your skin turns bright red. This is very important if you are unable to feel pain, heat, or cold. You may have a greater risk of getting burned.  General instructions  · Do not use any products that contain nicotine or tobacco, such as cigarettes, e-cigarettes, and chewing tobacco. If you need help quitting, ask your doctor.  · Keep all follow-up visits as told by your doctor. This is important.  Contact a doctor if:  · The pain gets worse.  · You have a fever.  Get help right away if:  · You have very bad pain in your joint.  · You have swelling in your joint.  · Your joint is red.  · Many joints become painful and swollen.  · You have very bad back pain.  · Your leg is very weak.  · You cannot control your pee (urine) or poop (stool).  Summary  · Arthritis means joint pain. It can also mean joint disease. A joint is a place where bones come together.  · The most common cause of this condition is wear and tear of a joint.  · Symptoms of this condition include redness, swelling, or stiffness of the joint.  · This condition is treated with rest, raising the joint, medicines, and putting cold or hot packs on the joint.  · Follow your doctor's instructions about medicines, activity, exercises, and other home care treatments.  This information is not intended to replace advice given to you by your health care provider. Make sure you discuss any questions you have with your health care provider.  Document Released: 03/14/2011 Document Revised: 11/25/2019 Document Reviewed: 11/25/2019  [employer] Patient Education © 2020 [employer]    Neck Exercises  Ask your health care provider which exercises are safe for you. Do exercises exactly as told by your health care provider and adjust them as  directed. It is normal to feel mild stretching, pulling, tightness, or discomfort as you do these exercises. Stop right away if you feel sudden pain or your pain gets worse. Do not begin these exercises until told by your health care provider.  Neck exercises can be important for many reasons. They can improve strength and maintain flexibility in your neck, which will help your upper back and prevent neck pain.  Stretching exercises  Rotation neck stretching    1. Sit in a chair or stand up.  2. Place your feet flat on the floor, shoulder width apart.  3. Slowly turn your head (rotate) to the right until a slight stretch is felt. Turn it all the way to the right so you can look over your right shoulder. Do not tilt or tip your head.  4. Hold this position for 10-30 seconds.  5. Slowly turn your head (rotate) to the left until a slight stretch is felt. Turn it all the way to the left so you can look over your left shoulder. Do not tilt or tip your head.  6. Hold this position for 10-30 seconds.  Repeat __________ times. Complete this exercise __________ times a day.  Neck retraction  1. Sit in a sturdy chair or stand up.  2. Look straight ahead. Do not bend your neck.  3. Use your fingers to push your chin backward (retraction). Do not bend your neck for this movement. Continue to face straight ahead. If you are doing the exercise properly, you will feel a slight sensation in your throat and a stretch at the back of your neck.  4. Hold the stretch for 1-2 seconds.  Repeat __________ times. Complete this exercise __________ times a day.  Strengthening exercises  Neck press  1. Lie on your back on a firm bed or on the floor with a pillow under your head.  2. Use your neck muscles to push your head down on the pillow and straighten your spine.  3. Hold the position as well as you can. Keep your head facing up (in a neutral position) and your chin tucked.  4. Slowly count to 5 while holding this position.  Repeat  __________ times. Complete this exercise __________ times a day.  Isometrics  These are exercises in which you strengthen the muscles in your neck while keeping your neck still (isometrics).  1. Sit in a supportive chair and place your hand on your forehead.  2. Keep your head and face facing straight ahead. Do not flex or extend your neck while doing isometrics.  3. Push forward with your head and neck while pushing back with your hand. Hold for 10 seconds.  4. Do the sequence again, this time putting your hand against the back of your head. Use your head and neck to push backward against the hand pressure.  5. Finally, do the same exercise on either side of your head, pushing sideways against the pressure of your hand.  Repeat __________ times. Complete this exercise __________ times a day.  Prone head lifts  1. Lie face-down (prone position), resting on your elbows so that your chest and upper back are raised.  2. Start with your head facing downward, near your chest. Position your chin either on or near your chest.  3. Slowly lift your head upward. Lift until you are looking straight ahead. Then continue lifting your head as far back as you can comfortably stretch.  4. Hold your head up for 5 seconds. Then slowly lower it to your starting position.  Repeat __________ times. Complete this exercise __________ times a day.  Supine head lifts  1. Lie on your back (supine position), bending your knees to point to the ceiling and keeping your feet flat on the floor.  2. Lift your head slowly off the floor, raising your chin toward your chest.  3. Hold for 5 seconds.  Repeat __________ times. Complete this exercise __________ times a day.  Scapular retraction  1. Stand with your arms at your sides. Look straight ahead.  2. Slowly pull both shoulders (scapulae) backward and downward (retraction) until you feel a stretch between your shoulder blades in your upper back.  3. Hold for 10-30 seconds.  4. Relax and  repeat.  Repeat __________ times. Complete this exercise __________ times a day.  Contact a health care provider if:  · Your neck pain or discomfort gets much worse when you do an exercise.  · Your neck pain or discomfort does not improve within 2 hours after you exercise.  If you have any of these problems, stop exercising right away. Do not do the exercises again unless your health care provider says that you can.  Get help right away if:  · You develop sudden, severe neck pain.  If this happens, stop exercising right away. Do not do the exercises again unless your health care provider says that you can.  This information is not intended to replace advice given to you by your health care provider. Make sure you discuss any questions you have with your health care provider.  Document Released: 11/28/2016 Document Revised: 10/16/2019 Document Reviewed: 10/16/2019  [employer] Patient Education © 2020 [employer]    High Cholesterol    High cholesterol is a condition in which the blood has high levels of a white, waxy, fat-like substance (cholesterol). The human body needs small amounts of cholesterol. The liver makes all the cholesterol that the body needs. Extra (excess) cholesterol comes from the food that we eat.  Cholesterol is carried from the liver by the blood through the blood vessels. If you have high cholesterol, deposits (plaques) may build up on the walls of your blood vessels (arteries). Plaques make the arteries narrower and stiffer. Cholesterol plaques increase your risk for heart attack and stroke. Work with your health care provider to keep your cholesterol levels in a healthy range.  What increases the risk?  This condition is more likely to develop in people who:  · Eat foods that are high in animal fat (saturated fat) or cholesterol.  · Are overweight.  · Are not getting enough exercise.  · Have a family history of high cholesterol.  What are the signs or symptoms?  There are no symptoms of this  "condition.  How is this diagnosed?  This condition may be diagnosed from the results of a blood test.  · If you are older than age 20, your health care provider may check your cholesterol every 4-6 years.  · You may be checked more often if you already have high cholesterol or other risk factors for heart disease.  The blood test for cholesterol measures:  · \"Bad\" cholesterol (LDL cholesterol). This is the main type of cholesterol that causes heart disease. The desired level for LDL is less than 100.  · \"Good\" cholesterol (HDL cholesterol). This type helps to protect against heart disease by cleaning the arteries and carrying the LDL away. The desired level for HDL is 60 or higher.  · Triglycerides. These are fats that the body can store or burn for energy. The desired number for triglycerides is lower than 150.  · Total cholesterol. This is a measure of the total amount of cholesterol in your blood, including LDL cholesterol, HDL cholesterol, and triglycerides. A healthy number is less than 200.  How is this treated?  This condition is treated with diet changes, lifestyle changes, and medicines.  Diet changes  · This may include eating more whole grains, fruits, vegetables, nuts, and fish.  · This may also include cutting back on red meat and foods that have a lot of added sugar.  Lifestyle changes  · Changes may include getting at least 40 minutes of aerobic exercise 3 times a week. Aerobic exercises include walking, biking, and swimming. Aerobic exercise along with a healthy diet can help you maintain a healthy weight.  · Changes may also include quitting smoking.  Medicines  · Medicines are usually given if diet and lifestyle changes have failed to reduce your cholesterol to healthy levels.  · Your health care provider may prescribe a statin medicine. Statin medicines have been shown to reduce cholesterol, which can reduce the risk of heart disease.  Follow these instructions at home:  Eating and drinking  If " told by your health care provider:  · Eat chicken (without skin), fish, veal, shellfish, ground turkey breast, and round or loin cuts of red meat.  · Do not eat fried foods or fatty meats, such as hot dogs and salami.  · Eat plenty of fruits, such as apples.  · Eat plenty of vegetables, such as broccoli, potatoes, and carrots.  · Eat beans, peas, and lentils.  · Eat grains such as barley, rice, couscous, and bulgur wheat.  · Eat pasta without cream sauces.  · Use skim or nonfat milk, and eat low-fat or nonfat yogurt and cheeses.  · Do not eat or drink whole milk, cream, ice cream, egg yolks, or hard cheeses.  · Do not eat stick margarine or tub margarines that contain trans fats (also called partially hydrogenated oils).  · Do not eat saturated tropical oils, such as coconut oil and palm oil.  · Do not eat cakes, cookies, crackers, or other baked goods that contain trans fats.    General instructions  · Exercise as directed by your health care provider. Increase your activity level with activities such as gardening, walking, and taking the stairs.  · Take over-the-counter and prescription medicines only as told by your health care provider.  · Do not use any products that contain nicotine or tobacco, such as cigarettes and e-cigarettes. If you need help quitting, ask your health care provider.  · Keep all follow-up visits as told by your health care provider. This is important.  Contact a health care provider if:  · You are struggling to maintain a healthy diet or weight.  · You need help to start on an exercise program.  · You need help to stop smoking.  Get help right away if:  · You have chest pain.  · You have trouble breathing.  This information is not intended to replace advice given to you by your health care provider. Make sure you discuss any questions you have with your health care provider.  Document Released: 12/18/2006 Document Revised: 12/21/2018 Document Reviewed: 06/17/2017  [employer] Patient  Education © 2020 [employer]

## 2020-06-19 ENCOUNTER — CLINICAL SUPPORT (OUTPATIENT)
Dept: FAMILY MEDICINE CLINIC | Facility: CLINIC | Age: 55
End: 2020-06-19

## 2020-06-19 DIAGNOSIS — Z00.01 ENCOUNTER FOR GENERAL ADULT MEDICAL EXAMINATION WITH ABNORMAL FINDINGS: ICD-10-CM

## 2020-06-19 LAB
25(OH)D3 SERPL-MCNC: 32.7 NG/ML (ref 30–100)
ALBUMIN SERPL-MCNC: 4.3 G/DL (ref 3.5–5.2)
ALBUMIN/GLOB SERPL: 1.5 G/DL
ALP SERPL-CCNC: 66 U/L (ref 39–117)
ALT SERPL W P-5'-P-CCNC: 15 U/L (ref 1–33)
ANION GAP SERPL CALCULATED.3IONS-SCNC: 14.8 MMOL/L (ref 5–15)
AST SERPL-CCNC: 15 U/L (ref 1–32)
BASOPHILS # BLD AUTO: 0.05 10*3/MM3 (ref 0–0.2)
BASOPHILS NFR BLD AUTO: 0.9 % (ref 0–1.5)
BILIRUB SERPL-MCNC: 0.3 MG/DL (ref 0.2–1.2)
BUN BLD-MCNC: 17 MG/DL (ref 6–20)
BUN/CREAT SERPL: 25 (ref 7–25)
CALCIUM SPEC-SCNC: 9.5 MG/DL (ref 8.6–10.5)
CHLORIDE SERPL-SCNC: 103 MMOL/L (ref 98–107)
CHOLEST SERPL-MCNC: 215 MG/DL (ref 0–200)
CO2 SERPL-SCNC: 21.2 MMOL/L (ref 22–29)
CREAT BLD-MCNC: 0.68 MG/DL (ref 0.57–1)
DEPRECATED RDW RBC AUTO: 44.1 FL (ref 37–54)
EOSINOPHIL # BLD AUTO: 0.2 10*3/MM3 (ref 0–0.4)
EOSINOPHIL NFR BLD AUTO: 3.5 % (ref 0.3–6.2)
ERYTHROCYTE [DISTWIDTH] IN BLOOD BY AUTOMATED COUNT: 12 % (ref 12.3–15.4)
GFR SERPL CREATININE-BSD FRML MDRD: 90 ML/MIN/1.73
GLOBULIN UR ELPH-MCNC: 2.8 GM/DL
GLUCOSE BLD-MCNC: 102 MG/DL (ref 65–99)
HCT VFR BLD AUTO: 37.6 % (ref 34–46.6)
HDLC SERPL-MCNC: 57 MG/DL (ref 40–60)
HGB BLD-MCNC: 13 G/DL (ref 12–15.9)
IMM GRANULOCYTES # BLD AUTO: 0.04 10*3/MM3 (ref 0–0.05)
IMM GRANULOCYTES NFR BLD AUTO: 0.7 % (ref 0–0.5)
LDLC SERPL CALC-MCNC: 130 MG/DL (ref 0–100)
LDLC/HDLC SERPL: 2.28 {RATIO}
LYMPHOCYTES # BLD AUTO: 2.16 10*3/MM3 (ref 0.7–3.1)
LYMPHOCYTES NFR BLD AUTO: 38.3 % (ref 19.6–45.3)
MCH RBC QN AUTO: 34.9 PG (ref 26.6–33)
MCHC RBC AUTO-ENTMCNC: 34.6 G/DL (ref 31.5–35.7)
MCV RBC AUTO: 101.1 FL (ref 79–97)
MONOCYTES # BLD AUTO: 0.5 10*3/MM3 (ref 0.1–0.9)
MONOCYTES NFR BLD AUTO: 8.9 % (ref 5–12)
NEUTROPHILS # BLD AUTO: 2.69 10*3/MM3 (ref 1.7–7)
NEUTROPHILS NFR BLD AUTO: 47.7 % (ref 42.7–76)
NRBC BLD AUTO-RTO: 0 /100 WBC (ref 0–0.2)
PLATELET # BLD AUTO: 392 10*3/MM3 (ref 140–450)
PMV BLD AUTO: 9.9 FL (ref 6–12)
POTASSIUM BLD-SCNC: 3.9 MMOL/L (ref 3.5–5.2)
PROT SERPL-MCNC: 7.1 G/DL (ref 6–8.5)
RBC # BLD AUTO: 3.72 10*6/MM3 (ref 3.77–5.28)
SODIUM BLD-SCNC: 139 MMOL/L (ref 136–145)
TRIGL SERPL-MCNC: 141 MG/DL (ref 0–150)
TSH SERPL DL<=0.05 MIU/L-ACNC: 3.01 UIU/ML (ref 0.27–4.2)
VIT B12 BLD-MCNC: 588 PG/ML (ref 211–946)
VLDLC SERPL-MCNC: 28.2 MG/DL (ref 5–40)
WBC NRBC COR # BLD: 5.64 10*3/MM3 (ref 3.4–10.8)

## 2020-06-19 PROCEDURE — 85025 COMPLETE CBC W/AUTO DIFF WBC: CPT | Performed by: NURSE PRACTITIONER

## 2020-06-19 PROCEDURE — 80053 COMPREHEN METABOLIC PANEL: CPT | Performed by: NURSE PRACTITIONER

## 2020-06-19 PROCEDURE — 82306 VITAMIN D 25 HYDROXY: CPT | Performed by: NURSE PRACTITIONER

## 2020-06-19 PROCEDURE — 82607 VITAMIN B-12: CPT | Performed by: NURSE PRACTITIONER

## 2020-06-19 PROCEDURE — 80061 LIPID PANEL: CPT | Performed by: NURSE PRACTITIONER

## 2020-06-19 PROCEDURE — 36415 COLL VENOUS BLD VENIPUNCTURE: CPT | Performed by: NURSE PRACTITIONER

## 2020-06-19 PROCEDURE — 84443 ASSAY THYROID STIM HORMONE: CPT | Performed by: NURSE PRACTITIONER

## 2020-06-19 PROCEDURE — 83036 HEMOGLOBIN GLYCOSYLATED A1C: CPT | Performed by: NURSE PRACTITIONER

## 2020-06-22 LAB — HBA1C MFR BLD: 5.4 % (ref 3.5–5.6)

## 2020-07-21 ENCOUNTER — LAB (OUTPATIENT)
Dept: LAB | Facility: HOSPITAL | Age: 55
End: 2020-07-21

## 2020-07-21 DIAGNOSIS — E83.119 HEMOCHROMATOSIS, UNSPECIFIED HEMOCHROMATOSIS TYPE: Primary | ICD-10-CM

## 2020-07-21 DIAGNOSIS — D64.9 ANEMIA, UNSPECIFIED TYPE: ICD-10-CM

## 2020-07-21 LAB
BASOPHILS # BLD AUTO: 0.05 10*3/MM3 (ref 0–0.2)
BASOPHILS NFR BLD AUTO: 0.8 % (ref 0–1.5)
DEPRECATED RDW RBC AUTO: 45.6 FL (ref 37–54)
EOSINOPHIL # BLD AUTO: 0.15 10*3/MM3 (ref 0–0.4)
EOSINOPHIL NFR BLD AUTO: 2.4 % (ref 0.3–6.2)
ERYTHROCYTE [DISTWIDTH] IN BLOOD BY AUTOMATED COUNT: 12.3 % (ref 12.3–15.4)
FERRITIN SERPL-MCNC: 271 NG/ML (ref 13–150)
HCT VFR BLD AUTO: 39.7 % (ref 34–46.6)
HGB BLD-MCNC: 13.7 G/DL (ref 12–15.9)
IRON 24H UR-MRATE: 247 MCG/DL (ref 37–145)
IRON SATN MFR SERPL: 82 % (ref 20–50)
LYMPHOCYTES # BLD AUTO: 2.08 10*3/MM3 (ref 0.7–3.1)
LYMPHOCYTES NFR BLD AUTO: 33.8 % (ref 19.6–45.3)
MCH RBC QN AUTO: 35.5 PG (ref 26.6–33)
MCHC RBC AUTO-ENTMCNC: 34.5 G/DL (ref 31.5–35.7)
MCV RBC AUTO: 102.8 FL (ref 79–97)
MONOCYTES # BLD AUTO: 0.59 10*3/MM3 (ref 0.1–0.9)
MONOCYTES NFR BLD AUTO: 9.6 % (ref 5–12)
NEUTROPHILS NFR BLD AUTO: 3.29 10*3/MM3 (ref 1.7–7)
NEUTROPHILS NFR BLD AUTO: 53.4 % (ref 42.7–76)
PLATELET # BLD AUTO: 372 10*3/MM3 (ref 140–450)
PMV BLD AUTO: 8.4 FL (ref 6–12)
RBC # BLD AUTO: 3.86 10*6/MM3 (ref 3.77–5.28)
TIBC SERPL-MCNC: 302 MCG/DL (ref 298–536)
TRANSFERRIN SERPL-MCNC: 203 MG/DL (ref 200–360)
WBC # BLD AUTO: 6.16 10*3/MM3 (ref 3.4–10.8)

## 2020-07-21 PROCEDURE — 85025 COMPLETE CBC W/AUTO DIFF WBC: CPT

## 2020-07-21 PROCEDURE — 84466 ASSAY OF TRANSFERRIN: CPT

## 2020-07-21 PROCEDURE — 83540 ASSAY OF IRON: CPT

## 2020-07-21 PROCEDURE — 82728 ASSAY OF FERRITIN: CPT

## 2020-07-21 PROCEDURE — 36415 COLL VENOUS BLD VENIPUNCTURE: CPT

## 2020-07-28 ENCOUNTER — TELEPHONE (OUTPATIENT)
Dept: ONCOLOGY | Facility: HOSPITAL | Age: 55
End: 2020-07-28

## 2020-07-28 ENCOUNTER — APPOINTMENT (OUTPATIENT)
Dept: LAB | Facility: HOSPITAL | Age: 55
End: 2020-07-28

## 2020-07-28 NOTE — TELEPHONE ENCOUNTER
Pt called stating that she is to see Dr. Mendoza today but has developed a cough with drainage. I spoke with Martha Wheat RN and she states to reschedule pt for 1-2 weeks. I spoke with Jeanine and she will take care of rescheduling. Pt verbalized understanding of this.

## 2020-08-04 ENCOUNTER — OFFICE VISIT (OUTPATIENT)
Dept: FAMILY MEDICINE CLINIC | Facility: CLINIC | Age: 55
End: 2020-08-04

## 2020-08-04 VITALS
OXYGEN SATURATION: 97 % | DIASTOLIC BLOOD PRESSURE: 69 MMHG | BODY MASS INDEX: 43.34 KG/M2 | HEIGHT: 59 IN | SYSTOLIC BLOOD PRESSURE: 119 MMHG | HEART RATE: 77 BPM | TEMPERATURE: 97.5 F | RESPIRATION RATE: 20 BRPM | WEIGHT: 215 LBS

## 2020-08-04 DIAGNOSIS — R05.9 COUGH: Primary | ICD-10-CM

## 2020-08-04 DIAGNOSIS — H69.83 EUSTACHIAN TUBE DYSFUNCTION, BILATERAL: ICD-10-CM

## 2020-08-04 PROBLEM — R91.8 LUNG MASS: Status: RESOLVED | Noted: 2019-02-18 | Resolved: 2020-08-04

## 2020-08-04 PROCEDURE — 99213 OFFICE O/P EST LOW 20 MIN: CPT | Performed by: NURSE PRACTITIONER

## 2020-08-04 RX ORDER — AZITHROMYCIN 250 MG/1
250 TABLET, FILM COATED ORAL DAILY
Qty: 6 TABLET | Refills: 0 | Status: SHIPPED | OUTPATIENT
Start: 2020-08-04 | End: 2020-08-09

## 2020-08-04 RX ORDER — BENZONATATE 100 MG/1
100 CAPSULE ORAL 3 TIMES DAILY PRN
Qty: 30 CAPSULE | Refills: 1 | Status: SHIPPED | OUTPATIENT
Start: 2020-08-04 | End: 2020-12-18

## 2020-08-04 NOTE — PROGRESS NOTES
"Subjective   Merissa Yusuf is a 55 y.o. female presents for   Chief Complaint   Patient presents with   • Cough       Health Maintenance Due   Topic Date Due   • ZOSTER VACCINE (1 of 2) 07/14/2013   • MAMMOGRAM  06/04/2020   • COLOGUARD  06/27/2020   • INFLUENZA VACCINE  08/01/2020       History of Present Illness   Pt reports cough 2-3 weeks.  She states she went camping and developed a cough at that time.  After a week of coughing, she states she had a covid test that was negative.  She denies fever, but reports lots of mucous, sinus drainage.  Mucinex DM helps cough but wears off too soon.  Pt reports feeling short of breath at times while coughing but denies shortness of air with activity.  She states cough wakes her up, but has taken nyquil the past 2 nights and states she has gotten a good night sleep.      Vitals:    08/04/20 1021 08/04/20 1022   BP: 115/78 119/69   BP Location: Right arm Right arm   Patient Position: Sitting Sitting   Cuff Size: Large Adult Large Adult   Pulse: 82 77   Resp: 20    Temp: 97.5 °F (36.4 °C)    TempSrc: Temporal    SpO2: 97%    Weight: 97.5 kg (215 lb)    Height: 149.9 cm (59\")      Body mass index is 43.42 kg/m².    Current Outpatient Medications on File Prior to Visit   Medication Sig Dispense Refill   • acetaminophen (TYLENOL) 500 MG tablet TYLENOL EXTRA STRENGTH 500 MG TABS     • busPIRone (BUSPAR) 15 MG tablet Take one tablet three times a day for anxiety 90 tablet 2   • Calcium Carbonate Antacid (TUMS PO) Take 2,000 mg by mouth At Night As Needed.     • Cholecalciferol (VITAMIN D-3) 5000 units tablet Daily.     • cyanocobalamin (VITAMIN B-12) 500 MCG tablet B-12 500 MCG TABS     • Desvenlafaxine Succinate ER 25 MG tablet sustained-release 24 hour TAKE 1 TABLET BY MOUTH EVERY MORNING 90 tablet 1   • diclofenac (VOLTAREN) 1 % gel gel Apply 4 g topically to the appropriate area as directed 4 (Four) Times a Day As Needed.     • Loratadine 10 MG capsule Take 1 tablet by mouth " Daily As Needed.     • Naproxen Sodium 220 MG capsule Take 1 tablet by mouth As Needed.       No current facility-administered medications on file prior to visit.        The following portions of the patient's history were reviewed and updated as appropriate: allergies, current medications, past family history, past medical history, past social history, past surgical history and problem list.    Review of Systems   Constitutional: Negative for chills and fever.   HENT: Positive for congestion, postnasal drip and sore throat. Negative for sinus pressure.    Eyes: Negative for blurred vision.   Respiratory: Positive for cough. Negative for shortness of breath.    Cardiovascular: Negative for chest pain.   Gastrointestinal: Negative for abdominal pain.   Endocrine: Negative.    Genitourinary: Negative.    Musculoskeletal: Negative for arthralgias and joint swelling.   Skin: Negative for color change.   Allergic/Immunologic: Positive for environmental allergies.   Neurological: Negative for dizziness.   Psychiatric/Behavioral: Negative for behavioral problems.       Objective   Physical Exam   Constitutional: She is oriented to person, place, and time. Vital signs are normal. She appears well-developed and well-nourished.   HENT:   Head: Normocephalic and atraumatic.   Right Ear: Hearing, external ear and ear canal normal. A middle ear effusion is present.   Left Ear: Hearing, external ear and ear canal normal. A middle ear effusion is present.   Nose: Mucosal edema present. Right sinus exhibits no maxillary sinus tenderness and no frontal sinus tenderness. Left sinus exhibits no maxillary sinus tenderness and no frontal sinus tenderness.   Mouth/Throat: Uvula is midline and mucous membranes are normal. Posterior oropharyngeal erythema present.   Eyes: Pupils are equal, round, and reactive to light. Conjunctivae and EOM are normal.   Neck: Normal range of motion. Neck supple.   Cardiovascular: Normal rate, regular  rhythm, normal heart sounds and intact distal pulses.   Pulmonary/Chest: Effort normal and breath sounds normal.   Abdominal: Soft.   Musculoskeletal: Normal range of motion.   Lymphadenopathy:     She has cervical adenopathy.   Neurological: She is alert and oriented to person, place, and time.   Skin: Skin is warm and dry.   Psychiatric: She has a normal mood and affect. Her behavior is normal. Judgment and thought content normal.   Nursing note and vitals reviewed.    PHQ-9 Total Score:      Assessment/Plan   Merissa was seen today for cough.    Diagnoses and all orders for this visit:    Cough    Eustachian tube dysfunction, bilateral  Comments:  encouraged use of nasocort daily, antihistamine prn    Other orders  -     azithromycin (Zithromax Z-Miguel) 250 MG tablet; Take 1 tablet by mouth Daily for 5 days. Take 2 tablets by mouth the first day, then 1 tablet daily for 4 days.  -     benzonatate (Tessalon Perles) 100 MG capsule; Take 1 capsule by mouth 3 (Three) Times a Day As Needed for Cough.        Patient Instructions   Nasocort (triamcinolone) nasal spray-2 sprays each nostril daily      Cough, Adult  A cough helps to clear your throat and lungs. A cough may be a sign of an illness or another medical condition.  An acute cough may only last 2-3 weeks, while a chronic cough may last 8 or more weeks.  Many things can cause a cough. They include:  · Germs (viruses or bacteria) that attack the airway.  · Breathing in things that bother (irritate) your lungs.  · Allergies.  · Asthma.  · Mucus that runs down the back of your throat (postnasal drip).  · Smoking.  · Acid backing up from the stomach into the tube that moves food from the mouth to the stomach (gastroesophageal reflux).  · Some medicines.  · Lung problems.  · Other medical conditions, such as heart failure or a blood clot in the lung (pulmonary embolism).  Follow these instructions at home:  Medicines  · Take over-the-counter and prescription medicines  only as told by your doctor.  · Talk with your doctor before you take medicines that stop a cough (coughsuppressants).  Lifestyle    · Do not smoke, and try not to be around smoke. Do not use any products that contain nicotine or tobacco, such as cigarettes, e-cigarettes, and chewing tobacco. If you need help quitting, ask your doctor.  · Drink enough fluid to keep your pee (urine) pale yellow.  · Avoid caffeine.  · Do not drink alcohol if your doctor tells you not to drink.  General instructions    · Watch for any changes in your cough. Tell your doctor about them.  · Always cover your mouth when you cough.  · Stay away from things that make you cough, such as perfume, candles, campfire smoke, or cleaning products.  · If the air is dry, use a cool mist vaporizer or humidifier in your home.  · If your cough is worse at night, try using extra pillows to raise your head up higher while you sleep.  · Rest as needed.  · Keep all follow-up visits as told by your doctor. This is important.  Contact a doctor if:  · You have new symptoms.  · You cough up pus.  · Your cough does not get better after 2-3 weeks, or your cough gets worse.  · Cough medicine does not help your cough and you are not sleeping well.  · You have pain that gets worse or pain that is not helped with medicine.  · You have a fever.  · You are losing weight and you do not know why.  · You have night sweats.  Get help right away if:  · You cough up blood.  · You have trouble breathing.  · Your heartbeat is very fast.  These symptoms may be an emergency. Do not wait to see if the symptoms will go away. Get medical help right away. Call your local emergency services (911 in the [place] Do not drive yourself to the hospital.  Summary  · A cough helps to clear your throat and lungs. Many things can cause a cough.  · Take over-the-counter and prescription medicines only as told by your doctor.  · Always cover your mouth when you cough.  · Contact a doctor if you  have new symptoms or you have a cough that does not get better or gets worse.  This information is not intended to replace advice given to you by your health care provider. Make sure you discuss any questions you have with your health care provider.  Document Released: 08/30/2012 Document Revised: 01/06/2020 Document Reviewed: 01/06/2020  Elsevier Patient Education © 2020 [employer]

## 2020-08-04 NOTE — PATIENT INSTRUCTIONS
Nasocort (triamcinolone) nasal spray-2 sprays each nostril daily      Cough, Adult  A cough helps to clear your throat and lungs. A cough may be a sign of an illness or another medical condition.  An acute cough may only last 2-3 weeks, while a chronic cough may last 8 or more weeks.  Many things can cause a cough. They include:  · Germs (viruses or bacteria) that attack the airway.  · Breathing in things that bother (irritate) your lungs.  · Allergies.  · Asthma.  · Mucus that runs down the back of your throat (postnasal drip).  · Smoking.  · Acid backing up from the stomach into the tube that moves food from the mouth to the stomach (gastroesophageal reflux).  · Some medicines.  · Lung problems.  · Other medical conditions, such as heart failure or a blood clot in the lung (pulmonary embolism).  Follow these instructions at home:  Medicines  · Take over-the-counter and prescription medicines only as told by your doctor.  · Talk with your doctor before you take medicines that stop a cough (coughsuppressants).  Lifestyle    · Do not smoke, and try not to be around smoke. Do not use any products that contain nicotine or tobacco, such as cigarettes, e-cigarettes, and chewing tobacco. If you need help quitting, ask your doctor.  · Drink enough fluid to keep your pee (urine) pale yellow.  · Avoid caffeine.  · Do not drink alcohol if your doctor tells you not to drink.  General instructions    · Watch for any changes in your cough. Tell your doctor about them.  · Always cover your mouth when you cough.  · Stay away from things that make you cough, such as perfume, candles, campfire smoke, or cleaning products.  · If the air is dry, use a cool mist vaporizer or humidifier in your home.  · If your cough is worse at night, try using extra pillows to raise your head up higher while you sleep.  · Rest as needed.  · Keep all follow-up visits as told by your doctor. This is important.  Contact a doctor if:  · You have new  symptoms.  · You cough up pus.  · Your cough does not get better after 2-3 weeks, or your cough gets worse.  · Cough medicine does not help your cough and you are not sleeping well.  · You have pain that gets worse or pain that is not helped with medicine.  · You have a fever.  · You are losing weight and you do not know why.  · You have night sweats.  Get help right away if:  · You cough up blood.  · You have trouble breathing.  · Your heartbeat is very fast.  These symptoms may be an emergency. Do not wait to see if the symptoms will go away. Get medical help right away. Call your local emergency services (911 in the [place] Do not drive yourself to the hospital.  Summary  · A cough helps to clear your throat and lungs. Many things can cause a cough.  · Take over-the-counter and prescription medicines only as told by your doctor.  · Always cover your mouth when you cough.  · Contact a doctor if you have new symptoms or you have a cough that does not get better or gets worse.  This information is not intended to replace advice given to you by your health care provider. Make sure you discuss any questions you have with your health care provider.  Document Released: 08/30/2012 Document Revised: 01/06/2020 Document Reviewed: 01/06/2020  Elsevier Patient Education © 2020 [employer]

## 2020-08-11 ENCOUNTER — OFFICE VISIT (OUTPATIENT)
Dept: ONCOLOGY | Facility: CLINIC | Age: 55
End: 2020-08-11

## 2020-08-11 ENCOUNTER — APPOINTMENT (OUTPATIENT)
Dept: LAB | Facility: HOSPITAL | Age: 55
End: 2020-08-11

## 2020-08-11 VITALS
RESPIRATION RATE: 16 BRPM | BODY MASS INDEX: 43.51 KG/M2 | TEMPERATURE: 98.6 F | SYSTOLIC BLOOD PRESSURE: 114 MMHG | HEART RATE: 76 BPM | DIASTOLIC BLOOD PRESSURE: 77 MMHG | HEIGHT: 59 IN | WEIGHT: 215.8 LBS

## 2020-08-11 DIAGNOSIS — E83.119 HEMOCHROMATOSIS, UNSPECIFIED HEMOCHROMATOSIS TYPE: Primary | ICD-10-CM

## 2020-08-11 PROCEDURE — 99214 OFFICE O/P EST MOD 30 MIN: CPT | Performed by: INTERNAL MEDICINE

## 2020-08-11 RX ORDER — TRIAMCINOLONE ACETONIDE 55 UG/1
2 SPRAY, METERED NASAL DAILY
COMMUNITY
End: 2021-07-28 | Stop reason: SDUPTHER

## 2020-08-11 NOTE — PROGRESS NOTES
Hematology/Oncology Outpatient Follow Up    Merissa Yusuf  1965    Primary Care Physician: Melissa Duarte MD  Referring Physician: Melissa Duarte MD  Chief complaint:  Hereditary hemochromatosis, homozygous mutation in H63D.  Iron overload  Macrocytosis  History of Present Illness:   · Ms. Yusuf has a history of COPD and also depression.  Laboratory work up was obtained in March 2019 which was abnormal.  Patient was sent to the [facility] and seen initially on 3/13/19.   · 3/5/19 - WBC 4.7, hemoglobin 14.1, MCV elevated to 103 and platelet count of 592,000.  Vitamin D  20.  Sed rate 35.  TSH 1.62.  Vitamin B12 476.  Creatinine 0.8.    · 3/13/19 - ZACKERY-2 mutation negative.  Hemochromatosis gene mutation, homozygous mutation in H63D.  · 3/13/19 - Vitamin B12 507.  Ferritin 178.  Creatinine 0.7.  Iron level 156.  Iron binding  capacity 257.  Percentage iron saturation 61.   · 1/8/2020 Ferritin 304 iron 177 iron saturation 56 TIBC 316  · 7/21/2020 iron 247 iron saturation 82 iron binding capacity 302 ferritin 271 hemoglobin 13.7 WBC 6.1 platelet count 372.  · 6/19/2020 CMP is normal including LFTs.  TSH 3.01      Past Medical History:   Diagnosis Date   • Arthritis    • COPD (chronic obstructive pulmonary disease) (CMS/HCC)    • Depression    • GERD (gastroesophageal reflux disease)    • Hereditary hemochromatosis (CMS/HCC)    • Iron overload    • Macrocytosis    • Seasonal allergies    • Sleep apnea      2.  6/6/2019 abnormal mammogram with a mass in the right breast which was biopsied which was benign breast tissue with fibrocystic changes no malignancy identified    Past Surgical History:   Procedure Laterality Date   • BLADDER SUSPENSION     • BREAST BIOPSY Right    • SUBTOTAL HYSTERECTOMY     • TUBAL ABDOMINAL LIGATION     • WISDOM TOOTH EXTRACTION           Current Outpatient Medications:   •  acetaminophen (TYLENOL) 500 MG tablet, TYLENOL EXTRA STRENGTH 500 MG TABS, Disp:  , Rfl:   •  benzonatate (Tessalon Perles) 100 MG capsule, Take 1 capsule by mouth 3 (Three) Times a Day As Needed for Cough., Disp: 30 capsule, Rfl: 1  •  busPIRone (BUSPAR) 15 MG tablet, Take one tablet three times a day for anxiety (Patient taking differently: Take one tablet three times a day for anxiety Patient takes 1-3 times a day.), Disp: 90 tablet, Rfl: 2  •  Cholecalciferol (VITAMIN D-3) 5000 units tablet, Daily., Disp: , Rfl:   •  cyanocobalamin (VITAMIN B-12) 500 MCG tablet, B-12 500 MCG TABS, Disp: , Rfl:   •  Desvenlafaxine Succinate ER 25 MG tablet sustained-release 24 hour, TAKE 1 TABLET BY MOUTH EVERY MORNING, Disp: 90 tablet, Rfl: 1  •  diclofenac (VOLTAREN) 1 % gel gel, Apply 4 g topically to the appropriate area as directed 4 (Four) Times a Day As Needed., Disp: , Rfl:   •  Loratadine 10 MG capsule, Take 1 tablet by mouth Daily As Needed., Disp: , Rfl:   •  Naproxen Sodium 220 MG capsule, Take 1 tablet by mouth As Needed., Disp: , Rfl:   •  Triamcinolone Acetonide (NASACORT) 55 MCG/ACT nasal inhaler, 2 sprays into the nostril(s) as directed by provider Daily., Disp: , Rfl:   •  Calcium Carbonate Antacid (TUMS PO), Take 2,000 mg by mouth At Night As Needed., Disp: , Rfl:     Allergies   Allergen Reactions   • Amoxicillin Swelling   • Erythromycin GI Intolerance   • Escitalopram Anxiety   • Prozac  [Fluoxetine Hcl] Rash   • Penicillin G Rash       Family History   Problem Relation Age of Onset   • Coronary artery disease Other    • Diabetes Other    • Dementia Other    • Diabetes Mother    • Cancer Mother    • Heart disease Father    • Liver disease Father    • Hypertension Father    • Cancer Brother        Cancer-related family history includes Cancer in her brother and mother.    Social History     Tobacco Use   • Smoking status: Never Smoker   • Smokeless tobacco: Never Used   Substance Use Topics   • Alcohol use: Yes     Frequency: Never     Drinks per session: 1 or 2     Binge frequency: Never  "    Comment: rarely   • Drug use: No       I have reviewed the history of present illness, past medical history, family history, social history, lab results, all notes and other records since the patient was last seen at the cancer care center.  SUBJECTIVE:    Patient is in office for follow-up.  Has back pain doing well otherwise no fevers night sweats.  Trying to lose weight not successful.        ROS:       Review of Systems   Constitutional: Negative for fever.   HENT: Negative for nosebleeds and trouble swallowing.    Eyes: Negative for visual disturbance.   Respiratory: Negative for cough, shortness of breath and wheezing.    Cardiovascular: Negative for chest pain.   Gastrointestinal: Negative for abdominal pain and blood in stool.   Endocrine: Negative for cold intolerance.   Genitourinary: Negative for dysuria and hematuria.   Musculoskeletal: Negative for joint swelling.   Skin: Negative for rash.   Allergic/Immunologic: Negative for environmental allergies.   Neurological: Negative for seizures.   Hematological: Does not bruise/bleed easily.   Psychiatric/Behavioral: The patient is not nervous/anxious.    MD performed ROS and are negative except as mentioned in Subjective.        Objective:       Vitals:    08/11/20 1126   BP: 114/77   Pulse: 76   Resp: 16   Temp: 98.6 °F (37 °C)   TempSrc: Skin   Weight: 97.9 kg (215 lb 12.8 oz)   Height: 149.9 cm (59\")   PainSc: 0-No pain         PHYSICAL EXAM:       Physical Exam   Constitutional: She is oriented to person, place, and time. No distress.   Moderately built well-nourished   HENT:   Head: Normocephalic and atraumatic.   Eyes: Conjunctivae and EOM are normal. Right eye exhibits no discharge. Left eye exhibits no discharge. No scleral icterus.   Neck: Normal range of motion. Neck supple. No thyromegaly present.   Cardiovascular: Normal rate, regular rhythm and normal heart sounds. Exam reveals no gallop and no friction rub.   Pulmonary/Chest: Effort normal. " No stridor. No respiratory distress. She has no wheezes.   Abdominal: Soft. Bowel sounds are normal. She exhibits no mass. There is no tenderness. There is no rebound and no guarding.   Soft distended no palpable organomegaly   Musculoskeletal: Normal range of motion. She exhibits no tenderness.   Trace bilateral lower extremity edema   Lymphadenopathy:     She has no cervical adenopathy.   Neurological: She is alert and oriented to person, place, and time. She exhibits normal muscle tone.   Skin: Skin is warm. No rash noted. She is not diaphoretic. No erythema.   Psychiatric: She has a normal mood and affect. Her behavior is normal.   Nursing note and vitals reviewed.     Physical exam done by MD.    RECENT LABS:     WBC   Date Value Ref Range Status   07/21/2020 6.16 3.40 - 10.80 10*3/mm3 Final     RBC   Date Value Ref Range Status   07/21/2020 3.86 3.77 - 5.28 10*6/mm3 Final     Hemoglobin   Date Value Ref Range Status   07/21/2020 13.7 12.0 - 15.9 g/dL Final     Hematocrit   Date Value Ref Range Status   07/21/2020 39.7 34.0 - 46.6 % Final     MCV   Date Value Ref Range Status   07/21/2020 102.8 (H) 79.0 - 97.0 fL Final     MCH   Date Value Ref Range Status   07/21/2020 35.5 (H) 26.6 - 33.0 pg Final     MCHC   Date Value Ref Range Status   07/21/2020 34.5 31.5 - 35.7 g/dL Final     RDW   Date Value Ref Range Status   07/21/2020 12.3 12.3 - 15.4 % Final     RDW-SD   Date Value Ref Range Status   07/21/2020 45.6 37.0 - 54.0 fl Final     MPV   Date Value Ref Range Status   07/21/2020 8.4 6.0 - 12.0 fL Final     Platelets   Date Value Ref Range Status   07/21/2020 372 140 - 450 10*3/mm3 Final     Neutrophil %   Date Value Ref Range Status   07/21/2020 53.4 42.7 - 76.0 % Final     Lymphocyte %   Date Value Ref Range Status   07/21/2020 33.8 19.6 - 45.3 % Final     Monocyte %   Date Value Ref Range Status   07/21/2020 9.6 5.0 - 12.0 % Final     Eosinophil %   Date Value Ref Range Status   07/21/2020 2.4 0.3 - 6.2 %  Final     Basophil %   Date Value Ref Range Status   07/21/2020 0.8 0.0 - 1.5 % Final     Immature Grans %   Date Value Ref Range Status   06/19/2020 0.7 (H) 0.0 - 0.5 % Final     Neutrophils, Absolute   Date Value Ref Range Status   07/21/2020 3.29 1.70 - 7.00 10*3/mm3 Final     Lymphocytes, Absolute   Date Value Ref Range Status   07/21/2020 2.08 0.70 - 3.10 10*3/mm3 Final     Monocytes, Absolute   Date Value Ref Range Status   07/21/2020 0.59 0.10 - 0.90 10*3/mm3 Final     Eosinophils, Absolute   Date Value Ref Range Status   07/21/2020 0.15 0.00 - 0.40 10*3/mm3 Final     Basophils, Absolute   Date Value Ref Range Status   07/21/2020 0.05 0.00 - 0.20 10*3/mm3 Final     Immature Grans, Absolute   Date Value Ref Range Status   06/19/2020 0.04 0.00 - 0.05 10*3/mm3 Final     nRBC   Date Value Ref Range Status   06/19/2020 0.0 0.0 - 0.2 /100 WBC Final       Lab Results   Component Value Date    GLUCOSE 102 (H) 06/19/2020    BUN 17 06/19/2020    CREATININE 0.68 06/19/2020    EGFRIFNONA 90 06/19/2020    EGFRIFAFRI >60 02/26/2019    BCR 25.0 06/19/2020    K 3.9 06/19/2020    CO2 21.2 (L) 06/19/2020    CALCIUM 9.5 06/19/2020    ALBUMIN 4.30 06/19/2020    LABIL2 1.5 03/13/2019    AST 15 06/19/2020    ALT 15 06/19/2020         Assessment/Plan      ASSESSMENT:     1. Hereditary hemochromatosis  2. Iron overload  3. Macrocytosis  4. ECOG 1    PLAN:      1. Reviewed the laboratory results with the patient iron studies from January 2020 shows increased ferritin level and iron level.  Ferritin fluctuates.  Patient does admit to hemochromatosis but only homozygous with mutation H63D which does not manifest hemochromatosis disease.   2. Phlebotomy is not indicated at this time.  Patient reports that she donates blood periodically every 3months to 6 months.  She reports she is A positive and she is requesting to donate blood.  3. No evidence of iron overload at this time, patient can donate blood at the [facility].  3.   Normal B12  macrocytosis is improving  4.   4.   I will see her back in my office in 6 months with prior iron studies.  I have reviewed labs results, imaging, vitals, and medications with the patient today.  Electronically signed by Alan Howard MD, 08/12/20, 6:17 AM.        This report was compiled using Dragon voice recognition software. I have made every effort to proof read this document; however, typographical errors may persist.

## 2020-09-15 DIAGNOSIS — F41.8 MIXED ANXIETY DEPRESSIVE DISORDER: ICD-10-CM

## 2020-09-15 RX ORDER — BUSPIRONE HYDROCHLORIDE 15 MG/1
TABLET ORAL
Qty: 90 TABLET | Refills: 2 | Status: SHIPPED | OUTPATIENT
Start: 2020-09-15 | End: 2021-02-22 | Stop reason: SDUPTHER

## 2020-09-16 ENCOUNTER — OFFICE VISIT (OUTPATIENT)
Dept: SURGERY | Facility: CLINIC | Age: 55
End: 2020-09-16

## 2020-09-16 VITALS
BODY MASS INDEX: 43.95 KG/M2 | HEIGHT: 59 IN | WEIGHT: 218 LBS | SYSTOLIC BLOOD PRESSURE: 120 MMHG | OXYGEN SATURATION: 99 % | TEMPERATURE: 96.9 F | DIASTOLIC BLOOD PRESSURE: 80 MMHG | HEART RATE: 65 BPM

## 2020-09-16 DIAGNOSIS — K42.9 UMBILICAL HERNIA WITHOUT OBSTRUCTION AND WITHOUT GANGRENE: Primary | ICD-10-CM

## 2020-09-16 PROCEDURE — 99213 OFFICE O/P EST LOW 20 MIN: CPT | Performed by: SURGERY

## 2020-09-16 RX ORDER — SODIUM CHLORIDE 9 MG/ML
100 INJECTION, SOLUTION INTRAVENOUS CONTINUOUS
Status: CANCELLED | OUTPATIENT
Start: 2020-09-16

## 2020-09-16 RX ORDER — CHLORHEXIDINE GLUCONATE 0.12 MG/ML
15 RINSE ORAL EVERY 12 HOURS SCHEDULED
Status: CANCELLED | OUTPATIENT
Start: 2020-09-16

## 2020-09-16 NOTE — PROGRESS NOTES
GENERAL SURGERY PROGRESS NOTE    9/16/2020    Patient Care Team:  Melissa Duarte MD as PCP - General    Chief Complaint: Umbilical hernia    Subjective   HPI: Patient is a 54 y.o. female presents with a bulge which is been present in the supraumbilical area for the last 2 to 3 years.  Patient has noticed this bulge when she looks down towards her feet, but it was not necessarily bothering her until a few months ago.  She had been meaning to ask her primary care physician about this area, but continually forgot.  She reports that sometimes when she bends over this area causes her discomfort.  She also was concerned because a few weeks ago she had a episode of nausea, vomiting, and watery diarrhea.  She was told by her primary care physician that this could be a ventral hernia and that she should see a surgeon to discuss surgical intervention for the area.  In her bellybutton, the patient also has a swelling protrusion which is less noticeable due to her body habitus.  This area is painful to palpation.  A CT scan of the abdomen and pelvis was performed which demonstrated an approximately 3 cm umbilical hernia.  The patient was scheduled for surgery, but had to reschedule due to personal issues.  It had been greater than 3 months since I last saw the patient so I asked her to please return to the office so that we could further discuss her surgery in detail in a face-to-face manner.    Interval History:   Patient returns to my office today to discuss surgery.  She was scheduled to have a performed, and then the COVID-19 pandemic arose and her surgery was deferred until a later time.  Right now she is concerned about having surgery during the code pandemic and wanted to discuss the specifics regarding her surgery going forward.  She is having some discomfort at the umbilicus, but is not having any symptoms of obstruction such as nausea or vomiting, and is having regular bowel movements.    Review of Systems -  General ROS: negative for - chills, fever or malaise  Psychological ROS: positive for - anxiety and depression  Allergy and Immunology ROS: negative for - hives, nasal congestion or postnasal drip  Respiratory ROS: no cough, shortness of breath, or wheezing  Cardiovascular ROS: no chest pain or dyspnea on exertion  Gastrointestinal ROS: no abdominal pain, change in bowel habits, or black or bloody stools  Genito-Urinary ROS: no dysuria, trouble voiding, or hematuria  Musculoskeletal ROS: negative for - gait disturbance, joint swelling or muscle pain  Neurological ROS: no TIA or stroke symptoms  Dermatological ROS: negative for lumps, pruritus and rash    Objective     Vital Signs  Temp:  [96.9 °F (36.1 °C)] 96.9 °F (36.1 °C)  Heart Rate:  [65] 65  BP: (120)/(80) 120/80    Physical Exam   Constitutional: She is oriented to person, place, and time. She appears well-developed.   HENT:   Head: Normocephalic and atraumatic.   Eyes: Pupils are equal, round, and reactive to light.   Neck: Normal range of motion.   Cardiovascular: Normal rate and regular rhythm.   Pulmonary/Chest: Effort normal and breath sounds normal.   Abdominal: Soft. She exhibits no distension. There is no abdominal tenderness. A hernia is present.   Abdomen is obese, umbilical hernia mildly tender to palpation   Musculoskeletal: Normal range of motion.   Lymphadenopathy:     She has no cervical adenopathy.   Neurological: She is alert and oriented to person, place, and time.   Skin: Skin is warm and dry. No rash noted.   Vitals reviewed.       Results Review:    Lab Results (last 24 hours)     ** No results found for the last 24 hours. **        Imaging Results (Last 24 Hours)     ** No results found for the last 24 hours. **           I have reviewed the above results and noted them below    Medication Review:    Current Outpatient Medications:   •  benzonatate (Tessalon Perles) 100 MG capsule, Take 1 capsule by mouth 3 (Three) Times a Day As Needed  for Cough., Disp: 30 capsule, Rfl: 1  •  busPIRone (BUSPAR) 15 MG tablet, TAKE 1 TABLET BY MOUTH THREE TIMES DAILY FOR ANXIETY, Disp: 90 tablet, Rfl: 2  •  Calcium Carbonate Antacid (TUMS PO), Take 2,000 mg by mouth At Night As Needed., Disp: , Rfl:   •  Desvenlafaxine Succinate ER 25 MG tablet sustained-release 24 hour, TAKE 1 TABLET BY MOUTH EVERY MORNING, Disp: 90 tablet, Rfl: 1  •  Loratadine 10 MG capsule, Take 1 tablet by mouth Daily As Needed., Disp: , Rfl:   •  Triamcinolone Acetonide (NASACORT) 55 MCG/ACT nasal inhaler, 2 sprays into the nostril(s) as directed by provider Daily., Disp: , Rfl:   •  acetaminophen (TYLENOL) 500 MG tablet, TYLENOL EXTRA STRENGTH 500 MG TABS, Disp: , Rfl:   •  Cholecalciferol (VITAMIN D-3) 5000 units tablet, Daily., Disp: , Rfl:   •  cyanocobalamin (VITAMIN B-12) 500 MCG tablet, B-12 500 MCG TABS, Disp: , Rfl:   •  diclofenac (VOLTAREN) 1 % gel gel, Apply 4 g topically to the appropriate area as directed 4 (Four) Times a Day As Needed., Disp: , Rfl:   •  Naproxen Sodium 220 MG capsule, Take 1 tablet by mouth As Needed., Disp: , Rfl:     Assessment/Plan     Active Problems:  Umbilical hernia    Given the size of this hernia on CT scan, I think the use of mesh would be prudent.  Given her central obesity, I have concerns about an open repair and with wound healing.  Therefore, I think the most prudent approach would be to proceed with a laparoscopic hernia repair with mesh.  We discussed the risk, benefits, and alternatives to this.  These include bleeding, mesh infection, damage to surrounding structures upon entering the abdomen, and recurrence.  Given her obesity, recurrence is a concern.  Patient states that she is unable to tolerate most oral analgesics as they cause her to have severe anxiety.  We will perform a tap block at the time of her surgery, and we will attempt to manage her abdominal pain with nonnarcotic medicine going forward.    Reuben Molina,  MD  09/16/20  16:03 EDT

## 2020-09-17 ENCOUNTER — LAB (OUTPATIENT)
Dept: LAB | Facility: HOSPITAL | Age: 55
End: 2020-09-17

## 2020-09-17 ENCOUNTER — HOSPITAL ENCOUNTER (OUTPATIENT)
Dept: CARDIOLOGY | Facility: HOSPITAL | Age: 55
Discharge: HOME | End: 2020-09-17

## 2020-09-17 ENCOUNTER — HOSPITAL ENCOUNTER (OUTPATIENT)
Dept: GENERAL RADIOLOGY | Facility: HOSPITAL | Age: 55
Discharge: HOME | End: 2020-09-17

## 2020-09-17 DIAGNOSIS — K42.9 UMBILICAL HERNIA WITHOUT OBSTRUCTION AND WITHOUT GANGRENE: ICD-10-CM

## 2020-09-17 LAB
ALBUMIN SERPL-MCNC: 4.5 G/DL (ref 3.5–5.2)
ALBUMIN/GLOB SERPL: 1.4 G/DL
ALP SERPL-CCNC: 83 U/L (ref 39–117)
ALT SERPL W P-5'-P-CCNC: 17 U/L (ref 1–33)
ANION GAP SERPL CALCULATED.3IONS-SCNC: 7.6 MMOL/L (ref 5–15)
AST SERPL-CCNC: 19 U/L (ref 1–32)
BASOPHILS # BLD AUTO: 0.05 10*3/MM3 (ref 0–0.2)
BASOPHILS NFR BLD AUTO: 0.7 % (ref 0–1.5)
BILIRUB SERPL-MCNC: 0.3 MG/DL (ref 0–1.2)
BUN SERPL-MCNC: 17 MG/DL (ref 6–20)
BUN/CREAT SERPL: 22.7 (ref 7–25)
CALCIUM SPEC-SCNC: 9.9 MG/DL (ref 8.6–10.5)
CHLORIDE SERPL-SCNC: 103 MMOL/L (ref 98–107)
CO2 SERPL-SCNC: 28.4 MMOL/L (ref 22–29)
CREAT SERPL-MCNC: 0.75 MG/DL (ref 0.57–1)
DEPRECATED RDW RBC AUTO: 46.1 FL (ref 37–54)
EOSINOPHIL # BLD AUTO: 0.15 10*3/MM3 (ref 0–0.4)
EOSINOPHIL NFR BLD AUTO: 2.2 % (ref 0.3–6.2)
ERYTHROCYTE [DISTWIDTH] IN BLOOD BY AUTOMATED COUNT: 12.1 % (ref 12.3–15.4)
GFR SERPL CREATININE-BSD FRML MDRD: 80 ML/MIN/1.73
GLOBULIN UR ELPH-MCNC: 3.2 GM/DL
GLUCOSE SERPL-MCNC: 91 MG/DL (ref 65–99)
HCT VFR BLD AUTO: 41.1 % (ref 34–46.6)
HGB BLD-MCNC: 14 G/DL (ref 12–15.9)
IMM GRANULOCYTES # BLD AUTO: 0.06 10*3/MM3 (ref 0–0.05)
IMM GRANULOCYTES NFR BLD AUTO: 0.9 % (ref 0–0.5)
LYMPHOCYTES # BLD AUTO: 2.55 10*3/MM3 (ref 0.7–3.1)
LYMPHOCYTES NFR BLD AUTO: 38.2 % (ref 19.6–45.3)
MCH RBC QN AUTO: 35.2 PG (ref 26.6–33)
MCHC RBC AUTO-ENTMCNC: 34.1 G/DL (ref 31.5–35.7)
MCV RBC AUTO: 103.3 FL (ref 79–97)
MONOCYTES # BLD AUTO: 0.57 10*3/MM3 (ref 0.1–0.9)
MONOCYTES NFR BLD AUTO: 8.5 % (ref 5–12)
NEUTROPHILS NFR BLD AUTO: 3.3 10*3/MM3 (ref 1.7–7)
NEUTROPHILS NFR BLD AUTO: 49.5 % (ref 42.7–76)
NRBC BLD AUTO-RTO: 0 /100 WBC (ref 0–0.2)
PLATELET # BLD AUTO: 382 10*3/MM3 (ref 140–450)
PMV BLD AUTO: 9.3 FL (ref 6–12)
POTASSIUM SERPL-SCNC: 4.3 MMOL/L (ref 3.5–5.2)
PROT SERPL-MCNC: 7.7 G/DL (ref 6–8.5)
RBC # BLD AUTO: 3.98 10*6/MM3 (ref 3.77–5.28)
SODIUM SERPL-SCNC: 139 MMOL/L (ref 136–145)
WBC # BLD AUTO: 6.68 10*3/MM3 (ref 3.4–10.8)

## 2020-09-17 PROCEDURE — 80053 COMPREHEN METABOLIC PANEL: CPT

## 2020-09-17 PROCEDURE — 93005 ELECTROCARDIOGRAM TRACING: CPT | Performed by: SURGERY

## 2020-09-17 PROCEDURE — 71046 X-RAY EXAM CHEST 2 VIEWS: CPT

## 2020-09-17 PROCEDURE — 85025 COMPLETE CBC W/AUTO DIFF WBC: CPT

## 2020-09-17 PROCEDURE — 36415 COLL VENOUS BLD VENIPUNCTURE: CPT

## 2020-09-18 NOTE — PROGRESS NOTES
No anemia.  Red cells are still large and may be due to folate or B12 deficiency-or if drinks alcohol, limit use-will check with next labs

## 2020-09-19 PROCEDURE — 93010 ELECTROCARDIOGRAM REPORT: CPT | Performed by: INTERNAL MEDICINE

## 2020-09-22 ENCOUNTER — LAB (OUTPATIENT)
Dept: LAB | Facility: HOSPITAL | Age: 55
End: 2020-09-22

## 2020-09-22 DIAGNOSIS — K42.9 UMBILICAL HERNIA WITHOUT OBSTRUCTION AND WITHOUT GANGRENE: ICD-10-CM

## 2020-09-22 PROCEDURE — C9803 HOPD COVID-19 SPEC COLLECT: HCPCS

## 2020-09-22 PROCEDURE — U0004 COV-19 TEST NON-CDC HGH THRU: HCPCS

## 2020-09-23 ENCOUNTER — ANESTHESIA EVENT (OUTPATIENT)
Dept: PERIOP | Facility: HOSPITAL | Age: 55
End: 2020-09-23

## 2020-09-23 LAB — SARS-COV-2 RNA NOSE QL NAA+PROBE: NOT DETECTED

## 2020-09-24 ENCOUNTER — ANESTHESIA (OUTPATIENT)
Dept: PERIOP | Facility: HOSPITAL | Age: 55
End: 2020-09-24

## 2020-09-24 ENCOUNTER — HOSPITAL ENCOUNTER (OUTPATIENT)
Facility: HOSPITAL | Age: 55
Setting detail: HOSPITAL OUTPATIENT SURGERY
Discharge: HOME | End: 2020-09-24
Attending: SURGERY | Admitting: SURGERY

## 2020-09-24 VITALS
OXYGEN SATURATION: 96 % | SYSTOLIC BLOOD PRESSURE: 94 MMHG | HEIGHT: 59 IN | BODY MASS INDEX: 43.59 KG/M2 | WEIGHT: 216.2 LBS | TEMPERATURE: 96.8 F | RESPIRATION RATE: 16 BRPM | HEART RATE: 80 BPM | DIASTOLIC BLOOD PRESSURE: 51 MMHG

## 2020-09-24 DIAGNOSIS — K42.9 UMBILICAL HERNIA WITHOUT OBSTRUCTION AND WITHOUT GANGRENE: ICD-10-CM

## 2020-09-24 PROCEDURE — 25010000002 DEXAMETHASONE PER 1 MG: Performed by: ANESTHESIOLOGY

## 2020-09-24 PROCEDURE — 49653 PR LAP, VENTRAL HERNIA REPAIR,INCARCERATED: CPT | Performed by: SURGERY

## 2020-09-24 PROCEDURE — 25010000002 DEXAMETHASONE PER 1 MG: Performed by: ANESTHESIOLOGIST ASSISTANT

## 2020-09-24 PROCEDURE — 25010000003 MEPERIDINE PER 100 MG: Performed by: ANESTHESIOLOGIST ASSISTANT

## 2020-09-24 PROCEDURE — 25010000002 FENTANYL CITRATE (PF) 100 MCG/2ML SOLUTION: Performed by: ANESTHESIOLOGIST ASSISTANT

## 2020-09-24 PROCEDURE — 25010000002 MIDAZOLAM PER 1 MG: Performed by: ANESTHESIOLOGIST ASSISTANT

## 2020-09-24 PROCEDURE — C1781 MESH (IMPLANTABLE): HCPCS | Performed by: SURGERY

## 2020-09-24 PROCEDURE — 25010000002 PROPOFOL 10 MG/ML EMULSION: Performed by: ANESTHESIOLOGIST ASSISTANT

## 2020-09-24 PROCEDURE — 25010000002 HYDROMORPHONE PER 4 MG: Performed by: ANESTHESIOLOGIST ASSISTANT

## 2020-09-24 PROCEDURE — 25010000002 ROPIVACAINE PER 1 MG: Performed by: ANESTHESIOLOGY

## 2020-09-24 PROCEDURE — 25010000002 ONDANSETRON PER 1 MG: Performed by: ANESTHESIOLOGIST ASSISTANT

## 2020-09-24 DEVICE — SORBAFIX ABSORBABLE FIXATION SYSTEM 30 ABSORBABLE FASTENERS
Type: IMPLANTABLE DEVICE | Site: ABDOMEN | Status: FUNCTIONAL
Brand: SORBAFIX ABSORBABLE FIXATION SYSTEM

## 2020-09-24 DEVICE — VENTRALIGHT ST MESH
Type: IMPLANTABLE DEVICE | Site: ABDOMEN | Status: FUNCTIONAL
Brand: VENTRALIGHT ST

## 2020-09-24 RX ORDER — FENTANYL CITRATE 50 UG/ML
50 INJECTION, SOLUTION INTRAMUSCULAR; INTRAVENOUS
Status: DISCONTINUED | OUTPATIENT
Start: 2020-09-24 | End: 2020-09-24 | Stop reason: HOSPADM

## 2020-09-24 RX ORDER — OXYCODONE HYDROCHLORIDE AND ACETAMINOPHEN 5; 325 MG/1; MG/1
1-2 TABLET ORAL EVERY 4 HOURS PRN
Qty: 30 TABLET | Refills: 0 | Status: SHIPPED | OUTPATIENT
Start: 2020-09-24 | End: 2020-12-18

## 2020-09-24 RX ORDER — PROMETHAZINE HYDROCHLORIDE 25 MG/1
25 TABLET ORAL ONCE AS NEEDED
Status: DISCONTINUED | OUTPATIENT
Start: 2020-09-24 | End: 2020-09-24 | Stop reason: HOSPADM

## 2020-09-24 RX ORDER — ACETAMINOPHEN 325 MG/1
650 TABLET ORAL ONCE AS NEEDED
Status: DISCONTINUED | OUTPATIENT
Start: 2020-09-24 | End: 2020-09-24 | Stop reason: HOSPADM

## 2020-09-24 RX ORDER — SODIUM CHLORIDE 0.9 % (FLUSH) 0.9 %
10 SYRINGE (ML) INJECTION AS NEEDED
Status: DISCONTINUED | OUTPATIENT
Start: 2020-09-24 | End: 2020-09-24 | Stop reason: HOSPADM

## 2020-09-24 RX ORDER — SODIUM CHLORIDE, SODIUM LACTATE, POTASSIUM CHLORIDE, CALCIUM CHLORIDE 600; 310; 30; 20 MG/100ML; MG/100ML; MG/100ML; MG/100ML
9 INJECTION, SOLUTION INTRAVENOUS CONTINUOUS PRN
Status: DISCONTINUED | OUTPATIENT
Start: 2020-09-24 | End: 2020-09-24 | Stop reason: HOSPADM

## 2020-09-24 RX ORDER — PHENYLEPHRINE HCL IN 0.9% NACL 0.5 MG/5ML
SYRINGE (ML) INTRAVENOUS AS NEEDED
Status: DISCONTINUED | OUTPATIENT
Start: 2020-09-24 | End: 2020-09-24 | Stop reason: SURG

## 2020-09-24 RX ORDER — MEPERIDINE HYDROCHLORIDE 25 MG/ML
12.5 INJECTION INTRAMUSCULAR; INTRAVENOUS; SUBCUTANEOUS
Status: DISCONTINUED | OUTPATIENT
Start: 2020-09-24 | End: 2020-09-24 | Stop reason: HOSPADM

## 2020-09-24 RX ORDER — DIPHENHYDRAMINE HYDROCHLORIDE 50 MG/ML
12.5 INJECTION INTRAMUSCULAR; INTRAVENOUS
Status: DISCONTINUED | OUTPATIENT
Start: 2020-09-24 | End: 2020-09-24 | Stop reason: HOSPADM

## 2020-09-24 RX ORDER — ONDANSETRON 2 MG/ML
4 INJECTION INTRAMUSCULAR; INTRAVENOUS ONCE AS NEEDED
Status: DISCONTINUED | OUTPATIENT
Start: 2020-09-24 | End: 2020-09-24 | Stop reason: HOSPADM

## 2020-09-24 RX ORDER — HYDRALAZINE HYDROCHLORIDE 20 MG/ML
5 INJECTION INTRAMUSCULAR; INTRAVENOUS
Status: DISCONTINUED | OUTPATIENT
Start: 2020-09-24 | End: 2020-09-24 | Stop reason: HOSPADM

## 2020-09-24 RX ORDER — CLINDAMYCIN PHOSPHATE 900 MG/50ML
900 INJECTION, SOLUTION INTRAVENOUS ONCE
Status: COMPLETED | OUTPATIENT
Start: 2020-09-24 | End: 2020-09-24

## 2020-09-24 RX ORDER — GLYCOPYRROLATE 1 MG/5 ML
SYRINGE (ML) INTRAVENOUS AS NEEDED
Status: DISCONTINUED | OUTPATIENT
Start: 2020-09-24 | End: 2020-09-24 | Stop reason: SURG

## 2020-09-24 RX ORDER — DEXAMETHASONE SODIUM PHOSPHATE 4 MG/ML
INJECTION, SOLUTION INTRA-ARTICULAR; INTRALESIONAL; INTRAMUSCULAR; INTRAVENOUS; SOFT TISSUE AS NEEDED
Status: DISCONTINUED | OUTPATIENT
Start: 2020-09-24 | End: 2020-09-24 | Stop reason: SURG

## 2020-09-24 RX ORDER — PROMETHAZINE HYDROCHLORIDE 25 MG/1
25 SUPPOSITORY RECTAL ONCE AS NEEDED
Status: DISCONTINUED | OUTPATIENT
Start: 2020-09-24 | End: 2020-09-24 | Stop reason: HOSPADM

## 2020-09-24 RX ORDER — ROPIVACAINE HYDROCHLORIDE 5 MG/ML
INJECTION, SOLUTION EPIDURAL; INFILTRATION; PERINEURAL
Status: DISCONTINUED | OUTPATIENT
Start: 2020-09-24 | End: 2020-09-24 | Stop reason: SURG

## 2020-09-24 RX ORDER — ACETAMINOPHEN 650 MG/1
650 SUPPOSITORY RECTAL ONCE AS NEEDED
Status: DISCONTINUED | OUTPATIENT
Start: 2020-09-24 | End: 2020-09-24 | Stop reason: HOSPADM

## 2020-09-24 RX ORDER — LIDOCAINE HYDROCHLORIDE 20 MG/ML
INJECTION, SOLUTION EPIDURAL; INFILTRATION; INTRACAUDAL; PERINEURAL AS NEEDED
Status: DISCONTINUED | OUTPATIENT
Start: 2020-09-24 | End: 2020-09-24 | Stop reason: SURG

## 2020-09-24 RX ORDER — LABETALOL HYDROCHLORIDE 5 MG/ML
5 INJECTION, SOLUTION INTRAVENOUS
Status: DISCONTINUED | OUTPATIENT
Start: 2020-09-24 | End: 2020-09-24 | Stop reason: HOSPADM

## 2020-09-24 RX ORDER — PROPOFOL 10 MG/ML
VIAL (ML) INTRAVENOUS AS NEEDED
Status: DISCONTINUED | OUTPATIENT
Start: 2020-09-24 | End: 2020-09-24 | Stop reason: SURG

## 2020-09-24 RX ORDER — MIDAZOLAM HYDROCHLORIDE 1 MG/ML
INJECTION INTRAMUSCULAR; INTRAVENOUS AS NEEDED
Status: DISCONTINUED | OUTPATIENT
Start: 2020-09-24 | End: 2020-09-24 | Stop reason: SURG

## 2020-09-24 RX ORDER — DEXAMETHASONE SODIUM PHOSPHATE 4 MG/ML
INJECTION, SOLUTION INTRA-ARTICULAR; INTRALESIONAL; INTRAMUSCULAR; INTRAVENOUS; SOFT TISSUE
Status: DISCONTINUED | OUTPATIENT
Start: 2020-09-24 | End: 2020-09-24 | Stop reason: SURG

## 2020-09-24 RX ORDER — CHLORHEXIDINE GLUCONATE 0.12 MG/ML
15 RINSE ORAL EVERY 12 HOURS SCHEDULED
Status: DISCONTINUED | OUTPATIENT
Start: 2020-09-24 | End: 2020-09-24 | Stop reason: HOSPADM

## 2020-09-24 RX ORDER — FENTANYL CITRATE 50 UG/ML
INJECTION, SOLUTION INTRAMUSCULAR; INTRAVENOUS AS NEEDED
Status: DISCONTINUED | OUTPATIENT
Start: 2020-09-24 | End: 2020-09-24 | Stop reason: SURG

## 2020-09-24 RX ORDER — NEOSTIGMINE METHYLSULFATE 5 MG/5 ML
SYRINGE (ML) INTRAVENOUS AS NEEDED
Status: DISCONTINUED | OUTPATIENT
Start: 2020-09-24 | End: 2020-09-24 | Stop reason: SURG

## 2020-09-24 RX ORDER — BUPIVACAINE HYDROCHLORIDE 2.5 MG/ML
INJECTION, SOLUTION INFILTRATION; PERINEURAL AS NEEDED
Status: DISCONTINUED | OUTPATIENT
Start: 2020-09-24 | End: 2020-09-24 | Stop reason: HOSPADM

## 2020-09-24 RX ORDER — SODIUM CHLORIDE 0.9 % (FLUSH) 0.9 %
10 SYRINGE (ML) INJECTION EVERY 12 HOURS SCHEDULED
Status: DISCONTINUED | OUTPATIENT
Start: 2020-09-24 | End: 2020-09-24 | Stop reason: HOSPADM

## 2020-09-24 RX ORDER — ONDANSETRON 2 MG/ML
INJECTION INTRAMUSCULAR; INTRAVENOUS AS NEEDED
Status: DISCONTINUED | OUTPATIENT
Start: 2020-09-24 | End: 2020-09-24 | Stop reason: SURG

## 2020-09-24 RX ORDER — SODIUM CHLORIDE 9 MG/ML
100 INJECTION, SOLUTION INTRAVENOUS CONTINUOUS
Status: DISCONTINUED | OUTPATIENT
Start: 2020-09-24 | End: 2020-09-24 | Stop reason: HOSPADM

## 2020-09-24 RX ORDER — HYDROMORPHONE HCL 110MG/55ML
0.25 PATIENT CONTROLLED ANALGESIA SYRINGE INTRAVENOUS
Status: DISCONTINUED | OUTPATIENT
Start: 2020-09-24 | End: 2020-09-24 | Stop reason: HOSPADM

## 2020-09-24 RX ORDER — ROCURONIUM BROMIDE 10 MG/ML
INJECTION, SOLUTION INTRAVENOUS AS NEEDED
Status: DISCONTINUED | OUTPATIENT
Start: 2020-09-24 | End: 2020-09-24 | Stop reason: SURG

## 2020-09-24 RX ADMIN — FENTANYL CITRATE 50 MCG: 50 INJECTION, SOLUTION INTRAMUSCULAR; INTRAVENOUS at 07:44

## 2020-09-24 RX ADMIN — LIDOCAINE HYDROCHLORIDE 100 MG: 20 INJECTION, SOLUTION EPIDURAL; INFILTRATION; INTRACAUDAL; PERINEURAL at 07:38

## 2020-09-24 RX ADMIN — PROPOFOL 200 MG: 10 INJECTION, EMULSION INTRAVENOUS at 07:38

## 2020-09-24 RX ADMIN — Medication 4 MG: at 08:42

## 2020-09-24 RX ADMIN — ONDANSETRON 4 MG: 2 INJECTION INTRAMUSCULAR; INTRAVENOUS at 08:36

## 2020-09-24 RX ADMIN — ROPIVACAINE HYDROCHLORIDE 30 ML: 5 INJECTION, SOLUTION EPIDURAL; INFILTRATION; PERINEURAL at 08:54

## 2020-09-24 RX ADMIN — ROCURONIUM BROMIDE 50 MG: 10 INJECTION, SOLUTION INTRAVENOUS at 07:39

## 2020-09-24 RX ADMIN — Medication 0.8 MG: at 08:42

## 2020-09-24 RX ADMIN — DEXAMETHASONE SODIUM PHOSPHATE 8 MG: 4 INJECTION, SOLUTION INTRAMUSCULAR; INTRAVENOUS at 08:54

## 2020-09-24 RX ADMIN — HYDROMORPHONE HYDROCHLORIDE 0.5 MG: 2 INJECTION, SOLUTION INTRAMUSCULAR; INTRAVENOUS; SUBCUTANEOUS at 09:42

## 2020-09-24 RX ADMIN — CHLORHEXIDINE GLUCONATE 0.12% ORAL RINSE 15 ML: 1.2 LIQUID ORAL at 06:55

## 2020-09-24 RX ADMIN — HYDROMORPHONE HYDROCHLORIDE 0.25 MG: 2 INJECTION, SOLUTION INTRAMUSCULAR; INTRAVENOUS; SUBCUTANEOUS at 09:59

## 2020-09-24 RX ADMIN — Medication 0.2 MG: at 08:46

## 2020-09-24 RX ADMIN — CLINDAMYCIN PHOSPHATE 900 MG: 900 INJECTION, SOLUTION INTRAVENOUS at 07:46

## 2020-09-24 RX ADMIN — PHENYLEPHRINE HYDROCHLORIDE 100 MCG: 10 INJECTION INTRAVENOUS at 08:35

## 2020-09-24 RX ADMIN — MIDAZOLAM 2 MG: 1 INJECTION INTRAMUSCULAR; INTRAVENOUS at 07:35

## 2020-09-24 RX ADMIN — PHENYLEPHRINE HYDROCHLORIDE 100 MCG: 10 INJECTION INTRAVENOUS at 08:13

## 2020-09-24 RX ADMIN — DEXAMETHASONE SODIUM PHOSPHATE 4 MG: 4 INJECTION, SOLUTION INTRAMUSCULAR; INTRAVENOUS at 07:55

## 2020-09-24 RX ADMIN — FENTANYL CITRATE 50 MCG: 50 INJECTION, SOLUTION INTRAMUSCULAR; INTRAVENOUS at 07:35

## 2020-09-24 RX ADMIN — MEPERIDINE HYDROCHLORIDE 12.5 MG: 25 INJECTION INTRAMUSCULAR; INTRAVENOUS; SUBCUTANEOUS at 09:24

## 2020-09-24 RX ADMIN — Medication 1 MG: at 08:46

## 2020-09-24 RX ADMIN — SODIUM CHLORIDE, SODIUM LACTATE, POTASSIUM CHLORIDE, AND CALCIUM CHLORIDE: .6; .31; .03; .02 INJECTION, SOLUTION INTRAVENOUS at 08:55

## 2020-09-24 RX ADMIN — MUPIROCIN: 20 OINTMENT TOPICAL at 06:55

## 2020-09-24 NOTE — ANESTHESIA PROCEDURE NOTES
Airway  Urgency: elective    Date/Time: 9/24/2020 7:40 AM  End Time:9/24/2020 7:41 AM  Airway not difficult    General Information and Staff    Patient location during procedure: OR  Anesthesiologist: Mathew Lange MD  CRNA: Zaid Soto AA    Indications and Patient Condition  Indications for airway management: airway protection    Preoxygenated: yes  MILS maintained throughout  Mask difficulty assessment: 1 - vent by mask    Final Airway Details  Final airway type: endotracheal airway      Successful airway: ETT  Cuffed: yes   Successful intubation technique: direct laryngoscopy  Facilitating devices/methods: intubating stylet  Endotracheal tube insertion site: oral  Blade: Nicanor  Blade size: 4  ETT size (mm): 7.0  Cormack-Lehane Classification: grade I - full view of glottis  Placement verified by: chest auscultation and capnometry   Measured from: lips  ETT/EBT  to lips (cm): 21  Number of attempts at approach: 1  Assessment: lips, teeth, and gum same as pre-op and atraumatic intubation

## 2020-09-24 NOTE — ANESTHESIA PREPROCEDURE EVALUATION
Anesthesia Evaluation     Patient summary reviewed and Nursing notes reviewed   no history of anesthetic complications:  NPO Solid Status: > 8 hours  NPO Liquid Status: > 8 hours           Airway   Mallampati: II  TM distance: >3 FB  Neck ROM: full  No difficulty expected  Dental - normal exam     Pulmonary - normal exam   (+) sleep apnea,   Cardiovascular - normal exam    (+) hyperlipidemia,       Neuro/Psych  (+) psychiatric history Depression and Anxiety,     GI/Hepatic/Renal/Endo    (+) morbid obesity, GERD,      Musculoskeletal     (+) back pain,   Abdominal   (+) obese,    Substance History - negative use     OB/GYN negative ob/gyn ROS         Other   arthritis,                    Anesthesia Plan    ASA 3     general with block     intravenous induction     Anesthetic plan, all risks, benefits, and alternatives have been provided, discussed and informed consent has been obtained with: patient.    Plan discussed with CAA.

## 2020-09-24 NOTE — ANESTHESIA POSTPROCEDURE EVALUATION
Patient: Merissa Yusuf    Procedure Summary     Date: 09/24/20 Room / Location: [facility] OR 09 / [facility] MAIN OR    Anesthesia Start: 0731 Anesthesia Stop: 0905    Procedure: LAPAROSCOPIC VENTRAL HERNIA (N/A Abdomen) Diagnosis:       Umbilical hernia without obstruction and without gangrene      (Umbilical hernia without obstruction and without gangrene [K42.9])    Surgeon: Reuben Molina MD Provider: Mathew Lange MD    Anesthesia Type: general with block ASA Status: 3          Anesthesia Type: general with block    Vitals  Vitals Value Taken Time   BP 96/53 09/24/20 1015   Temp 98 °F (36.7 °C) 09/24/20 1015   Pulse 77 09/24/20 1016   Resp 17 09/24/20 1015   SpO2 94 % 09/24/20 1016   Vitals shown include unvalidated device data.        Post Anesthesia Care and Evaluation    Patient location during evaluation: PACU  Patient participation: complete - patient participated  Level of consciousness: awake  Pain scale: See nurse's notes for pain score.  Pain management: adequate  Airway patency: patent  Anesthetic complications: No anesthetic complications  PONV Status: none  Cardiovascular status: acceptable  Respiratory status: acceptable  Hydration status: acceptable    Comments: Patient seen and examined postoperatively; vital signs stable; SpO2 greater than or equal to 90%; cardiopulmonary status stable; nausea/vomiting adequately controlled; pain adequately controlled; no apparent anesthesia complications; patient discharged from anesthesia care when discharge criteria were met

## 2020-09-24 NOTE — ANESTHESIA PROCEDURE NOTES
Peripheral Block      Patient reassessed immediately prior to procedure    Patient location during procedure: OR  Reason for block: at surgeon's request and post-op pain management  Performed by  Anesthesiologist: Mathew Lange MD  Preanesthetic Checklist  Completed: patient identified, site marked, surgical consent, pre-op evaluation, timeout performed, IV checked, risks and benefits discussed and monitors and equipment checked  Prep:  Pt Position: supine  Sterile barriers:cap, gloves, sterile barriers and mask  Prep: ChloraPrep  Patient monitoring: blood pressure monitoring, continuous pulse oximetry and EKG  Procedure  Sedation:no  Performed under: general  Guidance:ultrasound guided  ULTRASOUND INTERPRETATION. Using ultrasound guidance a 20 G gauge needle was placed in close proximity to the nerve, at which point, under ultrasound guidance anesthetic was injected in the area of the nerve and spread of the anesthesia was seen on ultrasound in close proximity thereto.  There were no abnormalities seen on ultrasound; a digital image was taken; and the patient tolerated the procedure with no complications. Images:still images obtained, printed/placed on chart    Laterality:Bilateral  Block Type:TAP  Injection Technique:single-shot  Needle Type:echogenic  Needle Gauge:20 G  Resistance on Injection: none    Medications Used: dexamethasone (DECADRON) injection, 8 mg  ropivacaine (NAROPIN) 0.5 % injection, 30 mL  Med admintered at 9/24/2020 8:54 AM      Medications  Preservative Free Saline:30ml  Comment:30cc on each side.    Post Assessment  Injection Assessment: negative aspiration for heme, incremental injection and no paresthesia on injection  Patient Tolerance:comfortable throughout block  Complications:no

## 2020-10-12 ENCOUNTER — OFFICE VISIT (OUTPATIENT)
Dept: CARDIOLOGY | Facility: CLINIC | Age: 55
End: 2020-10-12

## 2020-10-12 VITALS
SYSTOLIC BLOOD PRESSURE: 94 MMHG | WEIGHT: 221 LBS | HEART RATE: 87 BPM | OXYGEN SATURATION: 97 % | DIASTOLIC BLOOD PRESSURE: 62 MMHG | BODY MASS INDEX: 44.64 KG/M2

## 2020-10-12 DIAGNOSIS — G47.33 OBSTRUCTIVE SLEEP APNEA: ICD-10-CM

## 2020-10-12 DIAGNOSIS — R03.0 ELEVATED BLOOD PRESSURE READING WITHOUT DIAGNOSIS OF HYPERTENSION: ICD-10-CM

## 2020-10-12 DIAGNOSIS — F41.1 ANXIETY STATE: ICD-10-CM

## 2020-10-12 DIAGNOSIS — E78.2 MIXED HYPERLIPIDEMIA: Primary | ICD-10-CM

## 2020-10-12 DIAGNOSIS — E66.01 CLASS 3 SEVERE OBESITY DUE TO EXCESS CALORIES WITH BODY MASS INDEX (BMI) OF 40.0 TO 44.9 IN ADULT, UNSPECIFIED WHETHER SERIOUS COMORBIDITY PRESENT (HCC): ICD-10-CM

## 2020-10-12 PROCEDURE — 99212 OFFICE O/P EST SF 10 MIN: CPT | Performed by: NURSE PRACTITIONER

## 2020-10-12 NOTE — PROGRESS NOTES
[facility]      REASON FOR FOLLOW-UP:            Chief Complaint   Patient presents with   • Hyperlipidemia     6 mo f/u  no cardiac complaints         Dear        History of Present Illness     It was my pleasure to see Merissa in the office today.  As you are aware, she is a 55-year-old  female with no known history of coronary occlusive disease.  She had complete ischemic work-up performed March 2019 with nuclear stress testing demonstrating resting inferior apical defect likely technical factors.  No evidence of inducible ischemia.  2D echocardiogram performed at that time showed normal LV function with EF 55-60%.  Evidence of impaired relaxation-grade 1, trace mitral regurgitation.  She was referred to the office for evaluation further episodes of chest discomfort and abnormal EKG findings.  At last office visit, the patient reported multiple episodes of chest pressure and burning sensation that lasts for several hours at a time.  She denies any shortness of breath, dyspnea with exertion, orthopnea or PND.  She has had no dizziness, lightheadedness, presyncopal or syncopal episodes.  She states she has been prescribed BuSpar and that dosage was increased to 15 mg 3 times daily.  She reported increased environmental stressors at home and with family causing her significant anxiety.  She has had 24 hour Holter monitor in the past with no significant pathology.  EKG performed in the office showed nonspecific anterior septal T wave changes that are unchanged from last EKG in 2018.  Her blood pressure is 94/62-she reports is quite normal for her without any dizziness or lightheadedness.  In follow-up today for the above-mentioned diagnoses, patient reports that she feels very well and has had no significant symptoms since last office visit.  She would like to follow-up PRN.    Assessment:  Chest discomfort-noncardiac  Abnormal EKG-unchanged from  prior  Anxiety  Hemochromatosis     Plan:  Follow-up as needed        The following portions of the patient's history were reviewed and updated as appropriate: allergies, current medications, past family history, past medical history, past social history, past surgical history and problem list.    REVIEW OF SYSTEMS:    ROS    Vitals:    10/12/20 1423   BP: 94/62   Pulse: 87   SpO2: 97%         PHYSICAL EXAM:    General: Alert, cooperative, no distress, appears stated age  Head:  Normocephalic, atraumatic, mucous membranes moist  Eyes:  Conjunctiva/corneas clear, EOM's intact     Neck:  Supple,  no JVD or bruit     Lungs: Clear to auscultation bilaterally, no wheezes rhonchi rales are noted  Chest wall: No tenderness  Musculoskeletal:   Ambulates freely without assistance  Heart::  Regular rate and rhythm, S1 and S2 normal, no murmur, rub or gallop  Abdomen: Soft, non-tender, nondistended, bowel sounds active, no abdominal bruit  Extremities: No cyanosis, clubbing, or edema   Pulses: 2+ and symmetric all extremities  Skin:  No rashes or lesions  Neuro/psych: A&O x3. CN II through XII are grossly intact with appropriate affect        Past Medical History:   Diagnosis Date   • Anesthesia complication     drops B/P, and slow to arouse   • Anxiety    • Arthritis    • Depression    • GERD (gastroesophageal reflux disease)    • Hereditary hemochromatosis (CMS/HCC)    • Hyperlipidemia    • Hypotension    • Iron overload    • Macrocytosis    • Seasonal allergies    • Sleep apnea     BIPAP       Past Surgical History:   Procedure Laterality Date   • BLADDER SUSPENSION     • BREAST BIOPSY Right    • FINGER SURGERY  2008    removed cysts from right index finger    • SUBTOTAL HYSTERECTOMY     • TUBAL ABDOMINAL LIGATION     • VENTRAL/INCISIONAL HERNIA REPAIR N/A 9/24/2020    Procedure: LAPAROSCOPIC VENTRAL HERNIA;  Surgeon: Reuben Molina MD;  Location: [facility] MAIN OR;  Service: General;  Laterality: N/A;  0855 TAP  block   • WISDOM TOOTH EXTRACTION           Current Outpatient Medications:   •  acetaminophen (TYLENOL) 500 MG tablet, Take 500 mg by mouth Every 4 (Four) Hours As Needed., Disp: , Rfl:   •  busPIRone (BUSPAR) 15 MG tablet, TAKE 1 TABLET BY MOUTH THREE TIMES DAILY FOR ANXIETY (Patient taking differently: Take 15 mg by mouth 3 (Three) Times a Day. TAKE 1 TABLET BY MOUTH THREE TIMES DAILY FOR ANXIETY), Disp: 90 tablet, Rfl: 2  •  Calcium Carbonate Antacid (TUMS PO), Take 2,000 mg by mouth At Night As Needed., Disp: , Rfl:   •  Cholecalciferol (VITAMIN D-3) 5000 units tablet, Daily., Disp: , Rfl:   •  cyanocobalamin (VITAMIN B-12) 500 MCG tablet, B-12 500 MCG TABS, Disp: , Rfl:   •  Desvenlafaxine Succinate ER 25 MG tablet sustained-release 24 hour, TAKE 1 TABLET BY MOUTH EVERY MORNING, Disp: 90 tablet, Rfl: 1  •  diclofenac (VOLTAREN) 1 % gel gel, Apply 4 g topically to the appropriate area as directed 4 (Four) Times a Day As Needed., Disp: , Rfl:   •  Loratadine 10 MG capsule, Take 1 tablet by mouth Daily As Needed., Disp: , Rfl:   •  Naproxen Sodium 220 MG capsule, Take 1 tablet by mouth As Needed., Disp: , Rfl:   •  Triamcinolone Acetonide (NASACORT) 55 MCG/ACT nasal inhaler, 2 sprays into the nostril(s) as directed by provider Daily., Disp: , Rfl:   •  benzonatate (Tessalon Perles) 100 MG capsule, Take 1 capsule by mouth 3 (Three) Times a Day As Needed for Cough., Disp: 30 capsule, Rfl: 1  •  oxyCODONE-acetaminophen (PERCOCET) 5-325 MG per tablet, Take 1-2 tablets by mouth Every 4 (Four) Hours As Needed (Pain)., Disp: 30 tablet, Rfl: 0    Allergies   Allergen Reactions   • Amoxicillin Swelling   • Erythromycin GI Intolerance   • Escitalopram Anxiety   • Prozac  [Fluoxetine Hcl] Rash   • Penicillin G Rash       Family History   Problem Relation Age of Onset   • Coronary artery disease Other    • Diabetes Other    • Dementia Other    • Diabetes Mother    • Cancer Mother    • Heart disease Father    • Liver disease  "Father    • Hypertension Father    • Cancer Brother        Social History     Tobacco Use   • Smoking status: Never Smoker   • Smokeless tobacco: Never Used   Substance Use Topics   • Alcohol use: Yes     Alcohol/week: 1.0 standard drinks     Types: 1 Glasses of wine per week     Frequency: Never     Drinks per session: 1 or 2     Binge frequency: Never     Comment: rarely           Current Electrocardiogram:  Procedures        EMR Dragon/Transcription:   \"Dictated utilizing Dragon dictation\".         "

## 2020-10-21 ENCOUNTER — OFFICE VISIT (OUTPATIENT)
Dept: SURGERY | Facility: CLINIC | Age: 55
End: 2020-10-21

## 2020-10-21 VITALS
RESPIRATION RATE: 18 BRPM | DIASTOLIC BLOOD PRESSURE: 68 MMHG | BODY MASS INDEX: 44.43 KG/M2 | HEIGHT: 59 IN | TEMPERATURE: 97.5 F | HEART RATE: 74 BPM | OXYGEN SATURATION: 97 % | SYSTOLIC BLOOD PRESSURE: 95 MMHG | WEIGHT: 220.4 LBS

## 2020-10-21 DIAGNOSIS — K42.9 UMBILICAL HERNIA WITHOUT OBSTRUCTION AND WITHOUT GANGRENE: Primary | ICD-10-CM

## 2020-10-21 PROCEDURE — 99024 POSTOP FOLLOW-UP VISIT: CPT | Performed by: SURGERY

## 2020-10-21 NOTE — PROGRESS NOTES
"Post-op Note    Subjective   Merissa Yusuf is a 55 y.o. female status post laparoscopic periumbilical hernia repair with mesh performed on 9/24/2020.  Patient is doing well, tolerating a diet, and having normal bowel movements now that she is off the narcotic pain medicine.  She does report that she still has a tugging sensation on the left superior aspect of her umbilicus. This is midway between her left upper most port site and her umbilicus. She has still has some lower back discomfort which is likely related to the fact that she is using her lower back more than her abdominal muscles postoperatively.  She is not having any fevers.      Objective   BP 95/68 (BP Location: Right arm, Patient Position: Sitting, Cuff Size: Adult)   Pulse 74   Temp 97.5 °F (36.4 °C) (Temporal)   Resp 18   Ht 149.9 cm (59\")   Wt 100 kg (220 lb 6.4 oz)   LMP  (LMP Unknown)   SpO2 97%   BMI 44.52 kg/m²   Incisions are well-healed without any surrounding erythema, induration, or drainage to suggest infection.  No evidence of recurrent hernia at the umbilicus.      Assessment/Plan   Patient is a 55-year-old lady status post laparoscopic periumbilical hernia repair with mesh performed on 9/24/2020.    No heavy lifting for 2 months after surgery  Discussed that the tugging sensation that she has in this area is likely related to tethering of the mesh, and this should improve over time.  Recommend warm compress to this area to aid in softening of scar tissue  Regular diet as tolerated.  Follow-up with me as needed    Reuben Molina MD  10/21/2020  14:36 EDT  "

## 2020-10-27 ENCOUNTER — LAB (OUTPATIENT)
Dept: LAB | Facility: HOSPITAL | Age: 55
End: 2020-10-27

## 2020-10-27 DIAGNOSIS — E83.119 HEMOCHROMATOSIS, UNSPECIFIED HEMOCHROMATOSIS TYPE: Primary | ICD-10-CM

## 2020-10-27 LAB
ALBUMIN SERPL-MCNC: 4 G/DL (ref 3.5–5.2)
ALBUMIN/GLOB SERPL: 1.5 G/DL
ALP SERPL-CCNC: 85 U/L (ref 39–117)
ALT SERPL W P-5'-P-CCNC: 14 U/L (ref 1–33)
ANION GAP SERPL CALCULATED.3IONS-SCNC: 10 MMOL/L (ref 5–15)
AST SERPL-CCNC: 14 U/L (ref 1–32)
BASOPHILS # BLD AUTO: 0.04 10*3/MM3 (ref 0–0.2)
BASOPHILS NFR BLD AUTO: 0.6 % (ref 0–1.5)
BILIRUB SERPL-MCNC: 0.2 MG/DL (ref 0–1.2)
BUN SERPL-MCNC: 17 MG/DL (ref 6–20)
BUN/CREAT SERPL: 20.2 (ref 7–25)
CALCIUM SPEC-SCNC: 9.3 MG/DL (ref 8.6–10.5)
CHLORIDE SERPL-SCNC: 102 MMOL/L (ref 98–107)
CO2 SERPL-SCNC: 26 MMOL/L (ref 22–29)
CREAT SERPL-MCNC: 0.84 MG/DL (ref 0.57–1)
DEPRECATED RDW RBC AUTO: 45.7 FL (ref 37–54)
EOSINOPHIL # BLD AUTO: 0.32 10*3/MM3 (ref 0–0.4)
EOSINOPHIL NFR BLD AUTO: 5 % (ref 0.3–6.2)
ERYTHROCYTE [DISTWIDTH] IN BLOOD BY AUTOMATED COUNT: 12.3 % (ref 12.3–15.4)
FERRITIN SERPL-MCNC: 233.4 NG/ML (ref 13–150)
GFR SERPL CREATININE-BSD FRML MDRD: 70 ML/MIN/1.73
GLOBULIN UR ELPH-MCNC: 2.7 GM/DL
GLUCOSE SERPL-MCNC: 162 MG/DL (ref 65–99)
HCT VFR BLD AUTO: 39.5 % (ref 34–46.6)
HGB BLD-MCNC: 13.1 G/DL (ref 12–15.9)
IRON 24H UR-MRATE: 122 MCG/DL (ref 37–145)
IRON SATN MFR SERPL: 43 % (ref 20–50)
LYMPHOCYTES # BLD AUTO: 2.25 10*3/MM3 (ref 0.7–3.1)
LYMPHOCYTES NFR BLD AUTO: 35.4 % (ref 19.6–45.3)
MCH RBC QN AUTO: 34.3 PG (ref 26.6–33)
MCHC RBC AUTO-ENTMCNC: 33.2 G/DL (ref 31.5–35.7)
MCV RBC AUTO: 103.4 FL (ref 79–97)
MONOCYTES # BLD AUTO: 0.43 10*3/MM3 (ref 0.1–0.9)
MONOCYTES NFR BLD AUTO: 6.8 % (ref 5–12)
NEUTROPHILS NFR BLD AUTO: 3.32 10*3/MM3 (ref 1.7–7)
NEUTROPHILS NFR BLD AUTO: 52.2 % (ref 42.7–76)
PLATELET # BLD AUTO: 367 10*3/MM3 (ref 140–450)
PMV BLD AUTO: 8.3 FL (ref 6–12)
POTASSIUM SERPL-SCNC: 4.2 MMOL/L (ref 3.5–5.2)
PROT SERPL-MCNC: 6.7 G/DL (ref 6–8.5)
RBC # BLD AUTO: 3.82 10*6/MM3 (ref 3.77–5.28)
SODIUM SERPL-SCNC: 138 MMOL/L (ref 136–145)
TIBC SERPL-MCNC: 283 MCG/DL (ref 298–536)
TRANSFERRIN SERPL-MCNC: 190 MG/DL (ref 200–360)
WBC # BLD AUTO: 6.36 10*3/MM3 (ref 3.4–10.8)

## 2020-10-27 PROCEDURE — 83540 ASSAY OF IRON: CPT

## 2020-10-27 PROCEDURE — 84466 ASSAY OF TRANSFERRIN: CPT

## 2020-10-27 PROCEDURE — 82728 ASSAY OF FERRITIN: CPT

## 2020-10-27 PROCEDURE — 80053 COMPREHEN METABOLIC PANEL: CPT

## 2020-10-27 PROCEDURE — 85025 COMPLETE CBC W/AUTO DIFF WBC: CPT

## 2020-10-27 PROCEDURE — 36415 COLL VENOUS BLD VENIPUNCTURE: CPT

## 2020-10-27 NOTE — PROGRESS NOTES
Glucose 162-if this was fasting she seems to have developed diabetes.  Let me know.  Ferritan is increased but improving.  Transferrin is stilllow-when does she see hematologist?

## 2020-11-11 ENCOUNTER — OFFICE VISIT (OUTPATIENT)
Dept: ONCOLOGY | Facility: CLINIC | Age: 55
End: 2020-11-11

## 2020-11-11 ENCOUNTER — APPOINTMENT (OUTPATIENT)
Dept: LAB | Facility: HOSPITAL | Age: 55
End: 2020-11-11

## 2020-11-11 VITALS
WEIGHT: 224 LBS | DIASTOLIC BLOOD PRESSURE: 69 MMHG | RESPIRATION RATE: 16 BRPM | HEART RATE: 81 BPM | BODY MASS INDEX: 45.16 KG/M2 | TEMPERATURE: 97.1 F | SYSTOLIC BLOOD PRESSURE: 103 MMHG | HEIGHT: 59 IN

## 2020-11-11 DIAGNOSIS — E83.110 HEREDITARY HEMOCHROMATOSIS (HCC): Primary | ICD-10-CM

## 2020-11-11 PROBLEM — R79.89 ABNORMAL COMPLETE BLOOD COUNT: Status: RESOLVED | Noted: 2017-04-04 | Resolved: 2020-11-11

## 2020-11-11 PROCEDURE — 99213 OFFICE O/P EST LOW 20 MIN: CPT | Performed by: INTERNAL MEDICINE

## 2020-11-11 NOTE — PROGRESS NOTES
ONCOLOGY/HEMATOLOGY    PATIENT: Merissa Yusuf  YOB: 1965  MEDICAL RECORD NUMBER: [id number] OF SERVICE: 11/11/20      Chief complaint:  Hereditary hemochromatosis, homozygous mutation in H63D.  Iron overload  Macrocytosis  History of Present Illness:   · Ms. Yusuf has a history of COPD and also depression.  Laboratory work up was obtained in March 2019 which was abnormal.  Patient was sent to the [facility] and seen initially on 3/13/19.   · 3/5/19 - WBC 4.7, hemoglobin 14.1, MCV elevated to 103 and platelet count of 592,000.  Vitamin D  20.  Sed rate 35.  TSH 1.62.  Vitamin B12 476.  Creatinine 0.8.    · 3/13/19 - ZACKERY-2 mutation negative.  Hemochromatosis gene mutation, homozygous mutation in H63D.  · 3/13/19 - Vitamin B12 507.  Ferritin 178.  Creatinine 0.7.  Iron level 156.  Iron binding  capacity 257.  Percentage iron saturation 61.   · 1/8/2020 Ferritin 304 iron 177 iron saturation 56 TIBC 316  · 7/21/2020 iron 247 iron saturation 82 iron binding capacity 302 ferritin 271 hemoglobin 13.7 WBC 6.1 platelet count 372.  · 6/19/2020 CMP is normal including LFTs.  TSH 3.01    HISTORY OF PRESENT ILLNESS: She comes unaccompanied today.  Of note, this patient and all of these medical issues are new to me.  Since her last visit, she has been doing well.  Her saturation at last visit was 82%. It is now down to normal  At 43%. She has given up her spinach salads.       Review of Systems - Oncology     Review of systems:  Constitutional: Denies fatigue, fever, night sweats or weight loss  HEENT: Denies headache, vision changes, dysphagia or odynophagia   Lymph nodes: Denies lymphadenopathy  Heme: Denies bleeding or abnormal bruising  Respiratory: Denies dyspnea, cough  Cardiovascular: Denies chest pain, palpitations or lower extremity swelling  GI: Denies abdominal pain, nausea, vomiting, diarrhea, constipation, hematochezia or melena  : Denies dysuria or  hematuria  Musculoskeletal: Denies myalgia or arthralgia  Skin: Denies rash or skin lesions  Neurologic: Denies focal weakness, paresthesia, loss of balance or coordination  Endocrine: denies hot flashes  Psychologic: Denies depression or anxiety    Past Medical History:   Diagnosis Date   • Anesthesia complication     drops B/P, and slow to arouse   • Anxiety    • Arthritis    • Depression    • GERD (gastroesophageal reflux disease)    • Hereditary hemochromatosis (CMS/HCC)    • Hyperlipidemia    • Hypotension    • Iron overload    • Macrocytosis    • Seasonal allergies    • Sleep apnea     BIPAP       Past Surgical History:   Procedure Laterality Date   • BLADDER SUSPENSION     • BREAST BIOPSY Right    • FINGER SURGERY  2008    removed cysts from right index finger    • SUBTOTAL HYSTERECTOMY     • TUBAL ABDOMINAL LIGATION     • VENTRAL/INCISIONAL HERNIA REPAIR N/A 9/24/2020    Procedure: LAPAROSCOPIC VENTRAL HERNIA;  Surgeon: Reuben Molina MD;  Location: [facility] OR;  Service: General;  Laterality: N/A;  0855 TAP block   • WISDOM TOOTH EXTRACTION         Family History   Problem Relation Age of Onset   • Coronary artery disease Other    • Diabetes Other    • Dementia Other    • Diabetes Mother    • Cancer Mother    • Heart disease Father    • Liver disease Father    • Hypertension Father    • Cancer Brother        Social History     Socioeconomic History   • Marital status:      Spouse name: Not on file   • Number of children: Not on file   • Years of education: Not on file   • Highest education level: Not on file   Tobacco Use   • Smoking status: Never Smoker   • Smokeless tobacco: Never Used   Substance and Sexual Activity   • Alcohol use: Yes     Alcohol/week: 1.0 standard drinks     Types: 1 Glasses of wine per week     Frequency: Never     Drinks per session: 1 or 2     Binge frequency: Never     Comment: rarely   • Drug use: No   • Sexual activity: Yes     Partners: Male     Birth  "control/protection: None       ALLERGIES:  Amoxicillin, Erythromycin, Escitalopram, Prozac  [fluoxetine hcl], and Penicillin g    MEDICATION:  Current Outpatient Medications   Medication Sig Dispense Refill   • acetaminophen (TYLENOL) 500 MG tablet Take 500 mg by mouth Every 4 (Four) Hours As Needed.     • benzonatate (Tessalon Perles) 100 MG capsule Take 1 capsule by mouth 3 (Three) Times a Day As Needed for Cough. 30 capsule 1   • busPIRone (BUSPAR) 15 MG tablet TAKE 1 TABLET BY MOUTH THREE TIMES DAILY FOR ANXIETY (Patient taking differently: Take 15 mg by mouth 3 (Three) Times a Day. TAKE 1 TABLET BY MOUTH THREE TIMES DAILY FOR ANXIETY) 90 tablet 2   • Calcium Carbonate Antacid (TUMS PO) Take 2,000 mg by mouth At Night As Needed.     • Cholecalciferol (VITAMIN D-3) 5000 units tablet Daily.     • cyanocobalamin (VITAMIN B-12) 500 MCG tablet B-12 500 MCG TABS     • Desvenlafaxine Succinate ER 25 MG tablet sustained-release 24 hour TAKE 1 TABLET BY MOUTH EVERY MORNING 90 tablet 1   • diclofenac (VOLTAREN) 1 % gel gel Apply 4 g topically to the appropriate area as directed 4 (Four) Times a Day As Needed.     • Loratadine 10 MG capsule Take 1 tablet by mouth Daily As Needed.     • Naproxen Sodium 220 MG capsule Take 1 tablet by mouth As Needed.     • oxyCODONE-acetaminophen (PERCOCET) 5-325 MG per tablet Take 1-2 tablets by mouth Every 4 (Four) Hours As Needed (Pain). 30 tablet 0   • Triamcinolone Acetonide (NASACORT) 55 MCG/ACT nasal inhaler 2 sprays into the nostril(s) as directed by provider Daily.       No current facility-administered medications for this visit.            PHYSICAL EXAM:    Current Vitals:  /69   Pulse 81   Temp 97.1 °F (36.2 °C)   Resp 16   Ht 149.9 cm (59\")   Wt 102 kg (224 lb)   LMP  (LMP Unknown)   BMI 45.24 kg/m²       VITAL signs in the last 24 hours: [unfilled]       11/11/20  1235   Weight: 102 kg (224 lb)       Physical Exam     ECOG PERFORMANCE STATUS:0  Physical " Exam:  General: No acute distress  Eyes: Sclera anicteric, EOMS-I  ENT: no mucositis  Neck: Supple, with full ROM  Lymph nodes: No cervical, lymphadenopathy  Pulmonary: no wheezes or stridor .  CV: Regular rate and rhythm.  GI: Soft, non-tender,   Vascular: no edema  Neurologic: Cranial nerves 2-12 grossly intact, no focal motor deficits  Psych/MS: Alert and oriented x3,    LABORATORY/DATA:  WBC   Date Value Ref Range Status   10/27/2020 6.36 3.40 - 10.80 10*3/mm3 Final     RBC   Date Value Ref Range Status   10/27/2020 3.82 3.77 - 5.28 10*6/mm3 Final     Hemoglobin   Date Value Ref Range Status   10/27/2020 13.1 12.0 - 15.9 g/dL Final     Hematocrit   Date Value Ref Range Status   10/27/2020 39.5 34.0 - 46.6 % Final     MCV   Date Value Ref Range Status   10/27/2020 103.4 (H) 79.0 - 97.0 fL Final     MCH   Date Value Ref Range Status   10/27/2020 34.3 (H) 26.6 - 33.0 pg Final     MCHC   Date Value Ref Range Status   10/27/2020 33.2 31.5 - 35.7 g/dL Final     RDW   Date Value Ref Range Status   10/27/2020 12.3 12.3 - 15.4 % Final     RDW-SD   Date Value Ref Range Status   10/27/2020 45.7 37.0 - 54.0 fl Final     MPV   Date Value Ref Range Status   10/27/2020 8.3 6.0 - 12.0 fL Final     Platelets   Date Value Ref Range Status   10/27/2020 367 140 - 450 10*3/mm3 Final     Neutrophil %   Date Value Ref Range Status   10/27/2020 52.2 42.7 - 76.0 % Final     Lymphocyte %   Date Value Ref Range Status   10/27/2020 35.4 19.6 - 45.3 % Final     Monocyte %   Date Value Ref Range Status   10/27/2020 6.8 5.0 - 12.0 % Final     Eosinophil %   Date Value Ref Range Status   10/27/2020 5.0 0.3 - 6.2 % Final     Basophil %   Date Value Ref Range Status   10/27/2020 0.6 0.0 - 1.5 % Final     Immature Grans %   Date Value Ref Range Status   09/17/2020 0.9 (H) 0.0 - 0.5 % Final     Neutrophils, Absolute   Date Value Ref Range Status   10/27/2020 3.32 1.70 - 7.00 10*3/mm3 Final     Lymphocytes, Absolute   Date Value Ref Range Status    10/27/2020 2.25 0.70 - 3.10 10*3/mm3 Final     Monocytes, Absolute   Date Value Ref Range Status   10/27/2020 0.43 0.10 - 0.90 10*3/mm3 Final     Eosinophils, Absolute   Date Value Ref Range Status   10/27/2020 0.32 0.00 - 0.40 10*3/mm3 Final     Basophils, Absolute   Date Value Ref Range Status   10/27/2020 0.04 0.00 - 0.20 10*3/mm3 Final     Immature Grans, Absolute   Date Value Ref Range Status   09/17/2020 0.06 (H) 0.00 - 0.05 10*3/mm3 Final     nRBC   Date Value Ref Range Status   09/17/2020 0.0 0.0 - 0.2 /100 WBC Final     Lab Results   Component Value Date    GLUCOSE 162 (H) 10/27/2020    BUN 17 10/27/2020    CREATININE 0.84 10/27/2020    EGFRIFNONA 70 10/27/2020    EGFRIFAFRI >60 02/26/2019    BCR 20.2 10/27/2020    K 4.2 10/27/2020    CO2 26.0 10/27/2020    CALCIUM 9.3 10/27/2020    ALBUMIN 4.00 10/27/2020    LABIL2 1.5 03/13/2019    AST 14 10/27/2020    ALT 14 10/27/2020     No results found for:  No results found for: SPEP, UPEP   Uric Acid   Date Value Ref Range Status   04/19/2017 5.0 2.6 - 8.0 mg/dL Final      Lab Results   Component Value Date    IRON 122 10/27/2020    TIBC 283 (L) 10/27/2020    FERRITIN 233.40 (H) 10/27/2020          IMAGING:  No results found.    PROBLEM LIST:  Patient Active Problem List   Diagnosis   • Abnormal complete blood count   • Anxiety state   • Elevated blood pressure reading without diagnosis of hypertension   • Hearing deficit   • Hyperlipidemia   • Knee pain   • Low back pain   • Memory loss   • Mixed anxiety depressive disorder   • Obesity with body mass index 30 or greater   • Obesity   • Other abnormal and inconclusive findings on diagnostic imaging of breast   • Obstructive sleep apnea   • Thrombocytosis (CMS/HCC)   • Vitamin D deficiency   • Combined forms of age-related cataract, bilateral   • Convergence insufficiency   • Corneal pannus of right eye   • Corneal pannus   • Meibomian gland dysfunction (MGD) of both eyes   • Bilateral presbyopia   •  Presbyopia   • Vitreous floaters       Assessment & Plan  55 y.o. F w/ HH with homozgtyous H63D.      1) HH:  Iron saturation is normal.  Ferritin is down trending.  Hct is normal.  No indication for phlebotomy  At this time. She has not donated blood for 3 years and has not needed phlebotomy in that time. Therefore, I think  We can have her f/u in 1 year.    2) Hyperglycemia:  It was a non-fasting draw.  She has gained 25 pounds since the pandemic started.  She has a family h/o DM.         Gucci Perez MD,  11/11/2020   12:41 EST

## 2020-11-16 ENCOUNTER — APPOINTMENT (OUTPATIENT)
Dept: LAB | Facility: HOSPITAL | Age: 55
End: 2020-11-16

## 2020-11-17 ENCOUNTER — OFFICE VISIT (OUTPATIENT)
Dept: PSYCHIATRY | Facility: CLINIC | Age: 55
End: 2020-11-17

## 2020-11-17 DIAGNOSIS — F41.8 MIXED ANXIETY DEPRESSIVE DISORDER: Primary | ICD-10-CM

## 2020-11-17 PROCEDURE — 99213 OFFICE O/P EST LOW 20 MIN: CPT | Performed by: PHYSICIAN ASSISTANT

## 2020-11-17 RX ORDER — DESVENLAFAXINE SUCCINATE 50 MG/1
50 TABLET, EXTENDED RELEASE ORAL EVERY MORNING
Qty: 90 TABLET | Refills: 1 | Status: SHIPPED | OUTPATIENT
Start: 2020-11-17 | End: 2021-01-14

## 2020-11-17 NOTE — PATIENT INSTRUCTIONS
Persistent Depressive Disorder    Persistent depressive disorder (PDD) is a mental health condition. PDD causes symptoms of low-level depression for 2 years or longer. It may also be called long-term (chronic) depression or dysthymia. PDD may include episodes of more severe depression that last for about 2 weeks (major depressive disorder or MDD).  PDD can affect the way you think, feel, and sleep. This condition may also affect your relationships. You may be more likely to get sick if you have PDD.  Symptoms of PDD occur for most of the day and may include:  · Feeling tired (fatigue).  · Low energy.  · Eating too much or too little.  · Sleeping too much or too little.  · Feeling restless or agitated.  · Feeling hopeless.  · Feeling worthless or guilty.  · Feeling worried or nervous (anxiety).  · Trouble concentrating or making decisions.  · Low self-esteem.  · A negative way of looking at things (outlook).  · Not being able to have fun or feel pleasure.  · Avoiding interacting with people.  · Getting angry or annoyed easily (irritability).  · Acting aggressive or angry.  Follow these instructions at home:  Activity  · Go back to your normal activities as told by your doctor.  · Exercise regularly as told by your doctor.  General instructions  · Take over-the-counter and prescription medicines only as told by your doctor.  · Do not drink alcohol. Or, limit how much alcohol you drink to no more than 1 drink a day for nonpregnant women and 2 drinks a day for men. One drink equals 12 oz of beer, 5 oz of wine, or 1½ oz of hard liquor. Alcohol can affect any antidepressant medicines you are taking. Talk with your doctor about your alcohol use.  · Eat a healthy diet and get plenty of sleep.  · Find activities that you enjoy each day.  · Consider joining a support group. Your doctor may be able to suggest a support group.  · Keep all follow-up visits as told by your doctor. This is important.  Where to find more  information  National [place] [employer] Mental Illness  · www.liborio.org  [place] National [place] of [employer]  · www.[facility].nih.gov  National Suicide Prevention Lifeline  · (1-589.319.8143).  · This is free, 24-hour help.  Contact a doctor if:  · Your symptoms get worse.  · You have new symptoms.  · You have trouble sleeping or doing your daily activities.  Get help right away if:  · You self-harm.  · You have serious thoughts about hurting yourself or others.  · You see, hear, taste, smell, or feel things that are not there (hallucinate).  This information is not intended to replace advice given to you by your health care provider. Make sure you discuss any questions you have with your health care provider.  Document Released: 11/28/2016 Document Revised: 11/30/2018 Document Reviewed: 08/11/2017  Elsevier Patient Education © 2020 [employer]

## 2020-11-17 NOTE — PROGRESS NOTES
Subjective   Merissa Yusuf is a 55 y.o.white female who presents today for follow up in the office x 15 mintues    Chief Complaint:  Anxiety and depression    History of Present Illness:   Had hernia surgery 6 wks ago, recovered but does not want to get off the couch and do anything, gaining weight  Did not want to increase Pristiq last visit b/c fine but now c/o of depression, low energy, low motivation  Mom has Alzheimer's demenita, they had to put her in [place] in March, now recovering from COVID, moved her step-dad in with her and that has decreased her anxiety.  Patient lives the closest to Mom, has 6 siblings  Anxiety 2/10, has decreased her Buspar to one daily   Depression 6/10  Denies SI/HI      The following portions of the patient's history were reviewed and updated as appropriate: allergies, current medications, past family history, past medical history, past social history, past surgical history and problem list.    PAST PSYCHIATRIC HISTORY  Axis I  Affective/Bipoloar Disorder, Anxiety/Panic Disorder  Axis II  None    PAST OUTPATIENT TREATMENT  Diagnosis treated:  Affective Disorder, Anxiety/Panic Disorder  Treatment Type:  Family Therapy, Medication Management  No hospitalizations  Prior Psychiatric Medications:  Lexapro increased anxiety  Prozac, hives  Trintellix helped but gave her restless legs  Buspar   Viibryd, could not take  Pristiq  Support Groups:  None  Sequelae Of Mental Disorder:  social isolation, family disruption, emotional distress      Interval History  Improved    Side Effects  Restless legs with Trintellix    Past psychiatric history was reviewed and compared to 5/19/2020 visit and appropriate updates were made.    Past Medical History:  Past Medical History:   Diagnosis Date   • Anesthesia complication     drops B/P, and slow to arouse   • Anxiety    • Arthritis    • Depression    • GERD (gastroesophageal reflux disease)    • Hereditary hemochromatosis (CMS/HCC)    •  Hyperlipidemia    • Hypotension    • Iron overload    • Macrocytosis    • Seasonal allergies    • Sleep apnea     BIPAP       Social History:  Social History     Socioeconomic History   • Marital status:      Spouse name: Not on file   • Number of children: Not on file   • Years of education: Not on file   • Highest education level: Not on file   Tobacco Use   • Smoking status: Never Smoker   • Smokeless tobacco: Never Used   Substance and Sexual Activity   • Alcohol use: Yes     Alcohol/week: 1.0 standard drinks     Types: 1 Glasses of wine per week     Frequency: Never     Drinks per session: 1 or 2     Binge frequency: Never     Comment: rarely   • Drug use: No   • Sexual activity: Yes     Partners: Male     Birth control/protection: None       Family History:  Family History   Problem Relation Age of Onset   • Coronary artery disease Other    • Diabetes Other    • Dementia Other    • Diabetes Mother    • Cancer Mother    • Heart disease Father    • Liver disease Father    • Hypertension Father    • Cancer Brother        Past Surgical History:  Past Surgical History:   Procedure Laterality Date   • BLADDER SUSPENSION     • BREAST BIOPSY Right    • FINGER SURGERY  2008    removed cysts from right index finger    • SUBTOTAL HYSTERECTOMY     • TUBAL ABDOMINAL LIGATION     • VENTRAL/INCISIONAL HERNIA REPAIR N/A 9/24/2020    Procedure: LAPAROSCOPIC VENTRAL HERNIA;  Surgeon: Reuben Molina MD;  Location: [facility];  Service: General;  Laterality: N/A;  0673 TAP block   • WISDOM TOOTH EXTRACTION         Problem List:  Patient Active Problem List   Diagnosis   • Anxiety state   • Elevated blood pressure reading without diagnosis of hypertension   • Hearing deficit   • Hyperlipidemia   • Knee pain   • Low back pain   • Memory loss   • Mixed anxiety depressive disorder   • Obesity with body mass index 30 or greater   • Obesity   • Other abnormal and inconclusive findings on diagnostic imaging of  breast   • Obstructive sleep apnea   • Thrombocytosis (CMS/HCC)   • Vitamin D deficiency   • Combined forms of age-related cataract, bilateral   • Convergence insufficiency   • Corneal pannus of right eye   • Corneal pannus   • Meibomian gland dysfunction (MGD) of both eyes   • Bilateral presbyopia   • Presbyopia   • Vitreous floaters   • Hereditary hemochromatosis (CMS/HCC)       Allergy:   Allergies   Allergen Reactions   • Amoxicillin Swelling   • Erythromycin GI Intolerance   • Escitalopram Anxiety   • Prozac  [Fluoxetine Hcl] Rash   • Penicillin G Rash        Discontinued Medications:  Medications Discontinued During This Encounter   Medication Reason   • Desvenlafaxine Succinate ER 25 MG tablet sustained-release 24 hour Dose adjustment       Current Medications:   Current Outpatient Medications   Medication Sig Dispense Refill   • acetaminophen (TYLENOL) 500 MG tablet Take 500 mg by mouth Every 4 (Four) Hours As Needed.     • benzonatate (Tessalon Perles) 100 MG capsule Take 1 capsule by mouth 3 (Three) Times a Day As Needed for Cough. 30 capsule 1   • busPIRone (BUSPAR) 15 MG tablet TAKE 1 TABLET BY MOUTH THREE TIMES DAILY FOR ANXIETY (Patient taking differently: Take 15 mg by mouth 3 (Three) Times a Day. TAKE 1 TABLET BY MOUTH THREE TIMES DAILY FOR ANXIETY) 90 tablet 2   • Calcium Carbonate Antacid (TUMS PO) Take 2,000 mg by mouth At Night As Needed.     • Cholecalciferol (VITAMIN D-3) 5000 units tablet Daily.     • cyanocobalamin (VITAMIN B-12) 500 MCG tablet B-12 500 MCG TABS     • desvenlafaxine (PRISTIQ) 50 MG 24 hr tablet Take 1 tablet by mouth Every Morning. 90 tablet 1   • diclofenac (VOLTAREN) 1 % gel gel Apply 4 g topically to the appropriate area as directed 4 (Four) Times a Day As Needed.     • Loratadine 10 MG capsule Take 1 tablet by mouth Daily As Needed.     • Naproxen Sodium 220 MG capsule Take 1 tablet by mouth As Needed.     • oxyCODONE-acetaminophen (PERCOCET) 5-325 MG per tablet Take 1-2  tablets by mouth Every 4 (Four) Hours As Needed (Pain). 30 tablet 0   • Triamcinolone Acetonide (NASACORT) 55 MCG/ACT nasal inhaler 2 sprays into the nostril(s) as directed by provider Daily.       No current facility-administered medications for this visit.          Review of Symptoms:    Psychiatric/Behavioral: Negative for agitation, behavioral problems, confusion, decreased concentration, dysphoric mood, hallucinations, self-injury, sleep disturbance and suicidal ideas. The patient not nervous/anxious and is not hyperactive.        Physical Exam:   not currently breastfeeding.    Mental Status Exam:   Hygiene:   good  Cooperation:  Cooperative  Eye Contact:  Good  Psychomotor Behavior:  Appropriate  Affect:  Appropriate  Mood: Depressed  Hopelessness: Denies  Speech:  Normal  Thought Process:  Goal directed  Thought Content:  Normal  Suicidal:  None  Homicidal:  None  Hallucinations:  None  Delusion:  None  Memory:  Intact  Orientation:  Person, Place, Time and Situation  Reliability:  good  Insight:  Good  Judgement:  Good  Impulse Control:  Good  Physical/Medical Issues:  No      Mental status exam was reviewed and compared to 5/19/20 visit and appropriate updates were made.    PHQ-9 Depression Screening  Little interest or pleasure in doing things? 2   Feeling down, depressed, or hopeless? 2   Trouble falling or staying asleep, or sleeping too much? 2   Feeling tired or having little energy? 3   Poor appetite or overeating? 1   Feeling bad about yourself - or that you are a failure or have let yourself or your family down? 2   Trouble concentrating on things, such as reading the newspaper or watching television? 1   Moving or speaking so slowly that other people could have noticed? Or the opposite - being so fidgety or restless that you have been moving around a lot more than usual? 0   Thoughts that you would be better off dead, or of hurting yourself in some way? 0   PHQ-9 Total Score 13   If you checked  off any problems, how difficult have these problems made it for you to do your work, take care of things at home, or get along with other people? Very difficult           Never smoker    I advised Merissa of the risks of tobacco use.     Lab Results:   Lab on 10/27/2020   Component Date Value Ref Range Status   • Glucose 10/27/2020 162* 65 - 99 mg/dL Final   • BUN 10/27/2020 17  6 - 20 mg/dL Final   • Creatinine 10/27/2020 0.84  0.57 - 1.00 mg/dL Final   • Sodium 10/27/2020 138  136 - 145 mmol/L Final   • Potassium 10/27/2020 4.2  3.5 - 5.2 mmol/L Final   • Chloride 10/27/2020 102  98 - 107 mmol/L Final   • CO2 10/27/2020 26.0  22.0 - 29.0 mmol/L Final   • Calcium 10/27/2020 9.3  8.6 - 10.5 mg/dL Final   • Total Protein 10/27/2020 6.7  6.0 - 8.5 g/dL Final   • Albumin 10/27/2020 4.00  3.50 - 5.20 g/dL Final   • ALT (SGPT) 10/27/2020 14  1 - 33 U/L Final   • AST (SGOT) 10/27/2020 14  1 - 32 U/L Final   • Alkaline Phosphatase 10/27/2020 85  39 - 117 U/L Final   • Total Bilirubin 10/27/2020 0.2  0.0 - 1.2 mg/dL Final   • eGFR Non African Amer 10/27/2020 70  >60 mL/min/1.73 Final   • Globulin 10/27/2020 2.7  gm/dL Final   • A/G Ratio 10/27/2020 1.5  g/dL Final   • BUN/Creatinine Ratio 10/27/2020 20.2  7.0 - 25.0 Final   • Anion Gap 10/27/2020 10.0  5.0 - 15.0 mmol/L Final   • Ferritin 10/27/2020 233.40* 13.00 - 150.00 ng/mL Final   • Iron 10/27/2020 122  37 - 145 mcg/dL Final   • Iron Saturation 10/27/2020 43  20 - 50 % Final   • Transferrin 10/27/2020 190* 200 - 360 mg/dL Final   • TIBC 10/27/2020 283* 298 - 536 mcg/dL Final   • WBC 10/27/2020 6.36  3.40 - 10.80 10*3/mm3 Final   • RBC 10/27/2020 3.82  3.77 - 5.28 10*6/mm3 Final   • Hemoglobin 10/27/2020 13.1  12.0 - 15.9 g/dL Final   • Hematocrit 10/27/2020 39.5  34.0 - 46.6 % Final   • MCV 10/27/2020 103.4* 79.0 - 97.0 fL Final   • MCH 10/27/2020 34.3* 26.6 - 33.0 pg Final   • MCHC 10/27/2020 33.2  31.5 - 35.7 g/dL Final   • RDW 10/27/2020 12.3  12.3 - 15.4 % Final   •  RDW-SD 10/27/2020 45.7  37.0 - 54.0 fl Final   • MPV 10/27/2020 8.3  6.0 - 12.0 fL Final   • Platelets 10/27/2020 367  140 - 450 10*3/mm3 Final   • Neutrophil % 10/27/2020 52.2  42.7 - 76.0 % Final   • Lymphocyte % 10/27/2020 35.4  19.6 - 45.3 % Final   • Monocyte % 10/27/2020 6.8  5.0 - 12.0 % Final   • Eosinophil % 10/27/2020 5.0  0.3 - 6.2 % Final   • Basophil % 10/27/2020 0.6  0.0 - 1.5 % Final   • Neutrophils, Absolute 10/27/2020 3.32  1.70 - 7.00 10*3/mm3 Final   • Lymphocytes, Absolute 10/27/2020 2.25  0.70 - 3.10 10*3/mm3 Final   • Monocytes, Absolute 10/27/2020 0.43  0.10 - 0.90 10*3/mm3 Final   • Eosinophils, Absolute 10/27/2020 0.32  0.00 - 0.40 10*3/mm3 Final   • Basophils, Absolute 10/27/2020 0.04  0.00 - 0.20 10*3/mm3 Final   Lab on 09/22/2020   Component Date Value Ref Range Status   • SARS-CoV-2 CHRISTOPHER 09/22/2020 Not Detected  Not Detected Final   Lab on 09/17/2020   Component Date Value Ref Range Status   • Glucose 09/17/2020 91  65 - 99 mg/dL Final   • BUN 09/17/2020 17  6 - 20 mg/dL Final   • Creatinine 09/17/2020 0.75  0.57 - 1.00 mg/dL Final   • Sodium 09/17/2020 139  136 - 145 mmol/L Final   • Potassium 09/17/2020 4.3  3.5 - 5.2 mmol/L Final   • Chloride 09/17/2020 103  98 - 107 mmol/L Final   • CO2 09/17/2020 28.4  22.0 - 29.0 mmol/L Final   • Calcium 09/17/2020 9.9  8.6 - 10.5 mg/dL Final   • Total Protein 09/17/2020 7.7  6.0 - 8.5 g/dL Final   • Albumin 09/17/2020 4.50  3.50 - 5.20 g/dL Final   • ALT (SGPT) 09/17/2020 17  1 - 33 U/L Final   • AST (SGOT) 09/17/2020 19  1 - 32 U/L Final   • Alkaline Phosphatase 09/17/2020 83  39 - 117 U/L Final   • Total Bilirubin 09/17/2020 0.3  0.0 - 1.2 mg/dL Final   • eGFR Non African Amer 09/17/2020 80  >60 mL/min/1.73 Final   • Globulin 09/17/2020 3.2  gm/dL Final   • A/G Ratio 09/17/2020 1.4  g/dL Final   • BUN/Creatinine Ratio 09/17/2020 22.7  7.0 - 25.0 Final   • Anion Gap 09/17/2020 7.6  5.0 - 15.0 mmol/L Final   • WBC 09/17/2020 6.68  3.40 - 10.80  10*3/mm3 Final   • RBC 09/17/2020 3.98  3.77 - 5.28 10*6/mm3 Final   • Hemoglobin 09/17/2020 14.0  12.0 - 15.9 g/dL Final   • Hematocrit 09/17/2020 41.1  34.0 - 46.6 % Final   • MCV 09/17/2020 103.3* 79.0 - 97.0 fL Final   • MCH 09/17/2020 35.2* 26.6 - 33.0 pg Final   • MCHC 09/17/2020 34.1  31.5 - 35.7 g/dL Final   • RDW 09/17/2020 12.1* 12.3 - 15.4 % Final   • RDW-SD 09/17/2020 46.1  37.0 - 54.0 fl Final   • MPV 09/17/2020 9.3  6.0 - 12.0 fL Final   • Platelets 09/17/2020 382  140 - 450 10*3/mm3 Final   • Neutrophil % 09/17/2020 49.5  42.7 - 76.0 % Final   • Lymphocyte % 09/17/2020 38.2  19.6 - 45.3 % Final   • Monocyte % 09/17/2020 8.5  5.0 - 12.0 % Final   • Eosinophil % 09/17/2020 2.2  0.3 - 6.2 % Final   • Basophil % 09/17/2020 0.7  0.0 - 1.5 % Final   • Immature Grans % 09/17/2020 0.9* 0.0 - 0.5 % Final   • Neutrophils, Absolute 09/17/2020 3.30  1.70 - 7.00 10*3/mm3 Final   • Lymphocytes, Absolute 09/17/2020 2.55  0.70 - 3.10 10*3/mm3 Final   • Monocytes, Absolute 09/17/2020 0.57  0.10 - 0.90 10*3/mm3 Final   • Eosinophils, Absolute 09/17/2020 0.15  0.00 - 0.40 10*3/mm3 Final   • Basophils, Absolute 09/17/2020 0.05  0.00 - 0.20 10*3/mm3 Final   • Immature Grans, Absolute 09/17/2020 0.06* 0.00 - 0.05 10*3/mm3 Final   • nRBC 09/17/2020 0.0  0.0 - 0.2 /100 WBC Final       Assessment/Plan   Problems Addressed this Visit        Other    Mixed anxiety depressive disorder - Primary    Relevant Medications    desvenlafaxine (PRISTIQ) 50 MG 24 hr tablet      Diagnoses       Codes Comments    Mixed anxiety depressive disorder    -  Primary ICD-10-CM: F41.8  ICD-9-CM: 300.4           Visit Diagnoses:    ICD-10-CM ICD-9-CM   1. Mixed anxiety depressive disorder  F41.8 300.4       TREATMENT PLAN/GOALS: Continue supportive psychotherapy efforts and medications as indicated. Treatment and medication options discussed during today's visit. Patient ackowledged and verbally consented to continue with current treatment plan and  was educated on the importance of compliance with treatment and follow-up appointments.    MEDICATION ISSUES:  INSPECT reviewed as expected  Discussed medication options and treatment plan of prescribed medication as well as the risks, benefits, and side effects including potential falls, possible impaired driving and metabolic adversities among others. Patient is agreeable to call the office with any worsening of symptoms or onset of side effects. Patient is agreeable to call 911 or go to the nearest ER should he/she begin having SI/HI. No medication side effects or related complaints today.     Patient now c/o increased depression, low energy, low motivation  Increase Pristiq to 50mg daily and continue Buspar 15mg once daily, second dose if needed.  She will call next month to increase to 100mg of Pristiq if needed.      MEDS ORDERED DURING VISIT:  New Medications Ordered This Visit   Medications   • desvenlafaxine (PRISTIQ) 50 MG 24 hr tablet     Sig: Take 1 tablet by mouth Every Morning.     Dispense:  90 tablet     Refill:  1       Return in about 3 months (around 2/17/2021).         This document has been electronically signed by Laisha Mendiola PA-C  November 17, 2020 21:42 EST

## 2020-12-17 PROBLEM — R92.8 OTHER ABNORMAL AND INCONCLUSIVE FINDINGS ON DIAGNOSTIC IMAGING OF BREAST: Status: RESOLVED | Noted: 2019-05-24 | Resolved: 2020-12-17

## 2020-12-17 NOTE — PATIENT INSTRUCTIONS
Health Maintenance Due   Topic Date Due   • ZOSTER VACCINE (1 of 2) 07/14/2013   • MAMMOGRAM  06/04/2020   • COLOGUARD  06/27/2020   • INFLUENZA VACCINE  08/01/2020     Call if L thigh mass tender or enlarging

## 2020-12-18 ENCOUNTER — OFFICE VISIT (OUTPATIENT)
Dept: FAMILY MEDICINE CLINIC | Facility: CLINIC | Age: 55
End: 2020-12-18

## 2020-12-18 VITALS
BODY MASS INDEX: 45.72 KG/M2 | HEIGHT: 59 IN | HEART RATE: 71 BPM | WEIGHT: 226.8 LBS | DIASTOLIC BLOOD PRESSURE: 66 MMHG | OXYGEN SATURATION: 95 % | RESPIRATION RATE: 16 BRPM | SYSTOLIC BLOOD PRESSURE: 108 MMHG | TEMPERATURE: 97.1 F

## 2020-12-18 DIAGNOSIS — R03.0 ELEVATED BLOOD PRESSURE READING WITHOUT DIAGNOSIS OF HYPERTENSION: Primary | ICD-10-CM

## 2020-12-18 DIAGNOSIS — R22.42 MASS OF LEFT THIGH: ICD-10-CM

## 2020-12-18 DIAGNOSIS — F41.8 MIXED ANXIETY DEPRESSIVE DISORDER: ICD-10-CM

## 2020-12-18 DIAGNOSIS — E66.01 MORBID (SEVERE) OBESITY DUE TO EXCESS CALORIES (HCC): ICD-10-CM

## 2020-12-18 DIAGNOSIS — Z12.31 ENCOUNTER FOR SCREENING MAMMOGRAM FOR MALIGNANT NEOPLASM OF BREAST: ICD-10-CM

## 2020-12-18 DIAGNOSIS — R41.3 MEMORY LOSS: ICD-10-CM

## 2020-12-18 DIAGNOSIS — E83.110 HEREDITARY HEMOCHROMATOSIS (HCC): ICD-10-CM

## 2020-12-18 DIAGNOSIS — K21.9 GASTROESOPHAGEAL REFLUX DISEASE, UNSPECIFIED WHETHER ESOPHAGITIS PRESENT: ICD-10-CM

## 2020-12-18 DIAGNOSIS — G47.33 OBSTRUCTIVE SLEEP APNEA: ICD-10-CM

## 2020-12-18 DIAGNOSIS — E78.2 MIXED HYPERLIPIDEMIA: ICD-10-CM

## 2020-12-18 DIAGNOSIS — E55.9 VITAMIN D DEFICIENCY: ICD-10-CM

## 2020-12-18 DIAGNOSIS — Z12.11 SCREENING FOR COLON CANCER: ICD-10-CM

## 2020-12-18 LAB
25(OH)D3 SERPL-MCNC: 55.7 NG/ML (ref 30–100)
ALBUMIN SERPL-MCNC: 4.4 G/DL (ref 3.5–5.2)
ALBUMIN/GLOB SERPL: 1.5 G/DL
ALP SERPL-CCNC: 79 U/L (ref 39–117)
ALT SERPL W P-5'-P-CCNC: 18 U/L (ref 1–33)
ANION GAP SERPL CALCULATED.3IONS-SCNC: 9.3 MMOL/L (ref 5–15)
AST SERPL-CCNC: 17 U/L (ref 1–32)
BASOPHILS # BLD AUTO: 0.06 10*3/MM3 (ref 0–0.2)
BASOPHILS NFR BLD AUTO: 1 % (ref 0–1.5)
BILIRUB SERPL-MCNC: 0.5 MG/DL (ref 0–1.2)
BUN SERPL-MCNC: 13 MG/DL (ref 6–20)
BUN/CREAT SERPL: 20 (ref 7–25)
CALCIUM SPEC-SCNC: 9.4 MG/DL (ref 8.6–10.5)
CHLORIDE SERPL-SCNC: 103 MMOL/L (ref 98–107)
CHOLEST SERPL-MCNC: 221 MG/DL (ref 0–200)
CO2 SERPL-SCNC: 26.7 MMOL/L (ref 22–29)
CREAT SERPL-MCNC: 0.65 MG/DL (ref 0.57–1)
DEPRECATED RDW RBC AUTO: 43.8 FL (ref 37–54)
EOSINOPHIL # BLD AUTO: 0.22 10*3/MM3 (ref 0–0.4)
EOSINOPHIL NFR BLD AUTO: 3.6 % (ref 0.3–6.2)
ERYTHROCYTE [DISTWIDTH] IN BLOOD BY AUTOMATED COUNT: 12.2 % (ref 12.3–15.4)
GFR SERPL CREATININE-BSD FRML MDRD: 94 ML/MIN/1.73
GLOBULIN UR ELPH-MCNC: 3 GM/DL
GLUCOSE SERPL-MCNC: 95 MG/DL (ref 65–99)
HCT VFR BLD AUTO: 37.6 % (ref 34–46.6)
HDLC SERPL-MCNC: 61 MG/DL (ref 40–60)
HGB BLD-MCNC: 13.3 G/DL (ref 12–15.9)
IMM GRANULOCYTES # BLD AUTO: 0.04 10*3/MM3 (ref 0–0.05)
IMM GRANULOCYTES NFR BLD AUTO: 0.7 % (ref 0–0.5)
LDLC SERPL CALC-MCNC: 138 MG/DL (ref 0–100)
LDLC/HDLC SERPL: 2.22 {RATIO}
LYMPHOCYTES # BLD AUTO: 2.15 10*3/MM3 (ref 0.7–3.1)
LYMPHOCYTES NFR BLD AUTO: 35 % (ref 19.6–45.3)
MAGNESIUM SERPL-MCNC: 2.2 MG/DL (ref 1.6–2.6)
MCH RBC QN AUTO: 35.4 PG (ref 26.6–33)
MCHC RBC AUTO-ENTMCNC: 35.4 G/DL (ref 31.5–35.7)
MCV RBC AUTO: 100 FL (ref 79–97)
MONOCYTES # BLD AUTO: 0.53 10*3/MM3 (ref 0.1–0.9)
MONOCYTES NFR BLD AUTO: 8.6 % (ref 5–12)
NEUTROPHILS NFR BLD AUTO: 3.14 10*3/MM3 (ref 1.7–7)
NEUTROPHILS NFR BLD AUTO: 51.1 % (ref 42.7–76)
NRBC BLD AUTO-RTO: 0 /100 WBC (ref 0–0.2)
PLATELET # BLD AUTO: 392 10*3/MM3 (ref 140–450)
PMV BLD AUTO: 9.6 FL (ref 6–12)
POTASSIUM SERPL-SCNC: 4.3 MMOL/L (ref 3.5–5.2)
PROT SERPL-MCNC: 7.4 G/DL (ref 6–8.5)
RBC # BLD AUTO: 3.76 10*6/MM3 (ref 3.77–5.28)
SODIUM SERPL-SCNC: 139 MMOL/L (ref 136–145)
TRIGL SERPL-MCNC: 123 MG/DL (ref 0–150)
TSH SERPL DL<=0.05 MIU/L-ACNC: 1.84 UIU/ML (ref 0.27–4.2)
VIT B12 BLD-MCNC: >2000 PG/ML (ref 211–946)
VLDLC SERPL-MCNC: 22 MG/DL (ref 5–40)
WBC # BLD AUTO: 6.14 10*3/MM3 (ref 3.4–10.8)

## 2020-12-18 PROCEDURE — 85025 COMPLETE CBC W/AUTO DIFF WBC: CPT | Performed by: PREVENTIVE MEDICINE

## 2020-12-18 PROCEDURE — 80061 LIPID PANEL: CPT | Performed by: PREVENTIVE MEDICINE

## 2020-12-18 PROCEDURE — 84443 ASSAY THYROID STIM HORMONE: CPT | Performed by: PREVENTIVE MEDICINE

## 2020-12-18 PROCEDURE — 99214 OFFICE O/P EST MOD 30 MIN: CPT | Performed by: PREVENTIVE MEDICINE

## 2020-12-18 PROCEDURE — 83735 ASSAY OF MAGNESIUM: CPT | Performed by: PREVENTIVE MEDICINE

## 2020-12-18 PROCEDURE — 82306 VITAMIN D 25 HYDROXY: CPT | Performed by: PREVENTIVE MEDICINE

## 2020-12-18 PROCEDURE — 82607 VITAMIN B-12: CPT | Performed by: PREVENTIVE MEDICINE

## 2020-12-18 PROCEDURE — 80053 COMPREHEN METABOLIC PANEL: CPT | Performed by: PREVENTIVE MEDICINE

## 2020-12-18 NOTE — PROGRESS NOTES
Chief Complaint   Patient presents with   • Hyperlipidemia   • URI     with wet cough        Patient presents for follow-up on chronic medical problems including hyperlipidemia, obstructive sleep apnea and GERD. Patient denies adverse effects of medications, headache, dizziness, chest pain, palpitations, shortness of breath, nausea, vomiting, abdominal pain, joint pain, unexplained change in weight, hair loss and fatigue. Patient complains of cough and muscle aches. Patient is here for monitoring of chronic issues due to need for monitoring of renal function, liver function, blood pressure effects of meds, adverse effects, side effects and interactions    Past Medical History:   Diagnosis Date   • Anesthesia complication    • GERD (gastroesophageal reflux disease)    • Hereditary hemochromatosis (CMS/HCC)    • Hyperlipidemia    • Seasonal allergies      Past Surgical History:   Procedure Laterality Date   • BLADDER SUSPENSION     • BREAST BIOPSY Right    • FINGER SURGERY  2008    removed cysts from right index finger    • HERNIA REPAIR     • SUBTOTAL HYSTERECTOMY     • TUBAL ABDOMINAL LIGATION     • VENTRAL/INCISIONAL HERNIA REPAIR N/A 9/24/2020    Procedure: LAPAROSCOPIC VENTRAL HERNIA;  Surgeon: Reuben Molina MD;  Location: [facility] OR;  Service: General;  Laterality: N/A;  08 TAP block   • WISDOM TOOTH EXTRACTION       Family History   Problem Relation Age of Onset   • Coronary artery disease Other    • Diabetes Other    • Dementia Other    • Diabetes Mother    • Cancer Mother    • Heart disease Father    • Liver disease Father    • Hypertension Father    • Cancer Brother      Social History     Tobacco Use   • Smoking status: Never Smoker   • Smokeless tobacco: Never Used   Substance Use Topics   • Alcohol use: Yes     Alcohol/week: 1.0 standard drinks     Types: 1 Glasses of wine per week     Frequency: Never     Drinks per session: 1 or 2     Binge frequency: Never     Comment: rarely  "      PHQ-2 Depression Screening     PHQ-9 Depression Screening       Review of Systems   Constitutional: Negative.    HENT: Negative.  Negative for sinus pressure and sore throat.    Eyes: Negative.    Respiratory: Negative.  Negative for cough.    Cardiovascular: Negative.    Gastrointestinal: Negative.    Endocrine: Negative.    Genitourinary: Negative.    Musculoskeletal: Negative.    Skin: Negative.         Small nodule l lat lower outer thigh minimal tenderness moveable   Allergic/Immunologic: Positive for environmental allergies.   Neurological: Negative.    Hematological: Negative.    Psychiatric/Behavioral: Negative.      Vitals:    12/18/20 0809 12/18/20 0814   BP: 112/73 108/66   BP Location: Right arm Left arm   Patient Position: Sitting Sitting   Cuff Size: Large Adult Large Adult   Pulse: 72 71   Resp: 16    Temp: 97.1 °F (36.2 °C)    TempSrc: Infrared    SpO2: 95%    Weight: 103 kg (226 lb 12.8 oz)    Height: 150 cm (59.06\")      Body mass index is 45.72 kg/m².  Physical Exam  Vitals signs reviewed.   Constitutional:       General: She is not in acute distress.     Appearance: She is well-developed. She is obese. She is not ill-appearing or toxic-appearing.   HENT:      Head: Normocephalic and atraumatic.      Right Ear: Tympanic membrane, ear canal and external ear normal.      Left Ear: Tympanic membrane, ear canal and external ear normal.      Nose: Nose normal.   Eyes:      Extraocular Movements: Extraocular movements intact.      Conjunctiva/sclera: Conjunctivae normal.      Pupils: Pupils are equal, round, and reactive to light.   Neck:      Musculoskeletal: Neck supple.   Cardiovascular:      Rate and Rhythm: Normal rate and regular rhythm.      Heart sounds: Normal heart sounds.   Pulmonary:      Effort: Pulmonary effort is normal.      Breath sounds: Normal breath sounds.   Abdominal:      General: Bowel sounds are normal. There is no distension.      Palpations: Abdomen is soft. There is no " mass.      Tenderness: There is no abdominal tenderness.   Musculoskeletal: Normal range of motion.   Skin:     General: Skin is warm.      Comments: Nodule as decribed in history   Neurological:      General: No focal deficit present.      Mental Status: She is alert and oriented to person, place, and time.   Psychiatric:         Mood and Affect: Mood normal.         Behavior: Behavior normal.       Hemoglobin A1C (%)   Date Value   06/19/2020 5.4   01/22/2020 5.2     @RESUFAST(CHOL,chlpl,TRIG,HDL,LDLCALC,LDL,ldldirect)  Creatinine (mg/dL)   Date Value   10/27/2020 0.84   09/17/2020 0.75   06/19/2020 0.68     No results found for: MICROALBUR, [id number]      Diagnoses and all orders for this visit:    1. Elevated blood pressure reading without diagnosis of hypertension (Primary)  Comments:  Controlled  Orders:  -     CBC Auto Differential  -     Comprehensive Metabolic Panel    2. Encounter for screening mammogram for malignant neoplasm of breast  -     Mammo Screening Digital Tomosynthesis Bilateral With CAD; Future    3. Screening for colon cancer  -     Cologuard - Stool, Per Rectum; Future    4. Gastroesophageal reflux disease, unspecified whether esophagitis present  Comments:  controlled unless certain foods  Orders:  -     CBC Auto Differential  -     Magnesium    5. Hereditary hemochromatosis (CMS/HCC)  Comments:  Seeing hematology 1 year    6. Mixed hyperlipidemia  Comments:  Eating less sat fats not eating out  Orders:  -     Lipid Panel    7. Mixed anxiety depressive disorder  Comments:  meds helping  Orders:  -     TSH  -     Vitamin B12    8. Obstructive sleep apnea  Comments:  using CPAP    9. Memory loss  Comments:  About the same  Orders:  -     TSH    10. Vitamin D deficiency  Comments:  Taking meds  Orders:  -     Vitamin D 25 Hydroxy    11. Mass of left thigh  Comments:  Warm soaks 10 minutes 4Xday/deep moveable minimal discomfort .5 cm    12. Body mass index (BMI) of 45.0 to 49.9 in adult  (CMS/HCC)  Comments:  advise I track bites    13. Morbid (severe) obesity due to excess calories (CMS/HCC)    Other orders  -     Cancel: Ferritin

## 2020-12-19 NOTE — PROGRESS NOTES
B12 is elevated-if taking by mouth decrease dose to three days/week,  Total and bad cholesterol art still high.  If she has done all she can with diet and execise-should start statin-where should we send?

## 2020-12-21 ENCOUNTER — TELEPHONE (OUTPATIENT)
Dept: FAMILY MEDICINE CLINIC | Facility: CLINIC | Age: 55
End: 2020-12-21

## 2020-12-21 NOTE — TELEPHONE ENCOUNTER
My chart message    HUB TO READ:    B12 is elevated-if taking by mouth decrease dose to three days/week,  Total and bad cholesterol art still high.  If she has done all she can with diet and execise-should start statin-where should we send?

## 2020-12-23 ENCOUNTER — RESULTS ENCOUNTER (OUTPATIENT)
Dept: FAMILY MEDICINE CLINIC | Facility: CLINIC | Age: 55
End: 2020-12-23

## 2020-12-23 DIAGNOSIS — Z12.11 SCREENING FOR COLON CANCER: ICD-10-CM

## 2021-01-05 ENCOUNTER — APPOINTMENT (OUTPATIENT)
Dept: MAMMOGRAPHY | Facility: HOSPITAL | Age: 56
End: 2021-01-05

## 2021-01-07 ENCOUNTER — OFFICE VISIT (OUTPATIENT)
Dept: ORTHOPEDIC SURGERY | Facility: CLINIC | Age: 56
End: 2021-01-07

## 2021-01-07 VITALS
SYSTOLIC BLOOD PRESSURE: 112 MMHG | WEIGHT: 225 LBS | HEART RATE: 94 BPM | BODY MASS INDEX: 45.36 KG/M2 | HEIGHT: 59 IN | DIASTOLIC BLOOD PRESSURE: 74 MMHG

## 2021-01-07 DIAGNOSIS — M17.0 BILATERAL PRIMARY OSTEOARTHRITIS OF KNEE: ICD-10-CM

## 2021-01-07 DIAGNOSIS — M25.561 ACUTE PAIN OF BOTH KNEES: Primary | ICD-10-CM

## 2021-01-07 DIAGNOSIS — M25.562 ACUTE PAIN OF BOTH KNEES: Primary | ICD-10-CM

## 2021-01-07 PROCEDURE — 99213 OFFICE O/P EST LOW 20 MIN: CPT | Performed by: ORTHOPAEDIC SURGERY

## 2021-01-07 NOTE — PROGRESS NOTES
"     Patient ID: Merissa Yusuf is a 55 y.o. female.  Bilateral knee pain  Merissa is a 55-year-old female here with bilateral knee pain.  She had good results with cortisone injection in the right knee about a year ago.  Pain is returned.  It is dull and a 6/10 worse with stairs    Review of Systems:    Bilateral knee pain    Objective:    /74   Pulse 94   Ht 149.9 cm (59\")   Wt 102 kg (225 lb)   LMP  (LMP Unknown)   BMI 45.44 kg/m²     Physical Examination:  Both knees demonstrate no scars no atrophy with moderate medial joint line tenderness on both knees.  Trace effusions are present, knee range of motion 0 to 125 degrees bilateral with a positive Yin medially on both sides and no instability       Imaging:   bilateral Knee X-Ray  Indication: Chronic knee pain left worse than the right  AP, Lateral views, [place]  Findings: Moderate tricompartmental degenerative joint disease on the right and moderate to severe on the left with mild varus deformity  no bony lesion  Soft tissues normal  decreased joint spaces  Hardware appropriately positioned not applicable      yes prior studies available for comparison    Assessment:    Bilateral knee degenerative joint disease    Plan:   Treatment options discussed he would like to proceed with viscosupplementation.  Discussed possibility of diminished results specially on the more severe side with her advanced arthritis      Procedures          Disclaimer: Please note that areas of this note were completed with computer voice recognition software.  Quite often unanticipated grammatical, syntax, homophones, and other interpretive errors are inadvertently transcribed by the computer software. Please excuse any errors that have escaped final proofreading.  "

## 2021-01-08 ENCOUNTER — TELEPHONE (OUTPATIENT)
Dept: ORTHOPEDIC SURGERY | Facility: CLINIC | Age: 56
End: 2021-01-08

## 2021-01-13 ENCOUNTER — HOSPITAL ENCOUNTER (OUTPATIENT)
Dept: MAMMOGRAPHY | Facility: HOSPITAL | Age: 56
Discharge: HOME | End: 2021-01-13
Admitting: PREVENTIVE MEDICINE

## 2021-01-13 DIAGNOSIS — Z12.31 ENCOUNTER FOR SCREENING MAMMOGRAM FOR MALIGNANT NEOPLASM OF BREAST: ICD-10-CM

## 2021-01-13 PROCEDURE — 77067 SCR MAMMO BI INCL CAD: CPT

## 2021-01-13 PROCEDURE — 77063 BREAST TOMOSYNTHESIS BI: CPT

## 2021-01-14 DIAGNOSIS — F41.8 MIXED ANXIETY DEPRESSIVE DISORDER: ICD-10-CM

## 2021-01-14 RX ORDER — DESVENLAFAXINE 100 MG/1
100 TABLET, EXTENDED RELEASE ORAL EVERY MORNING
Qty: 90 TABLET | Refills: 1 | Status: SHIPPED | OUTPATIENT
Start: 2021-01-14 | End: 2021-07-17

## 2021-01-27 ENCOUNTER — OFFICE VISIT (OUTPATIENT)
Dept: ORTHOPEDIC SURGERY | Facility: CLINIC | Age: 56
End: 2021-01-27

## 2021-01-27 VITALS
WEIGHT: 225 LBS | BODY MASS INDEX: 45.36 KG/M2 | SYSTOLIC BLOOD PRESSURE: 108 MMHG | DIASTOLIC BLOOD PRESSURE: 73 MMHG | HEIGHT: 59 IN | HEART RATE: 59 BPM

## 2021-01-27 DIAGNOSIS — M17.0 BILATERAL PRIMARY OSTEOARTHRITIS OF KNEE: Primary | ICD-10-CM

## 2021-01-27 PROCEDURE — 20610 DRAIN/INJ JOINT/BURSA W/O US: CPT | Performed by: ORTHOPAEDIC SURGERY

## 2021-01-27 NOTE — PROGRESS NOTES
"     Patient ID: Merissa Yusuf is a 55 y.o. female.  Bilateral knee pain here for [facility]        Objective:    /73   Pulse 59   Ht 149.9 cm (59\")   Wt 102 kg (225 lb)   LMP  (LMP Unknown)   BMI 45.44 kg/m²     Physical Examination:  Both knees demonstrate mild effusions no redness       Imaging:      Assessment:  Bilateral knee degenerative disease    Plan:      Large Joint Arthrocentesis: R knee  Date/Time: 1/27/2021 10:15 AM  Consent given by: patient  Timeout: Immediately prior to procedure a time out was called to verify the correct patient, procedure, equipment, support staff and site/side marked as required   Supporting Documentation  Indications: pain   Procedure Details  Location: knee - R knee  Preparation: Patient was prepped and draped in the usual sterile fashion  Needle size: 22 G  Medications administered: 88 mg Hyaluronan 88 MG/4ML  Patient tolerance: patient tolerated the procedure well with no immediate complications    Large Joint Arthrocentesis: L knee  Date/Time: 1/27/2021 10:15 AM  Consent given by: patient  Timeout: Immediately prior to procedure a time out was called to verify the correct patient, procedure, equipment, support staff and site/side marked as required   Supporting Documentation  Indications: pain   Procedure Details  Location: knee - L knee  Preparation: Patient was prepped and draped in the usual sterile fashion  Needle size: 22 G  Medications administered: 88 mg Hyaluronan 88 MG/4ML  Patient tolerance: patient tolerated the procedure well with no immediate complications        "

## 2021-02-09 DIAGNOSIS — E83.110 HEREDITARY HEMOCHROMATOSIS (HCC): Primary | ICD-10-CM

## 2021-02-09 DIAGNOSIS — D64.9 ANEMIA, UNSPECIFIED TYPE: ICD-10-CM

## 2021-02-09 DIAGNOSIS — E83.19 IRON OVERLOAD: ICD-10-CM

## 2021-02-16 ENCOUNTER — OFFICE VISIT (OUTPATIENT)
Dept: NEUROLOGY | Facility: CLINIC | Age: 56
End: 2021-02-16

## 2021-02-16 DIAGNOSIS — G47.33 OBSTRUCTIVE SLEEP APNEA: Primary | ICD-10-CM

## 2021-02-16 PROCEDURE — 99441 PR PHYS/QHP TELEPHONE EVALUATION 5-10 MIN: CPT | Performed by: PSYCHIATRY & NEUROLOGY

## 2021-02-16 NOTE — PROGRESS NOTES
Telehealth    Telephone    Time: 8 minutes 30 seconds    You have chosen to receive care through a telephone visit. Do you consent to use a telephone visit for your medical care today? Yes       sleep medicine follow-up visit    Merissa Yusuf   1965  55 y.o. female   DATE OF SERVICE: 2/16/2021     pt. doing well with BIpap compliance. Patient uses a nasal mask mask and goes through [employer] for supplies. She states the hose is not heating up, and believes it needs replaced.    SLEEP TESTING HISTORY:    On NPSG at [facility] , 08/15/2017 patient had Severe obstructive sleep apnea syndrome with apnea-hypopnea index of 28.8 per sleep hour, minimum SpO2 of 85%    The compliance data reviewed and the patient is on BIPAP therapy at 10/25 cm/H2O MIN PS 0/MAX PS15 and compliance data indicates excellent compliance with 93.9% usage for more than 4 hours with an average usage of 7 hours 21 minutes. AHI down to 2.8 with BIPAP therapy and mean BIPAP pressure 10.1 cm of water/AVE PS 5.2.      The patient's hypersomnia also resolved with Meno Sleepiness Scale score of 7 with BIPAP therapy.  The patient feels great and is benefiting from it and is compliant.     EPWORTH SLEEPINESS SCALE  Sitting and reading 3 WatchingTV 1  Sitting, inactive, in a public place 0  As a passenger in a car for 1 hour w/o a break  0  Lying down to rest in the afternoon  3  Sitting and talking to someone  0  Sitting quietly after a lunch  0  In a car, while stopped for traffic or a light  0  Total 7    bmi  45.4  Weight up 25 lbs.  In past year      Review of Systems   Constitutional: Negative for fatigue.   HENT: Negative for congestion and nosebleeds.    Respiratory: Negative for apnea and shortness of breath.      I reviewed and addressed ROS entered by MA.    The following portions of the patient's history were reviewed and updated as appropriate: allergies, current medications, past family history, past medical history, past social history, past  surgical history and problem list.      Family History   Problem Relation Age of Onset   • Coronary artery disease Other    • Diabetes Other    • Dementia Other    • Diabetes Mother    • Cancer Mother    • Heart disease Father    • Liver disease Father    • Hypertension Father    • Cancer Brother    • Breast cancer Neg Hx    • Ovarian cancer Neg Hx        Past Medical History:   Diagnosis Date   • Anesthesia complication     drops B/P, and slow to arouse   • GERD (gastroesophageal reflux disease)    • Hereditary hemochromatosis (CMS/HCC)    • Hyperlipidemia    • Seasonal allergies        Social History     Socioeconomic History   • Marital status:      Spouse name: Not on file   • Number of children: Not on file   • Years of education: Not on file   • Highest education level: Not on file   Tobacco Use   • Smoking status: Never Smoker   • Smokeless tobacco: Never Used   Substance and Sexual Activity   • Alcohol use: Yes     Alcohol/week: 1.0 standard drinks     Types: 1 Glasses of wine per week     Frequency: Never     Drinks per session: 1 or 2     Binge frequency: Never     Comment: rarely   • Drug use: No   • Sexual activity: Yes     Partners: Male     Birth control/protection: None         Current Outpatient Medications:   •  acetaminophen (TYLENOL) 500 MG tablet, Take 500 mg by mouth Every 4 (Four) Hours As Needed., Disp: , Rfl:   •  busPIRone (BUSPAR) 15 MG tablet, TAKE 1 TABLET BY MOUTH THREE TIMES DAILY FOR ANXIETY (Patient taking differently: Take 15 mg by mouth 3 (Three) Times a Day. TAKE 1 TABLET BY MOUTH THREE TIMES DAILY FOR ANXIETY), Disp: 90 tablet, Rfl: 2  •  Calcium Carbonate Antacid (TUMS PO), Take 2,000 mg by mouth At Night As Needed., Disp: , Rfl:   •  Cholecalciferol (VITAMIN D-3) 5000 units tablet, Daily., Disp: , Rfl:   •  cyanocobalamin (VITAMIN B-12) 500 MCG tablet, B-12 500 MCG TABS, Disp: , Rfl:   •  desvenlafaxine (PRISTIQ) 100 MG 24 hr tablet, Take 1 tablet by mouth Every Morning.,  Disp: 90 tablet, Rfl: 1  •  diclofenac (VOLTAREN) 1 % gel gel, Apply 4 g topically to the appropriate area as directed 4 (Four) Times a Day As Needed., Disp: , Rfl:   •  Loratadine 10 MG capsule, Take 1 tablet by mouth Daily As Needed., Disp: , Rfl:   •  Naproxen Sodium 220 MG capsule, Take 1 tablet by mouth As Needed., Disp: , Rfl:   •  Triamcinolone Acetonide (NASACORT) 55 MCG/ACT nasal inhaler, 2 sprays into the nostril(s) as directed by provider Daily., Disp: , Rfl:     Allergies   Allergen Reactions   • Amoxicillin Swelling   • Erythromycin GI Intolerance   • Escitalopram Anxiety   • Prozac  [Fluoxetine Hcl] Rash   • Penicillin G Rash        PHYSICAL EXAMINATION:  There were no vitals filed for this visit.   There is no height or weight on file to calculate BMI.       Patient's speech was normal with normal language and mood and affect.  Diagnoses and all orders for this visit:    1. Obstructive sleep apnea (Primary)    2. Body mass index (BMI) of 45.0 to 49.9 in adult (CMS/[facility])       Continue PAP at the current pressure settings.  An order will be sent to either repair or replace the current machine.  She is to return for follow-up visit in 30 to 90 days if she obtains a new machine otherwise in 1 year.  She is encouraged to lose weight.    This document has been electronically signed by Joseph Seipel, MD on February 16, 2021 11:57 EST

## 2021-02-22 ENCOUNTER — OFFICE VISIT (OUTPATIENT)
Dept: PSYCHIATRY | Facility: CLINIC | Age: 56
End: 2021-02-22

## 2021-02-22 ENCOUNTER — TELEPHONE (OUTPATIENT)
Dept: NEUROLOGY | Facility: CLINIC | Age: 56
End: 2021-02-22

## 2021-02-22 DIAGNOSIS — F41.8 MIXED ANXIETY DEPRESSIVE DISORDER: Primary | Chronic | ICD-10-CM

## 2021-02-22 DIAGNOSIS — G47.33 OBSTRUCTIVE SLEEP APNEA: Primary | ICD-10-CM

## 2021-02-22 PROCEDURE — 99213 OFFICE O/P EST LOW 20 MIN: CPT | Performed by: PHYSICIAN ASSISTANT

## 2021-02-22 RX ORDER — BUSPIRONE HYDROCHLORIDE 15 MG/1
TABLET ORAL
Qty: 90 TABLET | Refills: 2 | Status: SHIPPED | OUTPATIENT
Start: 2021-02-22 | End: 2021-12-02 | Stop reason: SDUPTHER

## 2021-02-22 NOTE — TELEPHONE ENCOUNTER
----- Message from Joseph F Seipel, MD sent at 2/16/2021 11:50 AM EST -----   nasal mask mask and goes through [employer] for supplies    Repair or replace machine.   heated hose not working

## 2021-02-22 NOTE — PROGRESS NOTES
"Subjective   Merissa Yusuf is a 55 y.o.white female who presents today for follow up in the office x 15 mintues    Chief Complaint:  Anxiety and depression    History of Present Illness:   The increase of Pristiq to 100mg has been helpful   \"My  is driving me crazy\"..he has TBI and can push her buttons  Mom has Alzheimer's demenita, they had to put her in [place] in March, now able to visit her for 2 hrs per day, moved her step-dad in with her and that has decreased her anxiety.  Patient lives the closest to Mom, has 6 siblings  Anxiety 2/10, has decreased her Buspar to one daily   Depression 2/10  Denies SI/HI  Questioned changing back to Trintellix, her daughter thought she seemed happier on it but caused restless legs      The following portions of the patient's history were reviewed and updated as appropriate: allergies, current medications, past family history, past medical history, past social history, past surgical history and problem list.    PAST PSYCHIATRIC HISTORY  Axis I  Affective/Bipoloar Disorder, Anxiety/Panic Disorder  Axis II  None    PAST OUTPATIENT TREATMENT  Diagnosis treated:  Affective Disorder, Anxiety/Panic Disorder  Treatment Type:  Family Therapy, Medication Management  No hospitalizations  Prior Psychiatric Medications:  Lexapro increased anxiety  Prozac, hives  Trintellix helped but gave her restless legs  Buspar   Viibryd, could not take  Pristiq  Support Groups:  None  Sequelae Of Mental Disorder:  social isolation, family disruption, emotional distress      Interval History  Improved    Side Effects  Restless legs with Trintellix    Past psychiatric history was reviewed and compared to 11/17/2020 visit and appropriate updates were made.    Past Medical History:  Past Medical History:   Diagnosis Date   • Anesthesia complication     drops B/P, and slow to arouse   • GERD (gastroesophageal reflux disease)    • Hereditary hemochromatosis (CMS/HCC)    • Hyperlipidemia    • " Seasonal allergies        Social History:  Social History     Socioeconomic History   • Marital status:      Spouse name: Not on file   • Number of children: Not on file   • Years of education: Not on file   • Highest education level: Not on file   Tobacco Use   • Smoking status: Never Smoker   • Smokeless tobacco: Never Used   Substance and Sexual Activity   • Alcohol use: Yes     Alcohol/week: 1.0 standard drinks     Types: 1 Glasses of wine per week     Frequency: Never     Drinks per session: 1 or 2     Binge frequency: Never     Comment: rarely   • Drug use: No   • Sexual activity: Yes     Partners: Male     Birth control/protection: None       Family History:  Family History   Problem Relation Age of Onset   • Coronary artery disease Other    • Diabetes Other    • Dementia Other    • Diabetes Mother    • Cancer Mother    • Heart disease Father    • Liver disease Father    • Hypertension Father    • Cancer Brother    • Breast cancer Neg Hx    • Ovarian cancer Neg Hx        Past Surgical History:  Past Surgical History:   Procedure Laterality Date   • BLADDER SUSPENSION     • BREAST BIOPSY Right    • FINGER SURGERY  2008    removed cysts from right index finger    • HERNIA REPAIR     • SUBTOTAL HYSTERECTOMY     • TUBAL ABDOMINAL LIGATION     • VENTRAL/INCISIONAL HERNIA REPAIR N/A 9/24/2020    Procedure: LAPAROSCOPIC VENTRAL HERNIA;  Surgeon: Reuben Molina MD;  Location: [facility];  Service: General;  Laterality: N/A;  0575 TAP block   • WISDOM TOOTH EXTRACTION         Problem List:  Patient Active Problem List   Diagnosis   • Elevated blood pressure reading without diagnosis of hypertension   • Hearing deficit   • Hyperlipidemia   • Knee pain   • Low back pain   • Memory loss   • Mixed anxiety depressive disorder   • Morbid (severe) obesity due to excess calories (CMS/HCC)   • Obstructive sleep apnea   • Thrombocytosis (CMS/HCC)   • Vitamin D deficiency   • Combined forms of age-related  cataract, bilateral   • Convergence insufficiency   • Corneal pannus of right eye   • Corneal pannus   • Meibomian gland dysfunction (MGD) of both eyes   • Bilateral presbyopia   • Presbyopia   • Vitreous floaters   • Hereditary hemochromatosis (CMS/[facility])   • GERD (gastroesophageal reflux disease)   • Seasonal allergies   • Body mass index (BMI) of 45.0 to 49.9 in adult (CMS/[facility])       Allergy:   Allergies   Allergen Reactions   • Amoxicillin Swelling   • Erythromycin GI Intolerance   • Escitalopram Anxiety   • Prozac  [Fluoxetine Hcl] Rash   • Penicillin G Rash        Discontinued Medications:  Medications Discontinued During This Encounter   Medication Reason   • busPIRone (BUSPAR) 15 MG tablet Reorder       Current Medications:   Current Outpatient Medications   Medication Sig Dispense Refill   • acetaminophen (TYLENOL) 500 MG tablet Take 500 mg by mouth Every 4 (Four) Hours As Needed.     • busPIRone (BUSPAR) 15 MG tablet TAKE 1 TABLET BY MOUTH THREE TIMES DAILY FOR ANXIETY 90 tablet 2   • Calcium Carbonate Antacid (TUMS PO) Take 2,000 mg by mouth At Night As Needed.     • Cholecalciferol (VITAMIN D-3) 5000 units tablet Daily.     • cyanocobalamin (VITAMIN B-12) 500 MCG tablet B-12 500 MCG TABS     • desvenlafaxine (PRISTIQ) 100 MG 24 hr tablet Take 1 tablet by mouth Every Morning. 90 tablet 1   • diclofenac (VOLTAREN) 1 % gel gel Apply 4 g topically to the appropriate area as directed 4 (Four) Times a Day As Needed.     • Loratadine 10 MG capsule Take 1 tablet by mouth Daily As Needed.     • Naproxen Sodium 220 MG capsule Take 1 tablet by mouth As Needed.     • Triamcinolone Acetonide (NASACORT) 55 MCG/ACT nasal inhaler 2 sprays into the nostril(s) as directed by provider Daily.       No current facility-administered medications for this visit.          Review of Symptoms:    Psychiatric/Behavioral: Negative for agitation, behavioral problems, confusion, decreased concentration, dysphoric mood, hallucinations,  self-injury, sleep disturbance and suicidal ideas. The patient not nervous/anxious and is not hyperactive.        Physical Exam:   not currently breastfeeding.    Mental Status Exam:   Hygiene:   good  Cooperation:  Cooperative  Eye Contact:  Good  Psychomotor Behavior:  Appropriate  Affect:  Appropriate  Mood: Normal  Hopelessness: Denies  Speech:  Normal  Thought Process:  Goal directed  Thought Content:  Normal  Suicidal:  None  Homicidal:  None  Hallucinations:  None  Delusion:  None  Memory:  Intact  Orientation:  Person, Place, Time and Situation  Reliability:  good  Insight:  Good  Judgement:  Good  Impulse Control:  Good  Physical/Medical Issues:  No      Mental status exam was reviewed and compared to 11/17/20 visit and appropriate updates were made.    PHQ-9 Depression Screening  Little interest or pleasure in doing things? 1   Feeling down, depressed, or hopeless? 1   Trouble falling or staying asleep, or sleeping too much? 0   Feeling tired or having little energy? 1   Poor appetite or overeating? 1   Feeling bad about yourself - or that you are a failure or have let yourself or your family down? 1   Trouble concentrating on things, such as reading the newspaper or watching television? 0   Moving or speaking so slowly that other people could have noticed? Or the opposite - being so fidgety or restless that you have been moving around a lot more than usual? 0   Thoughts that you would be better off dead, or of hurting yourself in some way? 0   PHQ-9 Total Score 5   If you checked off any problems, how difficult have these problems made it for you to do your work, take care of things at home, or get along with other people? Somewhat difficult           Never smoker    I advised Merissa of the risks of tobacco use.     Lab Results:   Office Visit on 12/18/2020   Component Date Value Ref Range Status   • WBC 12/18/2020 6.14  3.40 - 10.80 10*3/mm3 Final   • RBC 12/18/2020 3.76* 3.77 - 5.28 10*6/mm3 Final   •  Hemoglobin 12/18/2020 13.3  12.0 - 15.9 g/dL Final   • Hematocrit 12/18/2020 37.6  34.0 - 46.6 % Final   • MCV 12/18/2020 100.0* 79.0 - 97.0 fL Final   • MCH 12/18/2020 35.4* 26.6 - 33.0 pg Final   • MCHC 12/18/2020 35.4  31.5 - 35.7 g/dL Final   • RDW 12/18/2020 12.2* 12.3 - 15.4 % Final   • RDW-SD 12/18/2020 43.8  37.0 - 54.0 fl Final   • MPV 12/18/2020 9.6  6.0 - 12.0 fL Final   • Platelets 12/18/2020 392  140 - 450 10*3/mm3 Final   • Neutrophil % 12/18/2020 51.1  42.7 - 76.0 % Final   • Lymphocyte % 12/18/2020 35.0  19.6 - 45.3 % Final   • Monocyte % 12/18/2020 8.6  5.0 - 12.0 % Final   • Eosinophil % 12/18/2020 3.6  0.3 - 6.2 % Final   • Basophil % 12/18/2020 1.0  0.0 - 1.5 % Final   • Immature Grans % 12/18/2020 0.7* 0.0 - 0.5 % Final   • Neutrophils, Absolute 12/18/2020 3.14  1.70 - 7.00 10*3/mm3 Final   • Lymphocytes, Absolute 12/18/2020 2.15  0.70 - 3.10 10*3/mm3 Final   • Monocytes, Absolute 12/18/2020 0.53  0.10 - 0.90 10*3/mm3 Final   • Eosinophils, Absolute 12/18/2020 0.22  0.00 - 0.40 10*3/mm3 Final   • Basophils, Absolute 12/18/2020 0.06  0.00 - 0.20 10*3/mm3 Final   • Immature Grans, Absolute 12/18/2020 0.04  0.00 - 0.05 10*3/mm3 Final   • nRBC 12/18/2020 0.0  0.0 - 0.2 /100 WBC Final   • Glucose 12/18/2020 95  65 - 99 mg/dL Final   • BUN 12/18/2020 13  6 - 20 mg/dL Final   • Creatinine 12/18/2020 0.65  0.57 - 1.00 mg/dL Final   • Sodium 12/18/2020 139  136 - 145 mmol/L Final   • Potassium 12/18/2020 4.3  3.5 - 5.2 mmol/L Final   • Chloride 12/18/2020 103  98 - 107 mmol/L Final   • CO2 12/18/2020 26.7  22.0 - 29.0 mmol/L Final   • Calcium 12/18/2020 9.4  8.6 - 10.5 mg/dL Final   • Total Protein 12/18/2020 7.4  6.0 - 8.5 g/dL Final   • Albumin 12/18/2020 4.40  3.50 - 5.20 g/dL Final   • ALT (SGPT) 12/18/2020 18  1 - 33 U/L Final   • AST (SGOT) 12/18/2020 17  1 - 32 U/L Final   • Alkaline Phosphatase 12/18/2020 79  39 - 117 U/L Final   • Total Bilirubin 12/18/2020 0.5  0.0 - 1.2 mg/dL Final   • eGFR  Non  Amer 12/18/2020 94  >60 mL/min/1.73 Final   • Globulin 12/18/2020 3.0  gm/dL Final   • A/G Ratio 12/18/2020 1.5  g/dL Final   • BUN/Creatinine Ratio 12/18/2020 20.0  7.0 - 25.0 Final   • Anion Gap 12/18/2020 9.3  5.0 - 15.0 mmol/L Final   • Total Cholesterol 12/18/2020 221* 0 - 200 mg/dL Final   • Triglycerides 12/18/2020 123  0 - 150 mg/dL Final   • HDL Cholesterol 12/18/2020 61* 40 - 60 mg/dL Final   • LDL Cholesterol  12/18/2020 138* 0 - 100 mg/dL Final   • VLDL Cholesterol 12/18/2020 22  5 - 40 mg/dL Final   • LDL/HDL Ratio 12/18/2020 2.22   Final   • Magnesium 12/18/2020 2.2  1.6 - 2.6 mg/dL Final   • TSH 12/18/2020 1.840  0.270 - 4.200 uIU/mL Final   • Vitamin B-12 12/18/2020 >2,000* 211 - 946 pg/mL Final   • 25 Hydroxy, Vitamin D 12/18/2020 55.7  30.0 - 100.0 ng/ml Final       Assessment/Plan   Problems Addressed this Visit        Mental Health    Mixed anxiety depressive disorder - Primary (Chronic)    Relevant Medications    busPIRone (BUSPAR) 15 MG tablet      Diagnoses       Codes Comments    Mixed anxiety depressive disorder    -  Primary ICD-10-CM: F41.8  ICD-9-CM: 300.4           Visit Diagnoses:    ICD-10-CM ICD-9-CM   1. Mixed anxiety depressive disorder  F41.8 300.4       TREATMENT PLAN/GOALS: Continue supportive psychotherapy efforts and medications as indicated. Treatment and medication options discussed during today's visit. Patient ackowledged and verbally consented to continue with current treatment plan and was educated on the importance of compliance with treatment and follow-up appointments.    MEDICATION ISSUES:  INSPECT reviewed as expected  Discussed medication options and treatment plan of prescribed medication as well as the risks, benefits, and side effects including potential falls, possible impaired driving and metabolic adversities among others. Patient is agreeable to call the office with any worsening of symptoms or onset of side effects. Patient is agreeable to call  911 or go to the nearest ER should he/she begin having SI/HI. No medication side effects or related complaints today.     Patient reports that her mood has been good with less anxiety except for issues her  causes.  Advised her to [facility] with [facility] and discuss his meds and mood with the doctor.  Continue Pristiq 100mg daily for depression and continue Buspar 15mg ITD prn anxiety, no refill needed today.       MEDS ORDERED DURING VISIT:  New Medications Ordered This Visit   Medications   • busPIRone (BUSPAR) 15 MG tablet     Sig: TAKE 1 TABLET BY MOUTH THREE TIMES DAILY FOR ANXIETY     Dispense:  90 tablet     Refill:  2       Return in about 4 months (around 6/22/2021).         This document has been electronically signed by Laisha Mendiola PA-C  February 22, 2021 14:32 EST

## 2021-03-09 ENCOUNTER — TELEPHONE (OUTPATIENT)
Dept: NEUROLOGY | Facility: CLINIC | Age: 56
End: 2021-03-09

## 2021-03-09 NOTE — TELEPHONE ENCOUNTER
Provider: DR SEIPEL     Caller:  MAURISIO TAPIA  Relationship to Patient: PT    Phone Number: [phone]  Reason for Call:  PT IS CALLING IN WITH A QUESTION ABOUT HER BIPAP MACHINE PT STATES SHE RECEIVED A REFURBISHED  MACHINE BY A SWAP DUE TO OLD MACHINE NOT WORKING PT IS WONDERING IF THIS QUALIFIES FOR HER TO HAVE TO SCHEDULE AN APPT SINCE ITS A REFURBISHED MACHINE OR CAN SHE HOLD OFF ON COMING IN UNTIL NEXT YEAR WHEN  SHE GETS A NEW MACHINE .. PLEASE ADVISE

## 2021-03-10 NOTE — TELEPHONE ENCOUNTER
Per Moraima, will not need a 31-90 day appt, was given a barely used machine but should good to go

## 2021-03-11 ENCOUNTER — OFFICE VISIT (OUTPATIENT)
Dept: FAMILY MEDICINE CLINIC | Facility: CLINIC | Age: 56
End: 2021-03-11

## 2021-03-11 VITALS
HEIGHT: 59 IN | OXYGEN SATURATION: 96 % | TEMPERATURE: 97.1 F | DIASTOLIC BLOOD PRESSURE: 72 MMHG | SYSTOLIC BLOOD PRESSURE: 120 MMHG | WEIGHT: 226.8 LBS | HEART RATE: 73 BPM | BODY MASS INDEX: 45.72 KG/M2

## 2021-03-11 DIAGNOSIS — E53.8 VITAMIN B12 DEFICIENCY: ICD-10-CM

## 2021-03-11 DIAGNOSIS — F41.8 MIXED ANXIETY DEPRESSIVE DISORDER: Chronic | ICD-10-CM

## 2021-03-11 DIAGNOSIS — R03.0 ELEVATED BLOOD PRESSURE READING WITHOUT DIAGNOSIS OF HYPERTENSION: Primary | ICD-10-CM

## 2021-03-11 DIAGNOSIS — R31.9 URINARY TRACT INFECTION WITH HEMATURIA, SITE UNSPECIFIED: ICD-10-CM

## 2021-03-11 DIAGNOSIS — E78.2 MIXED HYPERLIPIDEMIA: ICD-10-CM

## 2021-03-11 DIAGNOSIS — N39.0 URINARY TRACT INFECTION WITH HEMATURIA, SITE UNSPECIFIED: ICD-10-CM

## 2021-03-11 DIAGNOSIS — E66.01 CLASS 3 SEVERE OBESITY DUE TO EXCESS CALORIES WITH SERIOUS COMORBIDITY AND BODY MASS INDEX (BMI) OF 45.0 TO 49.9 IN ADULT (HCC): ICD-10-CM

## 2021-03-11 DIAGNOSIS — E83.110 HEREDITARY HEMOCHROMATOSIS (HCC): ICD-10-CM

## 2021-03-11 DIAGNOSIS — R07.89 OTHER CHEST PAIN: ICD-10-CM

## 2021-03-11 PROBLEM — E66.813 CLASS 3 SEVERE OBESITY DUE TO EXCESS CALORIES WITH SERIOUS COMORBIDITY AND BODY MASS INDEX (BMI) OF 45.0 TO 49.9 IN ADULT: Status: ACTIVE | Noted: 2021-03-11

## 2021-03-11 LAB
BILIRUB BLD-MCNC: NEGATIVE MG/DL
CLARITY, POC: ABNORMAL
COLOR UR: YELLOW
GLUCOSE UR STRIP-MCNC: NEGATIVE MG/DL
KETONES UR QL: NEGATIVE
LEUKOCYTE EST, POC: ABNORMAL
NITRITE UR-MCNC: POSITIVE MG/ML
PH UR: 6 [PH] (ref 5–8)
PROT UR STRIP-MCNC: ABNORMAL MG/DL
RBC # UR STRIP: ABNORMAL /UL
SP GR UR: 1.03 (ref 1–1.03)
UROBILINOGEN UR QL: NORMAL

## 2021-03-11 PROCEDURE — 87186 SC STD MICRODIL/AGAR DIL: CPT | Performed by: PREVENTIVE MEDICINE

## 2021-03-11 PROCEDURE — 87086 URINE CULTURE/COLONY COUNT: CPT | Performed by: PREVENTIVE MEDICINE

## 2021-03-11 PROCEDURE — 87088 URINE BACTERIA CULTURE: CPT | Performed by: PREVENTIVE MEDICINE

## 2021-03-11 PROCEDURE — 99214 OFFICE O/P EST MOD 30 MIN: CPT | Performed by: PREVENTIVE MEDICINE

## 2021-03-11 PROCEDURE — 81003 URINALYSIS AUTO W/O SCOPE: CPT | Performed by: PREVENTIVE MEDICINE

## 2021-03-11 PROCEDURE — 85025 COMPLETE CBC W/AUTO DIFF WBC: CPT | Performed by: PREVENTIVE MEDICINE

## 2021-03-11 PROCEDURE — 36415 COLL VENOUS BLD VENIPUNCTURE: CPT | Performed by: PREVENTIVE MEDICINE

## 2021-03-11 PROCEDURE — 80061 LIPID PANEL: CPT | Performed by: PREVENTIVE MEDICINE

## 2021-03-11 PROCEDURE — 82607 VITAMIN B-12: CPT | Performed by: PREVENTIVE MEDICINE

## 2021-03-11 PROCEDURE — 80053 COMPREHEN METABOLIC PANEL: CPT | Performed by: PREVENTIVE MEDICINE

## 2021-03-11 RX ORDER — NITROFURANTOIN 25; 75 MG/1; MG/1
100 CAPSULE ORAL 2 TIMES DAILY
Qty: 14 CAPSULE | Refills: 0 | Status: SHIPPED | OUTPATIENT
Start: 2021-03-11 | End: 2021-05-26

## 2021-03-11 NOTE — PATIENT INSTRUCTIONS
Put additional meds.    Try to increase fruit, fluid, fiber or /and Miralax.    Call if chest pain persists. Take antibiotic till gone and will call if needs repeat urinbe 3 days off medicine.    Call ortho about knee pain  Health Maintenance Due   Topic Date Due   • ZOSTER VACCINE (1 of 2) Never done     Calorie Counting for Weight Loss  Calories are units of energy. Your body needs a certain amount of calories from food to keep you going throughout the day. When you eat more calories than your body needs, your body stores the extra calories as fat. When you eat fewer calories than your body needs, your body burns fat to get the energy it needs.  Calorie counting means keeping track of how many calories you eat and drink each day. Calorie counting can be helpful if you need to lose weight. If you make sure to eat fewer calories than your body needs, you should lose weight. Ask your health care provider what a healthy weight is for you.  For calorie counting to work, you will need to eat the right number of calories in a day in order to lose a healthy amount of weight per week. A dietitian can help you determine how many calories you need in a day and will give you suggestions on how to reach your calorie goal.  · A healthy amount of weight to lose per week is usually 1-2 lb (0.5-0.9 kg). This usually means that your daily calorie intake should be reduced by 500-750 calories.  · Eating 1,200 - 1,500 calories per day can help most women lose weight.  · Eating 1,500 - 1,800 calories per day can help most men lose weight.  What is my plan?  My goal is to have __________ calories per day.  If I have this many calories per day, I should lose around __________ pounds per week.  What do I need to know about calorie counting?  In order to meet your daily calorie goal, you will need to:  · Find out how many calories are in each food you would like to eat. Try to do this before you eat.  · Decide how much of the food you plan  to eat.  · Write down what you ate and how many calories it had. Doing this is called keeping a food log.  To successfully lose weight, it is important to balance calorie counting with a healthy lifestyle that includes regular activity. Aim for 150 minutes of moderate exercise (such as walking) or 75 minutes of vigorous exercise (such as running) each week.  Where do I find calorie information?      The number of calories in a food can be found on a Nutrition Facts label. If a food does not have a Nutrition Facts label, try to look up the calories online or ask your dietitian for help.  Remember that calories are listed per serving. If you choose to have more than one serving of a food, you will have to multiply the calories per serving by the amount of servings you plan to eat. For example, the label on a package of bread might say that a serving size is 1 slice and that there are 90 calories in a serving. If you eat 1 slice, you will have eaten 90 calories. If you eat 2 slices, you will have eaten 180 calories.  How do I keep a food log?  Immediately after each meal, record the following information in your food log:  · What you ate. Don't forget to include toppings, sauces, and other extras on the food.  · How much you ate. This can be measured in cups, ounces, or number of items.  · How many calories each food and drink had.  · The total number of calories in the meal.  Keep your food log near you, such as in a small notebook in your pocket, or use a mobile ross or website. Some programs will calculate calories for you and show you how many calories you have left for the day to meet your goal.  What are some calorie counting tips?      · Use your calories on foods and drinks that will fill you up and not leave you hungry:  ? Some examples of foods that fill you up are nuts and nut butters, vegetables, lean proteins, and high-fiber foods like whole grains. High-fiber foods are foods with more than 5 g fiber per  "serving.  ? Drinks such as sodas, specialty coffee drinks, alcohol, and juices have a lot of calories, yet do not fill you up.  · Eat nutritious foods and avoid empty calories. Empty calories are calories you get from foods or beverages that do not have many vitamins or protein, such as candy, sweets, and soda. It is better to have a nutritious high-calorie food (such as an avocado) than a food with few nutrients (such as a bag of chips).  · Know how many calories are in the foods you eat most often. This will help you calculate calorie counts faster.  · Pay attention to calories in drinks. Low-calorie drinks include water and unsweetened drinks.  · Pay attention to nutrition labels for \"low fat\" or \"fat free\" foods. These foods sometimes have the same amount of calories or more calories than the full fat versions. They also often have added sugar, starch, or salt, to make up for flavor that was removed with the fat.  · Find a way of tracking calories that works for you. Get creative. Try different apps or programs if writing down calories does not work for you.  What are some portion control tips?  · Know how many calories are in a serving. This will help you know how many servings of a certain food you can have.  · Use a measuring cup to measure serving sizes. You could also try weighing out portions on a kitchen scale. With time, you will be able to estimate serving sizes for some foods.  · Take some time to put servings of different foods on your favorite plates, bowls, and cups so you know what a serving looks like.  · Try not to eat straight from a bag or box. Doing this can lead to overeating. Put the amount you would like to eat in a cup or on a plate to make sure you are eating the right portion.  · Use smaller plates, glasses, and bowls to prevent overeating.  · Try not to multitask (for example, watch TV or use your computer) while eating. If it is time to eat, sit down at a table and enjoy your food. " "This will help you to know when you are full. It will also help you to be aware of what you are eating and how much you are eating.  What are tips for following this plan?  Reading food labels  · Check the calorie count compared to the serving size. The serving size may be smaller than what you are used to eating.  · Check the source of the calories. Make sure the food you are eating is high in vitamins and protein and low in saturated and trans fats.  Shopping  · Read nutrition labels while you shop. This will help you make healthy decisions before you decide to purchase your food.  · Make a grocery list and stick to it.  Cooking  · Try to cook your favorite foods in a healthier way. For example, try baking instead of frying.  · Use low-fat dairy products.  Meal planning  · Use more fruits and vegetables. Half of your plate should be fruits and vegetables.  · Include lean proteins like poultry and fish.  How do I count calories when eating out?  · Ask for smaller portion sizes.  · Consider sharing an entree and sides instead of getting your own entree.  · If you get your own entree, eat only half. Ask for a box at the beginning of your meal and put the rest of your entree in it so you are not tempted to eat it.  · If calories are listed on the menu, choose the lower calorie options.  · Choose dishes that include vegetables, fruits, whole grains, low-fat dairy products, and lean protein.  · Choose items that are boiled, broiled, grilled, or steamed. Stay away from items that are buttered, battered, fried, or served with cream sauce. Items labeled \"crispy\" are usually fried, unless stated otherwise.  · Choose water, low-fat milk, unsweetened iced tea, or other drinks without added sugar. If you want an alcoholic beverage, choose a lower calorie option such as a glass of wine or light beer.  · Ask for dressings, sauces, and syrups on the side. These are usually high in calories, so you should limit the amount you " eat.  · If you want a salad, choose a garden salad and ask for grilled meats. Avoid extra toppings like michelle, cheese, or fried items. Ask for the dressing on the side, or ask for olive oil and vinegar or lemon to use as dressing.  · Estimate how many servings of a food you are given. For example, a serving of cooked rice is ½ cup or about the size of half a baseball. Knowing serving sizes will help you be aware of how much food you are eating at restaurants. The list below tells you how big or small some common portion sizes are based on everyday objects:  ? 1 oz--4 stacked dice.  ? 3 oz--1 deck of cards.  ? 1 tsp--1 die.  ? 1 Tbsp--½ a ping-pong ball.  ? 2 Tbsp--1 ping-pong ball.  ? ½ cup--½ baseball.  ? 1 cup--1 baseball.  Summary  · Calorie counting means keeping track of how many calories you eat and drink each day. If you eat fewer calories than your body needs, you should lose weight.  · A healthy amount of weight to lose per week is usually 1-2 lb (0.5-0.9 kg). This usually means reducing your daily calorie intake by 500-750 calories.  · The number of calories in a food can be found on a Nutrition Facts label. If a food does not have a Nutrition Facts label, try to look up the calories online or ask your dietitian for help.  · Use your calories on foods and drinks that will fill you up, and not on foods and drinks that will leave you hungry.  · Use smaller plates, glasses, and bowls to prevent overeating.  This information is not intended to replace advice given to you by your health care provider. Make sure you discuss any questions you have with your health care provider.  Document Released: 12/18/2006 Document Revised: 09/06/2019 Document Reviewed: 11/17/2017  [employer] Education © 2019 [employer]      Watch chocolate

## 2021-03-11 NOTE — PROGRESS NOTES
"Subjective   Merissa Yusuf is a 55 y.o. female presents for   Chief Complaint   Patient presents with   • Urinary Tract Infection     x2 days       Health Maintenance Due   Topic Date Due   • ZOSTER VACCINE (1 of 2) Never done       55-year-old white female is seen today for 2 new problems and follow-up on multiple chronic health conditions.  Last several days she has noticed burning with urination hesitancy and frequency.  No chills or fever.  No flank pain.  Feels like she has a urinary tract infection has not had one in over a year.  Second problem is chest pain.  She has had this when she has been stressed in the past negative cardiovascular work-up 2 years ago.  Father-in-law staying with her and causing the stress.  Patient had 2 episodes of chest pain in 1 day lasting 10 to 30 minutes with feelings of heat but no actual diaphoresis.  Pain was substernal did not radiate anywhere.  She feels as though this is the same pain that she had when she was evaluated 2 years ago and will call us if this persists.  Other multiple chronic diseases seem to be stable Home blood pressure readings on it cuff which has been recently calibrated are normal.       Vitals:    03/11/21 0806 03/11/21 0807   BP: 113/62 120/72   BP Location: Left arm Right arm   Patient Position: Sitting Sitting   Cuff Size: Adult Adult   Pulse: 73    Temp: 97.1 °F (36.2 °C)    TempSrc: Temporal    SpO2: 96%    Weight: 103 kg (226 lb 12.8 oz)    Height: 149.9 cm (59.02\")      Body mass index is 45.78 kg/m².    Current Outpatient Medications on File Prior to Visit   Medication Sig Dispense Refill   • acetaminophen (TYLENOL) 500 MG tablet Take 500 mg by mouth Every 4 (Four) Hours As Needed.     • busPIRone (BUSPAR) 15 MG tablet TAKE 1 TABLET BY MOUTH THREE TIMES DAILY FOR ANXIETY 90 tablet 2   • Calcium Carbonate Antacid (TUMS PO) Take 2,000 mg by mouth At Night As Needed.     • Cholecalciferol (VITAMIN D-3) 5000 units tablet Daily.     • " cyanocobalamin (VITAMIN B-12) 500 MCG tablet Take 500 mcg by mouth 3 (Three) Times a Week.     • desvenlafaxine (PRISTIQ) 100 MG 24 hr tablet Take 1 tablet by mouth Every Morning. 90 tablet 1   • diclofenac (VOLTAREN) 1 % gel gel Apply 4 g topically to the appropriate area as directed 4 (Four) Times a Day As Needed.     • Loratadine 10 MG capsule Take 1 tablet by mouth Daily As Needed.     • Naproxen Sodium 220 MG capsule Take 1 tablet by mouth As Needed.     • Triamcinolone Acetonide (NASACORT) 55 MCG/ACT nasal inhaler 2 sprays into the nostril(s) as directed by provider Daily.       No current facility-administered medications on file prior to visit.       The following portions of the patient's history were reviewed and updated as appropriate: allergies, current medications, past family history, past medical history, past social history, past surgical history and problem list.    Review of Systems   Constitutional: Negative.    HENT: Negative.  Negative for sinus pressure and sore throat.    Eyes: Negative.    Respiratory: Negative.  Negative for cough.    Cardiovascular: Positive for chest pain.   Gastrointestinal: Negative.    Endocrine: Negative.    Genitourinary: Positive for difficulty urinating, dysuria, frequency, hematuria and urgency. Negative for vaginal discharge.   Musculoskeletal: Negative.    Skin: Negative.    Allergic/Immunologic: Positive for environmental allergies.   Neurological: Negative.    Hematological: Negative.    Psychiatric/Behavioral: The patient is nervous/anxious.        Objective   Physical Exam  Vitals reviewed.   Constitutional:       General: She is not in acute distress.     Appearance: She is well-developed. She is obese. She is not ill-appearing or toxic-appearing.   HENT:      Head: Normocephalic and atraumatic.      Right Ear: Tympanic membrane, ear canal and external ear normal.      Left Ear: Tympanic membrane, ear canal and external ear normal.      Nose: Nose normal.    Eyes:      Extraocular Movements: Extraocular movements intact.      Conjunctiva/sclera: Conjunctivae normal.      Pupils: Pupils are equal, round, and reactive to light.   Cardiovascular:      Rate and Rhythm: Normal rate and regular rhythm.      Heart sounds: Normal heart sounds.   Pulmonary:      Effort: Pulmonary effort is normal.      Breath sounds: Normal breath sounds.   Abdominal:      General: Bowel sounds are normal. There is no distension.      Palpations: Abdomen is soft. There is no mass.      Tenderness: There is no abdominal tenderness.   Musculoskeletal:         General: Normal range of motion.      Cervical back: Neck supple.   Skin:     General: Skin is warm.   Neurological:      General: No focal deficit present.      Mental Status: She is alert and oriented to person, place, and time.   Psychiatric:         Mood and Affect: Mood normal.         Behavior: Behavior normal.       PHQ-9 Total Score: 2    Assessment/Plan   Diagnoses and all orders for this visit:    1. Elevated blood pressure reading without diagnosis of hypertension (Primary)  Comments:  Blood pressure is controlled at home.  Cuff has been calibrated.  Pressure high in office not felt to be important.  Low sodium and weight have been discussed  Orders:  -     CBC Auto Differential  -     Comprehensive Metabolic Panel    2. Mixed hyperlipidemia  Comments:  Patient trying to eat less saturated fats  Orders:  -     Lipid Panel    3. Hereditary hemochromatosis (CMS/HCC)  Comments:  Keeping follow-up with Dr. Thomas    4. Class 3 severe obesity due to excess calories with serious comorbidity and body mass index (BMI) of 45.0 to 49.9 in adult (CMS/HCC)  Comments:  Portion size and walking have been discussed.    5. Mixed anxiety depressive disorder  Comments:  No HI or SI but is stressed out due to father-in-law presently living with her.    6. Urinary tract infection with hematuria, site unspecified  Comments:  Burning with urination  frequency and hesitancy.  Has been going on for the last couple of days.  No fever chills or flank pain.  Orders:  -     Urine Culture - Urine, Urine, Clean Catch  -     POC Urinalysis Dipstick, Automated  -     Urine Culture - Urine, Urine, Clean Catch; Future    7. Vitamin B12 deficiency  Comments:  Taking over-the-counter.  Orders:  -     Vitamin B12    8. Other chest pain  Comments:  Pain occurred twice in 1 day.  She was upset over her father-in-law.  Did get hot but no diaphoresis.  Symptoms in the past with negative cardiac work-up 2 year    Other orders  -     nitrofurantoin, macrocrystal-monohydrate, (Macrobid) 100 MG capsule; Take 1 capsule by mouth 2 (Two) Times a Day.  Dispense: 14 capsule; Refill: 0        Patient Instructions     Put additional meds.    Try to increase fruit, fluid, fiber or /and Miralax.    Call if chest pain persists. Take antibiotic till gone and will call if needs repeat urinbe 3 days off medicine.    Call ortho about knee pain  Health Maintenance Due   Topic Date Due   • ZOSTER VACCINE (1 of 2) Never done     Calorie Counting for Weight Loss  Calories are units of energy. Your body needs a certain amount of calories from food to keep you going throughout the day. When you eat more calories than your body needs, your body stores the extra calories as fat. When you eat fewer calories than your body needs, your body burns fat to get the energy it needs.  Calorie counting means keeping track of how many calories you eat and drink each day. Calorie counting can be helpful if you need to lose weight. If you make sure to eat fewer calories than your body needs, you should lose weight. Ask your health care provider what a healthy weight is for you.  For calorie counting to work, you will need to eat the right number of calories in a day in order to lose a healthy amount of weight per week. A dietitian can help you determine how many calories you need in a day and will give you suggestions  on how to reach your calorie goal.  · A healthy amount of weight to lose per week is usually 1-2 lb (0.5-0.9 kg). This usually means that your daily calorie intake should be reduced by 500-750 calories.  · Eating 1,200 - 1,500 calories per day can help most women lose weight.  · Eating 1,500 - 1,800 calories per day can help most men lose weight.  What is my plan?  My goal is to have __________ calories per day.  If I have this many calories per day, I should lose around __________ pounds per week.  What do I need to know about calorie counting?  In order to meet your daily calorie goal, you will need to:  · Find out how many calories are in each food you would like to eat. Try to do this before you eat.  · Decide how much of the food you plan to eat.  · Write down what you ate and how many calories it had. Doing this is called keeping a food log.  To successfully lose weight, it is important to balance calorie counting with a healthy lifestyle that includes regular activity. Aim for 150 minutes of moderate exercise (such as walking) or 75 minutes of vigorous exercise (such as running) each week.  Where do I find calorie information?      The number of calories in a food can be found on a Nutrition Facts label. If a food does not have a Nutrition Facts label, try to look up the calories online or ask your dietitian for help.  Remember that calories are listed per serving. If you choose to have more than one serving of a food, you will have to multiply the calories per serving by the amount of servings you plan to eat. For example, the label on a package of bread might say that a serving size is 1 slice and that there are 90 calories in a serving. If you eat 1 slice, you will have eaten 90 calories. If you eat 2 slices, you will have eaten 180 calories.  How do I keep a food log?  Immediately after each meal, record the following information in your food log:  · What you ate. Don't forget to include toppings, sauces,  "and other extras on the food.  · How much you ate. This can be measured in cups, ounces, or number of items.  · How many calories each food and drink had.  · The total number of calories in the meal.  Keep your food log near you, such as in a small notebook in your pocket, or use a mobile ross or website. Some programs will calculate calories for you and show you how many calories you have left for the day to meet your goal.  What are some calorie counting tips?      · Use your calories on foods and drinks that will fill you up and not leave you hungry:  ? Some examples of foods that fill you up are nuts and nut butters, vegetables, lean proteins, and high-fiber foods like whole grains. High-fiber foods are foods with more than 5 g fiber per serving.  ? Drinks such as sodas, specialty coffee drinks, alcohol, and juices have a lot of calories, yet do not fill you up.  · Eat nutritious foods and avoid empty calories. Empty calories are calories you get from foods or beverages that do not have many vitamins or protein, such as candy, sweets, and soda. It is better to have a nutritious high-calorie food (such as an avocado) than a food with few nutrients (such as a bag of chips).  · Know how many calories are in the foods you eat most often. This will help you calculate calorie counts faster.  · Pay attention to calories in drinks. Low-calorie drinks include water and unsweetened drinks.  · Pay attention to nutrition labels for \"low fat\" or \"fat free\" foods. These foods sometimes have the same amount of calories or more calories than the full fat versions. They also often have added sugar, starch, or salt, to make up for flavor that was removed with the fat.  · Find a way of tracking calories that works for you. Get creative. Try different apps or programs if writing down calories does not work for you.  What are some portion control tips?  · Know how many calories are in a serving. This will help you know how many " servings of a certain food you can have.  · Use a measuring cup to measure serving sizes. You could also try weighing out portions on a kitchen scale. With time, you will be able to estimate serving sizes for some foods.  · Take some time to put servings of different foods on your favorite plates, bowls, and cups so you know what a serving looks like.  · Try not to eat straight from a bag or box. Doing this can lead to overeating. Put the amount you would like to eat in a cup or on a plate to make sure you are eating the right portion.  · Use smaller plates, glasses, and bowls to prevent overeating.  · Try not to multitask (for example, watch TV or use your computer) while eating. If it is time to eat, sit down at a table and enjoy your food. This will help you to know when you are full. It will also help you to be aware of what you are eating and how much you are eating.  What are tips for following this plan?  Reading food labels  · Check the calorie count compared to the serving size. The serving size may be smaller than what you are used to eating.  · Check the source of the calories. Make sure the food you are eating is high in vitamins and protein and low in saturated and trans fats.  Shopping  · Read nutrition labels while you shop. This will help you make healthy decisions before you decide to purchase your food.  · Make a grocery list and stick to it.  Cooking  · Try to cook your favorite foods in a healthier way. For example, try baking instead of frying.  · Use low-fat dairy products.  Meal planning  · Use more fruits and vegetables. Half of your plate should be fruits and vegetables.  · Include lean proteins like poultry and fish.  How do I count calories when eating out?  · Ask for smaller portion sizes.  · Consider sharing an entree and sides instead of getting your own entree.  · If you get your own entree, eat only half. Ask for a box at the beginning of your meal and put the rest of your entree in  "it so you are not tempted to eat it.  · If calories are listed on the menu, choose the lower calorie options.  · Choose dishes that include vegetables, fruits, whole grains, low-fat dairy products, and lean protein.  · Choose items that are boiled, broiled, grilled, or steamed. Stay away from items that are buttered, battered, fried, or served with cream sauce. Items labeled \"crispy\" are usually fried, unless stated otherwise.  · Choose water, low-fat milk, unsweetened iced tea, or other drinks without added sugar. If you want an alcoholic beverage, choose a lower calorie option such as a glass of wine or light beer.  · Ask for dressings, sauces, and syrups on the side. These are usually high in calories, so you should limit the amount you eat.  · If you want a salad, choose a garden salad and ask for grilled meats. Avoid extra toppings like michelle, cheese, or fried items. Ask for the dressing on the side, or ask for olive oil and vinegar or lemon to use as dressing.  · Estimate how many servings of a food you are given. For example, a serving of cooked rice is ½ cup or about the size of half a baseball. Knowing serving sizes will help you be aware of how much food you are eating at restaurants. The list below tells you how big or small some common portion sizes are based on everyday objects:  ? 1 oz--4 stacked dice.  ? 3 oz--1 deck of cards.  ? 1 tsp--1 die.  ? 1 Tbsp--½ a ping-pong ball.  ? 2 Tbsp--1 ping-pong ball.  ? ½ cup--½ baseball.  ? 1 cup--1 baseball.  Summary  · Calorie counting means keeping track of how many calories you eat and drink each day. If you eat fewer calories than your body needs, you should lose weight.  · A healthy amount of weight to lose per week is usually 1-2 lb (0.5-0.9 kg). This usually means reducing your daily calorie intake by 500-750 calories.  · The number of calories in a food can be found on a Nutrition Facts label. If a food does not have a Nutrition Facts label, try to look up " the calories online or ask your dietitian for help.  · Use your calories on foods and drinks that will fill you up, and not on foods and drinks that will leave you hungry.  · Use smaller plates, glasses, and bowls to prevent overeating.  This information is not intended to replace advice given to you by your health care provider. Make sure you discuss any questions you have with your health care provider.  Document Released: 12/18/2006 Document Revised: 09/06/2019 Document Reviewed: 11/17/2017  [employer] Patient Education © 2019 [employer]      Watch chocolate

## 2021-03-12 ENCOUNTER — TELEPHONE (OUTPATIENT)
Dept: FAMILY MEDICINE CLINIC | Facility: CLINIC | Age: 56
End: 2021-03-12

## 2021-03-12 LAB
ALBUMIN SERPL-MCNC: 3.8 G/DL (ref 3.5–5.2)
ALBUMIN/GLOB SERPL: 1.4 G/DL
ALP SERPL-CCNC: 78 U/L (ref 39–117)
ALT SERPL W P-5'-P-CCNC: 16 U/L (ref 1–33)
ANION GAP SERPL CALCULATED.3IONS-SCNC: 10.2 MMOL/L (ref 5–15)
AST SERPL-CCNC: 14 U/L (ref 1–32)
BASOPHILS # BLD AUTO: 0.04 10*3/MM3 (ref 0–0.2)
BASOPHILS NFR BLD AUTO: 0.7 % (ref 0–1.5)
BILIRUB SERPL-MCNC: 0.3 MG/DL (ref 0–1.2)
BUN SERPL-MCNC: 12 MG/DL (ref 6–20)
BUN/CREAT SERPL: 16.9 (ref 7–25)
CALCIUM SPEC-SCNC: 9.6 MG/DL (ref 8.6–10.5)
CHLORIDE SERPL-SCNC: 101 MMOL/L (ref 98–107)
CHOLEST SERPL-MCNC: 205 MG/DL (ref 0–200)
CO2 SERPL-SCNC: 23.8 MMOL/L (ref 22–29)
CREAT SERPL-MCNC: 0.71 MG/DL (ref 0.57–1)
DEPRECATED RDW RBC AUTO: 45.4 FL (ref 37–54)
EOSINOPHIL # BLD AUTO: 0.16 10*3/MM3 (ref 0–0.4)
EOSINOPHIL NFR BLD AUTO: 2.7 % (ref 0.3–6.2)
ERYTHROCYTE [DISTWIDTH] IN BLOOD BY AUTOMATED COUNT: 12 % (ref 12.3–15.4)
GFR SERPL CREATININE-BSD FRML MDRD: 85 ML/MIN/1.73
GLOBULIN UR ELPH-MCNC: 2.8 GM/DL
GLUCOSE SERPL-MCNC: 94 MG/DL (ref 65–99)
HCT VFR BLD AUTO: 38.4 % (ref 34–46.6)
HDLC SERPL-MCNC: 57 MG/DL (ref 40–60)
HGB BLD-MCNC: 13.1 G/DL (ref 12–15.9)
IMM GRANULOCYTES # BLD AUTO: 0.02 10*3/MM3 (ref 0–0.05)
IMM GRANULOCYTES NFR BLD AUTO: 0.3 % (ref 0–0.5)
LDLC SERPL CALC-MCNC: 131 MG/DL (ref 0–100)
LDLC/HDLC SERPL: 2.27 {RATIO}
LYMPHOCYTES # BLD AUTO: 1.88 10*3/MM3 (ref 0.7–3.1)
LYMPHOCYTES NFR BLD AUTO: 31.5 % (ref 19.6–45.3)
MCH RBC QN AUTO: 35.2 PG (ref 26.6–33)
MCHC RBC AUTO-ENTMCNC: 34.1 G/DL (ref 31.5–35.7)
MCV RBC AUTO: 103.2 FL (ref 79–97)
MONOCYTES # BLD AUTO: 0.48 10*3/MM3 (ref 0.1–0.9)
MONOCYTES NFR BLD AUTO: 8 % (ref 5–12)
NEUTROPHILS NFR BLD AUTO: 3.39 10*3/MM3 (ref 1.7–7)
NEUTROPHILS NFR BLD AUTO: 56.8 % (ref 42.7–76)
NRBC BLD AUTO-RTO: 0 /100 WBC (ref 0–0.2)
PLATELET # BLD AUTO: 394 10*3/MM3 (ref 140–450)
PMV BLD AUTO: 9.9 FL (ref 6–12)
POTASSIUM SERPL-SCNC: 3.9 MMOL/L (ref 3.5–5.2)
PROT SERPL-MCNC: 6.6 G/DL (ref 6–8.5)
RBC # BLD AUTO: 3.72 10*6/MM3 (ref 3.77–5.28)
SODIUM SERPL-SCNC: 135 MMOL/L (ref 136–145)
TRIGL SERPL-MCNC: 93 MG/DL (ref 0–150)
VIT B12 BLD-MCNC: >2000 PG/ML (ref 211–946)
VLDLC SERPL-MCNC: 17 MG/DL (ref 5–40)
WBC # BLD AUTO: 5.97 10*3/MM3 (ref 3.4–10.8)

## 2021-03-12 NOTE — PROGRESS NOTES
Urine does look infected so make sure she recultures 3 days after through with antibiotics. Total and bad cholesterol are both elevated can try some statin if she has done all she can with diet and exercise-let me know.  RBC's are slightly large but not due to lack of B12 as it is high and she can cut the dose in half if taking otc.

## 2021-03-12 NOTE — TELEPHONE ENCOUNTER
HUB TO HUB  Urine does look infected so make sure she recultures 3 days after through with antibiotics. Total and bad cholesterol are both elevated can try some statin if she has done all she can with diet and exercise-let me know.  RBC's are slightly large but not due to lack of B12 as it is high and she can cut the dose in half if taking otc.           Antibiotic should clear infection so make sure recultures 3 days off antibiotic

## 2021-03-13 LAB — BACTERIA SPEC AEROBE CULT: ABNORMAL

## 2021-03-14 ENCOUNTER — TELEPHONE (OUTPATIENT)
Dept: FAMILY MEDICINE CLINIC | Facility: CLINIC | Age: 56
End: 2021-03-14

## 2021-03-14 NOTE — TELEPHONE ENCOUNTER
ADDED TO PREVIOUS PHONE NOTE  Antibiotic should clear infection so make sure recultures 3 days off antibiotic

## 2021-03-15 NOTE — TELEPHONE ENCOUNTER
Patient said it has lowered a lot by diet and exercise and she will continue. She will stop in on Monday to give another UA sample

## 2021-03-17 ENCOUNTER — TELEPHONE (OUTPATIENT)
Dept: ORTHOPEDIC SURGERY | Facility: CLINIC | Age: 56
End: 2021-03-17

## 2021-03-17 NOTE — TELEPHONE ENCOUNTER
Called patient back to answer her question she sent in my chart. Let her know at this point she should make and appointment to discuss her options. Patient was transferred to the front to make an appointment.

## 2021-03-22 ENCOUNTER — CLINICAL SUPPORT (OUTPATIENT)
Dept: FAMILY MEDICINE CLINIC | Facility: CLINIC | Age: 56
End: 2021-03-22

## 2021-03-22 DIAGNOSIS — R31.9 URINARY TRACT INFECTION WITH HEMATURIA, SITE UNSPECIFIED: ICD-10-CM

## 2021-03-22 DIAGNOSIS — N39.0 URINARY TRACT INFECTION WITH HEMATURIA, SITE UNSPECIFIED: ICD-10-CM

## 2021-03-22 PROCEDURE — 87086 URINE CULTURE/COLONY COUNT: CPT | Performed by: PREVENTIVE MEDICINE

## 2021-03-23 ENCOUNTER — TELEPHONE (OUTPATIENT)
Dept: FAMILY MEDICINE CLINIC | Facility: CLINIC | Age: 56
End: 2021-03-23

## 2021-03-23 LAB — BACTERIA SPEC AEROBE CULT: NORMAL

## 2021-04-28 NOTE — PROGRESS NOTES
ONCOLOGY/HEMATOLOGY FOLLOW UP    PATIENT: Merissa Yusuf  YOB: 1965  MEDICAL RECORD NUMBER: [id number] OF SERVICE: 04/30/21      Chief complaint:  Hereditary hemochromatosis, homozygous mutation in H63D.  Iron overload  Macrocytosis    History of Present Illness:   · Ms. Yusuf has a history of COPD and also depression.  Laboratory work up was obtained in March 2019 which was abnormal.  Patient was sent to the [facility] and seen initially on 3/13/19.   · 3/5/19 - WBC 4.7, hemoglobin 14.1, MCV elevated to 103 and platelet count of 592,000.  Vitamin D  20.  Sed rate 35.  TSH 1.62.  Vitamin B12 476.  Creatinine 0.8.    · 3/13/19 - ZACKERY-2 mutation negative.  Hemochromatosis gene mutation, homozygous mutation in H63D.  · 3/13/19 - Vitamin B12 507.  Ferritin 178.  Creatinine 0.7.  Iron level 156.  Iron binding  capacity 257.  Percentage iron saturation 61.   · 1/8/2020 Ferritin 304 iron 177 iron saturation 56 TIBC 316  · 7/21/2020 iron 247 iron saturation 82 iron binding capacity 302 ferritin 271 hemoglobin 13.7 WBC 6.1 platelet count 372.  · 6/19/2020 CMP is normal including LFTs.  TSH 3.01  · 10/2020 ferritin 233.40 iron sat 43    Patient has not had blood donation    SUBJECTIVE:     Patient comes in for follow-up.  She is doing well overall no new symptoms of concern.  She continues to have stable ongoing fatigue.    Review of Systems   Constitutional: Positive for fatigue.   HENT:   Negative for nosebleeds.    Respiratory: Negative for chest tightness and cough.    Cardiovascular: Negative for chest pain.   Gastrointestinal: Negative for abdominal pain and blood in stool.   Genitourinary: Negative for dysuria and frequency.    Musculoskeletal: Negative for back pain.   Skin: Negative for rash and wound.   Neurological: Negative for headaches and light-headedness.   Psychiatric/Behavioral: Negative for confusion. The patient is nervous/anxious.             Past Medical History:    Diagnosis Date   • Anesthesia complication     drops B/P, and slow to arouse   • GERD (gastroesophageal reflux disease)    • Hereditary hemochromatosis (CMS/HCC)    • Hyperlipidemia    • Seasonal allergies        Past Surgical History:   Procedure Laterality Date   • BLADDER SUSPENSION     • BREAST BIOPSY Right    • FINGER SURGERY  2008    removed cysts from right index finger    • HERNIA REPAIR     • SUBTOTAL HYSTERECTOMY     • TUBAL ABDOMINAL LIGATION     • VENTRAL/INCISIONAL HERNIA REPAIR N/A 9/24/2020    Procedure: LAPAROSCOPIC VENTRAL HERNIA;  Surgeon: Reuben Molina MD;  Location: [facility] MAIN OR;  Service: General;  Laterality: N/A;  0855 TAP block   • WISDOM TOOTH EXTRACTION         Family History   Problem Relation Age of Onset   • Coronary artery disease Other    • Diabetes Other    • Dementia Other    • Diabetes Mother    • Cancer Mother    • Heart disease Father    • Liver disease Father    • Hypertension Father    • Cancer Brother    • Breast cancer Neg Hx    • Ovarian cancer Neg Hx        Social History     Socioeconomic History   • Marital status:      Spouse name: Not on file   • Number of children: Not on file   • Years of education: Not on file   • Highest education level: Not on file   Tobacco Use   • Smoking status: Never Smoker   • Smokeless tobacco: Never Used   Substance and Sexual Activity   • Alcohol use: Yes     Alcohol/week: 1.0 standard drinks     Types: 1 Glasses of wine per week     Comment: rarely   • Drug use: No   • Sexual activity: Yes     Partners: Male     Birth control/protection: None       ALLERGIES:  Amoxicillin, Erythromycin, Escitalopram, Prozac  [fluoxetine hcl], and Penicillin g    MEDICATION:  Current Outpatient Medications   Medication Sig Dispense Refill   • acetaminophen (TYLENOL) 500 MG tablet Take 500 mg by mouth Every 4 (Four) Hours As Needed.     • busPIRone (BUSPAR) 15 MG tablet TAKE 1 TABLET BY MOUTH THREE TIMES DAILY FOR ANXIETY 90 tablet 2   •  "Calcium Carbonate Antacid (TUMS PO) Take 2,000 mg by mouth At Night As Needed.     • Cholecalciferol (VITAMIN D-3) 5000 units tablet Daily.     • cyanocobalamin (VITAMIN B-12) 500 MCG tablet Take 500 mcg by mouth 3 (Three) Times a Week.     • desvenlafaxine (PRISTIQ) 100 MG 24 hr tablet Take 1 tablet by mouth Every Morning. 90 tablet 1   • diclofenac (VOLTAREN) 1 % gel gel Apply 4 g topically to the appropriate area as directed 4 (Four) Times a Day As Needed.     • Loratadine 10 MG capsule Take 1 tablet by mouth Daily As Needed.     • Naproxen Sodium 220 MG capsule Take 1 tablet by mouth As Needed.     • nitrofurantoin, macrocrystal-monohydrate, (Macrobid) 100 MG capsule Take 1 capsule by mouth 2 (Two) Times a Day. 14 capsule 0   • Triamcinolone Acetonide (NASACORT) 55 MCG/ACT nasal inhaler 2 sprays into the nostril(s) as directed by provider Daily.       No current facility-administered medications for this visit.     PHYSICAL EXAM:    Current Vitals:  /72   Pulse 85   Temp 97.1 °F (36.2 °C)   Resp 18   Ht 149.9 cm (59.02\")   Wt 101 kg (223 lb)   LMP  (LMP Unknown)   Breastfeeding No   BMI 45.01 kg/m²     VITAL signs in the last 24 hours: [unfilled]       04/30/21  1433   Weight: 101 kg (223 lb)       Physical Exam  Constitutional:       Appearance: Normal appearance.   HENT:      Head: Normocephalic and atraumatic.   Eyes:      Pupils: Pupils are equal, round, and reactive to light.   Cardiovascular:      Rate and Rhythm: Normal rate and regular rhythm.      Pulses: Normal pulses.      Heart sounds: No murmur heard.     Pulmonary:      Effort: Pulmonary effort is normal.      Breath sounds: Normal breath sounds.   Abdominal:      General: There is no distension.      Palpations: Abdomen is soft. There is no mass.      Tenderness: There is no abdominal tenderness.   Musculoskeletal:         General: Normal range of motion.      Cervical back: Normal range of motion.   Skin:     General: Skin is " warm.   Neurological:      General: No focal deficit present.      Mental Status: She is alert.   Psychiatric:         Mood and Affect: Mood normal.            LABORATORY/DATA:  Lab Results   Component Value Date    WBC 6.64 04/30/2021    WBC 5.97 03/11/2021    HGB 13.0 04/30/2021    HGB 13.1 03/11/2021    HCT 37.7 04/30/2021    HCT 38.4 03/11/2021    NEUTROABS 3.04 04/30/2021       Lab Results   Component Value Date    GLUCOSE 94 03/11/2021    BUN 12 03/11/2021    CREATININE 0.71 03/11/2021    EGFRIFNONA 85 03/11/2021    EGFRIFAFRI >60 02/26/2019    BCR 16.9 03/11/2021    K 3.9 03/11/2021    CO2 23.8 03/11/2021    CALCIUM 9.6 03/11/2021    ALBUMIN 3.80 03/11/2021    LABIL2 1.5 03/13/2019    AST 14 03/11/2021    ALT 16 03/11/2021       Lab Results   Component Value Date    IRON 122 10/27/2020    TIBC 283 (L) 10/27/2020    FERRITIN 233.40 (H) 10/27/2020              Assessment & Plan    Patient is a 55-year-old female with iron overload secondary to homozygous H63D mutation for hemochromatosis.    Hemochromatosis  Patient does not have the classic phenotype however has an alternate with homozygous H63D.  Disc has a propensity towards iron overload.  However less likely to cause significant liver damage.  Her iron saturation was 43 last checked.  This is improved from the previous visit.  Ferritin last visit was 233 which is also been improving over the last several checks.  This is likely due to her dietary modification.  She is trying to cut down iron intake.  I would recommend continuing the same diet.  I will wait for her iron studies from today and if continues to improve we will continue to monitor.  I will repeat iron studies in 6 months in follow-up.      This office note has been dictated.

## 2021-04-30 ENCOUNTER — OFFICE VISIT (OUTPATIENT)
Dept: ONCOLOGY | Facility: CLINIC | Age: 56
End: 2021-04-30

## 2021-04-30 ENCOUNTER — APPOINTMENT (OUTPATIENT)
Dept: LAB | Facility: HOSPITAL | Age: 56
End: 2021-04-30

## 2021-04-30 VITALS
HEIGHT: 59 IN | BODY MASS INDEX: 44.96 KG/M2 | DIASTOLIC BLOOD PRESSURE: 72 MMHG | SYSTOLIC BLOOD PRESSURE: 112 MMHG | WEIGHT: 223 LBS | RESPIRATION RATE: 18 BRPM | TEMPERATURE: 97.1 F | HEART RATE: 85 BPM

## 2021-04-30 DIAGNOSIS — E83.110 HEREDITARY HEMOCHROMATOSIS (HCC): Primary | ICD-10-CM

## 2021-04-30 LAB
BASOPHILS # BLD AUTO: 0.06 10*3/MM3 (ref 0–0.2)
BASOPHILS NFR BLD AUTO: 0.9 % (ref 0–1.5)
DEPRECATED RDW RBC AUTO: 45.6 FL (ref 37–54)
EOSINOPHIL # BLD AUTO: 0.13 10*3/MM3 (ref 0–0.4)
EOSINOPHIL NFR BLD AUTO: 2 % (ref 0.3–6.2)
ERYTHROCYTE [DISTWIDTH] IN BLOOD BY AUTOMATED COUNT: 12.1 % (ref 12.3–15.4)
FERRITIN SERPL-MCNC: 300.9 NG/ML (ref 13–150)
HCT VFR BLD AUTO: 37.7 % (ref 34–46.6)
HGB BLD-MCNC: 13 G/DL (ref 12–15.9)
IRON 24H UR-MRATE: 195 MCG/DL (ref 37–145)
IRON SATN MFR SERPL: 64 % (ref 20–50)
LYMPHOCYTES # BLD AUTO: 2.8 10*3/MM3 (ref 0.7–3.1)
LYMPHOCYTES NFR BLD AUTO: 42.2 % (ref 19.6–45.3)
MCH RBC QN AUTO: 35.6 PG (ref 26.6–33)
MCHC RBC AUTO-ENTMCNC: 34.5 G/DL (ref 31.5–35.7)
MCV RBC AUTO: 103.3 FL (ref 79–97)
MONOCYTES # BLD AUTO: 0.61 10*3/MM3 (ref 0.1–0.9)
MONOCYTES NFR BLD AUTO: 9.2 % (ref 5–12)
NEUTROPHILS NFR BLD AUTO: 3.04 10*3/MM3 (ref 1.7–7)
NEUTROPHILS NFR BLD AUTO: 45.7 % (ref 42.7–76)
PLATELET # BLD AUTO: 329 10*3/MM3 (ref 140–450)
PMV BLD AUTO: 9.7 FL (ref 6–12)
RBC # BLD AUTO: 3.65 10*6/MM3 (ref 3.77–5.28)
TIBC SERPL-MCNC: 304 MCG/DL (ref 298–536)
TRANSFERRIN SERPL-MCNC: 204 MG/DL (ref 200–360)
WBC # BLD AUTO: 6.64 10*3/MM3 (ref 3.4–10.8)

## 2021-04-30 PROCEDURE — 85025 COMPLETE CBC W/AUTO DIFF WBC: CPT | Performed by: INTERNAL MEDICINE

## 2021-04-30 PROCEDURE — 82728 ASSAY OF FERRITIN: CPT | Performed by: INTERNAL MEDICINE

## 2021-04-30 PROCEDURE — 84466 ASSAY OF TRANSFERRIN: CPT | Performed by: INTERNAL MEDICINE

## 2021-04-30 PROCEDURE — 36415 COLL VENOUS BLD VENIPUNCTURE: CPT | Performed by: INTERNAL MEDICINE

## 2021-04-30 PROCEDURE — 83540 ASSAY OF IRON: CPT | Performed by: INTERNAL MEDICINE

## 2021-04-30 PROCEDURE — 99213 OFFICE O/P EST LOW 20 MIN: CPT | Performed by: INTERNAL MEDICINE

## 2021-05-03 ENCOUNTER — TELEPHONE (OUTPATIENT)
Dept: ONCOLOGY | Facility: CLINIC | Age: 56
End: 2021-05-03

## 2021-05-03 DIAGNOSIS — D64.9 ANEMIA, UNSPECIFIED TYPE: ICD-10-CM

## 2021-05-03 DIAGNOSIS — E83.119 HEMOCHROMATOSIS, UNSPECIFIED HEMOCHROMATOSIS TYPE: ICD-10-CM

## 2021-05-03 DIAGNOSIS — E83.19 IRON OVERLOAD: Primary | ICD-10-CM

## 2021-05-03 NOTE — TELEPHONE ENCOUNTER
Contacted patient to let her know about iron levels. Scheduled three month follow up for repeat labs.

## 2021-05-17 ENCOUNTER — OFFICE VISIT (OUTPATIENT)
Dept: FAMILY MEDICINE CLINIC | Facility: CLINIC | Age: 56
End: 2021-05-17

## 2021-05-17 VITALS
TEMPERATURE: 98.2 F | SYSTOLIC BLOOD PRESSURE: 115 MMHG | OXYGEN SATURATION: 98 % | HEIGHT: 59 IN | HEART RATE: 74 BPM | BODY MASS INDEX: 45.36 KG/M2 | DIASTOLIC BLOOD PRESSURE: 84 MMHG | WEIGHT: 225 LBS

## 2021-05-17 DIAGNOSIS — E66.01 CLASS 3 SEVERE OBESITY DUE TO EXCESS CALORIES WITH SERIOUS COMORBIDITY AND BODY MASS INDEX (BMI) OF 45.0 TO 49.9 IN ADULT (HCC): ICD-10-CM

## 2021-05-17 DIAGNOSIS — R42 VERTIGO: Primary | ICD-10-CM

## 2021-05-17 PROCEDURE — 99213 OFFICE O/P EST LOW 20 MIN: CPT | Performed by: PREVENTIVE MEDICINE

## 2021-05-17 RX ORDER — MECLIZINE HYDROCHLORIDE 25 MG/1
25 TABLET ORAL 3 TIMES DAILY PRN
Qty: 30 TABLET | Refills: 0 | Status: SHIPPED | OUTPATIENT
Start: 2021-05-17 | End: 2021-07-28

## 2021-05-17 RX ORDER — CETIRIZINE HYDROCHLORIDE 10 MG/1
10 TABLET ORAL DAILY
COMMUNITY
End: 2022-06-28

## 2021-05-17 NOTE — PATIENT INSTRUCTIONS
Rest and drink plenty of fluids.  Take Meclazine as needed.  Continue antihistamine.  Call if symptoms worsen or not improved in one week.  Stop Meclazine day before OV and don't take the day of visit

## 2021-05-17 NOTE — PROGRESS NOTES
"Subjective   Merissa Yusuf is a 55 y.o. female presents for   Chief Complaint   Patient presents with   • Dizziness     Started Sunday morning early morning- woke up with it       Health Maintenance Due   Topic Date Due   • ZOSTER VACCINE (1 of 2) Never done       55-year-old white female with a 1 day history of extreme dizziness with certain head positions.  She has not hit her head has no change in her hearing has had no fever chills does have some problems with allergies and does feel as though her sinuses are congested.  She has had a history of vertigo in the past she did take Dramamine since she had no meclizine and is somewhat better.  This however may be masking the symptoms as we evaluate her today.She was advised to rest and drink plenty of fluids take meclizine as indicated but if she still is having dizziness in a week she should refrain from taking it the day before I see her and then the day of the visit and if necessary we will refer her to physical therapy for vestibular rehab.    Dizziness         Vitals:    05/17/21 1511 05/17/21 1514 05/17/21 1515   BP: 106/73 110/70 115/84   BP Location: Right arm Left arm Left arm   Patient Position: Sitting Sitting Standing   Cuff Size: Adult Adult Adult   Pulse: 74     Temp: 98.2 °F (36.8 °C)     TempSrc: Oral     SpO2: 98%     Weight: 102 kg (225 lb)     Height: 149.9 cm (59.02\")       Body mass index is 45.42 kg/m².    Current Outpatient Medications on File Prior to Visit   Medication Sig Dispense Refill   • acetaminophen (TYLENOL) 500 MG tablet Take 500 mg by mouth Every 4 (Four) Hours As Needed.     • busPIRone (BUSPAR) 15 MG tablet TAKE 1 TABLET BY MOUTH THREE TIMES DAILY FOR ANXIETY 90 tablet 2   • cetirizine (zyrTEC) 10 MG tablet Take 10 mg by mouth Daily.     • Cholecalciferol (VITAMIN D-3) 5000 units tablet Daily.     • desvenlafaxine (PRISTIQ) 100 MG 24 hr tablet Take 1 tablet by mouth Every Morning. 90 tablet 1   • diclofenac (VOLTAREN) 1 % gel " gel Apply 4 g topically to the appropriate area as directed 4 (Four) Times a Day As Needed.     • Naproxen Sodium 220 MG capsule Take 1 tablet by mouth As Needed.     • Calcium Carbonate Antacid (TUMS PO) Take 2,000 mg by mouth At Night As Needed.     • cyanocobalamin (VITAMIN B-12) 500 MCG tablet Take 500 mcg by mouth 3 (Three) Times a Week.     • Loratadine 10 MG capsule Take 1 tablet by mouth Daily As Needed.     • nitrofurantoin, macrocrystal-monohydrate, (Macrobid) 100 MG capsule Take 1 capsule by mouth 2 (Two) Times a Day. 14 capsule 0   • Triamcinolone Acetonide (NASACORT) 55 MCG/ACT nasal inhaler 2 sprays into the nostril(s) as directed by provider Daily.       No current facility-administered medications on file prior to visit.       The following portions of the patient's history were reviewed and updated as appropriate: allergies, current medications, past family history, past medical history, past social history, past surgical history and problem list.    Review of Systems   Neurological: Positive for dizziness.       Objective   Physical Exam  Vitals reviewed.   Constitutional:       General: She is not in acute distress.     Appearance: She is well-developed. She is not ill-appearing or toxic-appearing.   HENT:      Head: Normocephalic and atraumatic.      Right Ear: Tympanic membrane, ear canal and external ear normal.      Left Ear: Tympanic membrane, ear canal and external ear normal.      Nose: Nose normal.   Eyes:      Extraocular Movements: Extraocular movements intact.      Conjunctiva/sclera: Conjunctivae normal.      Pupils: Pupils are equal, round, and reactive to light.      Comments: No nystagmuse up or down   Neck:      Vascular: No carotid bruit.      Comments: No carotid bruits  Cardiovascular:      Rate and Rhythm: Normal rate and regular rhythm.      Heart sounds: Normal heart sounds.   Pulmonary:      Effort: Pulmonary effort is normal.      Breath sounds: Normal breath sounds.    Abdominal:      General: Bowel sounds are normal. There is no distension.      Palpations: Abdomen is soft. There is no mass.      Tenderness: There is no abdominal tenderness.   Musculoskeletal:         General: Normal range of motion.      Cervical back: Neck supple.   Skin:     General: Skin is warm.   Neurological:      General: No focal deficit present.      Mental Status: She is alert and oriented to person, place, and time.      Cranial Nerves: Cranial nerve deficit present.   Psychiatric:         Mood and Affect: Mood normal.         Behavior: Behavior normal.       PHQ-9 Total Score:      Assessment/Plan   Diagnoses and all orders for this visit:    1. Vertigo (Primary)  Comments:  Patient has recently taken meclizine.  Will advise that she rest and drink plenty of fluids taking allergy medicines as prescribed and recheck again in a week    2. Class 3 severe obesity due to excess calories with serious comorbidity and body mass index (BMI) of 45.0 to 49.9 in adult (CMS/HCC)    Other orders  -     meclizine (ANTIVERT) 25 MG tablet; Take 1 tablet by mouth 3 (Three) Times a Day As Needed for Dizziness.  Dispense: 30 tablet; Refill: 0        Patient Instructions   Rest and drink plenty of fluids.  Take Meclazine as needed.  Continue antihistamine.  Call if symptoms worsen or not improved in one week.  Stop Meclazine day before OV and don't take the day of visit

## 2021-05-20 ENCOUNTER — OFFICE VISIT (OUTPATIENT)
Dept: ORTHOPEDIC SURGERY | Facility: CLINIC | Age: 56
End: 2021-05-20

## 2021-05-20 VITALS
BODY MASS INDEX: 45.36 KG/M2 | WEIGHT: 225 LBS | SYSTOLIC BLOOD PRESSURE: 117 MMHG | HEIGHT: 59 IN | HEART RATE: 92 BPM | DIASTOLIC BLOOD PRESSURE: 82 MMHG

## 2021-05-20 DIAGNOSIS — M17.0 BILATERAL PRIMARY OSTEOARTHRITIS OF KNEE: Primary | ICD-10-CM

## 2021-05-20 PROCEDURE — 99213 OFFICE O/P EST LOW 20 MIN: CPT | Performed by: ORTHOPAEDIC SURGERY

## 2021-05-20 NOTE — PROGRESS NOTES
"     Patient ID: Merissa Yusuf is a 55 y.o. female.  Bilateral knee pain  Catherine is a 55-year-old female with bilateral knee pain secondary degenerative joint disease.  She had viscosupplementation in January with little relief.  The right hurts greater than the left with catching    Review of Systems:    Bilateral knee pain    Objective:    /82   Pulse 92   Ht 149.9 cm (59.02\")   Wt 102 kg (225 lb)   LMP  (LMP Unknown)   BMI 45.41 kg/m²     Physical Examination:     Both knees demonstrate no scars no atrophy with trace effusions and mild varus alignment.  Range of motion 0 to 115 degrees bilateral with mild crepitance and no instability    Imaging:       Assessment:    Bilateral knee degenerative disease    Plan:   Further treatment options were discussed.  She would like to try repeat injections and I recommend Zilretta      Procedures          Disclaimer: Please note that areas of this note were completed with computer voice recognition software.  Quite often unanticipated grammatical, syntax, homophones, and other interpretive errors are inadvertently transcribed by the computer software. Please excuse any errors that have escaped final proofreading.  "

## 2021-05-24 ENCOUNTER — OFFICE VISIT (OUTPATIENT)
Dept: FAMILY MEDICINE CLINIC | Facility: CLINIC | Age: 56
End: 2021-05-24

## 2021-05-24 ENCOUNTER — TELEPHONE (OUTPATIENT)
Dept: FAMILY MEDICINE CLINIC | Facility: CLINIC | Age: 56
End: 2021-05-24

## 2021-05-24 VITALS
TEMPERATURE: 97.7 F | DIASTOLIC BLOOD PRESSURE: 74 MMHG | SYSTOLIC BLOOD PRESSURE: 107 MMHG | HEIGHT: 59 IN | BODY MASS INDEX: 45.2 KG/M2 | HEART RATE: 82 BPM | WEIGHT: 224.2 LBS | OXYGEN SATURATION: 97 %

## 2021-05-24 DIAGNOSIS — R61 SWEATING INCREASE: ICD-10-CM

## 2021-05-24 DIAGNOSIS — E66.01 CLASS 3 SEVERE OBESITY DUE TO EXCESS CALORIES WITH SERIOUS COMORBIDITY AND BODY MASS INDEX (BMI) OF 45.0 TO 49.9 IN ADULT (HCC): ICD-10-CM

## 2021-05-24 DIAGNOSIS — R03.0 ELEVATED BLOOD PRESSURE READING WITHOUT DIAGNOSIS OF HYPERTENSION: ICD-10-CM

## 2021-05-24 DIAGNOSIS — E78.2 MIXED HYPERLIPIDEMIA: ICD-10-CM

## 2021-05-24 DIAGNOSIS — R42 VERTIGO: Primary | ICD-10-CM

## 2021-05-24 PROCEDURE — 99214 OFFICE O/P EST MOD 30 MIN: CPT | Performed by: PREVENTIVE MEDICINE

## 2021-05-24 PROCEDURE — 93000 ELECTROCARDIOGRAM COMPLETE: CPT | Performed by: PREVENTIVE MEDICINE

## 2021-05-24 NOTE — TELEPHONE ENCOUNTER
See if she can begin to wean herself off-take 2 a day for a few days and one/day for a few days and then off.  If needs 10 more let me know

## 2021-05-24 NOTE — PROGRESS NOTES
Procedure     ECG 12 Lead    Date/Time: 5/24/2021 11:33 AM  Performed by: Melissa Duarte MD  Authorized by: Melissa Duarte MD   Comparison: compared with previous ECG from 3/31/2020  Similar to previous ECG  Comparison to previous ECG: Ant ischemic changes  Rhythm: sinus rhythm  Rate: normal  Conduction: conduction normal  Q waves: III, V1 and V2    ST Segments: ST segments normal  T Waves: T waves normal  QRS axis: normal  Other: no other findings    Clinical impression: abnormal EKG

## 2021-05-24 NOTE — PROGRESS NOTES
Subjective   Merissa Yusuf is a 55 y.o. female presents for   Chief Complaint   Patient presents with   • Dizziness     non fasting- not as severe (Still having symptoms)   • Diarrhea     Did forgot to take her meds yesterday       Health Maintenance Due   Topic Date Due   • ZOSTER VACCINE (1 of 2) Never done       55-year-old white female to be rechecked today on vertigo.  She has not taken her meclizine for the last 36 hours.  She states that she has noticed that her eyes will rotate when she gets up and down.  While she was taking the meclizine she did not notice any dizziness except when she first got up in the morning and when she got up from her nap.  She goes on to state that she has been more short of breath over the last 6 weeks she states that she felt as though that was due to the fact that she is put on weight.  She has dropped 3 pounds on her home scale since we saw her last week as we had cautioned her to do.  She does state that she is sweating sweating with activity and she has never done that before.  We did point out that it has been extremely hot and humid over this course of this last week and she agrees.  Patient has had no chest pain no arm pain no jaw pain.  She states that she feels some of her symptoms are due to the fact that she forgot to take her antidepressant antianxiety medicine yesterday and that she does have some diarrhea she thinks is a result of that along with the fact that this does occur sometimes she forgets to take her medicine until noon we have encouraged her to take her mood medicines at the same time every day so she does not get these breakthrough problems.    Dizziness  Pertinent negatives include no chest pain.   Diarrhea          Vitals:    05/24/21 1021 05/24/21 1023   BP: 113/81 107/74   BP Location: Right arm Left arm   Patient Position: Sitting Sitting   Cuff Size: Adult Adult   Pulse: 82    Temp: 97.7 °F (36.5 °C)    TempSrc: Oral    SpO2: 97%    Weight: 102 kg  "(224 lb 3.2 oz)    Height: 149.9 cm (59.02\")      Body mass index is 45.26 kg/m².    Current Outpatient Medications on File Prior to Visit   Medication Sig Dispense Refill   • acetaminophen (TYLENOL) 500 MG tablet Take 500 mg by mouth Every 4 (Four) Hours As Needed.     • busPIRone (BUSPAR) 15 MG tablet TAKE 1 TABLET BY MOUTH THREE TIMES DAILY FOR ANXIETY 90 tablet 2   • Calcium Carbonate Antacid (TUMS PO) Take 2,000 mg by mouth At Night As Needed.     • cetirizine (zyrTEC) 10 MG tablet Take 10 mg by mouth Daily.     • Cholecalciferol (VITAMIN D-3) 5000 units tablet Daily.     • desvenlafaxine (PRISTIQ) 100 MG 24 hr tablet Take 1 tablet by mouth Every Morning. 90 tablet 1   • diclofenac (VOLTAREN) 1 % gel gel Apply 4 g topically to the appropriate area as directed 4 (Four) Times a Day As Needed.     • meclizine (ANTIVERT) 25 MG tablet Take 1 tablet by mouth 3 (Three) Times a Day As Needed for Dizziness. 30 tablet 0   • Naproxen Sodium 220 MG capsule Take 1 tablet by mouth As Needed.     • Triamcinolone Acetonide (NASACORT) 55 MCG/ACT nasal inhaler 2 sprays into the nostril(s) as directed by provider Daily.     • cyanocobalamin (VITAMIN B-12) 500 MCG tablet Take 500 mcg by mouth 3 (Three) Times a Week.     • Loratadine 10 MG capsule Take 1 tablet by mouth Daily As Needed.     • nitrofurantoin, macrocrystal-monohydrate, (Macrobid) 100 MG capsule Take 1 capsule by mouth 2 (Two) Times a Day. 14 capsule 0     No current facility-administered medications on file prior to visit.       The following portions of the patient's history were reviewed and updated as appropriate: allergies, current medications, past family history, past medical history, past social history, past surgical history and problem list.    Review of Systems   Constitutional: Positive for unexpected weight gain.   HENT: Negative for sinus pressure.    Respiratory: Positive for shortness of breath.    Cardiovascular: Negative for chest pain, palpitations " and leg swelling.   Gastrointestinal: Positive for diarrhea.   Genitourinary: Negative for dysuria.   Neurological: Positive for dizziness. Negative for headache.   Psychiatric/Behavioral: The patient is nervous/anxious.        Objective   Physical Exam  Vitals reviewed.   Constitutional:       General: She is not in acute distress.     Appearance: She is well-developed. She is obese. She is not ill-appearing or toxic-appearing.   HENT:      Head: Normocephalic and atraumatic.      Right Ear: Tympanic membrane, ear canal and external ear normal.      Left Ear: Tympanic membrane, ear canal and external ear normal.      Nose: Nose normal.   Eyes:      Extraocular Movements: Extraocular movements intact.      Conjunctiva/sclera: Conjunctivae normal.      Pupils: Pupils are equal, round, and reactive to light.   Cardiovascular:      Rate and Rhythm: Normal rate and regular rhythm.      Heart sounds: Normal heart sounds.   Pulmonary:      Effort: Pulmonary effort is normal.      Breath sounds: Normal breath sounds.   Abdominal:      General: Bowel sounds are normal. There is no distension.      Palpations: Abdomen is soft. There is no mass.      Tenderness: There is no abdominal tenderness.   Musculoskeletal:         General: Normal range of motion.      Cervical back: Neck supple.   Skin:     General: Skin is warm.   Neurological:      General: No focal deficit present.      Mental Status: She is alert and oriented to person, place, and time.   Psychiatric:         Mood and Affect: Mood normal.         Behavior: Behavior normal.       PHQ-9 Total Score:      Assessment/Plan   Diagnoses and all orders for this visit:    1. Vertigo (Primary)  Comments:  Patient states that while she was taking the meclizine she only got dizziness when she first got up in the morning or after she napped.  Orders:  -     CBC Auto Differential; Future  -     Comprehensive Metabolic Panel; Future  -     C-reactive Protein; Future  -      Sedimentation Rate; Future  -     Hemoglobin A1c; Future  -     Magnesium; Future  -     Vitamin B12; Future  -     TSH; Future  -     CT Head Without Contrast; Future    2. Elevated blood pressure reading without diagnosis of hypertension  Comments:  Blood pressure normal today    3. Mixed hyperlipidemia  Comments:  Patient trying to eat less saturated fats  Orders:  -     Lipid Panel; Future    4. Sweating increase  Comments:  Patient states that over this last week she has noticed that she was sweating with activity and that she had never done that before.    Other orders  -     POC Urinalysis Dipstick, Automated; Future        Patient Instructions     Health Maintenance Due   Topic Date Due   • ZOSTER VACCINE (1 of 2) Never done     12 hour fast for labs.  See Dr. Epperson this week.    May resume Antivert.  If chest pain or increased shortness of breath call 911 and get to ER.  Patient to call after sees Dr. Epperson and will consider CT head if persists.

## 2021-05-24 NOTE — PATIENT INSTRUCTIONS
Health Maintenance Due   Topic Date Due   • ZOSTER VACCINE (1 of 2) Never done     12 hour fast for labs.  See Dr. Epperson this week.    May resume Antivert.  If chest pain or increased shortness of breath call 911 and get to ER.  Patient to call after sees Dr. Epperson and will consider CT head if persists.

## 2021-05-24 NOTE — TELEPHONE ENCOUNTER
PATIENT STATES THAT SHE WASN'T ADVISED REGARDING CONTINUING OR STOPPING HER meclizine (ANTIVERT) 25 MG tablet    PLEASE ADVISE    PATIENT CAN BE REACHED AT  [phone]

## 2021-05-26 ENCOUNTER — OFFICE VISIT (OUTPATIENT)
Dept: CARDIOLOGY | Facility: CLINIC | Age: 56
End: 2021-05-26

## 2021-05-26 ENCOUNTER — CLINICAL SUPPORT (OUTPATIENT)
Dept: FAMILY MEDICINE CLINIC | Facility: CLINIC | Age: 56
End: 2021-05-26

## 2021-05-26 VITALS
WEIGHT: 224 LBS | RESPIRATION RATE: 18 BRPM | HEART RATE: 91 BPM | OXYGEN SATURATION: 97 % | SYSTOLIC BLOOD PRESSURE: 116 MMHG | BODY MASS INDEX: 45.16 KG/M2 | DIASTOLIC BLOOD PRESSURE: 78 MMHG | HEIGHT: 59 IN

## 2021-05-26 DIAGNOSIS — R42 VERTIGO: ICD-10-CM

## 2021-05-26 DIAGNOSIS — D75.839 THROMBOCYTOSIS: ICD-10-CM

## 2021-05-26 DIAGNOSIS — R42 VERTIGO: Primary | ICD-10-CM

## 2021-05-26 DIAGNOSIS — R31.9 HEMATURIA, UNSPECIFIED TYPE: Primary | ICD-10-CM

## 2021-05-26 DIAGNOSIS — E78.2 MIXED HYPERLIPIDEMIA: ICD-10-CM

## 2021-05-26 DIAGNOSIS — E55.9 VITAMIN D DEFICIENCY: ICD-10-CM

## 2021-05-26 DIAGNOSIS — E83.110 HEREDITARY HEMOCHROMATOSIS (HCC): ICD-10-CM

## 2021-05-26 DIAGNOSIS — N30.01 ACUTE CYSTITIS WITH HEMATURIA: Primary | ICD-10-CM

## 2021-05-26 DIAGNOSIS — R82.998 LEUKOCYTES IN URINE: ICD-10-CM

## 2021-05-26 DIAGNOSIS — R03.0 ELEVATED BLOOD PRESSURE READING WITHOUT DIAGNOSIS OF HYPERTENSION: ICD-10-CM

## 2021-05-26 LAB
BILIRUB BLD-MCNC: NEGATIVE MG/DL
CLARITY, POC: CLEAR
COLOR UR: YELLOW
GLUCOSE UR STRIP-MCNC: NEGATIVE MG/DL
KETONES UR QL: NEGATIVE
LEUKOCYTE EST, POC: ABNORMAL
NITRITE UR-MCNC: NEGATIVE MG/ML
PH UR: 5.5 [PH] (ref 5–8)
PROT UR STRIP-MCNC: NEGATIVE MG/DL
RBC # UR STRIP: ABNORMAL /UL
SP GR UR: 1.03 (ref 1–1.03)
UROBILINOGEN UR QL: NORMAL

## 2021-05-26 PROCEDURE — 81003 URINALYSIS AUTO W/O SCOPE: CPT | Performed by: PREVENTIVE MEDICINE

## 2021-05-26 PROCEDURE — 85652 RBC SED RATE AUTOMATED: CPT | Performed by: PREVENTIVE MEDICINE

## 2021-05-26 PROCEDURE — 87086 URINE CULTURE/COLONY COUNT: CPT | Performed by: NURSE PRACTITIONER

## 2021-05-26 PROCEDURE — 82607 VITAMIN B-12: CPT | Performed by: PREVENTIVE MEDICINE

## 2021-05-26 PROCEDURE — 36415 COLL VENOUS BLD VENIPUNCTURE: CPT | Performed by: PREVENTIVE MEDICINE

## 2021-05-26 PROCEDURE — 99213 OFFICE O/P EST LOW 20 MIN: CPT | Performed by: INTERNAL MEDICINE

## 2021-05-26 PROCEDURE — 85025 COMPLETE CBC W/AUTO DIFF WBC: CPT | Performed by: PREVENTIVE MEDICINE

## 2021-05-26 PROCEDURE — 86140 C-REACTIVE PROTEIN: CPT | Performed by: PREVENTIVE MEDICINE

## 2021-05-26 PROCEDURE — 80061 LIPID PANEL: CPT | Performed by: PREVENTIVE MEDICINE

## 2021-05-26 PROCEDURE — 83036 HEMOGLOBIN GLYCOSYLATED A1C: CPT | Performed by: PREVENTIVE MEDICINE

## 2021-05-26 PROCEDURE — 80053 COMPREHEN METABOLIC PANEL: CPT | Performed by: PREVENTIVE MEDICINE

## 2021-05-26 PROCEDURE — 83735 ASSAY OF MAGNESIUM: CPT | Performed by: PREVENTIVE MEDICINE

## 2021-05-26 PROCEDURE — 84443 ASSAY THYROID STIM HORMONE: CPT | Performed by: PREVENTIVE MEDICINE

## 2021-05-26 RX ORDER — SULFAMETHOXAZOLE AND TRIMETHOPRIM 800; 160 MG/1; MG/1
1 TABLET ORAL 2 TIMES DAILY
Qty: 20 TABLET | Refills: 0 | Status: SHIPPED | OUTPATIENT
Start: 2021-05-26 | End: 2021-06-05

## 2021-05-26 NOTE — PROGRESS NOTES
Venipuncture Blood Specimen Collection  Venipuncture performed in right arm by Sandy Le MA with good hemostasis. Patient tolerated the procedure well without complications.   05/26/21   PAM Bustos MD

## 2021-05-26 NOTE — PROGRESS NOTES
Cardiology Office Visit      Encounter Date:  05/26/2021    Patient ID:   Merissa Yusuf is a 55 y.o. female.    Reason For Followup:  Dizziness  Vertigo    Brief Clinical History:  Dear Dr. Duarte, Melissa Petty MD    I had the pleasure of seeing Merissa Yusuf today. As you are well aware, this is a 55 y.o. female with past medical history that is significant for history of no known coronary artery disease history of hyperlipidemia history of obesity resented with symptoms of dizziness and lightheadedness    Patient had a cardiac evaluation and middle of 2019 with an echocardiogram with normal LV systolic function and stress test with no ischemic burden      Interval History:  Patient denies any active symptoms of chest pain denies any orthopnea PND  Complaining symptoms of dizziness that sounds more like vertigo  Denies any loss of function test  Denies any heart failure symptoms  Denies any orthopnea PND  Extensive work-up done by primary care physician including labs reviewed and discussed with patient    Assessment & Plan    Impressions:  Dizziness  Vertigo  Hyperlipidemia      Recommendations:  Likely patient will benefit from statins for hyperlipidemia  Patient symptoms most likely vertigo in etiology at this point  Unless patient symptoms does not improve or if there is any new or worsening symptoms will consider further cardiac evaluation and work-up  Prior work-up and labs reviewed and discussed with the patient  Patient clinically feels better  Advised patient to consider a vascular screening study and also coronary artery calcium score for further stratification  Patient had a repeat labs including lipid levels active lipid levels are still high likely will benefit from a statin  Continued aggressive risk factor modification  Follow up in office in 3 months  Objective:    Vitals:  Vitals:    05/26/21 1349   BP: 116/78   BP Location: Left arm   Pulse: 91   Resp: 18   SpO2: 97%   Weight: 102 kg (224 lb)  "  Height: 149.9 cm (59\")       Physical Exam:    General: Alert, cooperative, no distress, appears stated age  Head:  Normocephalic, atraumatic, mucous membranes moist  Eyes:  Conjunctiva/corneas clear, EOM's intact     Neck:  Supple,  no adenopathy;      Lungs: Clear to auscultation bilaterally, no wheezes rhonchi rales are noted  Chest wall: No tenderness  Heart::  Regular rate and rhythm, S1 and S2 normal, no murmur, rub or gallop  Abdomen: Soft, non-tender, nondistended bowel sounds active  Extremities: No cyanosis, clubbing, or edema  Pulses: 2+ and symmetric all extremities  Skin:  No rashes or lesions  Neuro/psych: A&O x3. CN II through XII are grossly intact with appropriate affect      Allergies:  Allergies   Allergen Reactions   • Amoxicillin Swelling   • Erythromycin GI Intolerance   • Escitalopram Anxiety   • Prozac  [Fluoxetine Hcl] Rash   • Penicillin G Rash       Medication Review:     Current Outpatient Medications:   •  acetaminophen (TYLENOL) 500 MG tablet, Take 500 mg by mouth Every 4 (Four) Hours As Needed., Disp: , Rfl:   •  busPIRone (BUSPAR) 15 MG tablet, TAKE 1 TABLET BY MOUTH THREE TIMES DAILY FOR ANXIETY, Disp: 90 tablet, Rfl: 2  •  Calcium Carbonate Antacid (TUMS PO), Take 2,000 mg by mouth At Night As Needed., Disp: , Rfl:   •  cetirizine (zyrTEC) 10 MG tablet, Take 10 mg by mouth Daily., Disp: , Rfl:   •  Cholecalciferol (VITAMIN D-3) 5000 units tablet, Daily., Disp: , Rfl:   •  desvenlafaxine (PRISTIQ) 100 MG 24 hr tablet, Take 1 tablet by mouth Every Morning., Disp: 90 tablet, Rfl: 1  •  diclofenac (VOLTAREN) 1 % gel gel, Apply 4 g topically to the appropriate area as directed 4 (Four) Times a Day As Needed., Disp: , Rfl:   •  Naproxen Sodium 220 MG capsule, Take 1 tablet by mouth As Needed., Disp: , Rfl:   •  sulfamethoxazole-trimethoprim (Bactrim DS) 800-160 MG per tablet, Take 1 tablet by mouth 2 (Two) Times a Day for 10 days., Disp: 20 tablet, Rfl: 0  •  meclizine (ANTIVERT) 25 MG " tablet, Take 1 tablet by mouth 3 (Three) Times a Day As Needed for Dizziness., Disp: 30 tablet, Rfl: 0  •  Triamcinolone Acetonide (NASACORT) 55 MCG/ACT nasal inhaler, 2 sprays into the nostril(s) as directed by provider Daily., Disp: , Rfl:     Family History:  Family History   Problem Relation Age of Onset   • Coronary artery disease Other    • Diabetes Other    • Dementia Other    • Diabetes Mother    • Cancer Mother    • Heart disease Father    • Liver disease Father    • Hypertension Father    • Cancer Brother    • Breast cancer Neg Hx    • Ovarian cancer Neg Hx        Past Medical History:  Past Medical History:   Diagnosis Date   • Anesthesia complication     drops B/P, and slow to arouse   • GERD (gastroesophageal reflux disease)    • Hereditary hemochromatosis (CMS/HCC)    • Hyperlipidemia    • Seasonal allergies        Past surgical History:  Past Surgical History:   Procedure Laterality Date   • BLADDER SUSPENSION     • BREAST BIOPSY Right    • FINGER SURGERY  2008    removed cysts from right index finger    • HERNIA REPAIR     • SUBTOTAL HYSTERECTOMY     • TUBAL ABDOMINAL LIGATION     • VENTRAL/INCISIONAL HERNIA REPAIR N/A 9/24/2020    Procedure: LAPAROSCOPIC VENTRAL HERNIA;  Surgeon: Reuben Molina MD;  Location[facility] MAIN OR;  Service: General;  Laterality: N/A;  0855 TAP block   • WISDOM TOOTH EXTRACTION         Social History:  Social History     Socioeconomic History   • Marital status:      Spouse name: Not on file   • Number of children: Not on file   • Years of education: Not on file   • Highest education level: Not on file   Tobacco Use   • Smoking status: Never Smoker   • Smokeless tobacco: Never Used   Vaping Use   • Vaping Use: Never used   Substance and Sexual Activity   • Alcohol use: Yes     Alcohol/week: 1.0 standard drinks     Types: 1 Glasses of wine per week     Comment: rarely   • Drug use: No   • Sexual activity: Yes     Partners: Male     Birth control/protection:  None       Review of Systems:  The following systems were reviewed as they relate to the cardiovascular system: Constitutional, Eyes, ENT, Cardiovascular, Respiratory, Gastrointestinal, Integumentary, Neurological, Psychiatric, Hematologic, Endocrine, Musculoskeletal, and Genitourinary. The pertinent cardiovascular findings are reported above with all other cardiovascular points within those systems being negative.    Diagnostic Study Review:     Current Electrocardiogram:  Procedures      NOTE: The following portions of the patient's history were reviewed and updated this visit as appropriate: allergies, current medications, past family history, past medical history, past social history, past surgical history and problem list.

## 2021-05-27 LAB
ALBUMIN SERPL-MCNC: 4.3 G/DL (ref 3.5–5.2)
ALBUMIN/GLOB SERPL: 1.4 G/DL
ALP SERPL-CCNC: 73 U/L (ref 39–117)
ALT SERPL W P-5'-P-CCNC: 14 U/L (ref 1–33)
ANION GAP SERPL CALCULATED.3IONS-SCNC: 11.6 MMOL/L (ref 5–15)
AST SERPL-CCNC: 17 U/L (ref 1–32)
BASOPHILS # BLD AUTO: 0.05 10*3/MM3 (ref 0–0.2)
BASOPHILS NFR BLD AUTO: 0.8 % (ref 0–1.5)
BILIRUB SERPL-MCNC: 0.4 MG/DL (ref 0–1.2)
BUN SERPL-MCNC: 12 MG/DL (ref 6–20)
BUN/CREAT SERPL: 19.7 (ref 7–25)
CALCIUM SPEC-SCNC: 9.5 MG/DL (ref 8.6–10.5)
CHLORIDE SERPL-SCNC: 102 MMOL/L (ref 98–107)
CHOLEST SERPL-MCNC: 244 MG/DL (ref 0–200)
CO2 SERPL-SCNC: 22.4 MMOL/L (ref 22–29)
CREAT SERPL-MCNC: 0.61 MG/DL (ref 0.57–1)
CRP SERPL-MCNC: 0.81 MG/DL (ref 0–0.5)
DEPRECATED RDW RBC AUTO: 47.3 FL (ref 37–54)
EOSINOPHIL # BLD AUTO: 0.18 10*3/MM3 (ref 0–0.4)
EOSINOPHIL NFR BLD AUTO: 2.9 % (ref 0.3–6.2)
ERYTHROCYTE [DISTWIDTH] IN BLOOD BY AUTOMATED COUNT: 12.3 % (ref 12.3–15.4)
ERYTHROCYTE [SEDIMENTATION RATE] IN BLOOD: 36 MM/HR (ref 0–30)
GFR SERPL CREATININE-BSD FRML MDRD: 101 ML/MIN/1.73
GLOBULIN UR ELPH-MCNC: 3 GM/DL
GLUCOSE SERPL-MCNC: 98 MG/DL (ref 65–99)
HBA1C MFR BLD: 5.5 % (ref 3.5–5.6)
HCT VFR BLD AUTO: 41 % (ref 34–46.6)
HDLC SERPL-MCNC: 54 MG/DL (ref 40–60)
HGB BLD-MCNC: 13.9 G/DL (ref 12–15.9)
IMM GRANULOCYTES # BLD AUTO: 0.05 10*3/MM3 (ref 0–0.05)
IMM GRANULOCYTES NFR BLD AUTO: 0.8 % (ref 0–0.5)
LDLC SERPL CALC-MCNC: 165 MG/DL (ref 0–100)
LDLC/HDLC SERPL: 3 {RATIO}
LYMPHOCYTES # BLD AUTO: 2.25 10*3/MM3 (ref 0.7–3.1)
LYMPHOCYTES NFR BLD AUTO: 36 % (ref 19.6–45.3)
MAGNESIUM SERPL-MCNC: 2.1 MG/DL (ref 1.6–2.6)
MCH RBC QN AUTO: 35.2 PG (ref 26.6–33)
MCHC RBC AUTO-ENTMCNC: 33.9 G/DL (ref 31.5–35.7)
MCV RBC AUTO: 103.8 FL (ref 79–97)
MONOCYTES # BLD AUTO: 0.55 10*3/MM3 (ref 0.1–0.9)
MONOCYTES NFR BLD AUTO: 8.8 % (ref 5–12)
NEUTROPHILS NFR BLD AUTO: 3.17 10*3/MM3 (ref 1.7–7)
NEUTROPHILS NFR BLD AUTO: 50.7 % (ref 42.7–76)
NRBC BLD AUTO-RTO: 0 /100 WBC (ref 0–0.2)
PLATELET # BLD AUTO: 441 10*3/MM3 (ref 140–450)
PMV BLD AUTO: 9.7 FL (ref 6–12)
POTASSIUM SERPL-SCNC: 3.8 MMOL/L (ref 3.5–5.2)
PROT SERPL-MCNC: 7.3 G/DL (ref 6–8.5)
RBC # BLD AUTO: 3.95 10*6/MM3 (ref 3.77–5.28)
SODIUM SERPL-SCNC: 136 MMOL/L (ref 136–145)
TRIGL SERPL-MCNC: 139 MG/DL (ref 0–150)
TSH SERPL DL<=0.05 MIU/L-ACNC: 2.08 UIU/ML (ref 0.27–4.2)
VIT B12 BLD-MCNC: 922 PG/ML (ref 211–946)
VLDLC SERPL-MCNC: 25 MG/DL (ref 5–40)
WBC # BLD AUTO: 6.25 10*3/MM3 (ref 3.4–10.8)

## 2021-05-28 ENCOUNTER — TELEPHONE (OUTPATIENT)
Dept: FAMILY MEDICINE CLINIC | Facility: CLINIC | Age: 56
End: 2021-05-28

## 2021-05-28 LAB — BACTERIA SPEC AEROBE CULT: NORMAL

## 2021-05-29 DIAGNOSIS — N30.00 ACUTE CYSTITIS WITHOUT HEMATURIA: Primary | ICD-10-CM

## 2021-06-01 ENCOUNTER — TELEPHONE (OUTPATIENT)
Dept: ORTHOPEDIC SURGERY | Facility: CLINIC | Age: 56
End: 2021-06-01

## 2021-06-04 NOTE — TELEPHONE ENCOUNTER
PER [employer] REP:    Spoke to the PA Dept @ [facility] ([phone]): Insurance received the Pre-Certification request for the drug [id number] ([id number]) on 06/01/2021. The request is approved for 1 injection and is valid from 05/26/2021 till 11/21/2021. Approved case ID: [id number]. Requested the rep to fax the approval letter.  Rep: Katie GALVAN

## 2021-06-09 ENCOUNTER — CLINICAL SUPPORT (OUTPATIENT)
Dept: FAMILY MEDICINE CLINIC | Facility: CLINIC | Age: 56
End: 2021-06-09

## 2021-06-09 DIAGNOSIS — N30.00 ACUTE CYSTITIS WITHOUT HEMATURIA: ICD-10-CM

## 2021-06-09 PROCEDURE — 36415 COLL VENOUS BLD VENIPUNCTURE: CPT | Performed by: NURSE PRACTITIONER

## 2021-06-09 PROCEDURE — 82565 ASSAY OF CREATININE: CPT | Performed by: PREVENTIVE MEDICINE

## 2021-06-09 NOTE — PROGRESS NOTES
Venipuncture Blood Specimen Collection  Venipuncture performed in left arm by Sandy Le MA with good hemostasis. Patient tolerated the procedure well without complications.   06/09/21   PAM Bustos MD

## 2021-06-10 ENCOUNTER — HOSPITAL ENCOUNTER (OUTPATIENT)
Dept: CT IMAGING | Facility: HOSPITAL | Age: 56
Discharge: HOME | End: 2021-06-10
Admitting: PREVENTIVE MEDICINE

## 2021-06-10 ENCOUNTER — TELEPHONE (OUTPATIENT)
Dept: FAMILY MEDICINE CLINIC | Facility: CLINIC | Age: 56
End: 2021-06-10

## 2021-06-10 ENCOUNTER — CLINICAL SUPPORT (OUTPATIENT)
Dept: FAMILY MEDICINE CLINIC | Facility: CLINIC | Age: 56
End: 2021-06-10

## 2021-06-10 DIAGNOSIS — R42 VERTIGO: ICD-10-CM

## 2021-06-10 DIAGNOSIS — N30.00 ACUTE CYSTITIS WITHOUT HEMATURIA: ICD-10-CM

## 2021-06-10 LAB
CREAT SERPL-MCNC: 0.67 MG/DL (ref 0.57–1)
GFR SERPL CREATININE-BSD FRML MDRD: 91 ML/MIN/1.73

## 2021-06-10 PROCEDURE — 70450 CT HEAD/BRAIN W/O DYE: CPT

## 2021-06-10 PROCEDURE — 87086 URINE CULTURE/COLONY COUNT: CPT | Performed by: PREVENTIVE MEDICINE

## 2021-06-10 NOTE — TELEPHONE ENCOUNTER
HUB TO READ  ----- Message from Melissa Duarte MD sent at 6/10/2021  1:25 PM EDT -----  CT head looks good and without abnormalities-call or revist if symptoms persist.     Kidney functiuon looks good

## 2021-06-10 NOTE — TELEPHONE ENCOUNTER
HUB TO READ  ----- Message from Melissa Duarte MD sent at 6/10/2021  6:27 AM EDT -----  Kidney functiuon looks good

## 2021-06-11 LAB — BACTERIA SPEC AEROBE CULT: NO GROWTH

## 2021-06-11 NOTE — TELEPHONE ENCOUNTER
Patient states that here knees are not bothering her that bad right now so she would like to wait. She states she will call back if she decides to do it.

## 2021-06-13 ENCOUNTER — TELEPHONE (OUTPATIENT)
Dept: FAMILY MEDICINE CLINIC | Facility: CLINIC | Age: 56
End: 2021-06-13

## 2021-06-13 NOTE — TELEPHONE ENCOUNTER
HUB TO READ  ----- Message from Melissa Duarte MD sent at 6/13/2021 11:48 AM EDT -----  Urine is not infected

## 2021-06-14 DIAGNOSIS — R42 DIZZINESS: Primary | ICD-10-CM

## 2021-06-15 ENCOUNTER — TELEPHONE (OUTPATIENT)
Dept: FAMILY MEDICINE CLINIC | Facility: CLINIC | Age: 56
End: 2021-06-15

## 2021-06-15 NOTE — TELEPHONE ENCOUNTER
Caller: Merissa Yusuf    Relationship: Self    Best call back number: [phone]     What specialty or service is being requested: ENT       Any additional details: PATIENT STATED ALLERGIST DOES NOT DO ANYTHING ASSOCIATED WITH ENT, AND ACTUALLY NEEDS THE REFERRAL TO THE ENT. PATIENT WOULD LIKE A CALL BACK WITH THAT INFORMATION

## 2021-06-15 NOTE — TELEPHONE ENCOUNTER
SPOKE TO PATIENT. SHE SAID SHE ALREADY SEE DR. JOHNSTON IN [place]. SHE WILL CALL HIM FOR AN APPT.

## 2021-07-17 DIAGNOSIS — F41.8 MIXED ANXIETY DEPRESSIVE DISORDER: ICD-10-CM

## 2021-07-17 RX ORDER — DESVENLAFAXINE 100 MG/1
100 TABLET, EXTENDED RELEASE ORAL EVERY MORNING
Qty: 90 TABLET | Refills: 1 | Status: SHIPPED | OUTPATIENT
Start: 2021-07-17 | End: 2021-08-18 | Stop reason: ALTCHOICE

## 2021-07-28 ENCOUNTER — OFFICE VISIT (OUTPATIENT)
Dept: FAMILY MEDICINE CLINIC | Facility: CLINIC | Age: 56
End: 2021-07-28

## 2021-07-28 VITALS
DIASTOLIC BLOOD PRESSURE: 76 MMHG | SYSTOLIC BLOOD PRESSURE: 123 MMHG | TEMPERATURE: 97.1 F | BODY MASS INDEX: 45 KG/M2 | WEIGHT: 223.2 LBS | HEART RATE: 83 BPM | HEIGHT: 59 IN | OXYGEN SATURATION: 97 %

## 2021-07-28 DIAGNOSIS — N39.0 URINARY TRACT INFECTION WITH HEMATURIA, SITE UNSPECIFIED: Primary | ICD-10-CM

## 2021-07-28 DIAGNOSIS — R31.9 URINARY TRACT INFECTION WITH HEMATURIA, SITE UNSPECIFIED: Primary | ICD-10-CM

## 2021-07-28 LAB
BILIRUB BLD-MCNC: NEGATIVE MG/DL
CLARITY, POC: CLEAR
COLOR UR: ABNORMAL
GLUCOSE UR STRIP-MCNC: NEGATIVE MG/DL
KETONES UR QL: NEGATIVE
LEUKOCYTE EST, POC: ABNORMAL
NITRITE UR-MCNC: NEGATIVE MG/ML
PH UR: 5.5 [PH] (ref 5–8)
PROT UR STRIP-MCNC: NEGATIVE MG/DL
RBC # UR STRIP: ABNORMAL /UL
SP GR UR: 1.02 (ref 1–1.03)
UROBILINOGEN UR QL: NORMAL

## 2021-07-28 PROCEDURE — 99213 OFFICE O/P EST LOW 20 MIN: CPT | Performed by: NURSE PRACTITIONER

## 2021-07-28 PROCEDURE — 81003 URINALYSIS AUTO W/O SCOPE: CPT | Performed by: NURSE PRACTITIONER

## 2021-07-28 PROCEDURE — 87186 SC STD MICRODIL/AGAR DIL: CPT | Performed by: NURSE PRACTITIONER

## 2021-07-28 PROCEDURE — 87086 URINE CULTURE/COLONY COUNT: CPT | Performed by: NURSE PRACTITIONER

## 2021-07-28 RX ORDER — NITROFURANTOIN 25; 75 MG/1; MG/1
100 CAPSULE ORAL 2 TIMES DAILY
Qty: 10 CAPSULE | Refills: 0 | Status: SHIPPED | OUTPATIENT
Start: 2021-07-28 | End: 2021-07-29

## 2021-07-28 NOTE — PROGRESS NOTES
"Subjective   Merissa Yusuf is a 56 y.o. female presents for   Chief Complaint   Patient presents with   • Urinary Retention     x's 3-4 days   • Difficulty Urinating       Health Maintenance Due   Topic Date Due   • ZOSTER VACCINE (1 of 2) Never done   • ANNUAL PHYSICAL  06/19/2021       History of Present Illness   Pt present with complaints of urinary frequency and sensation of fullness in bladder after urination.  She denies fever.  She states she has been drinking more soda recently and less water.  She states urine has been cloudy.      Vitals:    07/28/21 1336 07/28/21 1338   BP: 111/76 123/76   BP Location: Right arm Left arm   Patient Position: Sitting Sitting   Cuff Size: Adult Adult   Pulse: 83    Temp: 97.1 °F (36.2 °C)    TempSrc: Oral    SpO2: 97%    Weight: 101 kg (223 lb 3.2 oz)    Height: 149.9 cm (59.02\")      Body mass index is 45.06 kg/m².    Current Outpatient Medications on File Prior to Visit   Medication Sig Dispense Refill   • acetaminophen (TYLENOL) 500 MG tablet Take 500 mg by mouth Every 4 (Four) Hours As Needed.     • busPIRone (BUSPAR) 15 MG tablet TAKE 1 TABLET BY MOUTH THREE TIMES DAILY FOR ANXIETY 90 tablet 2   • Calcium Carbonate Antacid (TUMS PO) Take 2,000 mg by mouth At Night As Needed.     • cetirizine (zyrTEC) 10 MG tablet Take 10 mg by mouth Daily.     • Cholecalciferol (VITAMIN D-3) 5000 units tablet Daily.     • desvenlafaxine (PRISTIQ) 100 MG 24 hr tablet TAKE 1 TABLET BY MOUTH EVERY MORNING 90 tablet 1   • diclofenac (VOLTAREN) 1 % gel gel Apply 4 g topically to the appropriate area as directed 4 (Four) Times a Day As Needed.     • Naproxen Sodium 220 MG capsule Take 1 tablet by mouth As Needed.     • [DISCONTINUED] Triamcinolone Acetonide (NASACORT) 55 MCG/ACT nasal inhaler 2 sprays into the nostril(s) as directed by provider Daily.     • [DISCONTINUED] meclizine (ANTIVERT) 25 MG tablet Take 1 tablet by mouth 3 (Three) Times a Day As Needed for Dizziness. 30 tablet 0 "     No current facility-administered medications on file prior to visit.       The following portions of the patient's history were reviewed and updated as appropriate: allergies, current medications, past family history, past medical history, past social history, past surgical history, and problem list.    Review of Systems   Constitutional: Negative for chills and fever.   HENT: Negative for sinus pressure and sore throat.    Eyes: Negative for blurred vision.   Respiratory: Negative for cough and shortness of breath.    Cardiovascular: Negative for chest pain.   Gastrointestinal: Negative for abdominal pain.   Endocrine: Negative.    Genitourinary: Positive for frequency and pelvic pressure.   Musculoskeletal: Negative for arthralgias and joint swelling.   Skin: Negative for color change.   Allergic/Immunologic: Negative.    Neurological: Negative for dizziness.   Hematological: Negative.    Psychiatric/Behavioral: Negative for behavioral problems.       Objective   Physical Exam  Vitals and nursing note reviewed.   Constitutional:       Appearance: Normal appearance. She is well-developed.   HENT:      Head: Normocephalic and atraumatic.      Right Ear: External ear normal.      Left Ear: External ear normal.      Nose: Nose normal.   Eyes:      Extraocular Movements: Extraocular movements intact.      Conjunctiva/sclera: Conjunctivae normal.      Pupils: Pupils are equal, round, and reactive to light.   Cardiovascular:      Rate and Rhythm: Normal rate and regular rhythm.      Heart sounds: Normal heart sounds.   Pulmonary:      Effort: Pulmonary effort is normal.      Breath sounds: Normal breath sounds.   Abdominal:      General: Bowel sounds are normal.      Palpations: Abdomen is soft.      Tenderness: There is no right CVA tenderness or left CVA tenderness.   Genitourinary:     Vagina: Normal.   Musculoskeletal:         General: Normal range of motion.      Cervical back: Normal range of motion and neck  supple.   Skin:     General: Skin is warm and dry.   Neurological:      General: No focal deficit present.      Mental Status: She is alert and oriented to person, place, and time.   Psychiatric:         Mood and Affect: Mood normal.         Behavior: Behavior normal.         Thought Content: Thought content normal.         Judgment: Judgment normal.       PHQ-9 Total Score:      Assessment/Plan   Diagnoses and all orders for this visit:    1. Urinary tract infection with hematuria, site unspecified (Primary)  Comments:  increase water intake and decrease caffeine intake, if she has another UTI, will refer to urology due to several infections this year.  Orders:  -     nitrofurantoin, macrocrystal-monohydrate, (Macrobid) 100 MG capsule; Take 1 capsule by mouth 2 (Two) Times a Day for 5 days.  Dispense: 10 capsule; Refill: 0  -     Urine Culture - Urine, Urine, Clean Catch; Future  -     POCT urinalysis dipstick, automated  -     Urine Culture - Urine, Urine, Clean Catch        Patient Instructions   Urinary Tract Infection, Adult  A urinary tract infection (UTI) is an infection of any part of the urinary tract. The urinary tract includes:  · The kidneys.  · The ureters.  · The bladder.  · The urethra.  These organs make, store, and get rid of pee (urine) in the body.  What are the causes?  This is caused by germs (bacteria) in your genital area. These germs grow and cause swelling (inflammation) of your urinary tract.  What increases the risk?  You are more likely to develop this condition if:  · You have a small, thin tube (catheter) to drain pee.  · You cannot control when you pee or poop (incontinence).  · You are female, and:  ? You use these methods to prevent pregnancy:  § A medicine that kills sperm (spermicide).  § A device that blocks sperm (diaphragm).  ? You have low levels of a female hormone (estrogen).  ? You are pregnant.  · You have genes that add to your risk.  · You are sexually active.  · You take  antibiotic medicines.  · You have trouble peeing because of:  ? A prostate that is bigger than normal, if you are male.  ? A blockage in the part of your body that drains pee from the bladder (urethra).  ? A kidney stone.  ? A nerve condition that affects your bladder (neurogenic bladder).  ? Not getting enough to drink.  ? Not peeing often enough.  · You have other conditions, such as:  ? Diabetes.  ? A weak disease-fighting system (immune system).  ? Sickle cell disease.  ? Gout.  ? Injury of the spine.  What are the signs or symptoms?  Symptoms of this condition include:  · Needing to pee right away (urgently).  · Peeing often.  · Peeing small amounts often.  · Pain or burning when peeing.  · Blood in the pee.  · Pee that smells bad or not like normal.  · Trouble peeing.  · Pee that is cloudy.  · Fluid coming from the vagina, if you are female.  · Pain in the belly or lower back.  Other symptoms include:  · Throwing up (vomiting).  · No urge to eat.  · Feeling mixed up (confused).  · Being tired and grouchy (irritable).  · A fever.  · Watery poop (diarrhea).  How is this treated?  This condition may be treated with:  · Antibiotic medicine.  · Other medicines.  · Drinking enough water.  Follow these instructions at home:    Medicines  · Take over-the-counter and prescription medicines only as told by your doctor.  · If you were prescribed an antibiotic medicine, take it as told by your doctor. Do not stop taking it even if you start to feel better.  General instructions  · Make sure you:  ? Pee until your bladder is empty.  ? Do not hold pee for a long time.  ? Empty your bladder after sex.  ? Wipe from front to back after pooping if you are a female. Use each tissue one time when you wipe.  · Drink enough fluid to keep your pee pale yellow.  · Keep all follow-up visits as told by your doctor. This is important.  Contact a doctor if:  · You do not get better after 1-2 days.  · Your symptoms go away and then come  back.  Get help right away if:  · You have very bad back pain.  · You have very bad pain in your lower belly.  · You have a fever.  · You are sick to your stomach (nauseous).  · You are throwing up.  Summary  · A urinary tract infection (UTI) is an infection of any part of the urinary tract.  · This condition is caused by germs in your genital area.  · There are many risk factors for a UTI. These include having a small, thin tube to drain pee and not being able to control when you pee or poop.  · Treatment includes antibiotic medicines for germs.  · Drink enough fluid to keep your pee pale yellow.  This information is not intended to replace advice given to you by your health care provider. Make sure you discuss any questions you have with your health care provider.  Document Revised: 12/05/2019 Document Reviewed: 06/27/2019  [employer] Patient Education © 2021 [employer]

## 2021-07-28 NOTE — PATIENT INSTRUCTIONS

## 2021-07-29 ENCOUNTER — TELEPHONE (OUTPATIENT)
Dept: FAMILY MEDICINE CLINIC | Facility: CLINIC | Age: 56
End: 2021-07-29

## 2021-07-29 DIAGNOSIS — R31.9 URINARY TRACT INFECTION WITH HEMATURIA, SITE UNSPECIFIED: Primary | ICD-10-CM

## 2021-07-29 DIAGNOSIS — N39.0 URINARY TRACT INFECTION WITH HEMATURIA, SITE UNSPECIFIED: Primary | ICD-10-CM

## 2021-07-29 LAB — BACTERIA SPEC AEROBE CULT: ABNORMAL

## 2021-07-29 RX ORDER — SULFAMETHOXAZOLE AND TRIMETHOPRIM 800; 160 MG/1; MG/1
1 TABLET ORAL 2 TIMES DAILY
Qty: 20 TABLET | Refills: 0 | Status: SHIPPED | OUTPATIENT
Start: 2021-07-29 | End: 2021-08-08

## 2021-07-29 NOTE — TELEPHONE ENCOUNTER
HUB TO READ  ----- Message from CHARLINE Wilkins sent at 7/29/2021  1:48 PM EDT -----  Please notify pt I have changed her antibiotic based on culture results.  Stop macrobid, take bactrim as directed, increase water intake.

## 2021-08-03 ENCOUNTER — LAB (OUTPATIENT)
Dept: LAB | Facility: HOSPITAL | Age: 56
End: 2021-08-03

## 2021-08-03 ENCOUNTER — APPOINTMENT (OUTPATIENT)
Dept: LAB | Facility: HOSPITAL | Age: 56
End: 2021-08-03

## 2021-08-03 ENCOUNTER — TELEPHONE (OUTPATIENT)
Dept: ONCOLOGY | Facility: CLINIC | Age: 56
End: 2021-08-03

## 2021-08-03 DIAGNOSIS — E83.119 HEMOCHROMATOSIS, UNSPECIFIED HEMOCHROMATOSIS TYPE: ICD-10-CM

## 2021-08-03 DIAGNOSIS — D64.9 ANEMIA, UNSPECIFIED TYPE: ICD-10-CM

## 2021-08-03 DIAGNOSIS — E83.19 IRON OVERLOAD: ICD-10-CM

## 2021-08-03 LAB
FERRITIN SERPL-MCNC: 341.4 NG/ML (ref 13–150)
IRON 24H UR-MRATE: 220 MCG/DL (ref 37–145)
IRON SATN MFR SERPL: 76 % (ref 20–50)
TIBC SERPL-MCNC: 291 MCG/DL (ref 298–536)
TRANSFERRIN SERPL-MCNC: 195 MG/DL (ref 200–360)

## 2021-08-03 PROCEDURE — 36415 COLL VENOUS BLD VENIPUNCTURE: CPT

## 2021-08-03 PROCEDURE — 82728 ASSAY OF FERRITIN: CPT

## 2021-08-03 PROCEDURE — 83540 ASSAY OF IRON: CPT

## 2021-08-03 PROCEDURE — 84466 ASSAY OF TRANSFERRIN: CPT

## 2021-08-03 NOTE — TELEPHONE ENCOUNTER
Caller: Merissa Yusuf    Relationship to patient: Self    Best call back number: [phone]    Type of visit: LAB    Requested date: PLEASE CALL TO R/S     If rescheduling, when is the original appointment: 08/03    Additional notes: HUB UNABLE TO REACH WARM TRANSFER.

## 2021-08-04 ENCOUNTER — TELEPHONE (OUTPATIENT)
Dept: ONCOLOGY | Facility: CLINIC | Age: 56
End: 2021-08-04

## 2021-08-04 NOTE — TELEPHONE ENCOUNTER
Contacted patient to schedule follow up. She states that she has been experiencing fatigue and takes a nap almost every day. I let her know that we will more than likely get her set up for a phlebotomy.

## 2021-08-06 NOTE — PROGRESS NOTES
ONCOLOGY/HEMATOLOGY FOLLOW UP    PATIENT: Merissa Yusuf  YOB: 1965  MEDICAL RECORD NUMBER: [id number] OF SERVICE: 08/10/21      Chief complaint:  Hereditary hemochromatosis, homozygous mutation in H63D.  Iron overload  Macrocytosis    History of Present Illness:   · Ms. Yusuf has a history of COPD and also depression.  Laboratory work up was obtained in March 2019 which was abnormal.  Patient was sent to the [facility] and seen initially on 3/13/19.   · 3/5/19 - WBC 4.7, hemoglobin 14.1, MCV elevated to 103 and platelet count of 592,000.  Vitamin D  20.  Sed rate 35.  TSH 1.62.  Vitamin B12 476.  Creatinine 0.8.    · 3/13/19 - ZACKERY-2 mutation negative.  Hemochromatosis gene mutation, homozygous mutation in H63D.  · 3/13/19 - Vitamin B12 507.  Ferritin 178.  Creatinine 0.7.  Iron level 156.  Iron binding  capacity 257.  Percentage iron saturation 61.   · 1/8/2020 Ferritin 304 iron 177 iron saturation 56 TIBC 316  · 7/21/2020 iron 247 iron saturation 82 iron binding capacity 302 ferritin 271 hemoglobin 13.7 WBC 6.1 platelet count 372.  · 6/19/2020 CMP is normal including LFTs.  TSH 3.01  · 10/2020 ferritin 233.40 iron sat 43  · 8/2021 ferritin 341.40, iron sat 76    Patient has not had blood donation/phlebotomy.    SUBJECTIVE:     Patient comes in for follow-up. She complains of increasing fatigue.no other new symptoms, no increasing iron intake.    Review of Systems   Constitutional: Positive for fatigue.   HENT:   Negative for nosebleeds.    Respiratory: Negative for chest tightness and cough.    Cardiovascular: Negative for chest pain.   Gastrointestinal: Negative for abdominal pain and blood in stool.   Genitourinary: Negative for dysuria and frequency.    Musculoskeletal: Negative for back pain.   Skin: Negative for rash and wound.   Neurological: Negative for headaches and light-headedness.   Psychiatric/Behavioral: Negative for confusion. The patient is  nervous/anxious.           Past Medical History:   Diagnosis Date   • Anesthesia complication     drops B/P, and slow to arouse   • GERD (gastroesophageal reflux disease)    • Hereditary hemochromatosis (CMS/HCC)    • Hyperlipidemia    • Seasonal allergies        Past Surgical History:   Procedure Laterality Date   • BLADDER SUSPENSION     • BREAST BIOPSY Right    • FINGER SURGERY  2008    removed cysts from right index finger    • HERNIA REPAIR     • SUBTOTAL HYSTERECTOMY     • TUBAL ABDOMINAL LIGATION     • VENTRAL/INCISIONAL HERNIA REPAIR N/A 9/24/2020    Procedure: LAPAROSCOPIC VENTRAL HERNIA;  Surgeon: Reuben Molina MD;  Location: [facility] MAIN OR;  Service: General;  Laterality: N/A;  0855 TAP block   • WISDOM TOOTH EXTRACTION         Family History   Problem Relation Age of Onset   • Coronary artery disease Other    • Diabetes Other    • Dementia Other    • Diabetes Mother    • Cancer Mother    • Heart disease Father    • Liver disease Father    • Hypertension Father    • Cancer Brother    • Breast cancer Neg Hx    • Ovarian cancer Neg Hx        Social History     Socioeconomic History   • Marital status:      Spouse name: Not on file   • Number of children: Not on file   • Years of education: Not on file   • Highest education level: Not on file   Tobacco Use   • Smoking status: Never Smoker   • Smokeless tobacco: Never Used   Vaping Use   • Vaping Use: Never used   Substance and Sexual Activity   • Alcohol use: Yes     Alcohol/week: 1.0 standard drinks     Types: 1 Glasses of wine per week     Comment: rarely   • Drug use: No   • Sexual activity: Yes     Partners: Male     Birth control/protection: None       ALLERGIES:  Amoxicillin, Erythromycin, Escitalopram, Prozac  [fluoxetine hcl], and Penicillin g    MEDICATION:  Current Outpatient Medications   Medication Sig Dispense Refill   • acetaminophen (TYLENOL) 500 MG tablet Take 500 mg by mouth Every 4 (Four) Hours As Needed.     • busPIRone  "(BUSPAR) 15 MG tablet TAKE 1 TABLET BY MOUTH THREE TIMES DAILY FOR ANXIETY 90 tablet 2   • Calcium Carbonate Antacid (TUMS PO) Take 2,000 mg by mouth At Night As Needed.     • cetirizine (zyrTEC) 10 MG tablet Take 10 mg by mouth Daily.     • Cholecalciferol (VITAMIN D-3) 5000 units tablet Daily.     • COLLAGEN PO Take  by mouth.     • desvenlafaxine (PRISTIQ) 100 MG 24 hr tablet TAKE 1 TABLET BY MOUTH EVERY MORNING 90 tablet 1   • diclofenac (VOLTAREN) 1 % gel gel Apply 4 g topically to the appropriate area as directed 4 (Four) Times a Day As Needed.     • Naproxen Sodium 220 MG capsule Take 1 tablet by mouth As Needed.     • TURMERIC PO Take  by mouth.       No current facility-administered medications for this visit.     PHYSICAL EXAM:    Current Vitals:  /79   Pulse 81   Temp 97.8 °F (36.6 °C)   Resp 18   Ht 149.9 cm (59\")   Wt 100 kg (220 lb 12.8 oz)   LMP  (LMP Unknown)   Breastfeeding No   BMI 44.60 kg/m²     VITAL signs in the last 24 hours: [unfilled]       08/10/21  1050   Weight: 100 kg (220 lb 12.8 oz)       Physical Exam  Constitutional:       Appearance: Normal appearance.   HENT:      Head: Normocephalic and atraumatic.   Eyes:      Pupils: Pupils are equal, round, and reactive to light.   Cardiovascular:      Rate and Rhythm: Normal rate and regular rhythm.      Pulses: Normal pulses.      Heart sounds: No murmur heard.     Pulmonary:      Effort: Pulmonary effort is normal.      Breath sounds: Normal breath sounds.   Abdominal:      General: There is no distension.      Palpations: Abdomen is soft. There is no mass.      Tenderness: There is no abdominal tenderness.   Musculoskeletal:         General: Normal range of motion.      Cervical back: Normal range of motion.   Skin:     General: Skin is warm.   Neurological:      General: No focal deficit present.      Mental Status: She is alert.   Psychiatric:         Mood and Affect: Mood normal.      no change in " exam      LABORATORY/DATA:  Lab Results   Component Value Date    WBC 6.25 05/26/2021    WBC 6.64 04/30/2021    HGB 13.9 05/26/2021    HGB 13.0 04/30/2021    HCT 41.0 05/26/2021    HCT 37.7 04/30/2021    NEUTROABS 3.17 05/26/2021       Lab Results   Component Value Date    GLUCOSE 98 05/26/2021    BUN 12 05/26/2021    CREATININE 0.67 06/09/2021    EGFRIFNONA 91 06/09/2021    EGFRIFAFRI >60 02/26/2019    BCR 19.7 05/26/2021    K 3.8 05/26/2021    CO2 22.4 05/26/2021    CALCIUM 9.5 05/26/2021    ALBUMIN 4.30 05/26/2021    LABIL2 1.5 03/13/2019    AST 17 05/26/2021    ALT 14 05/26/2021       Lab Results   Component Value Date    IRON 220 (H) 08/03/2021    TIBC 291 (L) 08/03/2021    FERRITIN 341.40 (H) 08/03/2021            Assessment & Plan    Patient is a 55-year-old female with iron overload secondary to homozygous H63D mutation for hemochromatosis.    Hemochromatosis  Patient does not have the classic phenotype however has an alternate with homozygous H63D.  Disc has a propensity towards iron overload.  However less likely to cause significant liver damage.  Her iron saturation was 43 last checked.   Ferritin last visit was 233 which is also been improving over the last several checks. Recently however her iron sat increased to 70s and ferritin increased to 341.40  I would recommend phlebotomy to be done now and every 4 weeks for 3 times  Follow up with cbc after.

## 2021-08-10 ENCOUNTER — APPOINTMENT (OUTPATIENT)
Dept: LAB | Facility: HOSPITAL | Age: 56
End: 2021-08-10

## 2021-08-10 ENCOUNTER — HOSPITAL ENCOUNTER (OUTPATIENT)
Dept: ONCOLOGY | Facility: HOSPITAL | Age: 56
Setting detail: INFUSION SERIES
Discharge: HOME | End: 2021-08-10

## 2021-08-10 ENCOUNTER — OFFICE VISIT (OUTPATIENT)
Dept: ONCOLOGY | Facility: CLINIC | Age: 56
End: 2021-08-10

## 2021-08-10 ENCOUNTER — CLINICAL SUPPORT (OUTPATIENT)
Dept: FAMILY MEDICINE CLINIC | Facility: CLINIC | Age: 56
End: 2021-08-10

## 2021-08-10 VITALS
SYSTOLIC BLOOD PRESSURE: 116 MMHG | DIASTOLIC BLOOD PRESSURE: 79 MMHG | BODY MASS INDEX: 44.51 KG/M2 | WEIGHT: 220.8 LBS | HEIGHT: 59 IN | TEMPERATURE: 97.8 F | HEART RATE: 81 BPM | RESPIRATION RATE: 18 BRPM

## 2021-08-10 VITALS — DIASTOLIC BLOOD PRESSURE: 76 MMHG | SYSTOLIC BLOOD PRESSURE: 110 MMHG | HEART RATE: 104 BPM

## 2021-08-10 DIAGNOSIS — E83.119 HEMOCHROMATOSIS, UNSPECIFIED HEMOCHROMATOSIS TYPE: Primary | ICD-10-CM

## 2021-08-10 DIAGNOSIS — E83.119 HEMOCHROMATOSIS, UNSPECIFIED HEMOCHROMATOSIS TYPE: ICD-10-CM

## 2021-08-10 DIAGNOSIS — R71.8 ELEVATED HEMATOCRIT: ICD-10-CM

## 2021-08-10 DIAGNOSIS — N39.0 URINARY TRACT INFECTION WITH HEMATURIA, SITE UNSPECIFIED: Primary | ICD-10-CM

## 2021-08-10 DIAGNOSIS — E83.19 IRON OVERLOAD: Primary | ICD-10-CM

## 2021-08-10 DIAGNOSIS — R71.8 ELEVATED HEMATOCRIT: Primary | ICD-10-CM

## 2021-08-10 DIAGNOSIS — R31.9 URINARY TRACT INFECTION WITH HEMATURIA, SITE UNSPECIFIED: Primary | ICD-10-CM

## 2021-08-10 LAB
BASOPHILS # BLD AUTO: 0.06 10*3/MM3 (ref 0–0.2)
BASOPHILS NFR BLD AUTO: 0.9 % (ref 0–1.5)
DEPRECATED RDW RBC AUTO: 44.9 FL (ref 37–54)
EOSINOPHIL # BLD AUTO: 0.04 10*3/MM3 (ref 0–0.4)
EOSINOPHIL NFR BLD AUTO: 0.6 % (ref 0.3–6.2)
ERYTHROCYTE [DISTWIDTH] IN BLOOD BY AUTOMATED COUNT: 12.1 % (ref 12.3–15.4)
HCT VFR BLD AUTO: 39.8 % (ref 34–46.6)
HGB BLD-MCNC: 13.6 G/DL (ref 12–15.9)
LYMPHOCYTES # BLD AUTO: 2.86 10*3/MM3 (ref 0.7–3.1)
LYMPHOCYTES NFR BLD AUTO: 44.8 % (ref 19.6–45.3)
MCH RBC QN AUTO: 35.5 PG (ref 26.6–33)
MCHC RBC AUTO-ENTMCNC: 34.2 G/DL (ref 31.5–35.7)
MCV RBC AUTO: 103.9 FL (ref 79–97)
MONOCYTES # BLD AUTO: 0.74 10*3/MM3 (ref 0.1–0.9)
MONOCYTES NFR BLD AUTO: 11.6 % (ref 5–12)
NEUTROPHILS NFR BLD AUTO: 2.68 10*3/MM3 (ref 1.7–7)
NEUTROPHILS NFR BLD AUTO: 42.1 % (ref 42.7–76)
PLATELET # BLD AUTO: 449 10*3/MM3 (ref 140–450)
PMV BLD AUTO: 9.2 FL (ref 6–12)
RBC # BLD AUTO: 3.83 10*6/MM3 (ref 3.77–5.28)
WBC # BLD AUTO: 6.38 10*3/MM3 (ref 3.4–10.8)

## 2021-08-10 PROCEDURE — 87086 URINE CULTURE/COLONY COUNT: CPT | Performed by: NURSE PRACTITIONER

## 2021-08-10 PROCEDURE — 99214 OFFICE O/P EST MOD 30 MIN: CPT | Performed by: INTERNAL MEDICINE

## 2021-08-10 PROCEDURE — 85025 COMPLETE CBC W/AUTO DIFF WBC: CPT | Performed by: INTERNAL MEDICINE

## 2021-08-10 PROCEDURE — 99195 PHLEBOTOMY: CPT

## 2021-08-11 ENCOUNTER — TELEPHONE (OUTPATIENT)
Dept: FAMILY MEDICINE CLINIC | Facility: CLINIC | Age: 56
End: 2021-08-11

## 2021-08-11 LAB — BACTERIA SPEC AEROBE CULT: NO GROWTH

## 2021-08-11 NOTE — TELEPHONE ENCOUNTER
----- Message from CHARLINE Wilkins sent at 8/11/2021 12:51 PM EDT -----  No growth on culture.  Let me know if any further problems/symptoms

## 2021-08-11 NOTE — TELEPHONE ENCOUNTER
HUB TO READ    ----- Message from CHARLINE Wilkins sent at 8/11/2021 12:51 PM EDT -----    No growth on culture.  Let me know if any further problems/symptoms

## 2021-08-18 ENCOUNTER — OFFICE VISIT (OUTPATIENT)
Dept: PSYCHIATRY | Facility: CLINIC | Age: 56
End: 2021-08-18

## 2021-08-18 DIAGNOSIS — F33.1 MODERATE EPISODE OF RECURRENT MAJOR DEPRESSIVE DISORDER (HCC): Primary | Chronic | ICD-10-CM

## 2021-08-18 DIAGNOSIS — F41.1 GAD (GENERALIZED ANXIETY DISORDER): Chronic | ICD-10-CM

## 2021-08-18 PROCEDURE — 99214 OFFICE O/P EST MOD 30 MIN: CPT | Performed by: PHYSICIAN ASSISTANT

## 2021-08-18 RX ORDER — VORTIOXETINE 5 MG/1
5 TABLET, FILM COATED ORAL
Qty: 28 TABLET | Refills: 0
Start: 2021-08-18 | End: 2021-08-29 | Stop reason: SDUPTHER

## 2021-08-18 NOTE — PROGRESS NOTES
"Subjective   Merissa Yusuf is a 56 y.o.white female who presents today for follow up in the office x 15 mintues    Chief Complaint:  Anxiety and depression    History of Present Illness:   Patient wants to switch back to Trintellix, her daughter said she was happier on it, will buy cream to apply to legs if restless legs develop  Went camping for 3 wks and just wanted to stay in the camper   \"My  is driving me crazy\"..he has TBI and can push her buttons  Mom has Alzheimer's demenita, they had to put her in [place] in March, now able to visit her for 2 hrs per day, moved her step-dad in with her and that has decreased her anxiety.  Patient lives the closest to Mom, has 6 siblings  Anxiety 4/10, has decreased her Buspar to one daily   Depression 6/10, low motivation, low energy  Denies SI/HI      The following portions of the patient's history were reviewed and updated as appropriate: allergies, current medications, past family history, past medical history, past social history, past surgical history and problem list.    PAST PSYCHIATRIC HISTORY  Axis I  Affective/Bipoloar Disorder, Anxiety/Panic Disorder  Axis II  None    PAST OUTPATIENT TREATMENT  Diagnosis treated:  Affective Disorder, Anxiety/Panic Disorder  Treatment Type:  Family Therapy, Medication Management  No hospitalizations  Prior Psychiatric Medications:  Lexapro increased anxiety  Prozac, hives  Trintellix helped but gave her restless legs  Buspar   Viibryd, could not take  Pristiq  Support Groups:  None  Sequelae Of Mental Disorder:  social isolation, family disruption, emotional distress      Interval History  Improved    Side Effects  Restless legs with Trintellix    Past psychiatric history was reviewed and compared to 2/22/21 visit and appropriate updates were made.    Past Medical History:  Past Medical History:   Diagnosis Date   • Anesthesia complication     drops B/P, and slow to arouse   • GERD (gastroesophageal reflux disease)    • " Hereditary hemochromatosis (CMS/HCC)    • Hyperlipidemia    • Seasonal allergies        Social History:  Social History     Socioeconomic History   • Marital status:      Spouse name: Not on file   • Number of children: Not on file   • Years of education: Not on file   • Highest education level: Not on file   Tobacco Use   • Smoking status: Never Smoker   • Smokeless tobacco: Never Used   Vaping Use   • Vaping Use: Never used   Substance and Sexual Activity   • Alcohol use: Yes     Alcohol/week: 1.0 standard drinks     Types: 1 Glasses of wine per week     Comment: rarely   • Drug use: No   • Sexual activity: Yes     Partners: Male     Birth control/protection: None       Family History:  Family History   Problem Relation Age of Onset   • Coronary artery disease Other    • Diabetes Other    • Dementia Other    • Diabetes Mother    • Cancer Mother    • Heart disease Father    • Liver disease Father    • Hypertension Father    • Cancer Brother    • Breast cancer Neg Hx    • Ovarian cancer Neg Hx        Past Surgical History:  Past Surgical History:   Procedure Laterality Date   • BLADDER SUSPENSION     • BREAST BIOPSY Right    • FINGER SURGERY  2008    removed cysts from right index finger    • HERNIA REPAIR     • SUBTOTAL HYSTERECTOMY     • TUBAL ABDOMINAL LIGATION     • VENTRAL/INCISIONAL HERNIA REPAIR N/A 9/24/2020    Procedure: LAPAROSCOPIC VENTRAL HERNIA;  Surgeon: Reuben Molina MD;  Location: [facility] MAIN OR;  Service: General;  Laterality: N/A;  0855 TAP block   • WISDOM TOOTH EXTRACTION         Problem List:  Patient Active Problem List   Diagnosis   • Elevated blood pressure reading without diagnosis of hypertension   • Hearing deficit   • Hyperlipidemia   • Knee pain   • Low back pain   • Memory loss   • Mixed anxiety depressive disorder   • Morbid (severe) obesity due to excess calories (CMS/HCC)   • Obstructive sleep apnea   • Thrombocytosis (CMS/HCC)   • Vitamin D deficiency   • Combined  forms of age-related cataract, bilateral   • Convergence insufficiency   • Corneal pannus of right eye   • Corneal pannus   • Meibomian gland dysfunction (MGD) of both eyes   • Bilateral presbyopia   • Presbyopia   • Vitreous floaters   • Hereditary hemochromatosis (CMS/HCC)   • GERD (gastroesophageal reflux disease)   • Seasonal allergies   • Class 3 severe obesity due to excess calories with serious comorbidity and body mass index (BMI) of 45.0 to 49.9 in adult (CMS/HCC)   • Other chest pain   • Vertigo   • Elevated hematocrit   • KIRSTIE (generalized anxiety disorder)   • Moderate episode of recurrent major depressive disorder (CMS/[facility])       Allergy:   Allergies   Allergen Reactions   • Amoxicillin Swelling   • Erythromycin GI Intolerance   • Escitalopram Anxiety   • Prozac  [Fluoxetine Hcl] Rash   • Penicillin G Rash        Discontinued Medications:  Medications Discontinued During This Encounter   Medication Reason   • desvenlafaxine (PRISTIQ) 100 MG 24 hr tablet Alternate therapy       Current Medications:   Current Outpatient Medications   Medication Sig Dispense Refill   • acetaminophen (TYLENOL) 500 MG tablet Take 500 mg by mouth Every 4 (Four) Hours As Needed.     • busPIRone (BUSPAR) 15 MG tablet TAKE 1 TABLET BY MOUTH THREE TIMES DAILY FOR ANXIETY 90 tablet 2   • Calcium Carbonate Antacid (TUMS PO) Take 2,000 mg by mouth At Night As Needed.     • cetirizine (zyrTEC) 10 MG tablet Take 10 mg by mouth Daily.     • Cholecalciferol (VITAMIN D-3) 5000 units tablet Daily.     • COLLAGEN PO Take  by mouth.     • diclofenac (VOLTAREN) 1 % gel gel Apply 4 g topically to the appropriate area as directed 4 (Four) Times a Day As Needed.     • Naproxen Sodium 220 MG capsule Take 1 tablet by mouth As Needed.     • TURMERIC PO Take  by mouth.     • Vortioxetine HBr (Trintellix) 5 MG tablet Take 5 mg by mouth Daily With Breakfast. 28 tablet 0     No current facility-administered medications for this visit.         Review of  Symptoms:    Psychiatric/Behavioral: Negative for agitation, behavioral problems, confusion, decreased concentration, dysphoric mood, hallucinations, self-injury, sleep disturbance and suicidal ideas. The patient not nervous/anxious and is not hyperactive.        Physical Exam:   not currently breastfeeding.    Mental Status Exam:   Hygiene:   good  Cooperation:  Cooperative  Eye Contact:  Good  Psychomotor Behavior:  Appropriate  Affect:  Appropriate  Mood: Normal  Hopelessness: Denies  Speech:  Normal  Thought Process:  Goal directed  Thought Content:  Normal  Suicidal:  None  Homicidal:  None  Hallucinations:  None  Delusion:  None  Memory:  Intact  Orientation:  Person, Place, Time and Situation  Reliability:  good  Insight:  Good  Judgement:  Good  Impulse Control:  Good  Physical/Medical Issues:  No      Mental status exam was reviewed and compared to 2/22/21 visit and appropriate updates were made.    PHQ-9 Depression Screening  Little interest or pleasure in doing things? 2   Feeling down, depressed, or hopeless? 2   Trouble falling or staying asleep, or sleeping too much? 1   Feeling tired or having little energy? 2   Poor appetite or overeating? 1   Feeling bad about yourself - or that you are a failure or have let yourself or your family down? 2   Trouble concentrating on things, such as reading the newspaper or watching television? 1   Moving or speaking so slowly that other people could have noticed? Or the opposite - being so fidgety or restless that you have been moving around a lot more than usual? 0   Thoughts that you would be better off dead, or of hurting yourself in some way? 0   PHQ-9 Total Score 11   If you checked off any problems, how difficult have these problems made it for you to do your work, take care of things at home, or get along with other people? Very difficult           Never smoker    I advised Merissa of the risks of tobacco use.     Lab Results:   Hospital Outpatient Visit on  08/10/2021   Component Date Value Ref Range Status   • WBC 08/10/2021 6.38  3.40 - 10.80 10*3/mm3 Final   • RBC 08/10/2021 3.83  3.77 - 5.28 10*6/mm3 Final   • Hemoglobin 08/10/2021 13.6  12.0 - 15.9 g/dL Final   • Hematocrit 08/10/2021 39.8  34.0 - 46.6 % Final   • MCV 08/10/2021 103.9* 79.0 - 97.0 fL Final   • MCH 08/10/2021 35.5* 26.6 - 33.0 pg Final   • MCHC 08/10/2021 34.2  31.5 - 35.7 g/dL Final   • RDW 08/10/2021 12.1* 12.3 - 15.4 % Final   • RDW-SD 08/10/2021 44.9  37.0 - 54.0 fl Final   • MPV 08/10/2021 9.2  6.0 - 12.0 fL Final   • Platelets 08/10/2021 449  140 - 450 10*3/mm3 Final   • Neutrophil % 08/10/2021 42.1* 42.7 - 76.0 % Final   • Lymphocyte % 08/10/2021 44.8  19.6 - 45.3 % Final   • Monocyte % 08/10/2021 11.6  5.0 - 12.0 % Final   • Eosinophil % 08/10/2021 0.6  0.3 - 6.2 % Final   • Basophil % 08/10/2021 0.9  0.0 - 1.5 % Final   • Neutrophils, Absolute 08/10/2021 2.68  1.70 - 7.00 10*3/mm3 Final   • Lymphocytes, Absolute 08/10/2021 2.86  0.70 - 3.10 10*3/mm3 Final   • Monocytes, Absolute 08/10/2021 0.74  0.10 - 0.90 10*3/mm3 Final   • Eosinophils, Absolute 08/10/2021 0.04  0.00 - 0.40 10*3/mm3 Final   • Basophils, Absolute 08/10/2021 0.06  0.00 - 0.20 10*3/mm3 Final   Clinical Support on 08/10/2021   Component Date Value Ref Range Status   • Urine Culture 08/10/2021 No growth   Final   Lab on 08/03/2021   Component Date Value Ref Range Status   • Ferritin 08/03/2021 341.40* 13.00 - 150.00 ng/mL Final   • Iron 08/03/2021 220* 37 - 145 mcg/dL Final   • Iron Saturation 08/03/2021 76* 20 - 50 % Final   • Transferrin 08/03/2021 195* 200 - 360 mg/dL Final   • TIBC 08/03/2021 291* 298 - 536 mcg/dL Final   Office Visit on 07/28/2021   Component Date Value Ref Range Status   • Color 07/28/2021 Dark Yellow  Yellow, Straw, Dark Yellow, Brie Final   • Clarity, UA 07/28/2021 Clear  Clear Final   • Specific Gravity  07/28/2021 1.020  1.005 - 1.030 Final   • pH, Urine 07/28/2021 5.5  5.0 - 8.0 Final   •  Leukocytes 07/28/2021 Small (1+)* Negative Final   • Nitrite, UA 07/28/2021 Negative  Negative Final   • Protein, POC 07/28/2021 Negative  Negative mg/dL Final   • Glucose, UA 07/28/2021 Negative  Negative, 1000 mg/dL (3+) mg/dL Final   • Ketones, UA 07/28/2021 Negative  Negative Final   • Urobilinogen, UA 07/28/2021 Normal  Normal Final   • Bilirubin 07/28/2021 Negative  Negative Final   • Blood, UA 07/28/2021 Trace* Negative Final   • Urine Culture 07/28/2021 >100,000 CFU/mL Escherichia coli*  Final   Clinical Support on 06/10/2021   Component Date Value Ref Range Status   • Urine Culture 06/10/2021 No growth   Final   Clinical Support on 06/09/2021   Component Date Value Ref Range Status   • Creatinine 06/09/2021 0.67  0.57 - 1.00 mg/dL Final   • eGFR Non African Amer 06/09/2021 91  >60 mL/min/1.73 Final   Clinical Support on 05/26/2021   Component Date Value Ref Range Status   • WBC 05/26/2021 6.25  3.40 - 10.80 10*3/mm3 Final   • RBC 05/26/2021 3.95  3.77 - 5.28 10*6/mm3 Final   • Hemoglobin 05/26/2021 13.9  12.0 - 15.9 g/dL Final   • Hematocrit 05/26/2021 41.0  34.0 - 46.6 % Final   • MCV 05/26/2021 103.8* 79.0 - 97.0 fL Final   • MCH 05/26/2021 35.2* 26.6 - 33.0 pg Final   • MCHC 05/26/2021 33.9  31.5 - 35.7 g/dL Final   • RDW 05/26/2021 12.3  12.3 - 15.4 % Final   • RDW-SD 05/26/2021 47.3  37.0 - 54.0 fl Final   • MPV 05/26/2021 9.7  6.0 - 12.0 fL Final   • Platelets 05/26/2021 441  140 - 450 10*3/mm3 Final   • Neutrophil % 05/26/2021 50.7  42.7 - 76.0 % Final   • Lymphocyte % 05/26/2021 36.0  19.6 - 45.3 % Final   • Monocyte % 05/26/2021 8.8  5.0 - 12.0 % Final   • Eosinophil % 05/26/2021 2.9  0.3 - 6.2 % Final   • Basophil % 05/26/2021 0.8  0.0 - 1.5 % Final   • Immature Grans % 05/26/2021 0.8* 0.0 - 0.5 % Final   • Neutrophils, Absolute 05/26/2021 3.17  1.70 - 7.00 10*3/mm3 Final   • Lymphocytes, Absolute 05/26/2021 2.25  0.70 - 3.10 10*3/mm3 Final   • Monocytes, Absolute 05/26/2021 0.55  0.10 - 0.90  10*3/mm3 Final   • Eosinophils, Absolute 05/26/2021 0.18  0.00 - 0.40 10*3/mm3 Final   • Basophils, Absolute 05/26/2021 0.05  0.00 - 0.20 10*3/mm3 Final   • Immature Grans, Absolute 05/26/2021 0.05  0.00 - 0.05 10*3/mm3 Final   • nRBC 05/26/2021 0.0  0.0 - 0.2 /100 WBC Final   • Glucose 05/26/2021 98  65 - 99 mg/dL Final   • BUN 05/26/2021 12  6 - 20 mg/dL Final   • Creatinine 05/26/2021 0.61  0.57 - 1.00 mg/dL Final   • Sodium 05/26/2021 136  136 - 145 mmol/L Final   • Potassium 05/26/2021 3.8  3.5 - 5.2 mmol/L Final   • Chloride 05/26/2021 102  98 - 107 mmol/L Final   • CO2 05/26/2021 22.4  22.0 - 29.0 mmol/L Final   • Calcium 05/26/2021 9.5  8.6 - 10.5 mg/dL Final   • Total Protein 05/26/2021 7.3  6.0 - 8.5 g/dL Final   • Albumin 05/26/2021 4.30  3.50 - 5.20 g/dL Final   • ALT (SGPT) 05/26/2021 14  1 - 33 U/L Final   • AST (SGOT) 05/26/2021 17  1 - 32 U/L Final   • Alkaline Phosphatase 05/26/2021 73  39 - 117 U/L Final   • Total Bilirubin 05/26/2021 0.4  0.0 - 1.2 mg/dL Final   • eGFR Non African Amer 05/26/2021 101  >60 mL/min/1.73 Final   • Globulin 05/26/2021 3.0  gm/dL Final   • A/G Ratio 05/26/2021 1.4  g/dL Final   • BUN/Creatinine Ratio 05/26/2021 19.7  7.0 - 25.0 Final   • Anion Gap 05/26/2021 11.6  5.0 - 15.0 mmol/L Final   • C-Reactive Protein 05/26/2021 0.81* 0.00 - 0.50 mg/dL Final   • Sed Rate 05/26/2021 36* 0 - 30 mm/hr Final   • Hemoglobin A1C 05/26/2021 5.5  3.5 - 5.6 % Final   • Magnesium 05/26/2021 2.1  1.6 - 2.6 mg/dL Final   • Total Cholesterol 05/26/2021 244* 0 - 200 mg/dL Final   • Triglycerides 05/26/2021 139  0 - 150 mg/dL Final   • HDL Cholesterol 05/26/2021 54  40 - 60 mg/dL Final   • LDL Cholesterol  05/26/2021 165* 0 - 100 mg/dL Final   • VLDL Cholesterol 05/26/2021 25  5 - 40 mg/dL Final   • LDL/HDL Ratio 05/26/2021 3.00   Final   • Vitamin B-12 05/26/2021 922  211 - 946 pg/mL Final   • TSH 05/26/2021 2.080  0.270 - 4.200 uIU/mL Final   • Urine Culture 05/26/2021 50,000 CFU/mL Normal  Urogenital Brittany   Final   • Color 05/26/2021 Yellow  Yellow, Straw, Dark Yellow, Brie Final   • Clarity, UA 05/26/2021 Clear  Clear Final   • Specific Gravity  05/26/2021 1.030  1.005 - 1.030 Final   • pH, Urine 05/26/2021 5.5  5.0 - 8.0 Final   • Leukocytes 05/26/2021 Large (3+)* Negative Final   • Nitrite, UA 05/26/2021 Negative  Negative Final   • Protein, POC 05/26/2021 Negative  Negative mg/dL Final   • Glucose, UA 05/26/2021 Negative  Negative, 1000 mg/dL (3+) mg/dL Final   • Ketones, UA 05/26/2021 Negative  Negative Final   • Urobilinogen, UA 05/26/2021 Normal  Normal Final   • Bilirubin 05/26/2021 Negative  Negative Final   • Blood, UA 05/26/2021 Small* Negative Final       Assessment/Plan   Problems Addressed this Visit        Mental Health    KIRSTIE (generalized anxiety disorder) (Chronic)    Relevant Medications    Vortioxetine HBr (Trintellix) 5 MG tablet    Moderate episode of recurrent major depressive disorder (CMS/HCC) - Primary (Chronic)    Relevant Medications    Vortioxetine HBr (Trintellix) 5 MG tablet      Diagnoses       Codes Comments    Moderate episode of recurrent major depressive disorder (CMS/HCC)    -  Primary ICD-10-CM: F33.1  ICD-9-CM: 296.32     KIRSTIE (generalized anxiety disorder)     ICD-10-CM: F41.1  ICD-9-CM: 300.02           Visit Diagnoses:    ICD-10-CM ICD-9-CM   1. Moderate episode of recurrent major depressive disorder (CMS/HCC)  F33.1 296.32   2. KIRSTIE (generalized anxiety disorder)  F41.1 300.02       TREATMENT PLAN/GOALS: Continue supportive psychotherapy efforts and medications as indicated. Treatment and medication options discussed during today's visit. Patient ackowledged and verbally consented to continue with current treatment plan and was educated on the importance of compliance with treatment and follow-up appointments.    MEDICATION ISSUES:  INSPECT reviewed as expected  Discussed medication options and treatment plan of prescribed medication as well as the risks,  benefits, and side effects including potential falls, possible impaired driving and metabolic adversities among others. Patient is agreeable to call the office with any worsening of symptoms or onset of side effects. Patient is agreeable to call 911 or go to the nearest ER should he/she begin having SI/HI. No medication side effects or related complaints today.     Patient reports that her mood has been good with less anxiety except for issues her  causes.  Advised her to [facility] and discuss his meds and mood with the doctor.  She wants to change the Pristiq back to Trintellix, so she will wean off Pristiq over this month and start Trintellix 5mg daily.  She will call in one month to let me know if she wants 5mg or 10mg for the second month Rx.   Continue Buspar 15mg TID prn anxiety, usually only takes it once daily       MEDS ORDERED DURING VISIT:  New Medications Ordered This Visit   Medications   • Vortioxetine HBr (Trintellix) 5 MG tablet     Sig: Take 5 mg by mouth Daily With Breakfast.     Dispense:  28 tablet     Refill:  0     Samples given       Return in about 4 months (around 12/18/2021).         This document has been electronically signed by Laisha Mendiola PA-C  August 18, 2021 16:45 EDT

## 2021-08-24 ENCOUNTER — OFFICE VISIT (OUTPATIENT)
Dept: CARDIOLOGY | Facility: CLINIC | Age: 56
End: 2021-08-24

## 2021-08-24 VITALS
BODY MASS INDEX: 45.24 KG/M2 | OXYGEN SATURATION: 97 % | WEIGHT: 224 LBS | HEART RATE: 75 BPM | DIASTOLIC BLOOD PRESSURE: 85 MMHG | SYSTOLIC BLOOD PRESSURE: 138 MMHG

## 2021-08-24 DIAGNOSIS — R42 VERTIGO: ICD-10-CM

## 2021-08-24 DIAGNOSIS — R07.89 OTHER CHEST PAIN: ICD-10-CM

## 2021-08-24 DIAGNOSIS — R03.0 ELEVATED BLOOD PRESSURE READING WITHOUT DIAGNOSIS OF HYPERTENSION: Primary | ICD-10-CM

## 2021-08-24 DIAGNOSIS — E78.2 MIXED HYPERLIPIDEMIA: ICD-10-CM

## 2021-08-24 DIAGNOSIS — E83.110 HEREDITARY HEMOCHROMATOSIS (HCC): ICD-10-CM

## 2021-08-24 PROCEDURE — 99214 OFFICE O/P EST MOD 30 MIN: CPT | Performed by: INTERNAL MEDICINE

## 2021-08-24 RX ORDER — EZETIMIBE 10 MG/1
10 TABLET ORAL DAILY
Qty: 90 TABLET | Refills: 3 | Status: SHIPPED | OUTPATIENT
Start: 2021-08-24 | End: 2021-12-02 | Stop reason: SDUPTHER

## 2021-08-24 RX ORDER — EZETIMIBE 10 MG/1
10 TABLET ORAL DAILY
Qty: 90 TABLET | Refills: 3 | Status: SHIPPED | OUTPATIENT
Start: 2021-08-24 | End: 2021-08-24 | Stop reason: SDUPTHER

## 2021-08-24 NOTE — PROGRESS NOTES
Cardiology Office Visit      Encounter Date:  08/24/2021    Patient ID:   Merissa Yusuf is a 56 y.o. female.    Reason For Followup:  Dizziness  Vertigo    Brief Clinical History:  Dear Dr. Duarte, Melissa Petty MD    I had the pleasure of seeing Merissa Yusuf today. As you are well aware, this is a 56 y.o. female with past medical history that is significant for history of no known coronary artery disease history of hyperlipidemia history of obesity resented with symptoms of dizziness and lightheadedness    Patient had a cardiac evaluation and middle of 2019 with an echocardiogram with normal LV systolic function and stress test with no ischemic burden      Interval History:  Patient denies any active symptoms of chest pain denies any orthopnea PND  Complaining symptoms of dizziness that sounds more like vertigo  Denies any loss of function test  Denies any heart failure symptoms  Denies any orthopnea PND      Assessment & Plan    Impressions:  Dizziness  Vertigo  Hyperlipidemia  hemochromatosis      Recommendations:  Likely patient will benefit from statins for hyperlipidemia  Patient is worried about side effects from statins and likes to not to take statins  Lipids reviewed and discussed with the patient  Start patient on Zetia 10 mg p.o. once a day  Prior work-up and labs reviewed and discussed with the patient  Patient clinically feels better  Advised patient to consider a vascular screening study and also coronary artery calcium score for further stratification    Continued aggressive risk factor modification  Follow up in office in 6 months    Objective:    Vitals:  Vitals:    08/24/21 1522   BP: 138/85   Pulse: 75   SpO2: 97%   Weight: 102 kg (224 lb)       Physical Exam:  General: Alert, cooperative, no distress, appears stated age  Head:  Normocephalic, atraumatic, mucous membranes moist  Eyes:  Conjunctiva/corneas clear, EOM's intact     Neck:  Supple,  no adenopathy;      Lungs: Clear to  auscultation bilaterally, no wheezes rhonchi rales are noted  Chest wall: No tenderness  Heart::  Regular rate and rhythm, S1 and S2 normal, no murmur, rub or gallop  Abdomen: Soft, non-tender, nondistended bowel sounds active  Extremities: No cyanosis, clubbing, or edema  Pulses: 2+ and symmetric all extremities  Skin:  No rashes or lesions  Neuro/psych: A&O x3. CN II through XII are grossly intact with appropriate affect      Allergies:  Allergies   Allergen Reactions   • Amoxicillin Swelling   • Erythromycin GI Intolerance   • Escitalopram Anxiety   • Prozac  [Fluoxetine Hcl] Rash   • Penicillin G Rash       Medication Review:     Current Outpatient Medications:   •  acetaminophen (TYLENOL) 500 MG tablet, Take 500 mg by mouth Every 4 (Four) Hours As Needed., Disp: , Rfl:   •  busPIRone (BUSPAR) 15 MG tablet, TAKE 1 TABLET BY MOUTH THREE TIMES DAILY FOR ANXIETY, Disp: 90 tablet, Rfl: 2  •  Calcium Carbonate Antacid (TUMS PO), Take 2,000 mg by mouth At Night As Needed., Disp: , Rfl:   •  cetirizine (zyrTEC) 10 MG tablet, Take 10 mg by mouth Daily., Disp: , Rfl:   •  Cholecalciferol (VITAMIN D-3) 5000 units tablet, Daily., Disp: , Rfl:   •  COLLAGEN PO, Take  by mouth., Disp: , Rfl:   •  diclofenac (VOLTAREN) 1 % gel gel, Apply 4 g topically to the appropriate area as directed 4 (Four) Times a Day As Needed., Disp: , Rfl:   •  Naproxen Sodium 220 MG capsule, Take 1 tablet by mouth As Needed., Disp: , Rfl:   •  TURMERIC PO, Take  by mouth., Disp: , Rfl:   •  Vortioxetine HBr (Trintellix) 5 MG tablet, Take 5 mg by mouth Daily With Breakfast., Disp: 28 tablet, Rfl: 0    Family History:  Family History   Problem Relation Age of Onset   • Coronary artery disease Other    • Diabetes Other    • Dementia Other    • Diabetes Mother    • Cancer Mother    • Heart disease Father    • Liver disease Father    • Hypertension Father    • Cancer Brother    • Breast cancer Neg Hx    • Ovarian cancer Neg Hx        Past Medical  History:  Past Medical History:   Diagnosis Date   • Anesthesia complication     drops B/P, and slow to arouse   • GERD (gastroesophageal reflux disease)    • Hereditary hemochromatosis (CMS/HCC)    • Hyperlipidemia    • Seasonal allergies        Past surgical History:  Past Surgical History:   Procedure Laterality Date   • BLADDER SUSPENSION     • BREAST BIOPSY Right    • FINGER SURGERY  2008    removed cysts from right index finger    • HERNIA REPAIR     • SUBTOTAL HYSTERECTOMY     • TUBAL ABDOMINAL LIGATION     • VENTRAL/INCISIONAL HERNIA REPAIR N/A 9/24/2020    Procedure: LAPAROSCOPIC VENTRAL HERNIA;  Surgeon: Reuben Molina MD;  Location[facility] MAIN OR;  Service: General;  Laterality: N/A;  0855 TAP block   • WISDOM TOOTH EXTRACTION         Social History:  Social History     Socioeconomic History   • Marital status:      Spouse name: Not on file   • Number of children: Not on file   • Years of education: Not on file   • Highest education level: Not on file   Tobacco Use   • Smoking status: Never Smoker   • Smokeless tobacco: Never Used   Vaping Use   • Vaping Use: Never used   Substance and Sexual Activity   • Alcohol use: Yes     Alcohol/week: 1.0 standard drinks     Types: 1 Glasses of wine per week     Comment: rarely   • Drug use: No   • Sexual activity: Yes     Partners: Male     Birth control/protection: None       Review of Systems:  The following systems were reviewed as they relate to the cardiovascular system: Constitutional, Eyes, ENT, Cardiovascular, Respiratory, Gastrointestinal, Integumentary, Neurological, Psychiatric, Hematologic, Endocrine, Musculoskeletal, and Genitourinary. The pertinent cardiovascular findings are reported above with all other cardiovascular points within those systems being negative.    Diagnostic Study Review:     Current Electrocardiogram:  Procedures      NOTE: The following portions of the patient's history were reviewed and updated this visit as  appropriate: allergies, current medications, past family history, past medical history, past social history, past surgical history and problem list.

## 2021-08-29 RX ORDER — VORTIOXETINE 5 MG/1
5 TABLET, FILM COATED ORAL
Qty: 30 TABLET | Refills: 5 | Status: SHIPPED | OUTPATIENT
Start: 2021-08-29 | End: 2021-11-30 | Stop reason: SDUPTHER

## 2021-09-01 ENCOUNTER — APPOINTMENT (OUTPATIENT)
Dept: CARDIOLOGY | Facility: HOSPITAL | Age: 56
End: 2021-09-01

## 2021-09-08 ENCOUNTER — APPOINTMENT (OUTPATIENT)
Dept: ONCOLOGY | Facility: HOSPITAL | Age: 56
End: 2021-09-08

## 2021-09-13 ENCOUNTER — APPOINTMENT (OUTPATIENT)
Dept: ONCOLOGY | Facility: HOSPITAL | Age: 56
End: 2021-09-13

## 2021-09-20 NOTE — PATIENT INSTRUCTIONS
Health Maintenance Due   Topic Date Due   • ZOSTER VACCINE (1 of 2) Never done   • ANNUAL PHYSICAL  06/19/2021

## 2021-09-21 ENCOUNTER — OFFICE VISIT (OUTPATIENT)
Dept: FAMILY MEDICINE CLINIC | Facility: CLINIC | Age: 56
End: 2021-09-21

## 2021-09-21 ENCOUNTER — TELEPHONE (OUTPATIENT)
Dept: ORTHOPEDIC SURGERY | Facility: CLINIC | Age: 56
End: 2021-09-21

## 2021-09-21 VITALS
BODY MASS INDEX: 44.96 KG/M2 | HEIGHT: 59 IN | SYSTOLIC BLOOD PRESSURE: 115 MMHG | DIASTOLIC BLOOD PRESSURE: 79 MMHG | TEMPERATURE: 97.3 F | WEIGHT: 223 LBS | HEART RATE: 78 BPM | OXYGEN SATURATION: 98 %

## 2021-09-21 DIAGNOSIS — K21.9 GASTROESOPHAGEAL REFLUX DISEASE, UNSPECIFIED WHETHER ESOPHAGITIS PRESENT: ICD-10-CM

## 2021-09-21 DIAGNOSIS — F41.8 MIXED ANXIETY DEPRESSIVE DISORDER: Chronic | ICD-10-CM

## 2021-09-21 DIAGNOSIS — E83.119 HEMOCHROMATOSIS, UNSPECIFIED HEMOCHROMATOSIS TYPE: ICD-10-CM

## 2021-09-21 DIAGNOSIS — R42 VERTIGO: ICD-10-CM

## 2021-09-21 DIAGNOSIS — Z00.01 ENCOUNTER FOR ANNUAL GENERAL MEDICAL EXAMINATION WITH ABNORMAL FINDINGS IN ADULT: Primary | ICD-10-CM

## 2021-09-21 DIAGNOSIS — E78.2 MIXED HYPERLIPIDEMIA: ICD-10-CM

## 2021-09-21 DIAGNOSIS — R07.89 OTHER CHEST PAIN: ICD-10-CM

## 2021-09-21 DIAGNOSIS — R71.8 ELEVATED HEMATOCRIT: ICD-10-CM

## 2021-09-21 DIAGNOSIS — R03.0 ELEVATED BLOOD PRESSURE READING WITHOUT DIAGNOSIS OF HYPERTENSION: ICD-10-CM

## 2021-09-21 DIAGNOSIS — R39.11 HESITANCY OF MICTURITION: ICD-10-CM

## 2021-09-21 DIAGNOSIS — F33.1 MODERATE EPISODE OF RECURRENT MAJOR DEPRESSIVE DISORDER (HCC): Chronic | ICD-10-CM

## 2021-09-21 DIAGNOSIS — E55.9 VITAMIN D DEFICIENCY: ICD-10-CM

## 2021-09-21 DIAGNOSIS — E66.01 CLASS 3 SEVERE OBESITY DUE TO EXCESS CALORIES WITH SERIOUS COMORBIDITY AND BODY MASS INDEX (BMI) OF 45.0 TO 49.9 IN ADULT (HCC): ICD-10-CM

## 2021-09-21 LAB
25(OH)D3 SERPL-MCNC: 49.5 NG/ML
ALBUMIN SERPL-MCNC: 4.4 G/DL (ref 3.5–5.2)
ALBUMIN/GLOB SERPL: 1.6 G/DL
ALP SERPL-CCNC: 73 U/L (ref 39–117)
ALT SERPL W P-5'-P-CCNC: 15 U/L (ref 1–33)
ANION GAP SERPL CALCULATED.3IONS-SCNC: 10.7 MMOL/L (ref 5–15)
AST SERPL-CCNC: 15 U/L (ref 1–32)
BASOPHILS # BLD AUTO: 0.04 10*3/MM3 (ref 0–0.2)
BASOPHILS NFR BLD AUTO: 0.7 % (ref 0–1.5)
BILIRUB BLD-MCNC: NEGATIVE MG/DL
BILIRUB SERPL-MCNC: 0.3 MG/DL (ref 0–1.2)
BILIRUB UR QL STRIP: NEGATIVE
BUN SERPL-MCNC: 14 MG/DL (ref 6–20)
BUN/CREAT SERPL: 18.9 (ref 7–25)
CALCIUM SPEC-SCNC: 9.6 MG/DL (ref 8.6–10.5)
CHLORIDE SERPL-SCNC: 104 MMOL/L (ref 98–107)
CHOLEST SERPL-MCNC: 236 MG/DL (ref 0–200)
CLARITY UR: CLEAR
CLARITY, POC: CLEAR
CO2 SERPL-SCNC: 24.3 MMOL/L (ref 22–29)
COLOR UR: YELLOW
COLOR UR: YELLOW
CREAT SERPL-MCNC: 0.74 MG/DL (ref 0.57–1)
DEPRECATED RDW RBC AUTO: 45.3 FL (ref 37–54)
EOSINOPHIL # BLD AUTO: 0.12 10*3/MM3 (ref 0–0.4)
EOSINOPHIL NFR BLD AUTO: 2 % (ref 0.3–6.2)
ERYTHROCYTE [DISTWIDTH] IN BLOOD BY AUTOMATED COUNT: 12.1 % (ref 12.3–15.4)
FERRITIN SERPL-MCNC: 174 NG/ML (ref 13–150)
GFR SERPL CREATININE-BSD FRML MDRD: 81 ML/MIN/1.73
GLOBULIN UR ELPH-MCNC: 2.8 GM/DL
GLUCOSE SERPL-MCNC: 105 MG/DL (ref 65–99)
GLUCOSE UR STRIP-MCNC: NEGATIVE MG/DL
GLUCOSE UR STRIP-MCNC: NEGATIVE MG/DL
HCT VFR BLD AUTO: 38.2 % (ref 34–46.6)
HDLC SERPL-MCNC: 58 MG/DL (ref 40–60)
HGB BLD-MCNC: 13 G/DL (ref 12–15.9)
HGB UR QL STRIP.AUTO: NEGATIVE
IMM GRANULOCYTES # BLD AUTO: 0.08 10*3/MM3 (ref 0–0.05)
IMM GRANULOCYTES NFR BLD AUTO: 1.3 % (ref 0–0.5)
KETONES UR QL STRIP: NEGATIVE
KETONES UR QL: NEGATIVE
LDLC SERPL CALC-MCNC: 156 MG/DL (ref 0–100)
LDLC/HDLC SERPL: 2.64 {RATIO}
LEUKOCYTE EST, POC: NEGATIVE
LEUKOCYTE ESTERASE UR QL STRIP.AUTO: NEGATIVE
LYMPHOCYTES # BLD AUTO: 2.12 10*3/MM3 (ref 0.7–3.1)
LYMPHOCYTES NFR BLD AUTO: 35.3 % (ref 19.6–45.3)
MAGNESIUM SERPL-MCNC: 2.1 MG/DL (ref 1.6–2.6)
MCH RBC QN AUTO: 34.5 PG (ref 26.6–33)
MCHC RBC AUTO-ENTMCNC: 34 G/DL (ref 31.5–35.7)
MCV RBC AUTO: 101.3 FL (ref 79–97)
MONOCYTES # BLD AUTO: 0.53 10*3/MM3 (ref 0.1–0.9)
MONOCYTES NFR BLD AUTO: 8.8 % (ref 5–12)
NEUTROPHILS NFR BLD AUTO: 3.11 10*3/MM3 (ref 1.7–7)
NEUTROPHILS NFR BLD AUTO: 51.9 % (ref 42.7–76)
NITRITE UR QL STRIP: NEGATIVE
NITRITE UR-MCNC: NEGATIVE MG/ML
NRBC BLD AUTO-RTO: 0 /100 WBC (ref 0–0.2)
PH UR STRIP.AUTO: 6.5 [PH] (ref 5–8)
PH UR: 5.5 [PH] (ref 5–8)
PLATELET # BLD AUTO: 432 10*3/MM3 (ref 140–450)
PMV BLD AUTO: 9.3 FL (ref 6–12)
POTASSIUM SERPL-SCNC: 4.3 MMOL/L (ref 3.5–5.2)
PROT SERPL-MCNC: 7.2 G/DL (ref 6–8.5)
PROT UR QL STRIP: NEGATIVE
PROT UR STRIP-MCNC: NEGATIVE MG/DL
RBC # BLD AUTO: 3.77 10*6/MM3 (ref 3.77–5.28)
RBC # UR STRIP: NEGATIVE /UL
SODIUM SERPL-SCNC: 139 MMOL/L (ref 136–145)
SP GR UR STRIP: 1.01 (ref 1–1.03)
SP GR UR: 1.02 (ref 1–1.03)
TRIGL SERPL-MCNC: 124 MG/DL (ref 0–150)
TSH SERPL DL<=0.05 MIU/L-ACNC: 2.3 UIU/ML (ref 0.27–4.2)
UROBILINOGEN UR QL STRIP: NORMAL
UROBILINOGEN UR QL: NORMAL
VIT B12 BLD-MCNC: 638 PG/ML (ref 211–946)
VLDLC SERPL-MCNC: 22 MG/DL (ref 5–40)
WBC # BLD AUTO: 6 10*3/MM3 (ref 3.4–10.8)

## 2021-09-21 PROCEDURE — 99396 PREV VISIT EST AGE 40-64: CPT | Performed by: PREVENTIVE MEDICINE

## 2021-09-21 PROCEDURE — 84443 ASSAY THYROID STIM HORMONE: CPT | Performed by: PREVENTIVE MEDICINE

## 2021-09-21 PROCEDURE — 90686 IIV4 VACC NO PRSV 0.5 ML IM: CPT | Performed by: PREVENTIVE MEDICINE

## 2021-09-21 PROCEDURE — 83735 ASSAY OF MAGNESIUM: CPT | Performed by: PREVENTIVE MEDICINE

## 2021-09-21 PROCEDURE — 81003 URINALYSIS AUTO W/O SCOPE: CPT | Performed by: PREVENTIVE MEDICINE

## 2021-09-21 PROCEDURE — 85025 COMPLETE CBC W/AUTO DIFF WBC: CPT | Performed by: PREVENTIVE MEDICINE

## 2021-09-21 PROCEDURE — 80061 LIPID PANEL: CPT | Performed by: PREVENTIVE MEDICINE

## 2021-09-21 PROCEDURE — 90471 IMMUNIZATION ADMIN: CPT | Performed by: PREVENTIVE MEDICINE

## 2021-09-21 PROCEDURE — 99213 OFFICE O/P EST LOW 20 MIN: CPT | Performed by: PREVENTIVE MEDICINE

## 2021-09-21 PROCEDURE — 82306 VITAMIN D 25 HYDROXY: CPT | Performed by: PREVENTIVE MEDICINE

## 2021-09-21 PROCEDURE — 82607 VITAMIN B-12: CPT | Performed by: PREVENTIVE MEDICINE

## 2021-09-21 PROCEDURE — 82728 ASSAY OF FERRITIN: CPT | Performed by: PREVENTIVE MEDICINE

## 2021-09-21 PROCEDURE — 36415 COLL VENOUS BLD VENIPUNCTURE: CPT | Performed by: PREVENTIVE MEDICINE

## 2021-09-21 PROCEDURE — 80053 COMPREHEN METABOLIC PANEL: CPT | Performed by: PREVENTIVE MEDICINE

## 2021-09-21 NOTE — PROGRESS NOTES
"Subjective   Merissa Yusuf is a 56 y.o. female presents for   Chief Complaint   Patient presents with   • Annual Exam   • Hyperlipidemia   Patient presents today for annual wellness exam and has been advised to wear sunscreen and seatbelt.  She is also here to follow-up on multiple chronic health conditions most of which are stable.  She does have hemochromatosis and does need to have some blood removed.  This was scheduled and then she lost her son and 2 of her brother-in-law's.  She seems to be coping fairly well with this and will arrange to have the blood withdrawn.  We will monitor her ferritin and iron today she also wants to speak with the dietitian about what she can eat and what she can eat and that things that are fortified with iron have been a problem for her.  Patient has had both of her Covid vaccinations and will get her flu shot today.  Weight loss was discussed she will increase her walking and will decrease her portion size.  Patient does have a new problem which is hesitancy of urination whenever she gets this she does feel as though she usually has a urinary tract infection she has had no fever chills or burning no blood in her urine.    Health Maintenance Due   Topic Date Due   • ZOSTER VACCINE (1 of 2) Never done       History of Present Illness     Vitals:    09/21/21 0830 09/21/21 0832   BP: 120/75 115/79   BP Location: Right arm Left arm   Patient Position: Sitting Sitting   Cuff Size: Adult Adult   Pulse: 78    Temp: 97.3 °F (36.3 °C)    SpO2: 98%    Weight: 101 kg (223 lb)    Height: 149.9 cm (59.02\")      Body mass index is 45.02 kg/m².    Current Outpatient Medications on File Prior to Visit   Medication Sig Dispense Refill   • acetaminophen (TYLENOL) 500 MG tablet Take 500 mg by mouth Every 4 (Four) Hours As Needed.     • busPIRone (BUSPAR) 15 MG tablet TAKE 1 TABLET BY MOUTH THREE TIMES DAILY FOR ANXIETY 90 tablet 2   • Calcium Carbonate Antacid (TUMS PO) Take 2,000 mg by mouth At " Night As Needed.     • cetirizine (zyrTEC) 10 MG tablet Take 10 mg by mouth Daily.     • COLLAGEN PO Take  by mouth.     • Naproxen Sodium 220 MG capsule Take 1 tablet by mouth As Needed.     • TURMERIC PO Take  by mouth.     • Vortioxetine HBr (Trintellix) 5 MG tablet Take 5 mg by mouth Daily With Breakfast. 30 tablet 5   • Cholecalciferol (VITAMIN D-3) 5000 units tablet Daily.     • diclofenac (VOLTAREN) 1 % gel gel Apply 4 g topically to the appropriate area as directed 4 (Four) Times a Day As Needed.     • ezetimibe (ZETIA) 10 MG tablet Take 1 tablet by mouth Daily. 90 tablet 3     No current facility-administered medications on file prior to visit.       The following portions of the patient's history were reviewed and updated as appropriate: allergies, current medications, past family history, past medical history, past social history, past surgical history and problem list.    Review of Systems   Genitourinary: Positive for difficulty urinating.   Psychiatric/Behavioral: Positive for depressed mood and stress.       Objective   Physical Exam  Vitals reviewed.   Constitutional:       General: She is not in acute distress.     Appearance: She is well-developed. She is obese. She is not ill-appearing or toxic-appearing.   HENT:      Head: Normocephalic and atraumatic.      Right Ear: Tympanic membrane, ear canal and external ear normal.      Left Ear: Tympanic membrane, ear canal and external ear normal.      Nose: Nose normal.   Eyes:      Extraocular Movements: Extraocular movements intact.      Conjunctiva/sclera: Conjunctivae normal.      Pupils: Pupils are equal, round, and reactive to light.   Cardiovascular:      Rate and Rhythm: Normal rate and regular rhythm.      Heart sounds: Normal heart sounds.   Pulmonary:      Effort: Pulmonary effort is normal.      Breath sounds: Normal breath sounds.   Abdominal:      General: Bowel sounds are normal. There is no distension.      Palpations: Abdomen is soft.  There is no mass.      Tenderness: There is no abdominal tenderness.   Musculoskeletal:      Cervical back: Neck supple.   Skin:     General: Skin is warm.   Neurological:      General: No focal deficit present.      Mental Status: She is alert and oriented to person, place, and time.   Psychiatric:         Mood and Affect: Mood normal.         Behavior: Behavior normal.       PHQ-9 Total Score:      Assessment/Plan   Diagnoses and all orders for this visit:    1. Encounter for annual general medical examination with abnormal findings in adult (Primary)  Comments:  Patient has been advised to wear sunscreen and seatbelt.    2. Mixed hyperlipidemia  Comments:  Patient trying to eat less saturated fats  Orders:  -     Comprehensive Metabolic Panel  -     Lipid Panel    3. Vertigo  Comments:  No more problems with vertigo.    4. Other chest pain  Comments:  No recent chest pain.    5. Gastroesophageal reflux disease, unspecified whether esophagitis present  Comments:  Heartburn is controlled with antacid.  Orders:  -     CBC Auto Differential  -     Magnesium    6. Vitamin D deficiency  Comments:  Patient takes D3 daily.  Orders:  -     Vitamin D 25 Hydroxy    7. Mixed anxiety depressive disorder  Comments:  Recently lost her son and 2 brother-in-law's.  Patient seems to be coping well no HI or SI has seen her psychiatrist.    8. Elevated blood pressure reading without diagnosis of hypertension  Comments:  Blood pressure is controlled.    9. Moderate episode of recurrent major depressive disorder (CMS/HCC)  Comments:  No HI or SI.  Orders:  -     TSH  -     Vitamin B12    10. Elevated hematocrit  -     Iron and TIBC; Future  -     Ferritin    11. Hesitancy of micturition  Comments:  Patient feels as though she is having trouble emptying her bladder and last time this happened she had an infection.  Urine and urine culture pending.  Orders:  -     POC Urinalysis Dipstick, Automated  -     UA / M With / Rflx  Culture([facility] ONLY) - Urine, Clean Catch    12. Hemochromatosis, unspecified hemochromatosis type  Comments:  Ferritin and iron profile are pending.  Patient will arrange for having some blood removed.  Orders:  -     Ambulatory Referral to Nutrition Services    13. Class 3 severe obesity due to excess calories with serious comorbidity and body mass index (BMI) of 45.0 to 49.9 in adult (CMS/HCC)    Other orders  -     FluLaval >6 Months (2022-8009)        Patient Instructions     Health Maintenance Due   Topic Date Due   • ZOSTER VACCINE (1 of 2) Never done   • ANNUAL PHYSICAL  06/19/2021

## 2021-09-21 NOTE — TELEPHONE ENCOUNTER
Caller: Maurisio Yusuf    Relationship: Self    Best call back number: [phone]    What is the best time to reach you: ANYTIME    Who are you requesting to speak with (clinical staff, provider,  specific staff member): CLINICAL STAFF OR PROVIDER    Do you know the name of the person who called: MAURISIO    What was the call regarding: PT IS HAVING PAIN OF KNEE AND WANTS A CORTISONE INJ I ATTEMPTED TO SCHEDULE HER FOR A FOLLOW UP 06/30/2021 , BUT SHE IS WANTING TO BE SEEN THIS WEEK DUE TO HER NOT BEING ABLE TO WALK AND GOING OUT OF TOWN    Do you require a callback: YES

## 2021-09-21 NOTE — PROGRESS NOTES
Injection  Injection performed in left deltoid by Rach Gomez. Patient tolerated the procedure well without complications.  09/21/21   Rach Gomez    Venipuncture Blood Specimen Collection  Venipuncture performed in Right medial antecubital area   by Rach Gomez with good hemostasis. Patient tolerated the procedure well without complications.   09/21/21   Rach Gomez

## 2021-09-22 ENCOUNTER — TELEPHONE (OUTPATIENT)
Dept: FAMILY MEDICINE CLINIC | Facility: CLINIC | Age: 56
End: 2021-09-22

## 2021-09-22 NOTE — TELEPHONE ENCOUNTER
Cardio had just started her on Zetia and she only had her first dose today. So she is hoping this is enough to help along with more diet and exercise. Advised of increased risk for MI, CVA and PVD

## 2021-09-22 NOTE — TELEPHONE ENCOUNTER
HUB TO READ    ----- Message from Melissa Duarte MD sent at 9/22/2021 11:44 AM EDT -----    Glucose 105 so watch carbs and walk.  Ferritin decreased to 174 which is great news.  Total and bad cholesterol have decreased slightly-if she has done all she can to decrease sat fat and walk, should consider statin-let me know.

## 2021-09-22 NOTE — PROGRESS NOTES
Glucose 105 so watch carbs and walk.  Ferritin decreased to 174 which is great news.  Total and bad cholesterol have decreased slightly-if she has done all she can to decrease sat fat and walk, should consider statin-let me know.

## 2021-09-23 ENCOUNTER — OFFICE VISIT (OUTPATIENT)
Dept: ORTHOPEDIC SURGERY | Facility: CLINIC | Age: 56
End: 2021-09-23

## 2021-09-23 VITALS
SYSTOLIC BLOOD PRESSURE: 111 MMHG | BODY MASS INDEX: 45.36 KG/M2 | HEIGHT: 59 IN | HEART RATE: 73 BPM | DIASTOLIC BLOOD PRESSURE: 62 MMHG | WEIGHT: 225 LBS

## 2021-09-23 DIAGNOSIS — M17.0 BILATERAL PRIMARY OSTEOARTHRITIS OF KNEE: Primary | ICD-10-CM

## 2021-09-23 PROCEDURE — 20610 DRAIN/INJ JOINT/BURSA W/O US: CPT | Performed by: ORTHOPAEDIC SURGERY

## 2021-09-23 RX ORDER — METHYLPREDNISOLONE ACETATE 40 MG/ML
80 INJECTION, SUSPENSION INTRA-ARTICULAR; INTRALESIONAL; INTRAMUSCULAR; SOFT TISSUE ONCE
Status: DISCONTINUED | OUTPATIENT
Start: 2021-09-23 | End: 2021-09-23

## 2021-09-23 RX ORDER — METHYLPREDNISOLONE ACETATE 80 MG/ML
80 INJECTION, SUSPENSION INTRA-ARTICULAR; INTRALESIONAL; INTRAMUSCULAR; SOFT TISSUE ONCE
Status: COMPLETED | OUTPATIENT
Start: 2021-09-23 | End: 2021-09-23

## 2021-09-23 RX ADMIN — METHYLPREDNISOLONE ACETATE 80 MG: 80 INJECTION, SUSPENSION INTRA-ARTICULAR; INTRALESIONAL; INTRAMUSCULAR; SOFT TISSUE at 11:43

## 2021-09-23 NOTE — PROGRESS NOTES
"     Patient ID: Merissa Yusuf is a 56 y.o. female.    Bilateral knee pain  Here for knee injection, the right knee is worse than the left    Objective:    /62   Pulse 73   Ht 149.9 cm (59\")   Wt 102 kg (225 lb)   LMP  (LMP Unknown)   BMI 45.44 kg/m²     Physical Examination:     Both knees demonstrate no scars no atrophy with trace effusions and mild varus alignment.  Range of motion 0 to 115 degrees bilateral with mild crepitance and no instability    Imaging:      Assessment:  Bilateral knee pain with degenerative joint disease    Plan:  We will proceed with Zilretta on the right which was approved, Zilretta was not approved on the left so we will proceed with Depo-Medrol  Left side, I recomend injection after today's evaluation.  Risks and benefits were discussed. Under sterile technique and after timeout and verbal consent I injected 80 mg of Depo medrol and 2 mL of 1% Lidocaine plain into the left knee. It was well tolerated. Postinjection instructions were given.    Large Joint Arthrocentesis: R knee  Date/Time: 9/23/2021 10:09 AM  Consent given by: patient  Timeout: Immediately prior to procedure a time out was called to verify the correct patient, procedure, equipment, support staff and site/side marked as required   Supporting Documentation  Indications: pain   Procedure Details  Location: knee - R knee  Preparation: Patient was prepped and draped in the usual sterile fashion  Needle size: 22 G  Medications administered: 32 mg Triamcinolone Acetonide 32 MG  Patient tolerance: patient tolerated the procedure well with no immediate complications        "

## 2021-09-27 ENCOUNTER — HOSPITAL ENCOUNTER (OUTPATIENT)
Dept: ONCOLOGY | Facility: HOSPITAL | Age: 56
Setting detail: INFUSION SERIES
Discharge: HOME | End: 2021-09-27

## 2021-09-27 VITALS
SYSTOLIC BLOOD PRESSURE: 118 MMHG | BODY MASS INDEX: 45.16 KG/M2 | HEIGHT: 59 IN | RESPIRATION RATE: 18 BRPM | WEIGHT: 224 LBS | DIASTOLIC BLOOD PRESSURE: 79 MMHG | HEART RATE: 77 BPM | TEMPERATURE: 96.9 F

## 2021-09-27 DIAGNOSIS — R71.8 ELEVATED HEMATOCRIT: Primary | ICD-10-CM

## 2021-09-27 PROCEDURE — 99195 PHLEBOTOMY: CPT

## 2021-10-26 ENCOUNTER — TELEPHONE (OUTPATIENT)
Dept: ONCOLOGY | Facility: HOSPITAL | Age: 56
End: 2021-10-26

## 2021-10-26 ENCOUNTER — APPOINTMENT (OUTPATIENT)
Dept: ONCOLOGY | Facility: HOSPITAL | Age: 56
End: 2021-10-26

## 2021-10-26 ENCOUNTER — OFFICE VISIT (OUTPATIENT)
Dept: ENDOCRINOLOGY | Facility: CLINIC | Age: 56
End: 2021-10-26

## 2021-10-26 DIAGNOSIS — E66.01 MORBID (SEVERE) OBESITY DUE TO EXCESS CALORIES (HCC): ICD-10-CM

## 2021-10-26 DIAGNOSIS — E66.01 CLASS 3 SEVERE OBESITY DUE TO EXCESS CALORIES WITH SERIOUS COMORBIDITY AND BODY MASS INDEX (BMI) OF 45.0 TO 49.9 IN ADULT (HCC): ICD-10-CM

## 2021-10-26 DIAGNOSIS — E83.119 HEMOCHROMATOSIS, UNSPECIFIED HEMOCHROMATOSIS TYPE: ICD-10-CM

## 2021-10-26 DIAGNOSIS — G47.33 OBSTRUCTIVE SLEEP APNEA: ICD-10-CM

## 2021-10-26 DIAGNOSIS — E78.2 MIXED HYPERLIPIDEMIA: ICD-10-CM

## 2021-10-26 PROCEDURE — 97802 MEDICAL NUTRITION INDIV IN: CPT | Performed by: DIETITIAN, REGISTERED

## 2021-10-26 NOTE — TELEPHONE ENCOUNTER
I returned call to pt and reschedule her phlebotomy appointment for today due to her being sick, pt verbalized understanding of new appointment date/time.

## 2021-11-02 ENCOUNTER — HOSPITAL ENCOUNTER (OUTPATIENT)
Dept: ONCOLOGY | Facility: HOSPITAL | Age: 56
Setting detail: INFUSION SERIES
Discharge: HOME | End: 2021-11-02

## 2021-11-02 VITALS
DIASTOLIC BLOOD PRESSURE: 73 MMHG | RESPIRATION RATE: 18 BRPM | BODY MASS INDEX: 44.76 KG/M2 | HEART RATE: 78 BPM | TEMPERATURE: 96.9 F | WEIGHT: 222 LBS | HEIGHT: 59 IN | SYSTOLIC BLOOD PRESSURE: 115 MMHG

## 2021-11-02 DIAGNOSIS — E83.19 IRON OVERLOAD: Primary | ICD-10-CM

## 2021-11-02 DIAGNOSIS — R71.8 ELEVATED HEMATOCRIT: ICD-10-CM

## 2021-11-02 DIAGNOSIS — E83.119 HEMOCHROMATOSIS, UNSPECIFIED HEMOCHROMATOSIS TYPE: ICD-10-CM

## 2021-11-02 LAB
BASOPHILS # BLD AUTO: 0.03 10*3/MM3 (ref 0–0.2)
BASOPHILS NFR BLD AUTO: 0.4 % (ref 0–1.5)
DEPRECATED RDW RBC AUTO: 45.1 FL (ref 37–54)
EOSINOPHIL # BLD AUTO: 0.19 10*3/MM3 (ref 0–0.4)
EOSINOPHIL NFR BLD AUTO: 2.7 % (ref 0.3–6.2)
ERYTHROCYTE [DISTWIDTH] IN BLOOD BY AUTOMATED COUNT: 12.2 % (ref 12.3–15.4)
FERRITIN SERPL-MCNC: 112.2 NG/ML (ref 13–150)
HCT VFR BLD AUTO: 40.3 % (ref 34–46.6)
HGB BLD-MCNC: 14.2 G/DL (ref 12–15.9)
LYMPHOCYTES # BLD AUTO: 2.36 10*3/MM3 (ref 0.7–3.1)
LYMPHOCYTES NFR BLD AUTO: 33.7 % (ref 19.6–45.3)
MCH RBC QN AUTO: 36 PG (ref 26.6–33)
MCHC RBC AUTO-ENTMCNC: 35.2 G/DL (ref 31.5–35.7)
MCV RBC AUTO: 102.3 FL (ref 79–97)
MONOCYTES # BLD AUTO: 0.58 10*3/MM3 (ref 0.1–0.9)
MONOCYTES NFR BLD AUTO: 8.3 % (ref 5–12)
NEUTROPHILS NFR BLD AUTO: 3.85 10*3/MM3 (ref 1.7–7)
NEUTROPHILS NFR BLD AUTO: 54.9 % (ref 42.7–76)
PLATELET # BLD AUTO: 352 10*3/MM3 (ref 140–450)
PMV BLD AUTO: 9.7 FL (ref 6–12)
RBC # BLD AUTO: 3.94 10*6/MM3 (ref 3.77–5.28)
WBC # BLD AUTO: 7.01 10*3/MM3 (ref 3.4–10.8)

## 2021-11-02 PROCEDURE — 85025 COMPLETE CBC W/AUTO DIFF WBC: CPT | Performed by: INTERNAL MEDICINE

## 2021-11-02 PROCEDURE — 82728 ASSAY OF FERRITIN: CPT | Performed by: INTERNAL MEDICINE

## 2021-11-02 PROCEDURE — 36415 COLL VENOUS BLD VENIPUNCTURE: CPT

## 2021-11-02 PROCEDURE — 99195 PHLEBOTOMY: CPT

## 2021-11-02 NOTE — PROGRESS NOTES
Patient here for Phlebotomy, patient has no complaints at this time. Patient tolerated well. Patient has appt with MD on 11/10/21. AVS given.

## 2021-11-09 NOTE — PROGRESS NOTES
ONCOLOGY/HEMATOLOGY FOLLOW UP    PATIENT: Merissa Yusuf  YOB: 1965  MEDICAL RECORD NUMBER: [id number] OF SERVICE: 11/10/21      Chief complaint:  Hereditary hemochromatosis, homozygous mutation in H63D.  Iron overload  Macrocytosis    History of Present Illness:   · Ms. Yusuf has a history of COPD and also depression.  Laboratory work up was obtained in March 2019 which was abnormal.  Patient was sent to the [facility] and seen initially on 3/13/19.   · 3/5/19 - WBC 4.7, hemoglobin 14.1, MCV elevated to 103 and platelet count of 592,000.  Vitamin D  20.  Sed rate 35.  TSH 1.62.  Vitamin B12 476.  Creatinine 0.8.    · 3/13/19 - ZACKERY-2 mutation negative.  Hemochromatosis gene mutation, homozygous mutation in H63D.  · 3/13/19 - Vitamin B12 507.  Ferritin 178.  Creatinine 0.7.  Iron level 156.  Iron binding  capacity 257.  Percentage iron saturation 61.   · 1/8/2020 Ferritin 304 iron 177 iron saturation 56 TIBC 316  · 7/21/2020 iron 247 iron saturation 82 iron binding capacity 302 ferritin 271 hemoglobin 13.7 WBC 6.1 platelet count 372.  · 6/19/2020 CMP is normal including LFTs.  TSH 3.01  · 10/2020 ferritin 233.40 iron sat 43  · 8/2021 ferritin 341.40, iron sat 76  · 11/2/2021 -ferritin 112      SUBJECTIVE:   Patient is seen for follow-up.  Has some ongoing fatigue no other new symptoms.    Review of Systems   Constitutional: Positive for fatigue.   HENT:   Negative for nosebleeds.    Respiratory: Negative for chest tightness and cough.    Cardiovascular: Negative for chest pain.   Gastrointestinal: Negative for abdominal pain and blood in stool.   Genitourinary: Negative for dysuria and frequency.    Musculoskeletal: Negative for back pain.   Skin: Negative for rash and wound.   Neurological: Negative for headaches and light-headedness.   Psychiatric/Behavioral: Negative for confusion. The patient is not nervous/anxious.           Past Medical History:   Diagnosis Date   •  Anesthesia complication     drops B/P, and slow to arouse   • GERD (gastroesophageal reflux disease)    • Hereditary hemochromatosis (HCC)    • Hyperlipidemia    • Seasonal allergies        Past Surgical History:   Procedure Laterality Date   • BLADDER SUSPENSION     • BREAST BIOPSY Right    • FINGER SURGERY  2008    removed cysts from right index finger    • HERNIA REPAIR     • SUBTOTAL HYSTERECTOMY     • TUBAL ABDOMINAL LIGATION     • VENTRAL/INCISIONAL HERNIA REPAIR N/A 9/24/2020    Procedure: LAPAROSCOPIC VENTRAL HERNIA;  Surgeon: Reuben Molina MD;  [facility] MAIN OR;  Service: General;  Laterality: N/A;  0855 TAP block   • WISDOM TOOTH EXTRACTION         Family History   Problem Relation Age of Onset   • Coronary artery disease Other    • Diabetes Other    • Dementia Other    • Diabetes Mother    • Cancer Mother    • Heart disease Father    • Liver disease Father    • Hypertension Father    • Cancer Brother    • Breast cancer Neg Hx    • Ovarian cancer Neg Hx        Social History     Socioeconomic History   • Marital status:    Tobacco Use   • Smoking status: Never Smoker   • Smokeless tobacco: Never Used   Vaping Use   • Vaping Use: Never used   Substance and Sexual Activity   • Alcohol use: Yes     Alcohol/week: 1.0 standard drink     Types: 1 Glasses of wine per week     Comment: rarely   • Drug use: No   • Sexual activity: Yes     Partners: Male     Birth control/protection: None       ALLERGIES:  Amoxicillin, Erythromycin, Escitalopram, Prozac  [fluoxetine hcl], and Penicillin g    MEDICATION:  Current Outpatient Medications   Medication Sig Dispense Refill   • acetaminophen (TYLENOL) 500 MG tablet Take 500 mg by mouth Every 4 (Four) Hours As Needed.     • busPIRone (BUSPAR) 15 MG tablet TAKE 1 TABLET BY MOUTH THREE TIMES DAILY FOR ANXIETY 90 tablet 2   • Calcium Carbonate Antacid (TUMS PO) Take 2,000 mg by mouth At Night As Needed.     • cetirizine (zyrTEC) 10 MG tablet Take 10  mg by mouth Daily.     • COLLAGEN PO Take  by mouth.     • diclofenac (VOLTAREN) 1 % gel gel Apply 4 g topically to the appropriate area as directed 4 (Four) Times a Day As Needed.     • ezetimibe (ZETIA) 10 MG tablet Take 1 tablet by mouth Daily. 90 tablet 3   • Naproxen Sodium 220 MG capsule Take 1 tablet by mouth As Needed.     • TURMERIC PO Take  by mouth.     • Vortioxetine HBr (Trintellix) 5 MG tablet Take 5 mg by mouth Daily With Breakfast. 30 tablet 5     No current facility-administered medications for this visit.     PHYSICAL EXAM:    Current Vitals:  /76   Pulse 68   Temp 97.1 °F (36.2 °C)   LMP  (LMP Unknown)     VITAL signs in the last 24 hours: [unfilled]   There were no vitals filed for this visit.    Physical Exam  Constitutional:       Appearance: Normal appearance.   HENT:      Head: Normocephalic and atraumatic.   Eyes:      Pupils: Pupils are equal, round, and reactive to light.   Cardiovascular:      Rate and Rhythm: Normal rate and regular rhythm.      Pulses: Normal pulses.      Heart sounds: No murmur heard.      Pulmonary:      Effort: Pulmonary effort is normal.      Breath sounds: Normal breath sounds.   Abdominal:      General: There is no distension.      Palpations: Abdomen is soft. There is no mass.      Tenderness: There is no abdominal tenderness.   Musculoskeletal:         General: Normal range of motion.      Cervical back: Normal range of motion and neck supple.   Skin:     General: Skin is warm.   Neurological:      General: No focal deficit present.      Mental Status: She is alert.   Psychiatric:         Mood and Affect: Mood normal.      no change in exam      LABORATORY/DATA:  Lab Results   Component Value Date    WBC 7.01 11/02/2021    WBC 6.00 09/21/2021    HGB 14.2 11/02/2021    HGB 13.0 09/21/2021    HCT 40.3 11/02/2021    HCT 38.2 09/21/2021    NEUTROABS 3.85 11/02/2021       Lab Results   Component Value Date    GLUCOSE 105 (H) 09/21/2021    BUN 14 09/21/2021     CREATININE 0.74 09/21/2021    EGFRIFNONA 81 09/21/2021    EGFRIFAFRI >60 02/26/2019    BCR 18.9 09/21/2021    K 4.3 09/21/2021    CO2 24.3 09/21/2021    CALCIUM 9.6 09/21/2021    ALBUMIN 4.40 09/21/2021    LABIL2 1.5 03/13/2019    AST 15 09/21/2021    ALT 15 09/21/2021       Lab Results   Component Value Date    IRON 220 (H) 08/03/2021    TIBC 291 (L) 08/03/2021    FERRITIN 112.20 11/02/2021            Assessment & Plan    Patient is a 56-year-old female with iron overload secondary to homozygous H63D mutation for hemochromatosis.    Hemochromatosis  Patient does not have the classic phenotype however has an alternate with homozygous H63D.  She has a propensity towards iron overload.  However less likely to cause significant liver damage.  Ferritin improved to 112 today from 300 previously.  Ideally ferritin should be below 75 to prevent liver damage.  Recommend getting phlebotomy done twice 1 month apart.  I will recheck a CBC iron studies and 4 months discussed the further phlebotomy schedule with her.    Patient agrees with the plan.

## 2021-11-10 ENCOUNTER — APPOINTMENT (OUTPATIENT)
Dept: LAB | Facility: HOSPITAL | Age: 56
End: 2021-11-10

## 2021-11-10 ENCOUNTER — OFFICE VISIT (OUTPATIENT)
Dept: ONCOLOGY | Facility: CLINIC | Age: 56
End: 2021-11-10

## 2021-11-10 VITALS — DIASTOLIC BLOOD PRESSURE: 76 MMHG | HEART RATE: 68 BPM | SYSTOLIC BLOOD PRESSURE: 118 MMHG | TEMPERATURE: 97.1 F

## 2021-11-10 DIAGNOSIS — E83.110 HEREDITARY HEMOCHROMATOSIS (HCC): Primary | ICD-10-CM

## 2021-11-10 DIAGNOSIS — E83.119 HEMOCHROMATOSIS, UNSPECIFIED HEMOCHROMATOSIS TYPE: ICD-10-CM

## 2021-11-10 PROCEDURE — 99213 OFFICE O/P EST LOW 20 MIN: CPT | Performed by: INTERNAL MEDICINE

## 2021-11-30 RX ORDER — VORTIOXETINE 5 MG/1
5 TABLET, FILM COATED ORAL
Qty: 90 TABLET | Refills: 1 | Status: SHIPPED | OUTPATIENT
Start: 2021-11-30 | End: 2022-04-18

## 2021-11-30 NOTE — TELEPHONE ENCOUNTER
Changed pharmacy to [facility]- mail order. Requesting new rx be sent for Trintellix for a 90 day supply

## 2021-12-02 DIAGNOSIS — F41.8 MIXED ANXIETY DEPRESSIVE DISORDER: Chronic | ICD-10-CM

## 2021-12-02 RX ORDER — EZETIMIBE 10 MG/1
10 TABLET ORAL DAILY
Qty: 90 TABLET | Refills: 3 | Status: SHIPPED | OUTPATIENT
Start: 2021-12-02 | End: 2021-12-02 | Stop reason: SDUPTHER

## 2021-12-02 RX ORDER — EZETIMIBE 10 MG/1
10 TABLET ORAL DAILY
Qty: 90 TABLET | Refills: 3 | Status: SHIPPED | OUTPATIENT
Start: 2021-12-02 | End: 2021-12-15 | Stop reason: SDUPTHER

## 2021-12-02 RX ORDER — BUSPIRONE HYDROCHLORIDE 15 MG/1
TABLET ORAL
Qty: 270 TABLET | Refills: 1 | Status: SHIPPED | OUTPATIENT
Start: 2021-12-02 | End: 2022-09-07

## 2021-12-07 ENCOUNTER — HOSPITAL ENCOUNTER (OUTPATIENT)
Dept: ONCOLOGY | Facility: HOSPITAL | Age: 56
Setting detail: INFUSION SERIES
Discharge: HOME | End: 2021-12-07

## 2021-12-07 VITALS
HEART RATE: 70 BPM | SYSTOLIC BLOOD PRESSURE: 133 MMHG | BODY MASS INDEX: 44.76 KG/M2 | HEIGHT: 59 IN | DIASTOLIC BLOOD PRESSURE: 84 MMHG | WEIGHT: 222 LBS | TEMPERATURE: 96.6 F | OXYGEN SATURATION: 96 % | RESPIRATION RATE: 18 BRPM

## 2021-12-07 DIAGNOSIS — R71.8 ELEVATED HEMATOCRIT: Primary | ICD-10-CM

## 2021-12-07 DIAGNOSIS — E78.2 MIXED HYPERLIPIDEMIA: ICD-10-CM

## 2021-12-07 LAB
FERRITIN SERPL-MCNC: 84.35 NG/ML (ref 13–150)
HCT VFR BLD AUTO: 40.7 % (ref 34–46.6)

## 2021-12-07 PROCEDURE — 99195 PHLEBOTOMY: CPT

## 2021-12-07 PROCEDURE — 82728 ASSAY OF FERRITIN: CPT | Performed by: INTERNAL MEDICINE

## 2021-12-07 PROCEDURE — 85014 HEMATOCRIT: CPT | Performed by: INTERNAL MEDICINE

## 2021-12-07 NOTE — PROGRESS NOTES
Patient here today for therapeutic phlebotomy. Ferritin 11/2/21 was 112.2, Hct today 40.7. 400ml removed without complication, patient tolerated well, vitals stable.

## 2021-12-15 RX ORDER — EZETIMIBE 10 MG/1
10 TABLET ORAL DAILY
Qty: 90 TABLET | Refills: 3 | Status: SHIPPED | OUTPATIENT
Start: 2021-12-15 | End: 2022-01-06

## 2021-12-29 ENCOUNTER — OFFICE VISIT (OUTPATIENT)
Dept: PSYCHIATRY | Facility: CLINIC | Age: 56
End: 2021-12-29

## 2021-12-29 DIAGNOSIS — F41.1 GAD (GENERALIZED ANXIETY DISORDER): Primary | Chronic | ICD-10-CM

## 2021-12-29 DIAGNOSIS — F33.1 MODERATE EPISODE OF RECURRENT MAJOR DEPRESSIVE DISORDER: Chronic | ICD-10-CM

## 2021-12-29 PROCEDURE — 99214 OFFICE O/P EST MOD 30 MIN: CPT | Performed by: PHYSICIAN ASSISTANT

## 2021-12-29 RX ORDER — TOPIRAMATE 25 MG/1
25 TABLET ORAL 2 TIMES DAILY
Qty: 60 TABLET | Refills: 2 | Status: SHIPPED | OUTPATIENT
Start: 2021-12-29 | End: 2022-01-17 | Stop reason: SINTOL

## 2021-12-30 ENCOUNTER — TELEPHONE (OUTPATIENT)
Dept: CARDIOLOGY | Facility: CLINIC | Age: 56
End: 2021-12-30

## 2021-12-30 NOTE — TELEPHONE ENCOUNTER
Called pt with info.  She voiced understanding.     ----- Message from Chely Arnold MD sent at 12/30/2021  2:57 PM EST -----  Regarding: FW: Ezetimibe side effects  She is not on statin      Very unlikely Zetia is causing significant muscle symptoms  But if patient wants to try she can stop Zetia for 1 week and see if that'll help with symptoms  If there is no improvement in the symptoms she can start back on Zetia and look for other causes  ----- Message -----  From: Sarah Marrufo MA  Sent: 12/30/2021   2:31 PM EST  To: Chely Arnold MD  Subject: FW: Ezetimibe side effects                         ----- Message -----  From: Merissa Yusuf  Sent: 12/30/2021   2:30 PM EST  To: [facility] [place]  Subject: Ezetimibe side effects                           For the past month  or so, I have been having pains, on and off, in both biceps but but more so in the left one. The pain is similar to that of when I got my covid shots. Could this be from the new cholesterol drug ?   If so is there a different drug I could try?

## 2022-01-05 ENCOUNTER — TELEPHONE (OUTPATIENT)
Dept: CARDIOLOGY | Facility: CLINIC | Age: 57
End: 2022-01-05

## 2022-01-05 NOTE — TELEPHONE ENCOUNTER
Notified pt.      ----- Message from Chely Arnold MD sent at 12/30/2021  2:57 PM EST -----  Regarding: FW: Ezetimibe side effects  She is not on statin  Very unlikely Zetia is causing significant muscle symptoms  But if patient wants to try she can stop Zetia for 1 week and see if that'll help with symptoms  If there is no improvement in the symptoms she can start back on Zetia and look for other causes  ----- Message -----  From: Sarah Marrufo MA  Sent: 12/30/2021   2:31 PM EST  To: Chely Arnold MD  Subject: FW: Ezetimibe side effects                         ----- Message -----  From: Merissa Yusuf  Sent: 12/30/2021   2:30 PM EST  To: [facility]  Subject: Ezetimibe side effects                           For the past month  or so, I have been having pains, on and off, in both biceps but but more so in the left one. The pain is similar to that of when I got my covid shots. Could this be from the new cholesterol drug ?   If so is there a different drug I could try?

## 2022-01-06 ENCOUNTER — OFFICE VISIT (OUTPATIENT)
Dept: FAMILY MEDICINE CLINIC | Facility: CLINIC | Age: 57
End: 2022-01-06

## 2022-01-06 ENCOUNTER — HOSPITAL ENCOUNTER (OUTPATIENT)
Dept: GENERAL RADIOLOGY | Facility: HOSPITAL | Age: 57
Discharge: HOME | End: 2022-01-06
Admitting: PREVENTIVE MEDICINE

## 2022-01-06 ENCOUNTER — TELEPHONE (OUTPATIENT)
Dept: FAMILY MEDICINE CLINIC | Facility: CLINIC | Age: 57
End: 2022-01-06

## 2022-01-06 VITALS
BODY MASS INDEX: 45.24 KG/M2 | SYSTOLIC BLOOD PRESSURE: 115 MMHG | HEIGHT: 59 IN | DIASTOLIC BLOOD PRESSURE: 66 MMHG | TEMPERATURE: 97.5 F | HEART RATE: 69 BPM | WEIGHT: 224.4 LBS | OXYGEN SATURATION: 98 %

## 2022-01-06 DIAGNOSIS — R05.9 COUGH: Primary | ICD-10-CM

## 2022-01-06 DIAGNOSIS — E66.01 CLASS 3 SEVERE OBESITY DUE TO EXCESS CALORIES WITH SERIOUS COMORBIDITY AND BODY MASS INDEX (BMI) OF 45.0 TO 49.9 IN ADULT: ICD-10-CM

## 2022-01-06 LAB
B PARAPERT DNA SPEC QL NAA+PROBE: NOT DETECTED
B PERT DNA SPEC QL NAA+PROBE: NOT DETECTED
C PNEUM DNA NPH QL NAA+NON-PROBE: NOT DETECTED
FLUAV SUBTYP SPEC NAA+PROBE: NOT DETECTED
FLUBV RNA ISLT QL NAA+PROBE: NOT DETECTED
HADV DNA SPEC NAA+PROBE: NOT DETECTED
HCOV 229E RNA SPEC QL NAA+PROBE: NOT DETECTED
HCOV HKU1 RNA SPEC QL NAA+PROBE: NOT DETECTED
HCOV NL63 RNA SPEC QL NAA+PROBE: NOT DETECTED
HCOV OC43 RNA SPEC QL NAA+PROBE: NOT DETECTED
HMPV RNA NPH QL NAA+NON-PROBE: DETECTED
HPIV1 RNA ISLT QL NAA+PROBE: NOT DETECTED
HPIV2 RNA SPEC QL NAA+PROBE: NOT DETECTED
HPIV3 RNA NPH QL NAA+PROBE: NOT DETECTED
HPIV4 P GENE NPH QL NAA+PROBE: NOT DETECTED
M PNEUMO IGG SER IA-ACNC: NOT DETECTED
RHINOVIRUS RNA SPEC NAA+PROBE: NOT DETECTED
RSV RNA NPH QL NAA+NON-PROBE: NOT DETECTED
SARS-COV-2 RNA NPH QL NAA+NON-PROBE: NOT DETECTED

## 2022-01-06 PROCEDURE — 71046 X-RAY EXAM CHEST 2 VIEWS: CPT

## 2022-01-06 PROCEDURE — 99213 OFFICE O/P EST LOW 20 MIN: CPT | Performed by: PREVENTIVE MEDICINE

## 2022-01-06 PROCEDURE — 0202U NFCT DS 22 TRGT SARS-COV-2: CPT | Performed by: PREVENTIVE MEDICINE

## 2022-01-06 RX ORDER — BENZONATATE 200 MG/1
200 CAPSULE ORAL 3 TIMES DAILY PRN
Qty: 30 CAPSULE | Refills: 1 | Status: SHIPPED | OUTPATIENT
Start: 2022-01-06 | End: 2022-06-28

## 2022-01-06 NOTE — PROGRESS NOTES
"Subjective   Merissa Yusuf is a 56 y.o. female presents for   Chief Complaint   Patient presents with   • Cough     x 9 days, neg covid 1/5   Patient has had a cough for 9 days son-in-law does have human pneumo virus patient has not been exposed to Covid or flu that she is aware of.  Sputum is still clear in color she is just concerned that she is still coughing will try Tessalon no fever diarrhea or dysuria throat and ears are uninvolved.  No loss of taste or smell has had all 3 Covid shots and flu shot.    Health Maintenance Due   Topic Date Due   • ZOSTER VACCINE (1 of 2) Never done   • COVID-19 Vaccine (3 - Booster for Moderna series) 10/02/2021       History of Present Illness     Vitals:    01/06/22 1109 01/06/22 1113   BP: 119/77 115/66   BP Location: Right arm Left arm   Patient Position: Sitting Sitting   Cuff Size: Large Adult Large Adult   Pulse: 69    Temp: 97.5 °F (36.4 °C)    TempSrc: Infrared    SpO2: 98%    Weight: 102 kg (224 lb 6.4 oz)    Height: 149.9 cm (59.02\")      Body mass index is 45.3 kg/m².    Current Outpatient Medications on File Prior to Visit   Medication Sig Dispense Refill   • acetaminophen (TYLENOL) 500 MG tablet Take 500 mg by mouth Every 4 (Four) Hours As Needed.     • busPIRone (BUSPAR) 15 MG tablet TAKE 1 TABLET BY MOUTH THREE TIMES DAILY FOR ANXIETY 270 tablet 1   • Calcium Carbonate Antacid (TUMS PO) Take 2,000 mg by mouth At Night As Needed.     • cetirizine (zyrTEC) 10 MG tablet Take 10 mg by mouth Daily.     • COLLAGEN PO Take  by mouth.     • diclofenac (VOLTAREN) 1 % gel gel Apply 4 g topically to the appropriate area as directed 4 (Four) Times a Day As Needed.     • Naproxen Sodium 220 MG capsule Take 1 tablet by mouth As Needed.     • topiramate (Topamax) 25 MG tablet Take 1 tablet by mouth 2 (Two) Times a Day. 60 tablet 2   • TURMERIC PO Take  by mouth.     • Vortioxetine HBr (Trintellix) 5 MG tablet Take 5 mg by mouth Daily With Breakfast. 90 tablet 1   • " [DISCONTINUED] ezetimibe (ZETIA) 10 MG tablet Take 1 tablet by mouth Daily. 90 tablet 3     No current facility-administered medications on file prior to visit.       The following portions of the patient's history were reviewed and updated as appropriate: allergies, current medications, past family history, past medical history, past social history, past surgical history and problem list.    Review of Systems   HENT: Positive for congestion.    Respiratory: Positive for cough.        Objective   Physical Exam  Vitals reviewed.   Constitutional:       General: She is not in acute distress.     Appearance: She is well-developed. She is obese. She is not ill-appearing or toxic-appearing.   HENT:      Head: Normocephalic and atraumatic.      Right Ear: Tympanic membrane, ear canal and external ear normal.      Left Ear: Tympanic membrane, ear canal and external ear normal.      Nose: Nose normal.   Eyes:      Extraocular Movements: Extraocular movements intact.      Conjunctiva/sclera: Conjunctivae normal.      Pupils: Pupils are equal, round, and reactive to light.   Cardiovascular:      Rate and Rhythm: Normal rate and regular rhythm.      Heart sounds: Normal heart sounds.   Pulmonary:      Effort: Pulmonary effort is normal. No respiratory distress.      Breath sounds: Normal breath sounds. No wheezing or rhonchi.   Abdominal:      General: Bowel sounds are normal. There is no distension.      Palpations: Abdomen is soft. There is no mass.      Tenderness: There is no abdominal tenderness.   Musculoskeletal:         General: Normal range of motion.      Cervical back: Neck supple.   Skin:     General: Skin is warm.   Neurological:      General: No focal deficit present.      Mental Status: She is alert and oriented to person, place, and time.   Psychiatric:         Mood and Affect: Mood normal.         Behavior: Behavior normal.       PHQ-9 Total Score:      Assessment/Plan   Diagnoses and all orders for this  visit:    1. Cough (Primary)  Comments:  Cough times approximately a week no known Covid exposure her son-in-law does have human pneumo virus and patient has had patient agreed to perform visit using v  Orders:  -     Respiratory Panel PCR w/COVID-19(SARS-CoV-2) DIPIKA/KULWINDER/PAXTON/PAD/COR/MAD/KILLIAN In-House, NP Swab in UTM/VTM, 3-4 HR TAT - Swab, Nasopharynx; Future  -     XR Chest PA & Lateral  -     Respiratory Panel PCR w/COVID-19(SARS-CoV-2) DIPIKA/KULWINDER/PAXTON/PAD/COR/MAD/KILLINA In-House, NP Swab in UTM/VTM, 3-4 HR TAT - Swab, Nasopharynx    2. Class 3 severe obesity due to excess calories with serious comorbidity and body mass index (BMI) of 45.0 to 49.9 in adult (HCC)    Other orders  -     benzonatate (TESSALON) 200 MG capsule; Take 1 capsule by mouth 3 (Three) Times a Day As Needed for Cough.  Dispense: 30 capsule; Refill: 1        Patient Instructions   Rest, fluid, quarantine and mask when around kids.    Go get xray today.

## 2022-01-07 ENCOUNTER — TELEPHONE (OUTPATIENT)
Dept: FAMILY MEDICINE CLINIC | Facility: CLINIC | Age: 57
End: 2022-01-07

## 2022-01-07 NOTE — TELEPHONE ENCOUNTER
HUB TO READ  ----- Message from Melissa Duarte MD sent at 1/7/2022  7:48 AM EST -----  You have Human Metopneumovirus as well.  Continue to rest and drink plenty of fluids and send us a Mychart message next week to report progress.

## 2022-01-07 NOTE — PROGRESS NOTES
You have Human Metopneumovirus as well.  Continue to rest and drink plenty of fluids and send us a [facility] message next week to report progress.

## 2022-01-11 ENCOUNTER — HOSPITAL ENCOUNTER (OUTPATIENT)
Dept: ONCOLOGY | Facility: HOSPITAL | Age: 57
Setting detail: INFUSION SERIES
Discharge: HOME | End: 2022-01-11

## 2022-01-11 VITALS
TEMPERATURE: 97.4 F | WEIGHT: 228 LBS | SYSTOLIC BLOOD PRESSURE: 100 MMHG | RESPIRATION RATE: 18 BRPM | HEART RATE: 67 BPM | DIASTOLIC BLOOD PRESSURE: 70 MMHG | HEIGHT: 59 IN | BODY MASS INDEX: 45.96 KG/M2 | OXYGEN SATURATION: 99 %

## 2022-01-11 DIAGNOSIS — R71.8 ELEVATED HEMATOCRIT: Primary | ICD-10-CM

## 2022-01-11 DIAGNOSIS — E78.2 MIXED HYPERLIPIDEMIA: ICD-10-CM

## 2022-01-11 LAB
BASOPHILS # BLD AUTO: 0.04 10*3/MM3 (ref 0–0.2)
BASOPHILS NFR BLD AUTO: 0.6 % (ref 0–1.5)
DEPRECATED RDW RBC AUTO: 46.6 FL (ref 37–54)
EOSINOPHIL # BLD AUTO: 0.12 10*3/MM3 (ref 0–0.4)
EOSINOPHIL NFR BLD AUTO: 1.9 % (ref 0.3–6.2)
ERYTHROCYTE [DISTWIDTH] IN BLOOD BY AUTOMATED COUNT: 12.7 % (ref 12.3–15.4)
FERRITIN SERPL-MCNC: 78.49 NG/ML (ref 13–150)
HCT VFR BLD AUTO: 38.8 % (ref 34–46.6)
HGB BLD-MCNC: 13 G/DL (ref 12–15.9)
LYMPHOCYTES # BLD AUTO: 2.11 10*3/MM3 (ref 0.7–3.1)
LYMPHOCYTES NFR BLD AUTO: 33.1 % (ref 19.6–45.3)
MCH RBC QN AUTO: 34.7 PG (ref 26.6–33)
MCHC RBC AUTO-ENTMCNC: 33.5 G/DL (ref 31.5–35.7)
MCV RBC AUTO: 103.5 FL (ref 79–97)
MONOCYTES # BLD AUTO: 0.63 10*3/MM3 (ref 0.1–0.9)
MONOCYTES NFR BLD AUTO: 9.9 % (ref 5–12)
NEUTROPHILS NFR BLD AUTO: 3.48 10*3/MM3 (ref 1.7–7)
NEUTROPHILS NFR BLD AUTO: 54.5 % (ref 42.7–76)
PLATELET # BLD AUTO: 417 10*3/MM3 (ref 140–450)
PMV BLD AUTO: 8.6 FL (ref 6–12)
RBC # BLD AUTO: 3.75 10*6/MM3 (ref 3.77–5.28)
WBC NRBC COR # BLD: 6.38 10*3/MM3 (ref 3.4–10.8)

## 2022-01-11 PROCEDURE — 99195 PHLEBOTOMY: CPT

## 2022-01-11 PROCEDURE — 85025 COMPLETE CBC W/AUTO DIFF WBC: CPT | Performed by: INTERNAL MEDICINE

## 2022-01-11 PROCEDURE — 82728 ASSAY OF FERRITIN: CPT | Performed by: INTERNAL MEDICINE

## 2022-01-11 NOTE — PROGRESS NOTES
Patient scheduled today for therapeutic phlebotomy. Hct today 38.8. 400ml removed without complication, patient tolerated well, vitals stable post procedure. Patient states this is the last phlebotomy she is to have until she follows up with Dr. Pimentel. Patient states she will scheduled next phlebotomy if Dr. Pimentel recommends at next MD appt.

## 2022-01-14 ENCOUNTER — TELEPHONE (OUTPATIENT)
Dept: PSYCHIATRY | Facility: CLINIC | Age: 57
End: 2022-01-14

## 2022-01-17 RX ORDER — LAMOTRIGINE 25 MG/1
25 TABLET ORAL 2 TIMES DAILY
Qty: 60 TABLET | Refills: 2 | Status: SHIPPED | OUTPATIENT
Start: 2022-01-17 | End: 2022-03-17 | Stop reason: SDUPTHER

## 2022-02-24 ENCOUNTER — OFFICE VISIT (OUTPATIENT)
Dept: CARDIOLOGY | Facility: CLINIC | Age: 57
End: 2022-02-24

## 2022-02-24 VITALS
RESPIRATION RATE: 18 BRPM | SYSTOLIC BLOOD PRESSURE: 141 MMHG | WEIGHT: 226 LBS | HEART RATE: 79 BPM | OXYGEN SATURATION: 99 % | HEIGHT: 59 IN | DIASTOLIC BLOOD PRESSURE: 86 MMHG | BODY MASS INDEX: 45.56 KG/M2

## 2022-02-24 DIAGNOSIS — R07.89 OTHER CHEST PAIN: Primary | ICD-10-CM

## 2022-02-24 DIAGNOSIS — R06.02 SOB (SHORTNESS OF BREATH): ICD-10-CM

## 2022-02-24 DIAGNOSIS — E83.110 HEREDITARY HEMOCHROMATOSIS: ICD-10-CM

## 2022-02-24 DIAGNOSIS — E78.5 HYPERLIPIDEMIA, UNSPECIFIED HYPERLIPIDEMIA TYPE: ICD-10-CM

## 2022-02-24 DIAGNOSIS — R06.09 DOE (DYSPNEA ON EXERTION): ICD-10-CM

## 2022-02-24 PROCEDURE — 99214 OFFICE O/P EST MOD 30 MIN: CPT | Performed by: INTERNAL MEDICINE

## 2022-02-24 PROCEDURE — 93000 ELECTROCARDIOGRAM COMPLETE: CPT | Performed by: INTERNAL MEDICINE

## 2022-02-24 NOTE — PROGRESS NOTES
Cardiology Office Visit      Encounter Date:  02/24/2022    Patient ID:   Merissa Yusuf is a 56 y.o. female.    Reason For Followup:  Chest discomfort  Shortness of breath    Brief Clinical History:  Dear Dr. Duarte, Melissa Petty MD    I had the pleasure of seeing Merissa Yusuf today. As you are well aware, this is a 56 y.o. female with past medical history that is significant for history of no known coronary artery disease history of hyperlipidemia history of obesity resented with symptoms of dizziness and lightheadedness    Patient had a cardiac evaluation and middle of 2019 with an echocardiogram with normal LV systolic function and stress test with no ischemic burden      Interval History:  Episode of chest discomfort substernal in nature last week with no recurrence  Shortness of breath and dyspnea on exertion  Unable to tolerate Zetia secondary to side effects  Unable to take statin secondary to underlying hemachromatosis  Denies any orthopnea PND      Assessment & Plan    Impressions:  Dizziness/improved  Chest pain  Shortness of breath  Hyperlipidemia  hemochromatosis      Recommendations:  Intolerant to Zetia  Unable to take statin secondary to underlying hemochromatosis  Start patient on a PCSK9 inhibitor to help with hyperlipidemia  Schedule patient for a myocardial perfusion study for further stratification for symptoms of chest discomfort  Schedule patient for an echocardiogram to assess the LV systolic function rule out any structural heart disease with symptoms of shortness of breath and dyspnea on exertion    Advised patient to consider a vascular screening study and also coronary artery calcium score for further stratification    Continued aggressive risk factor modification  Follow up in office in 6 months      Objective:    Vitals:  Vitals:    02/24/22 1441   BP: 141/86   BP Location: Left arm   Patient Position: Sitting   Cuff Size: Large Adult   Pulse: 79   Resp: 18   SpO2: 99%   Weight:  "103 kg (226 lb)   Height: 149.9 cm (59\")       Physical Exam:    General: Alert, cooperative, no distress, appears stated age  Head:  Normocephalic, atraumatic, mucous membranes moist  Eyes:  Conjunctiva/corneas clear, EOM's intact     Neck:  Supple,  no adenopathy;      Lungs: Clear to auscultation bilaterally, no wheezes rhonchi rales are noted  Chest wall: No tenderness  Heart::  Regular rate and rhythm, S1 and S2 normal, no murmur, rub or gallop  Abdomen: Soft, non-tender, nondistended bowel sounds active  Extremities: No cyanosis, clubbing, or edema  Pulses: 2+ and symmetric all extremities  Skin:  No rashes or lesions  Neuro/psych: A&O x3. CN II through XII are grossly intact with appropriate affect      Allergies:  Allergies   Allergen Reactions   • Amoxicillin Swelling   • Erythromycin GI Intolerance   • Escitalopram Anxiety   • Prozac  [Fluoxetine Hcl] Rash   • Penicillin G Rash       Medication Review:     Current Outpatient Medications:   •  acetaminophen (TYLENOL) 500 MG tablet, Take 500 mg by mouth Every 4 (Four) Hours As Needed., Disp: , Rfl:   •  benzonatate (TESSALON) 200 MG capsule, Take 1 capsule by mouth 3 (Three) Times a Day As Needed for Cough., Disp: 30 capsule, Rfl: 1  •  busPIRone (BUSPAR) 15 MG tablet, TAKE 1 TABLET BY MOUTH THREE TIMES DAILY FOR ANXIETY, Disp: 270 tablet, Rfl: 1  •  Calcium Carbonate Antacid (TUMS PO), Take 2,000 mg by mouth At Night As Needed., Disp: , Rfl:   •  cetirizine (zyrTEC) 10 MG tablet, Take 10 mg by mouth Daily., Disp: , Rfl:   •  COLLAGEN PO, Take  by mouth., Disp: , Rfl:   •  diclofenac (VOLTAREN) 1 % gel gel, Apply 4 g topically to the appropriate area as directed 4 (Four) Times a Day As Needed., Disp: , Rfl:   •  lamoTRIgine (LaMICtal) 25 MG tablet, Take 1 tablet by mouth 2 (Two) Times a Day. For mood stabilier, Disp: 60 tablet, Rfl: 2  •  Naproxen Sodium 220 MG capsule, Take 1 tablet by mouth As Needed., Disp: , Rfl:   •  TURMERIC PO, Take  by mouth., Disp: " , Rfl:   •  Vortioxetine HBr (Trintellix) 5 MG tablet, Take 5 mg by mouth Daily With Breakfast., Disp: 90 tablet, Rfl: 1  •  Evolocumab (REPATHA) solution auto-injector SureClick injection, Inject 1 mL under the skin into the appropriate area as directed Every 14 (Fourteen) Days., Disp: 1 mL, Rfl: 5    Family History:  Family History   Problem Relation Age of Onset   • Coronary artery disease Other    • Diabetes Other    • Dementia Other    • Diabetes Mother    • Cancer Mother    • Heart disease Father    • Liver disease Father    • Hypertension Father    • Cancer Brother    • Breast cancer Neg Hx    • Ovarian cancer Neg Hx        Past Medical History:  Past Medical History:   Diagnosis Date   • Anesthesia complication     drops B/P, and slow to arouse   • GERD (gastroesophageal reflux disease)    • Hereditary hemochromatosis (HCC)    • Hyperlipidemia    • Seasonal allergies        Past surgical History:  Past Surgical History:   Procedure Laterality Date   • BLADDER SUSPENSION     • BREAST BIOPSY Right    • FINGER SURGERY  2008    removed cysts from right index finger    • HERNIA REPAIR     • SUBTOTAL HYSTERECTOMY     • TUBAL ABDOMINAL LIGATION     • VENTRAL/INCISIONAL HERNIA REPAIR N/A 9/24/2020    Procedure: LAPAROSCOPIC VENTRAL HERNIA;  Surgeon: Reuben Molina MD;  Location: [facility] MAIN OR;  Service: General;  Laterality: N/A;  0855 TAP block   • WISDOM TOOTH EXTRACTION         Social History:  Social History     Socioeconomic History   • Marital status:    Tobacco Use   • Smoking status: Never Smoker   • Smokeless tobacco: Never Used   Vaping Use   • Vaping Use: Never used   Substance and Sexual Activity   • Alcohol use: Yes     Alcohol/week: 1.0 standard drink     Types: 1 Glasses of wine per week     Comment: rarely   • Drug use: No   • Sexual activity: Yes     Partners: Male     Birth control/protection: None       Review of Systems:  The following systems were reviewed as they relate to the  cardiovascular system: Constitutional, Eyes, ENT, Cardiovascular, Respiratory, Gastrointestinal, Integumentary, Neurological, Psychiatric, Hematologic, Endocrine, Musculoskeletal, and Genitourinary. The pertinent cardiovascular findings are reported above with all other cardiovascular points within those systems being negative.    Diagnostic Study Review:     Current Electrocardiogram:    ECG 12 Lead    Date/Time: 2/24/2022 5:01 PM  Performed by: Chely Arnold MD  Authorized by: Chely Arnold MD   Comparison: compared with previous ECG   Similar to previous ECG  Rhythm: sinus rhythm  Rate: normal  BPM: 80  Conduction: conduction normal  Q waves: III and aVF    QRS axis: normal  Other findings: non-specific ST-T wave changes    Clinical impression: abnormal EKG              NOTE: The following portions of the patient's history were reviewed and updated this visit as appropriate: allergies, current medications, past family history, past medical history, past social history, past surgical history and problem list.

## 2022-02-28 ENCOUNTER — TELEPHONE (OUTPATIENT)
Dept: CARDIOLOGY | Facility: CLINIC | Age: 57
End: 2022-02-28

## 2022-02-28 NOTE — TELEPHONE ENCOUNTER
Received approval on [employer], through cover my meds, sent to pharmacy.    CaseId:[id number]; Status:Approved; Review Type:Prior Auth;  Coverage Start Date:01/29/2022;  Coverage End Date:02/28/2023;

## 2022-03-08 ENCOUNTER — TELEPHONE (OUTPATIENT)
Dept: FAMILY MEDICINE CLINIC | Facility: CLINIC | Age: 57
End: 2022-03-08

## 2022-03-08 ENCOUNTER — LAB (OUTPATIENT)
Dept: LAB | Facility: HOSPITAL | Age: 57
End: 2022-03-08

## 2022-03-08 DIAGNOSIS — E83.110 HEREDITARY HEMOCHROMATOSIS: ICD-10-CM

## 2022-03-08 DIAGNOSIS — E83.119 HEMOCHROMATOSIS, UNSPECIFIED HEMOCHROMATOSIS TYPE: ICD-10-CM

## 2022-03-08 DIAGNOSIS — E83.19 IRON OVERLOAD: ICD-10-CM

## 2022-03-08 LAB
BASOPHILS # BLD AUTO: 0.05 10*3/MM3 (ref 0–0.2)
BASOPHILS NFR BLD AUTO: 0.6 % (ref 0–1.5)
DEPRECATED RDW RBC AUTO: 44.9 FL (ref 37–54)
EOSINOPHIL # BLD AUTO: 0.22 10*3/MM3 (ref 0–0.4)
EOSINOPHIL NFR BLD AUTO: 2.8 % (ref 0.3–6.2)
ERYTHROCYTE [DISTWIDTH] IN BLOOD BY AUTOMATED COUNT: 12.1 % (ref 12.3–15.4)
FERRITIN SERPL-MCNC: 59.7 NG/ML (ref 13–150)
HCT VFR BLD AUTO: 38.7 % (ref 34–46.6)
HGB BLD-MCNC: 13.5 G/DL (ref 12–15.9)
IRON 24H UR-MRATE: 120 MCG/DL (ref 37–145)
IRON SATN MFR SERPL: 35 % (ref 20–50)
LYMPHOCYTES # BLD AUTO: 2.58 10*3/MM3 (ref 0.7–3.1)
LYMPHOCYTES NFR BLD AUTO: 32.6 % (ref 19.6–45.3)
MCH RBC QN AUTO: 35.1 PG (ref 26.6–33)
MCHC RBC AUTO-ENTMCNC: 34.9 G/DL (ref 31.5–35.7)
MCV RBC AUTO: 100.5 FL (ref 79–97)
MONOCYTES # BLD AUTO: 0.53 10*3/MM3 (ref 0.1–0.9)
MONOCYTES NFR BLD AUTO: 6.7 % (ref 5–12)
NEUTROPHILS NFR BLD AUTO: 4.54 10*3/MM3 (ref 1.7–7)
NEUTROPHILS NFR BLD AUTO: 57.3 % (ref 42.7–76)
PLATELET # BLD AUTO: 359 10*3/MM3 (ref 140–450)
PMV BLD AUTO: 8.2 FL (ref 6–12)
RBC # BLD AUTO: 3.85 10*6/MM3 (ref 3.77–5.28)
TIBC SERPL-MCNC: 347 MCG/DL (ref 298–536)
TRANSFERRIN SERPL-MCNC: 233 MG/DL (ref 200–360)
WBC NRBC COR # BLD: 7.92 10*3/MM3 (ref 3.4–10.8)

## 2022-03-08 PROCEDURE — 83540 ASSAY OF IRON: CPT

## 2022-03-08 PROCEDURE — 36415 COLL VENOUS BLD VENIPUNCTURE: CPT

## 2022-03-08 PROCEDURE — 82728 ASSAY OF FERRITIN: CPT

## 2022-03-08 PROCEDURE — 85025 COMPLETE CBC W/AUTO DIFF WBC: CPT | Performed by: INTERNAL MEDICINE

## 2022-03-08 PROCEDURE — 84466 ASSAY OF TRANSFERRIN: CPT

## 2022-03-09 NOTE — TELEPHONE ENCOUNTER
Hub to read  ----- Message from Melissa Duarte MD sent at 3/8/2022  2:45 PM EST -----  Iron studies were normal.

## 2022-03-14 ENCOUNTER — TELEMEDICINE (OUTPATIENT)
Dept: FAMILY MEDICINE CLINIC | Facility: CLINIC | Age: 57
End: 2022-03-14

## 2022-03-14 VITALS
DIASTOLIC BLOOD PRESSURE: 86 MMHG | HEART RATE: 82 BPM | SYSTOLIC BLOOD PRESSURE: 128 MMHG | TEMPERATURE: 97.3 F | OXYGEN SATURATION: 94 % | BODY MASS INDEX: 46.45 KG/M2 | WEIGHT: 230 LBS

## 2022-03-14 DIAGNOSIS — J39.8 CONGESTION OF UPPER RESPIRATORY TRACT: Primary | ICD-10-CM

## 2022-03-14 DIAGNOSIS — R03.0 ELEVATED BLOOD PRESSURE READING WITHOUT DIAGNOSIS OF HYPERTENSION: ICD-10-CM

## 2022-03-14 DIAGNOSIS — Z12.31 ENCOUNTER FOR SCREENING MAMMOGRAM FOR MALIGNANT NEOPLASM OF BREAST: ICD-10-CM

## 2022-03-14 PROCEDURE — 99443 PR PHYS/QHP TELEPHONE EVALUATION 21-30 MIN: CPT | Performed by: PREVENTIVE MEDICINE

## 2022-03-14 RX ORDER — GUAIFENESIN AND DEXTROMETHORPHAN HYDROBROMIDE 600; 30 MG/1; MG/1
TABLET, EXTENDED RELEASE ORAL 2 TIMES DAILY PRN
COMMUNITY
End: 2022-09-22

## 2022-03-14 RX ORDER — DOXYCYCLINE 100 MG/1
100 CAPSULE ORAL 2 TIMES DAILY
Qty: 20 CAPSULE | Refills: 0 | Status: SHIPPED | OUTPATIENT
Start: 2022-03-14 | End: 2022-06-28

## 2022-03-14 RX ORDER — LEVOMEFOLATE CALCIUM 7.5 MG
7.5 TABLET ORAL 2 TIMES DAILY
COMMUNITY

## 2022-03-14 NOTE — PROGRESS NOTES
Subjective   Merissa Yusuf is a 56 y.o. female presents for   Chief Complaint   Patient presents with   • Cough     For 3 weeks; runny nose and chest congestion. Productive with white to yellow sputum. Has had atypical stool for her and sore throat but not too bad. Patient also reports ear pain. Denies being exposed to COVID, FLU, or strep. No fever and O2 has been mid 90s   • Headache   • Wheezing     Unable to complete visit using a video connection to the patient. A phone visit was used to complete this visits. Total time of discussion was 25 minutes and 10 to chart minutes.  I was in my office with the door closed on my desktop computer patient was on her 's desktop computer with her  in the room with her.  Patient states that she started getting ill with congestion approximately 3 weeks ago on 226 of 2022.  She did feel better last week thought she was over it was sleeping through the night for a few nights and then came back with coughing to the point where she has difficulty sleeping.  She did empty the garbage bag from her vacuum and does feel as though that may have stirred up some allergies.  She is no longer using her nasal spray and will resume that has not had a fever with this at all but is having yellow tinge sputum throughout the day now as well as first thing when she gets up in the morning..  Patient has not been around anybody that has had Covid she has had her COVID shots of booster.  Does have allergies this time of year as well.  No one else in the family is ill she did do a Covid test twice at home and both tests were negative.  Did not admit to any nausea or vomiting    Health Maintenance Due   Topic Date Due   • ZOSTER VACCINE (1 of 2) Never done   • COVID-19 Vaccine (3 - Booster for Moderna series) 09/02/2021   • MAMMOGRAM  01/13/2022       History of Present Illness     Vitals:    03/14/22 1322 03/14/22 1334   BP: 129/99 128/86   Pulse: 82    Temp: 97.3 °F (36.3 °C)     SpO2: 94%    Weight: 104 kg (230 lb)      Body mass index is 46.45 kg/m².    Current Outpatient Medications on File Prior to Visit   Medication Sig Dispense Refill   • acetaminophen (TYLENOL) 500 MG tablet Take 500 mg by mouth Every 4 (Four) Hours As Needed.     • benzonatate (TESSALON) 200 MG capsule Take 1 capsule by mouth 3 (Three) Times a Day As Needed for Cough. 30 capsule 1   • busPIRone (BUSPAR) 15 MG tablet TAKE 1 TABLET BY MOUTH THREE TIMES DAILY FOR ANXIETY 270 tablet 1   • Calcium Carbonate Antacid (TUMS PO) Take 2,000 mg by mouth At Night As Needed.     • cetirizine (zyrTEC) 10 MG tablet Take 10 mg by mouth Daily.     • COLLAGEN PO Take  by mouth.     • diclofenac (VOLTAREN) 1 % gel gel Apply 4 g topically to the appropriate area as directed 4 (Four) Times a Day As Needed.     • Evolocumab (REPATHA) solution auto-injector SureClick injection Inject 1 mL under the skin into the appropriate area as directed Every 14 (Fourteen) Days. 2 mL 6   • guaifenesin-dextromethorphan (MUCINEX DM)  MG tablet sustained-release 12 hour tablet Take  by mouth 2 (Two) Times a Day As Needed.     • l-methylfolate calcium (DEPLIN) 7.5 MG tablet tablet Take 7.5 mg by mouth 2 (Two) Times a Day.     • lamoTRIgine (LaMICtal) 25 MG tablet Take 1 tablet by mouth 2 (Two) Times a Day. For mood stabilier 60 tablet 2   • Naproxen Sodium 220 MG capsule Take 1 tablet by mouth As Needed.     • TURMERIC PO Take  by mouth.     • Vortioxetine HBr (Trintellix) 5 MG tablet Take 5 mg by mouth Daily With Breakfast. 90 tablet 1     No current facility-administered medications on file prior to visit.       The following portions of the patient's history were reviewed and updated as appropriate: allergies, current medications, past family history, past medical history, past social history, past surgical history and problem list.    Review of Systems   Constitutional: Positive for fatigue. Negative for fever.   HENT: Positive for congestion,  postnasal drip, rhinorrhea and sinus pressure.    Respiratory: Positive for cough and chest tightness.    Neurological: Positive for headache.       Objective   Physical Exam   Pulmonary/Chest: Effort normal.   Psychiatric: She has a normal mood and affect.        PHQ-9 Total Score:      Assessment/Plan   Diagnoses and all orders for this visit:    1. Congestion of upper respiratory tract (Primary)  Comments:  Congestion began 226 of 22 no fever yellow sputum.    2. Encounter for screening mammogram for malignant neoplasm of breast  Comments:  Will remind patient to get mammogram    3. Elevated blood pressure reading without diagnosis of hypertension  Comments:  Blood pressure is well controlled.        Patient Instructions     Health Maintenance Due   Topic Date Due   • ZOSTER VACCINE (1 of 2) Never done   • COVID-19 Vaccine (3 - Booster for Moderna series) 09/02/2021   • MAMMOGRAM  01/13/2022    Rest and plenty of fluids.  Use Flonas along with Zyrtec and Tessalon and take antibiotics till gone.  If oxygen goes below 90 or not feeling like improving advised recheck in [facility].  Text back end of week to report progress and will get chest xray if persists.

## 2022-03-14 NOTE — PATIENT INSTRUCTIONS
Health Maintenance Due   Topic Date Due    ZOSTER VACCINE (1 of 2) Never done    COVID-19 Vaccine (3 - Booster for Moderna series) 09/02/2021    MAMMOGRAM  01/13/2022    Rest and plenty of fluids.  Use Flonas along with Zyrtec and Tessalon and take antibiotics till gone.  If oxygen goes below 90 or not feeling like improving advised recheck in [facility].  Text back end of week to report progress and will get chest xray if persists.

## 2022-03-15 ENCOUNTER — TELEPHONE (OUTPATIENT)
Dept: ONCOLOGY | Facility: CLINIC | Age: 57
End: 2022-03-15

## 2022-03-15 ENCOUNTER — APPOINTMENT (OUTPATIENT)
Dept: LAB | Facility: HOSPITAL | Age: 57
End: 2022-03-15

## 2022-03-15 NOTE — TELEPHONE ENCOUNTER
Caller: Merissa Yusuf    Relationship to patient: Self    Best call back number: [phone]    Chief complaint: PATIENT NOT FEELING WELL, NEEDS TO RESCHEDULE. HUB UNABLE TO DUE TO PATIENT BEING IN ACTIVE TREATMENT. TRIED TO WARM TRANSFER, NO ANSWER.     Type of visit: VITALS AND FOLLOW UP    Requested date: PATIENT WILL CALL BACK TO RESCHEDULE WHEN SHE IS FEELING WELL.PLEASE CANCEL APPOINTMENTS FOR TODAY    If rescheduling, when is the original appointment: 3-15-22

## 2022-03-17 ENCOUNTER — TELEPHONE (OUTPATIENT)
Dept: PSYCHIATRY | Facility: CLINIC | Age: 57
End: 2022-03-17

## 2022-03-17 RX ORDER — LAMOTRIGINE 25 MG/1
25 TABLET ORAL 2 TIMES DAILY
Qty: 180 TABLET | Refills: 1 | Status: SHIPPED | OUTPATIENT
Start: 2022-03-17 | End: 2022-09-07

## 2022-03-18 NOTE — TELEPHONE ENCOUNTER
Caller: Merissa Yusuf    Relationship to patient: Self    Best call back number: [phone]    Patient is needing: O R/S 3- LAB AND F/U APPT.

## 2022-03-21 ENCOUNTER — TELEPHONE (OUTPATIENT)
Dept: ONCOLOGY | Facility: CLINIC | Age: 57
End: 2022-03-21

## 2022-03-21 NOTE — PATIENT INSTRUCTIONS
Health Maintenance Due   Topic Date Due    ZOSTER VACCINE (1 of 2) Never done    MAMMOGRAM  01/13/2022    Call Sally if still prickling in abdomen    Patient to recall Dr. Seipel

## 2022-03-21 NOTE — TELEPHONE ENCOUNTER
Caller: Merissa Yusuf    Relationship: Self    Best call back number:[phone]    What is the best time to reach you: ANYTIME    Who are you requesting to speak with (clinical staff, provider,  specific staff member): CELESTINE/ SCHEDULING    Do youANGIE    What was the call regarding: RETURNING A MISSED CALL TO RESCHED 3 MO FU    Do you require a callback: YES    ATTEMPTED TO WT NO ANSWER

## 2022-03-22 ENCOUNTER — OFFICE VISIT (OUTPATIENT)
Dept: FAMILY MEDICINE CLINIC | Facility: CLINIC | Age: 57
End: 2022-03-22

## 2022-03-22 VITALS
WEIGHT: 229.8 LBS | DIASTOLIC BLOOD PRESSURE: 68 MMHG | HEIGHT: 59 IN | OXYGEN SATURATION: 98 % | SYSTOLIC BLOOD PRESSURE: 103 MMHG | HEART RATE: 77 BPM | TEMPERATURE: 97.5 F | BODY MASS INDEX: 46.33 KG/M2

## 2022-03-22 DIAGNOSIS — E66.01 CLASS 3 SEVERE OBESITY DUE TO EXCESS CALORIES WITH SERIOUS COMORBIDITY AND BODY MASS INDEX (BMI) OF 45.0 TO 49.9 IN ADULT: ICD-10-CM

## 2022-03-22 DIAGNOSIS — R41.3 MEMORY LOSS: ICD-10-CM

## 2022-03-22 DIAGNOSIS — F33.1 MODERATE EPISODE OF RECURRENT MAJOR DEPRESSIVE DISORDER: Chronic | ICD-10-CM

## 2022-03-22 DIAGNOSIS — K21.9 GASTROESOPHAGEAL REFLUX DISEASE, UNSPECIFIED WHETHER ESOPHAGITIS PRESENT: ICD-10-CM

## 2022-03-22 DIAGNOSIS — Z12.31 ENCOUNTER FOR SCREENING MAMMOGRAM FOR MALIGNANT NEOPLASM OF BREAST: ICD-10-CM

## 2022-03-22 DIAGNOSIS — E55.9 VITAMIN D DEFICIENCY: ICD-10-CM

## 2022-03-22 DIAGNOSIS — R03.0 ELEVATED BLOOD PRESSURE READING WITHOUT DIAGNOSIS OF HYPERTENSION: ICD-10-CM

## 2022-03-22 DIAGNOSIS — G47.33 OBSTRUCTIVE SLEEP APNEA: ICD-10-CM

## 2022-03-22 DIAGNOSIS — E78.2 MIXED HYPERLIPIDEMIA: Primary | ICD-10-CM

## 2022-03-22 DIAGNOSIS — E83.110 HEREDITARY HEMOCHROMATOSIS: ICD-10-CM

## 2022-03-22 LAB
25(OH)D3 SERPL-MCNC: 45 NG/ML (ref 30–100)
ALBUMIN SERPL-MCNC: 4.4 G/DL (ref 3.5–5.2)
ALBUMIN/GLOB SERPL: 1.7 G/DL
ALP SERPL-CCNC: 87 U/L (ref 39–117)
ALT SERPL W P-5'-P-CCNC: 13 U/L (ref 1–33)
ANION GAP SERPL CALCULATED.3IONS-SCNC: 9.5 MMOL/L (ref 5–15)
AST SERPL-CCNC: 17 U/L (ref 1–32)
BILIRUB SERPL-MCNC: 0.4 MG/DL (ref 0–1.2)
BUN SERPL-MCNC: 13 MG/DL (ref 6–20)
BUN/CREAT SERPL: 17.3 (ref 7–25)
CALCIUM SPEC-SCNC: 9.8 MG/DL (ref 8.6–10.5)
CHLORIDE SERPL-SCNC: 104 MMOL/L (ref 98–107)
CHOLEST SERPL-MCNC: 127 MG/DL (ref 0–200)
CO2 SERPL-SCNC: 24.5 MMOL/L (ref 22–29)
CREAT SERPL-MCNC: 0.75 MG/DL (ref 0.57–1)
EGFRCR SERPLBLD CKD-EPI 2021: 92.4 ML/MIN/1.73
GLOBULIN UR ELPH-MCNC: 2.6 GM/DL
GLUCOSE SERPL-MCNC: 98 MG/DL (ref 65–99)
HDLC SERPL-MCNC: 64 MG/DL (ref 40–60)
LDLC SERPL CALC-MCNC: 48 MG/DL (ref 0–100)
LDLC/HDLC SERPL: 0.75 {RATIO}
MAGNESIUM SERPL-MCNC: 2 MG/DL (ref 1.6–2.6)
POTASSIUM SERPL-SCNC: 4.2 MMOL/L (ref 3.5–5.2)
PROT SERPL-MCNC: 7 G/DL (ref 6–8.5)
SODIUM SERPL-SCNC: 138 MMOL/L (ref 136–145)
TRIGL SERPL-MCNC: 75 MG/DL (ref 0–150)
TSH SERPL DL<=0.05 MIU/L-ACNC: 1.36 UIU/ML (ref 0.27–4.2)
VIT B12 BLD-MCNC: 805 PG/ML (ref 211–946)
VLDLC SERPL-MCNC: 15 MG/DL (ref 5–40)

## 2022-03-22 PROCEDURE — 84443 ASSAY THYROID STIM HORMONE: CPT | Performed by: PREVENTIVE MEDICINE

## 2022-03-22 PROCEDURE — 80053 COMPREHEN METABOLIC PANEL: CPT | Performed by: PREVENTIVE MEDICINE

## 2022-03-22 PROCEDURE — 82607 VITAMIN B-12: CPT | Performed by: PREVENTIVE MEDICINE

## 2022-03-22 PROCEDURE — 36415 COLL VENOUS BLD VENIPUNCTURE: CPT | Performed by: PREVENTIVE MEDICINE

## 2022-03-22 PROCEDURE — 82306 VITAMIN D 25 HYDROXY: CPT | Performed by: PREVENTIVE MEDICINE

## 2022-03-22 PROCEDURE — 99214 OFFICE O/P EST MOD 30 MIN: CPT | Performed by: PREVENTIVE MEDICINE

## 2022-03-22 PROCEDURE — 83735 ASSAY OF MAGNESIUM: CPT | Performed by: PREVENTIVE MEDICINE

## 2022-03-22 PROCEDURE — 80061 LIPID PANEL: CPT | Performed by: PREVENTIVE MEDICINE

## 2022-03-22 NOTE — PROGRESS NOTES
"Subjective   Merissa Yusuf is a 56 y.o. female presents for   Chief Complaint   Patient presents with   • Hyperlipidemia     6 month f/u. Had a small lick of donut icing   • Hypertension     Patient was checked today for multiple chronic health conditions most of which are stable.  Her upper respiratory infection that we saw her for last week has improved but she does have some laryngitis and has been encouraged to wear her mask around vulnerable people.  Patient has been instructed to call Dr. Willard about when her replacement CPAP will be in patient's depression has improved she will consider getting the mammogram soon.  Health Maintenance Due   Topic Date Due   • ZOSTER VACCINE (1 of 2) Never done   • MAMMOGRAM  01/13/2022       History of Present Illness     Vitals:    03/22/22 0902 03/22/22 0905   BP: 116/79 103/68   BP Location: Right arm Left arm   Patient Position: Sitting Sitting   Cuff Size: Large Adult Large Adult   Pulse: 77    Temp: 97.5 °F (36.4 °C)    TempSrc: Temporal    SpO2: 98%    Weight: 104 kg (229 lb 12.8 oz)    Height: 149.9 cm (59\")      Body mass index is 46.41 kg/m².    Current Outpatient Medications on File Prior to Visit   Medication Sig Dispense Refill   • acetaminophen (TYLENOL) 500 MG tablet Take 500 mg by mouth Every 4 (Four) Hours As Needed.     • benzonatate (TESSALON) 200 MG capsule Take 1 capsule by mouth 3 (Three) Times a Day As Needed for Cough. 30 capsule 1   • busPIRone (BUSPAR) 15 MG tablet TAKE 1 TABLET BY MOUTH THREE TIMES DAILY FOR ANXIETY 270 tablet 1   • Calcium Carbonate Antacid (TUMS PO) Take 2,000 mg by mouth At Night As Needed.     • cetirizine (zyrTEC) 10 MG tablet Take 10 mg by mouth Daily.     • COLLAGEN PO Take  by mouth.     • diclofenac (VOLTAREN) 1 % gel gel Apply 4 g topically to the appropriate area as directed 4 (Four) Times a Day As Needed.     • doxycycline (MONODOX) 100 MG capsule Take 1 capsule by mouth 2 (Two) Times a Day. 20 capsule 0   • Evolocumab " (REPATHA) solution auto-injector SureClick injection Inject 1 mL under the skin into the appropriate area as directed Every 14 (Fourteen) Days. 6 mL 3   • l-methylfolate calcium (DEPLIN) 7.5 MG tablet tablet Take 7.5 mg by mouth 2 (Two) Times a Day.     • lamoTRIgine (LaMICtal) 25 MG tablet Take 1 tablet by mouth 2 (Two) Times a Day. For mood stabilier 180 tablet 1   • Naproxen Sodium 220 MG capsule Take 1 tablet by mouth As Needed.     • TURMERIC PO Take  by mouth.     • Vortioxetine HBr (Trintellix) 5 MG tablet Take 5 mg by mouth Daily With Breakfast. 90 tablet 1   • guaifenesin-dextromethorphan (MUCINEX DM)  MG tablet sustained-release 12 hour tablet Take  by mouth 2 (Two) Times a Day As Needed.       No current facility-administered medications on file prior to visit.       The following portions of the patient's history were reviewed and updated as appropriate: allergies, current medications, past family history, past medical history, past social history, past surgical history and problem list.    Review of Systems   HENT: Positive for congestion and voice change.    Respiratory: Positive for cough.    Psychiatric/Behavioral: Positive for depressed mood.       Objective   Physical Exam  Vitals reviewed.   Constitutional:       General: She is not in acute distress.     Appearance: She is well-developed. She is obese. She is not ill-appearing or toxic-appearing.   HENT:      Head: Normocephalic and atraumatic.      Right Ear: Tympanic membrane, ear canal and external ear normal.      Left Ear: Tympanic membrane, ear canal and external ear normal.      Nose: Nose normal.      Mouth/Throat:      Pharynx: No oropharyngeal exudate or posterior oropharyngeal erythema.   Eyes:      Extraocular Movements: Extraocular movements intact.      Conjunctiva/sclera: Conjunctivae normal.      Pupils: Pupils are equal, round, and reactive to light.   Cardiovascular:      Rate and Rhythm: Normal rate and regular rhythm.       Heart sounds: Normal heart sounds.   Pulmonary:      Effort: Pulmonary effort is normal.      Comments: Today no wheezes rales or rhonchi.  Abdominal:      General: Bowel sounds are normal. There is no distension.      Palpations: Abdomen is soft. There is no mass.      Tenderness: There is no abdominal tenderness.   Musculoskeletal:         General: Normal range of motion.      Cervical back: Neck supple.   Skin:     General: Skin is warm.   Neurological:      General: No focal deficit present.      Mental Status: She is alert and oriented to person, place, and time.   Psychiatric:         Mood and Affect: Mood normal.         Behavior: Behavior normal.       PHQ-9 Total Score:      Assessment/Plan   Diagnoses and all orders for this visit:    1. Mixed hyperlipidemia (Primary)  Comments:  Trying to eat less saturated fats  Orders:  -     Lipid Panel    2. Elevated blood pressure reading without diagnosis of hypertension  Comments:  =Blood pressures well controlled today.    3. Gastroesophageal reflux disease, unspecified whether esophagitis present  Comments:  rarely  Orders:  -     Magnesium    4. Hereditary hemochromatosis (HCC)  Comments:  Recent check showed ferritin was normal will follow up with the hematologist    5. Moderate episode of recurrent major depressive disorder (HCC)  Comments:  Mood is much improved no HI or SI    6. Obstructive sleep apnea  Comments:  Stopped using CPAP with the upper respiratory infection will resume soon.    7. Vitamin D deficiency  -     Vitamin D 25 Hydroxy    8. Memory loss  Comments:  Will consider seeing Dr. Seipel again and let us know  Orders:  -     Comprehensive Metabolic Panel  -     TSH  -     Vitamin B12    9. Encounter for screening mammogram for malignant neoplasm of breast  -     Mammo Screening Digital Tomosynthesis Bilateral With CAD; Future    10. Class 3 severe obesity due to excess calories with serious comorbidity and body mass index (BMI) of 45.0 to  49.9 in adult (HCC)        Patient Instructions     Health Maintenance Due   Topic Date Due   • ZOSTER VACCINE (1 of 2) Never done   • MAMMOGRAM  01/13/2022    Call [facility] if still prickling in abdomen    Patient to recall Dr. Seipel

## 2022-03-22 NOTE — PROGRESS NOTES
Venipuncture Blood Specimen Collection  Venipuncture performed in right arm by Gary Brothers MA with good hemostasis. Patient tolerated the procedure well without complications.   03/22/2022  Gary Brothers MA

## 2022-03-23 ENCOUNTER — TELEPHONE (OUTPATIENT)
Dept: FAMILY MEDICINE CLINIC | Facility: CLINIC | Age: 57
End: 2022-03-23

## 2022-03-23 NOTE — TELEPHONE ENCOUNTER
----- Message from Melissa Duarte MD sent at 3/23/2022  9:55 AM EDT -----    HUB TO READ    Labs look normal- keep up good work

## 2022-03-23 NOTE — PROGRESS NOTES
ONCOLOGY/HEMATOLOGY FOLLOW UP    PATIENT: Merissa Yusuf  YOB: 1965  MEDICAL RECORD NUMBER: [id number] OF SERVICE: 03/28/22      Chief complaint:  Hereditary hemochromatosis, homozygous mutation in H63D.  Iron overload  Macrocytosis    History of Present Illness:   · Ms. Yusuf has a history of COPD and also depression.  Laboratory work up was obtained in March 2019 which was abnormal.  Patient was sent to the [facility] and seen initially on 3/13/19.   · 3/5/19 - WBC 4.7, hemoglobin 14.1, MCV elevated to 103 and platelet count of 592,000.  Vitamin D  20.  Sed rate 35.  TSH 1.62.  Vitamin B12 476.  Creatinine 0.8.    · 3/13/19 - ZACKERY-2 mutation negative.  Hemochromatosis gene mutation, homozygous mutation in H63D.  · 3/13/19 - Vitamin B12 507.  Ferritin 178.  Creatinine 0.7.  Iron level 156.  Iron binding  capacity 257.  Percentage iron saturation 61.   · 1/8/2020 Ferritin 304 iron 177 iron saturation 56 TIBC 316  · 7/21/2020 iron 247 iron saturation 82 iron binding capacity 302 ferritin 271 hemoglobin 13.7 WBC 6.1 platelet count 372.  · 6/19/2020 CMP is normal including LFTs.  TSH 3.01  · 10/2020 ferritin 233.40 iron sat 43  · 8/2021 ferritin 341.40, iron sat 76  · 11/2/2021 -ferritin 112  · 3/8/2022 -CBC with hemoglobin 13.5, hematocrit 38.7.  Ferritin 59.7, iron saturation 35      SUBJECTIVE:   Patient denies any new symptoms.  She does have ongoing fatigue.    Review of Systems   Constitutional: Positive for fatigue.   HENT:   Negative for nosebleeds.    Respiratory: Negative for chest tightness and cough.    Cardiovascular: Negative for chest pain.   Gastrointestinal: Negative for abdominal pain and blood in stool.   Genitourinary: Negative for dysuria, frequency and hematuria.    Musculoskeletal: Negative for back pain.   Skin: Negative for rash and wound.   Neurological: Negative for headaches and light-headedness.   Psychiatric/Behavioral: Negative for confusion.  The patient is not nervous/anxious.           Past Medical History:   Diagnosis Date   • Anesthesia complication     drops B/P, and slow to arouse   • GERD (gastroesophageal reflux disease)    • Hereditary hemochromatosis (HCC)    • Hyperlipidemia    • Seasonal allergies        Past Surgical History:   Procedure Laterality Date   • BLADDER SUSPENSION     • BREAST BIOPSY Right    • FINGER SURGERY  2008    removed cysts from right index finger    • HERNIA REPAIR     • SUBTOTAL HYSTERECTOMY     • TUBAL ABDOMINAL LIGATION     • VENTRAL/INCISIONAL HERNIA REPAIR N/A 9/24/2020    Procedure: LAPAROSCOPIC VENTRAL HERNIA;  Surgeon: Reuben Molina MD;  Location: [facility] OR;  Service: General;  Laterality: N/A;  0855 TAP block   • WISDOM TOOTH EXTRACTION         Family History   Problem Relation Age of Onset   • Coronary artery disease Other    • Diabetes Other    • Dementia Other    • Diabetes Mother    • Cancer Mother    • Heart disease Father    • Liver disease Father    • Hypertension Father    • Cancer Brother    • Breast cancer Neg Hx    • Ovarian cancer Neg Hx        Social History     Socioeconomic History   • Marital status:    Tobacco Use   • Smoking status: Never Smoker   • Smokeless tobacco: Never Used   Vaping Use   • Vaping Use: Never used   Substance and Sexual Activity   • Alcohol use: Yes     Alcohol/week: 1.0 standard drink     Types: 1 Glasses of wine per week     Comment: rarely   • Drug use: No   • Sexual activity: Yes     Partners: Male     Birth control/protection: None       ALLERGIES:  Amoxicillin, Erythromycin, Escitalopram, Prozac  [fluoxetine hcl], and Penicillin g    MEDICATION:  Current Outpatient Medications   Medication Sig Dispense Refill   • acetaminophen (TYLENOL) 500 MG tablet Take 500 mg by mouth Every 4 (Four) Hours As Needed.     • benzonatate (TESSALON) 200 MG capsule Take 1 capsule by mouth 3 (Three) Times a Day As Needed for Cough. 30 capsule 1   • busPIRone  "(BUSPAR) 15 MG tablet TAKE 1 TABLET BY MOUTH THREE TIMES DAILY FOR ANXIETY 270 tablet 1   • Calcium Carbonate Antacid (TUMS PO) Take 2,000 mg by mouth At Night As Needed.     • cetirizine (zyrTEC) 10 MG tablet Take 10 mg by mouth Daily.     • COLLAGEN PO Take  by mouth.     • diclofenac (VOLTAREN) 1 % gel gel Apply 4 g topically to the appropriate area as directed 4 (Four) Times a Day As Needed.     • doxycycline (MONODOX) 100 MG capsule Take 1 capsule by mouth 2 (Two) Times a Day. 20 capsule 0   • Evolocumab (REPATHA) solution auto-injector SureClick injection Inject 1 mL under the skin into the appropriate area as directed Every 14 (Fourteen) Days. 6 mL 3   • guaifenesin-dextromethorphan (MUCINEX DM)  MG tablet sustained-release 12 hour tablet Take  by mouth 2 (Two) Times a Day As Needed.     • l-methylfolate calcium (DEPLIN) 7.5 MG tablet tablet Take 7.5 mg by mouth 2 (Two) Times a Day.     • lamoTRIgine (LaMICtal) 25 MG tablet Take 1 tablet by mouth 2 (Two) Times a Day. For mood stabilier 180 tablet 1   • Naproxen Sodium 220 MG capsule Take 1 tablet by mouth As Needed.     • TURMERIC PO Take  by mouth.     • Vortioxetine HBr (Trintellix) 5 MG tablet Take 5 mg by mouth Daily With Breakfast. 90 tablet 1     No current facility-administered medications for this visit.     PHYSICAL EXAM:    Current Vitals:  /81   Pulse 74   Temp 96.8 °F (36 °C)   Resp 18   Ht 149.9 cm (59\")   Wt 106 kg (232 lb 9.6 oz)   LMP  (LMP Unknown)   Breastfeeding No   BMI 46.98 kg/m²     VITAL signs in the last 24 hours: [unfilled]       03/24/22  1112   Weight: 106 kg (232 lb 9.6 oz)       Physical Exam  Constitutional:       Appearance: Normal appearance.   HENT:      Head: Normocephalic and atraumatic.   Eyes:      Pupils: Pupils are equal, round, and reactive to light.   Cardiovascular:      Rate and Rhythm: Normal rate and regular rhythm.      Pulses: Normal pulses.      Heart sounds: No murmur heard.  Pulmonary: "      Effort: Pulmonary effort is normal.      Breath sounds: Normal breath sounds.   Abdominal:      General: There is no distension.      Palpations: Abdomen is soft. There is no mass.      Tenderness: There is no abdominal tenderness.   Musculoskeletal:         General: Normal range of motion.      Cervical back: Normal range of motion.   Skin:     General: Skin is warm.   Neurological:      General: No focal deficit present.      Mental Status: She is alert.   Psychiatric:         Mood and Affect: Mood normal.      no change in exam      LABORATORY/DATA:  Lab Results   Component Value Date    WBC 6.64 03/24/2022    WBC 7.92 03/08/2022    HGB 13.6 03/24/2022    HGB 13.5 03/08/2022    HCT 40.1 03/24/2022    HCT 38.7 03/08/2022    NEUTROABS 3.37 03/24/2022       Lab Results   Component Value Date    GLUCOSE 98 03/22/2022    BUN 13 03/22/2022    CREATININE 0.75 03/22/2022    EGFRIFNONA 81 09/21/2021    EGFRIFAFRI >60 02/26/2019    BCR 17.3 03/22/2022    K 4.2 03/22/2022    CO2 24.5 03/22/2022    CALCIUM 9.8 03/22/2022    ALBUMIN 4.40 03/22/2022    LABIL2 1.5 03/13/2019    AST 17 03/22/2022    ALT 13 03/22/2022       Lab Results   Component Value Date    IRON 120 03/08/2022    TIBC 347 03/08/2022    FERRITIN 59.70 03/08/2022            Assessment & Plan    Patient is a 56-year-old female with iron overload secondary to homozygous H63D mutation for hemochromatosis.    Hemochromatosis  Patient does not have the classic phenotype however has an alternate with homozygous H63D.  She has a propensity towards iron overload.  However less likely to cause significant liver damage.  Ferritin has continued to improve down to 57.  Ideally ferritin should be below 75 to prevent liver damage.  Which is where the patient is at currently  Will hold on phlebotomies for now and follow-up in 4 months    I will recheck a CBC iron studies and 4 months discussed the further phlebotomy schedule with her.    Patient agrees with the plan.

## 2022-03-24 ENCOUNTER — OFFICE VISIT (OUTPATIENT)
Dept: ONCOLOGY | Facility: CLINIC | Age: 57
End: 2022-03-24

## 2022-03-24 ENCOUNTER — LAB (OUTPATIENT)
Dept: LAB | Facility: HOSPITAL | Age: 57
End: 2022-03-24

## 2022-03-24 ENCOUNTER — TELEPHONE (OUTPATIENT)
Dept: FAMILY MEDICINE CLINIC | Facility: CLINIC | Age: 57
End: 2022-03-24

## 2022-03-24 VITALS
DIASTOLIC BLOOD PRESSURE: 81 MMHG | HEART RATE: 74 BPM | RESPIRATION RATE: 18 BRPM | HEIGHT: 59 IN | BODY MASS INDEX: 46.89 KG/M2 | TEMPERATURE: 96.8 F | SYSTOLIC BLOOD PRESSURE: 122 MMHG | WEIGHT: 232.6 LBS

## 2022-03-24 DIAGNOSIS — E83.119 HEMOCHROMATOSIS, UNSPECIFIED HEMOCHROMATOSIS TYPE: ICD-10-CM

## 2022-03-24 DIAGNOSIS — E83.19 IRON OVERLOAD: Primary | ICD-10-CM

## 2022-03-24 DIAGNOSIS — D64.9 ANEMIA, UNSPECIFIED TYPE: ICD-10-CM

## 2022-03-24 DIAGNOSIS — E83.119 HEMOCHROMATOSIS, UNSPECIFIED HEMOCHROMATOSIS TYPE: Primary | ICD-10-CM

## 2022-03-24 LAB
BASOPHILS # BLD AUTO: 0.04 10*3/MM3 (ref 0–0.2)
BASOPHILS NFR BLD AUTO: 0.6 % (ref 0–1.5)
DEPRECATED RDW RBC AUTO: 46.7 FL (ref 37–54)
EOSINOPHIL # BLD AUTO: 0.19 10*3/MM3 (ref 0–0.4)
EOSINOPHIL NFR BLD AUTO: 2.9 % (ref 0.3–6.2)
ERYTHROCYTE [DISTWIDTH] IN BLOOD BY AUTOMATED COUNT: 12.5 % (ref 12.3–15.4)
HCT VFR BLD AUTO: 40.1 % (ref 34–46.6)
HGB BLD-MCNC: 13.6 G/DL (ref 12–15.9)
HOLD SPECIMEN: NORMAL
HOLD SPECIMEN: NORMAL
LYMPHOCYTES # BLD AUTO: 2.33 10*3/MM3 (ref 0.7–3.1)
LYMPHOCYTES NFR BLD AUTO: 35.1 % (ref 19.6–45.3)
MCH RBC QN AUTO: 34.5 PG (ref 26.6–33)
MCHC RBC AUTO-ENTMCNC: 33.9 G/DL (ref 31.5–35.7)
MCV RBC AUTO: 101.8 FL (ref 79–97)
MONOCYTES # BLD AUTO: 0.71 10*3/MM3 (ref 0.1–0.9)
MONOCYTES NFR BLD AUTO: 10.7 % (ref 5–12)
NEUTROPHILS NFR BLD AUTO: 3.37 10*3/MM3 (ref 1.7–7)
NEUTROPHILS NFR BLD AUTO: 50.7 % (ref 42.7–76)
PLATELET # BLD AUTO: 389 10*3/MM3 (ref 140–450)
PMV BLD AUTO: 8.5 FL (ref 6–12)
RBC # BLD AUTO: 3.94 10*6/MM3 (ref 3.77–5.28)
WBC NRBC COR # BLD: 6.64 10*3/MM3 (ref 3.4–10.8)

## 2022-03-24 PROCEDURE — 36415 COLL VENOUS BLD VENIPUNCTURE: CPT

## 2022-03-24 PROCEDURE — 99213 OFFICE O/P EST LOW 20 MIN: CPT | Performed by: INTERNAL MEDICINE

## 2022-03-24 PROCEDURE — 85025 COMPLETE CBC W/AUTO DIFF WBC: CPT

## 2022-03-24 NOTE — TELEPHONE ENCOUNTER
----- Message from Melissa Duarte MD sent at 3/24/2022  4:11 PM EDT -----    HUB TO READ    CBC looked normal

## 2022-03-28 ENCOUNTER — APPOINTMENT (OUTPATIENT)
Dept: CARDIOLOGY | Facility: HOSPITAL | Age: 57
End: 2022-03-28

## 2022-04-08 ENCOUNTER — APPOINTMENT (OUTPATIENT)
Dept: MAMMOGRAPHY | Facility: HOSPITAL | Age: 57
End: 2022-04-08

## 2022-04-12 ENCOUNTER — HOSPITAL ENCOUNTER (OUTPATIENT)
Dept: CARDIOLOGY | Facility: HOSPITAL | Age: 57
Discharge: HOME | End: 2022-04-12

## 2022-04-12 VITALS
HEART RATE: 77 BPM | WEIGHT: 232 LBS | BODY MASS INDEX: 46.77 KG/M2 | HEIGHT: 59 IN | DIASTOLIC BLOOD PRESSURE: 81 MMHG | SYSTOLIC BLOOD PRESSURE: 141 MMHG

## 2022-04-12 DIAGNOSIS — E83.110 HEREDITARY HEMOCHROMATOSIS: ICD-10-CM

## 2022-04-12 DIAGNOSIS — E78.5 HYPERLIPIDEMIA, UNSPECIFIED HYPERLIPIDEMIA TYPE: ICD-10-CM

## 2022-04-12 DIAGNOSIS — R07.89 OTHER CHEST PAIN: ICD-10-CM

## 2022-04-12 PROCEDURE — 93306 TTE W/DOPPLER COMPLETE: CPT | Performed by: INTERNAL MEDICINE

## 2022-04-12 PROCEDURE — 78452 HT MUSCLE IMAGE SPECT MULT: CPT

## 2022-04-12 PROCEDURE — 0 TECHNETIUM SESTAMIBI: Performed by: INTERNAL MEDICINE

## 2022-04-12 PROCEDURE — 93017 CV STRESS TEST TRACING ONLY: CPT

## 2022-04-12 PROCEDURE — 78452 HT MUSCLE IMAGE SPECT MULT: CPT | Performed by: INTERNAL MEDICINE

## 2022-04-12 PROCEDURE — A9500 TC99M SESTAMIBI: HCPCS | Performed by: INTERNAL MEDICINE

## 2022-04-12 PROCEDURE — 93018 CV STRESS TEST I&R ONLY: CPT | Performed by: INTERNAL MEDICINE

## 2022-04-12 PROCEDURE — 93016 CV STRESS TEST SUPVJ ONLY: CPT | Performed by: INTERNAL MEDICINE

## 2022-04-12 PROCEDURE — 93306 TTE W/DOPPLER COMPLETE: CPT

## 2022-04-12 RX ADMIN — TECHNETIUM TC 99M SESTAMIBI 1 DOSE: 1 INJECTION INTRAVENOUS at 10:45

## 2022-04-12 RX ADMIN — TECHNETIUM TC 99M SESTAMIBI 1 DOSE: 1 INJECTION INTRAVENOUS at 12:20

## 2022-04-13 ENCOUNTER — HOSPITAL ENCOUNTER (OUTPATIENT)
Dept: MAMMOGRAPHY | Facility: HOSPITAL | Age: 57
Discharge: HOME | End: 2022-04-13
Admitting: PREVENTIVE MEDICINE

## 2022-04-13 DIAGNOSIS — Z12.31 ENCOUNTER FOR SCREENING MAMMOGRAM FOR MALIGNANT NEOPLASM OF BREAST: ICD-10-CM

## 2022-04-13 PROCEDURE — 77067 SCR MAMMO BI INCL CAD: CPT

## 2022-04-13 PROCEDURE — 77063 BREAST TOMOSYNTHESIS BI: CPT

## 2022-04-14 ENCOUNTER — TELEPHONE (OUTPATIENT)
Dept: FAMILY MEDICINE CLINIC | Facility: CLINIC | Age: 57
End: 2022-04-14

## 2022-04-14 LAB
AORTIC ARCH: 3.3 CM
ASCENDING AORTA: 3.5 CM
BH CV ECHO MEAS - ACS: 2 CM
BH CV ECHO MEAS - AO MAX PG: 8.3 MMHG
BH CV ECHO MEAS - AO MEAN PG: 4.6 MMHG
BH CV ECHO MEAS - AO ROOT DIAM: 3.2 CM
BH CV ECHO MEAS - AO V2 MAX: 143.8 CM/SEC
BH CV ECHO MEAS - AO V2 VTI: 32.1 CM
BH CV ECHO MEAS - AVA(I,D): 2.45 CM2
BH CV ECHO MEAS - EDV(CUBED): 100.3 ML
BH CV ECHO MEAS - EDV(MOD-SP2): 110.8 ML
BH CV ECHO MEAS - EDV(MOD-SP4): 87.6 ML
BH CV ECHO MEAS - EF(MOD-BP): 55 %
BH CV ECHO MEAS - EF(MOD-SP2): 56.4 %
BH CV ECHO MEAS - EF(MOD-SP4): 52 %
BH CV ECHO MEAS - ESV(CUBED): 38 ML
BH CV ECHO MEAS - ESV(MOD-SP2): 48.3 ML
BH CV ECHO MEAS - ESV(MOD-SP4): 42.1 ML
BH CV ECHO MEAS - FS: 27.7 %
BH CV ECHO MEAS - IVS/LVPW: 1.28 CM
BH CV ECHO MEAS - IVSD: 1.15 CM
BH CV ECHO MEAS - LA DIMENSION(2D): 4.1 CM
BH CV ECHO MEAS - LA DIMENSION: 4.3 CM
BH CV ECHO MEAS - LAT PEAK E' VEL: 8 CM/SEC
BH CV ECHO MEAS - LV DIASTOLIC VOL/BSA (35-75): 44.6 CM2
BH CV ECHO MEAS - LV MASS(C)D: 166.8 GRAMS
BH CV ECHO MEAS - LV MAX PG: 3.1 MMHG
BH CV ECHO MEAS - LV MEAN PG: 2.12 MMHG
BH CV ECHO MEAS - LV SYSTOLIC VOL/BSA (12-30): 21.4 CM2
BH CV ECHO MEAS - LV V1 MAX: 88.5 CM/SEC
BH CV ECHO MEAS - LV V1 VTI: 21.5 CM
BH CV ECHO MEAS - LVIDD: 4.6 CM
BH CV ECHO MEAS - LVIDS: 3.4 CM
BH CV ECHO MEAS - LVOT AREA: 3.7 CM2
BH CV ECHO MEAS - LVOT DIAM: 2.16 CM
BH CV ECHO MEAS - LVPWD: 0.9 CM
BH CV ECHO MEAS - MED PEAK E' VEL: 9 CM/SEC
BH CV ECHO MEAS - MV A MAX VEL: 78.1 CM/SEC
BH CV ECHO MEAS - MV DEC SLOPE: 501.5 CM/SEC2
BH CV ECHO MEAS - MV DEC TIME: 0.18 MSEC
BH CV ECHO MEAS - MV E MAX VEL: 91.3 CM/SEC
BH CV ECHO MEAS - MV E/A: 1.17
BH CV ECHO MEAS - MV MAX PG: 3.7 MMHG
BH CV ECHO MEAS - MV MEAN PG: 1.87 MMHG
BH CV ECHO MEAS - MV P1/2T: 65 MSEC
BH CV ECHO MEAS - MV V2 VTI: 22.7 CM
BH CV ECHO MEAS - MVA(P1/2T): 3.4 CM2
BH CV ECHO MEAS - MVA(VTI): 3.5 CM2
BH CV ECHO MEAS - PA ACC SLOPE: 516 CM/SEC2
BH CV ECHO MEAS - PA ACC TIME: 0.13 SEC
BH CV ECHO MEAS - PA PR(ACCEL): 20.6 MMHG
BH CV ECHO MEAS - PA V2 MAX: 92.9 CM/SEC
BH CV ECHO MEAS - PAPD(PI EDV): 6 MMHG
BH CV ECHO MEAS - PI END-D VEL: 81 CM/SEC
BH CV ECHO MEAS - PULM A REVS DUR: 0.1 SEC
BH CV ECHO MEAS - PULM A REVS VEL: 22.8 CM/SEC
BH CV ECHO MEAS - PULM DIAS VEL: 58.6 CM/SEC
BH CV ECHO MEAS - PULM SYS VEL: 58.4 CM/SEC
BH CV ECHO MEAS - RAP SYSTOLE: 3 MMHG
BH CV ECHO MEAS - RV MAX PG: 1.95 MMHG
BH CV ECHO MEAS - RV V1 MAX: 69.7 CM/SEC
BH CV ECHO MEAS - RV V1 VTI: 16.7 CM
BH CV ECHO MEAS - RVSP: 20.8 MMHG
BH CV ECHO MEAS - SI(MOD-SP2): 31.8 ML/M2
BH CV ECHO MEAS - SI(MOD-SP4): 23.2 ML/M2
BH CV ECHO MEAS - SV(LVOT): 78.8 ML
BH CV ECHO MEAS - SV(MOD-SP2): 62.5 ML
BH CV ECHO MEAS - SV(MOD-SP4): 45.5 ML
BH CV ECHO MEAS - TAPSE (>1.6): 2.1 CM
BH CV ECHO MEAS - TR MAX PG: 17.8 MMHG
BH CV ECHO MEAS - TR MAX VEL: 210.6 CM/SEC
BH CV ECHO MEASUREMENTS AVERAGE E/E' RATIO: 10.74
BH CV VAS BP LEFT ARM: NORMAL MMHG
BH CV XLRA - RV BASE: 3.5 CM
BH CV XLRA - RV MID: 2.4 CM
BH CV XLRA - TDI S': 11 CM/SEC
IVRT: 65 MSEC
LEFT ATRIUM VOLUME INDEX: 29 ML/M2
LEFT ATRIUM VOLUME: 57 CM3
LV EF 2D ECHO EST: 55 %
MAXIMAL PREDICTED HEART RATE: 162 BPM
STRESS TARGET HR: 138 BPM

## 2022-04-14 NOTE — TELEPHONE ENCOUNTER
----- Message from Melissa Duarte MD sent at 4/14/2022  7:29 AM EDT -----    HUB TO READ    mammogram normal.  Ordered for one year unless change in symptoms or physical

## 2022-04-18 RX ORDER — VORTIOXETINE 5 MG/1
TABLET, FILM COATED ORAL
Qty: 90 TABLET | Refills: 3 | Status: SHIPPED | OUTPATIENT
Start: 2022-04-18

## 2022-04-19 LAB
BH CV REST NUCLEAR ISOTOPE DOSE: 7.8 MCI
BH CV STRESS BP STAGE 1: NORMAL
BH CV STRESS DURATION MIN STAGE 1: 3
BH CV STRESS DURATION SEC STAGE 1: 46
BH CV STRESS GRADE STAGE 1: 10
BH CV STRESS HR STAGE 1: 136
BH CV STRESS METS STAGE 1: 5
BH CV STRESS NUCLEAR ISOTOPE DOSE: 31.6 MCI
BH CV STRESS PROTOCOL 1: NORMAL
BH CV STRESS RECOVERY BP: NORMAL MMHG
BH CV STRESS RECOVERY HR: 90 BPM
BH CV STRESS SPEED STAGE 1: 1.7
BH CV STRESS STAGE 1: 1
LV EF NUC BP: 70 %
MAXIMAL PREDICTED HEART RATE: 162 BPM
PERCENT MAX PREDICTED HR: 88.89 %
STRESS BASELINE BP: NORMAL MMHG
STRESS BASELINE HR: 68 BPM
STRESS PERCENT HR: 105 %
STRESS POST ESTIMATED WORKLOAD: 4.6 METS
STRESS POST EXERCISE DUR MIN: 3 MIN
STRESS POST EXERCISE DUR SEC: 46 SEC
STRESS POST PEAK BP: NORMAL MMHG
STRESS POST PEAK HR: 144 BPM
STRESS TARGET HR: 138 BPM

## 2022-04-28 ENCOUNTER — OFFICE VISIT (OUTPATIENT)
Dept: PSYCHIATRY | Facility: CLINIC | Age: 57
End: 2022-04-28

## 2022-04-28 DIAGNOSIS — F33.1 MODERATE EPISODE OF RECURRENT MAJOR DEPRESSIVE DISORDER: Primary | Chronic | ICD-10-CM

## 2022-04-28 DIAGNOSIS — F41.1 GAD (GENERALIZED ANXIETY DISORDER): Chronic | ICD-10-CM

## 2022-04-28 PROCEDURE — 99214 OFFICE O/P EST MOD 30 MIN: CPT | Performed by: PHYSICIAN ASSISTANT

## 2022-04-28 NOTE — PROGRESS NOTES
"Subjective   Merissa Yusuf is a 56 y.o.white female who presents today for follow up in the office x 15 mintues    Chief Complaint:  Anxiety and depression    History of Present Illness:   Her 39 yr old son  in September, she was out of town in [place] with her sister who's  was dying and passed when she got back.  Patient is doing better on Trintellix, her daughter said she is happier on it, no restless legs yet on the 5mg  Lamictal has helped her irritability, does not need increase  \"My  is driving me crazy\"..he has TBI and can push her buttons  Mom has Alzheimer's demenita, they had to put her in [place] in March, now able to visit her for 2 hrs per day, moved her step-dad in with her and that has decreased her anxiety.  Her step-dad had been irritable and asked his kids to come visit him, but instead forced him to leave and go stay with a daughter  Patient lives the closest to Mom, has 6 siblings  Anxiety 4/10 with Buspar again, takes it twice daily  Found a grief group at [facility] that will last 12 weeks starting in January  Depression 4/10  Denies SI/HI      The following portions of the patient's history were reviewed and updated as appropriate: allergies, current medications, past family history, past medical history, past social history, past surgical history and problem list.    PAST PSYCHIATRIC HISTORY  Axis I  Affective/Bipoloar Disorder, Anxiety/Panic Disorder  Axis II  None    PAST OUTPATIENT TREATMENT  Diagnosis treated:  Affective Disorder, Anxiety/Panic Disorder  Treatment Type:  Family Therapy, Medication Management  No hospitalizations  Genesight testing done 2019  Prior Psychiatric Medications:  Lexapro increased anxiety  Prozac, hives  Trintellix helped but gave her restless legs  Buspar   Viibryd, could not take  Pristiq  Topamax, increased her anxiety and cause SOA  Lamictal  Support Groups:  None  Sequelae Of Mental Disorder:  social isolation, family " disruption, emotional distress      Interval History  Improved    Side Effects  Restless legs with Trintellix at 10 mg    Past psychiatric history was reviewed and compared to 12/29/21 visit and appropriate updates were made.    Past Medical History:  Past Medical History:   Diagnosis Date   • Anesthesia complication     drops B/P, and slow to arouse   • GERD (gastroesophageal reflux disease)    • Hereditary hemochromatosis (HCC)    • Hyperlipidemia    • Seasonal allergies        Social History:  Social History     Socioeconomic History   • Marital status:    Tobacco Use   • Smoking status: Never Smoker   • Smokeless tobacco: Never Used   Vaping Use   • Vaping Use: Never used   Substance and Sexual Activity   • Alcohol use: Yes     Alcohol/week: 1.0 standard drink     Types: 1 Glasses of wine per week     Comment: rarely   • Drug use: No   • Sexual activity: Yes     Partners: Male     Birth control/protection: None       Family History:  Family History   Problem Relation Age of Onset   • Coronary artery disease Other    • Diabetes Other    • Dementia Other    • Diabetes Mother    • Cancer Mother    • Heart disease Father    • Liver disease Father    • Hypertension Father    • Cancer Brother    • Breast cancer Neg Hx    • Ovarian cancer Neg Hx        Past Surgical History:  Past Surgical History:   Procedure Laterality Date   • BLADDER SUSPENSION     • BREAST BIOPSY Right    • FINGER SURGERY  2008    removed cysts from right index finger    • HERNIA REPAIR     • SUBTOTAL HYSTERECTOMY     • TUBAL ABDOMINAL LIGATION     • VENTRAL/INCISIONAL HERNIA REPAIR N/A 9/24/2020    Procedure: LAPAROSCOPIC VENTRAL HERNIA;  Surgeon: Reuben Molina MD;  Location: [facility] MAIN OR;  Service: General;  Laterality: N/A;  0855 TAP block   • WISDOM TOOTH EXTRACTION         Problem List:  Patient Active Problem List   Diagnosis   • Elevated blood pressure reading without diagnosis of hypertension   • Hearing deficit   •  Hyperlipidemia   • Knee pain   • Low back pain   • Memory loss   • Morbid (severe) obesity due to excess calories ([facility])   • Obstructive sleep apnea   • Thrombocytosis   • Vitamin D deficiency   • Combined forms of age-related cataract, bilateral   • Convergence insufficiency   • Corneal pannus of right eye   • Corneal pannus   • Meibomian gland dysfunction (MGD) of both eyes   • Bilateral presbyopia   • Presbyopia   • Vitreous floaters   • Hereditary hemochromatosis ([facility])   • GERD (gastroesophageal reflux disease)   • Seasonal allergies   • Class 3 severe obesity due to excess calories with serious comorbidity and body mass index (BMI) of 45.0 to 49.9 in adult ([facility])   • Elevated hematocrit   • KIRSTIE (generalized anxiety disorder)   • Moderate episode of recurrent major depressive disorder ([facility])       Allergy:   Allergies   Allergen Reactions   • Amoxicillin Swelling   • Erythromycin GI Intolerance   • Escitalopram Anxiety   • Prozac  [Fluoxetine Hcl] Rash   • Penicillin G Rash        Discontinued Medications:  There are no discontinued medications.    Current Medications:   Current Outpatient Medications   Medication Sig Dispense Refill   • acetaminophen (TYLENOL) 500 MG tablet Take 500 mg by mouth Every 4 (Four) Hours As Needed.     • benzonatate (TESSALON) 200 MG capsule Take 1 capsule by mouth 3 (Three) Times a Day As Needed for Cough. 30 capsule 1   • busPIRone (BUSPAR) 15 MG tablet TAKE 1 TABLET BY MOUTH THREE TIMES DAILY FOR ANXIETY 270 tablet 1   • Calcium Carbonate Antacid (TUMS PO) Take 2,000 mg by mouth At Night As Needed.     • cetirizine (zyrTEC) 10 MG tablet Take 10 mg by mouth Daily.     • COLLAGEN PO Take  by mouth.     • diclofenac (VOLTAREN) 1 % gel gel Apply 4 g topically to the appropriate area as directed 4 (Four) Times a Day As Needed.     • doxycycline (MONODOX) 100 MG capsule Take 1 capsule by mouth 2 (Two) Times a Day. 20 capsule 0   • Evolocumab (REPATHA) solution auto-injector SureClick  injection Inject 1 mL under the skin into the appropriate area as directed Every 14 (Fourteen) Days. 6 mL 3   • guaifenesin-dextromethorphan (MUCINEX DM)  MG tablet sustained-release 12 hour tablet Take  by mouth 2 (Two) Times a Day As Needed.     • l-methylfolate calcium (DEPLIN) 7.5 MG tablet tablet Take 7.5 mg by mouth 2 (Two) Times a Day.     • lamoTRIgine (LaMICtal) 25 MG tablet Take 1 tablet by mouth 2 (Two) Times a Day. For mood stabilier 180 tablet 1   • Naproxen Sodium 220 MG capsule Take 1 tablet by mouth As Needed.     • Trintellix 5 MG tablet TAKE 1 TABLET DAILY WITH BREAKFAST 90 tablet 3   • TURMERIC PO Take  by mouth.       No current facility-administered medications for this visit.         Review of Symptoms:    Psychiatric/Behavioral: Negative for agitation, behavioral problems, confusion, decreased concentration, dysphoric mood, hallucinations, self-injury, sleep disturbance and suicidal ideas. The patient not nervous/anxious and is not hyperactive.        Physical Exam:   not currently breastfeeding.    Mental Status Exam:   Hygiene:   good  Cooperation:  Cooperative  Eye Contact:  Good  Psychomotor Behavior:  Appropriate  Affect:  Appropriate  Mood: Mildly anxious  Hopelessness: Denies  Speech:  Normal  Thought Process:  Goal directed  Thought Content:  Normal  Suicidal:  None  Homicidal:  None  Hallucinations:  None  Delusion:  None  Memory:  Intact  Orientation:  Person, Place, Time and Situation  Reliability:  good  Insight:  Good  Judgement:  Good  Impulse Control:  Good  Physical/Medical Issues:  No      Mental status exam was reviewed and compared to 12/29/21 visit and appropriate updates were made.    PHQ-9 Depression Screening  Little interest or pleasure in doing things?     Feeling down, depressed, or hopeless?     Trouble falling or staying asleep, or sleeping too much?     Feeling tired or having little energy?     Poor appetite or overeating?     Feeling bad about yourself - or  that you are a failure or have let yourself or your family down?     Trouble concentrating on things, such as reading the newspaper or watching television?     Moving or speaking so slowly that other people could have noticed? Or the opposite - being so fidgety or restless that you have been moving around a lot more than usual?     Thoughts that you would be better off dead, or of hurting yourself in some way?     PHQ-9 Total Score  8   If you checked off any problems, how difficult have these problems made it for you to do your work, take care of things at home, or get along with other people?  Somewhat difficult           Never smoker    I advised Merissa of the risks of tobacco use.     Lab Results:   Hospital Outpatient Visit on 04/12/2022   Component Date Value Ref Range Status   • Target HR (85%) 04/12/2022 138  bpm Final   • Max. Pred. HR (100%) 04/12/2022 162  bpm Final   • BH CV VAS BP LEFT ARM 04/12/2022 141/81  mmHg Final   • ACS 04/12/2022 2.00  cm Final   • Ao root diam 04/12/2022 3.2  cm Final   • Ao pk haritha 04/12/2022 143.8  cm/sec Final   • Ao V2 VTI 04/12/2022 32.1  cm Final   • WESTON(I,D) 04/12/2022 2.45  cm2 Final   • EDV(cubed) 04/12/2022 100.3  ml Final   • EDV(MOD-sp2) 04/12/2022 110.8  ml Final   • EDV(MOD-sp4) 04/12/2022 87.6  ml Final   • EF(MOD-sp2) 04/12/2022 56.4  % Final   • EF(MOD-sp4) 04/12/2022 52.0  % Final   • ESV(cubed) 04/12/2022 38.0  ml Final   • ESV(MOD-sp2) 04/12/2022 48.3  ml Final   • ESV(MOD-sp4) 04/12/2022 42.1  ml Final   • IVS/LVPW 04/12/2022 1.28  cm Final   • LV mass(C)d 04/12/2022 166.8  grams Final   • LV V1 max PG 04/12/2022 3.1  mmHg Final   • LV V1 mean PG 04/12/2022 2.12  mmHg Final   • LV V1 max 04/12/2022 88.5  cm/sec Final   • LVPWd 04/12/2022 0.90  cm Final   • MV dec slope 04/12/2022 501.5  cm/sec2 Final   • MV dec time 04/12/2022 0.18  msec Final   • MV V2 VTI 04/12/2022 22.7  cm Final   • MVA(VTI) 04/12/2022 3.5  cm2 Final   • PA acc time 04/12/2022 0.13  sec  Final   • PA pr(Accel) 04/12/2022 20.6  mmHg Final   • PA V2 max 04/12/2022 92.9  cm/sec Final   • PI end-d jonh 04/12/2022 81.0  cm/sec Final   • Pulm A Revs Jonh 04/12/2022 22.8  cm/sec Final   • RAP systole 04/12/2022 3.0  mmHg Final   • RV V1 max PG 04/12/2022 1.95  mmHg Final   • RV V1 max 04/12/2022 69.7  cm/sec Final   • RV V1 VTI 04/12/2022 16.7  cm Final   • RVSP(TR) 04/12/2022 20.8  mmHg Final   • SI(MOD-sp2) 04/12/2022 31.8  ml/m2 Final   • SI(MOD-sp4) 04/12/2022 23.2  ml/m2 Final   • SV(LVOT) 04/12/2022 78.8  ml Final   • SV(MOD-sp2) 04/12/2022 62.5  ml Final   • SV(MOD-sp4) 04/12/2022 45.5  ml Final   • TR max PG 04/12/2022 17.8  mmHg Final   • Ao max PG 04/12/2022 8.3  mmHg Final   • Ao mean PG 04/12/2022 4.6  mmHg Final   • FS 04/12/2022 27.7  % Final   • IVSd 04/12/2022 1.15  cm Final   • LA dimension 04/12/2022 4.3  cm Final   • LV V1 VTI 04/12/2022 21.5  cm Final   • LVIDd 04/12/2022 4.6  cm Final   • LVIDs 04/12/2022 3.4  cm Final   • LVOT area 04/12/2022 3.7  cm2 Final   • LVOT diam 04/12/2022 2.16  cm Final   • MV E/A 04/12/2022 1.17   Final   • MV max PG 04/12/2022 3.7  mmHg Final   • MV mean PG 04/12/2022 1.87  mmHg Final   • MV A max jonh 04/12/2022 78.1  cm/sec Final   • MV E max jonh 04/12/2022 91.3  cm/sec Final   • Pulm A Revs Dur 04/12/2022 0.10  sec Final   • Pulm Harding Jonh 04/12/2022 58.6  cm/sec Final   • Pulm Sys Jonh 04/12/2022 58.4  cm/sec Final   • TR max jonh 04/12/2022 210.6  cm/sec Final   • LV Harding Vol (BSA corrected) 04/12/2022 44.6  cm2 Final   • LV Sys Vol (BSA corrected) 04/12/2022 21.4  cm2 Final   • RV S' 04/12/2022 11.00  cm/sec Final   • RV Base 04/12/2022 3.50  cm Final   • RV Mid 04/12/2022 2.40  cm Final   • LA volume 04/12/2022 57.0  cm3 Final   • Ascending aorta 04/12/2022 3.5  cm Final   • Aortic arch 04/12/2022 3.3  cm Final   • IVRT 04/12/2022 65.0  msec Final   • LA Volume Index 04/12/2022 29.0  mL/m2 Final   • Avg E/e' ratio 04/12/2022 10.74   Final   • EF(MOD-bp)  04/12/2022 55.0  % Final   • Lat Peak E' Jonh 04/12/2022 8.0  cm/sec Final   • Med Peak E' Jonh 04/12/2022 9.00  cm/sec Final   • MV P1/2t 04/12/2022 65  msec Final   • PA acc slope 04/12/2022 516  cm/sec2 Final   • PAPd(PI edV) 04/12/2022 6.00  mmHg Final   • LA dimension(2D) 04/12/2022 4.1  cm Final   • MVA(P1/2t) 04/12/2022 3.4  cm2 Final   • TAPSE (>1.6) 04/12/2022 2.10  cm Final   • Echo EF Estimated 04/12/2022 55  % Final   Hospital Outpatient Visit on 04/12/2022   Component Date Value Ref Range Status   • Target HR (85%) 04/12/2022 138  bpm Final   • Max. Pred. HR (100%) 04/12/2022 162  bpm Final   • BH CV STRESS PROTOCOL 1 04/12/2022 Jean-Claude   Final   • Stage 1 04/12/2022 1   Final   • HR Stage 1 04/12/2022 136   Final   • BP Stage 1 04/12/2022 152/68   Final   • Duration Min Stage 1 04/12/2022 3   Final   • Duration Sec Stage 1 04/12/2022 46   Final   • Grade Stage 1 04/12/2022 10   Final   • Speed Stage 1 04/12/2022 1.7   Final   • BH CV STRESS METS STAGE 1 04/12/2022 5   Final   • Baseline HR 04/12/2022 68  bpm Final   • Baseline BP 04/12/2022 138/76  mmHg Final   • Peak HR 04/12/2022 144  bpm Final   • Percent Max Pred HR 04/12/2022 88.89  % Final   • Percent Target HR 04/12/2022 105  % Final   • Peak BP 04/12/2022 152/68  mmHg Final   • Recovery HR 04/12/2022 90  bpm Final   • Recovery BP 04/12/2022 130/80  mmHg Final   • Exercise duration (min) 04/12/2022 3  min Final   • Exercise duration (sec) 04/12/2022 46  sec Final   • BH CV REST NUCLEAR ISOTOPE DOSE 04/12/2022 7.8  mCi Final   • BH CV STRESS NUCLEAR ISOTOPE DOSE 04/12/2022 31.6  mCi Final   • Estimated workload 04/12/2022 4.6  METS Final   • Nuc Stress EF 04/12/2022 70  % Final   Lab on 03/24/2022   Component Date Value Ref Range Status   • WBC 03/24/2022 6.64  3.40 - 10.80 10*3/mm3 Final   • RBC 03/24/2022 3.94  3.77 - 5.28 10*6/mm3 Final   • Hemoglobin 03/24/2022 13.6  12.0 - 15.9 g/dL Final   • Hematocrit 03/24/2022 40.1  34.0 - 46.6 % Final   •  MCV 03/24/2022 101.8 (A) 79.0 - 97.0 fL Final   • MCH 03/24/2022 34.5 (A) 26.6 - 33.0 pg Final   • MCHC 03/24/2022 33.9  31.5 - 35.7 g/dL Final   • RDW 03/24/2022 12.5  12.3 - 15.4 % Final   • RDW-SD 03/24/2022 46.7  37.0 - 54.0 fl Final   • MPV 03/24/2022 8.5  6.0 - 12.0 fL Final   • Platelets 03/24/2022 389  140 - 450 10*3/mm3 Final   • Neutrophil % 03/24/2022 50.7  42.7 - 76.0 % Final   • Lymphocyte % 03/24/2022 35.1  19.6 - 45.3 % Final   • Monocyte % 03/24/2022 10.7  5.0 - 12.0 % Final   • Eosinophil % 03/24/2022 2.9  0.3 - 6.2 % Final   • Basophil % 03/24/2022 0.6  0.0 - 1.5 % Final   • Neutrophils, Absolute 03/24/2022 3.37  1.70 - 7.00 10*3/mm3 Final   • Lymphocytes, Absolute 03/24/2022 2.33  0.70 - 3.10 10*3/mm3 Final   • Monocytes, Absolute 03/24/2022 0.71  0.10 - 0.90 10*3/mm3 Final   • Eosinophils, Absolute 03/24/2022 0.19  0.00 - 0.40 10*3/mm3 Final   • Basophils, Absolute 03/24/2022 0.04  0.00 - 0.20 10*3/mm3 Final   • Extra Tube 03/24/2022 Hold for add-ons.   Final    Auto resulted.   • Extra Tube 03/24/2022 Hold for add-ons.   Final    Auto resulted.   Office Visit on 03/22/2022   Component Date Value Ref Range Status   • Glucose 03/22/2022 98  65 - 99 mg/dL Final   • BUN 03/22/2022 13  6 - 20 mg/dL Final   • Creatinine 03/22/2022 0.75  0.57 - 1.00 mg/dL Final   • Sodium 03/22/2022 138  136 - 145 mmol/L Final   • Potassium 03/22/2022 4.2  3.5 - 5.2 mmol/L Final   • Chloride 03/22/2022 104  98 - 107 mmol/L Final   • CO2 03/22/2022 24.5  22.0 - 29.0 mmol/L Final   • Calcium 03/22/2022 9.8  8.6 - 10.5 mg/dL Final   • Total Protein 03/22/2022 7.0  6.0 - 8.5 g/dL Final   • Albumin 03/22/2022 4.40  3.50 - 5.20 g/dL Final   • ALT (SGPT) 03/22/2022 13  1 - 33 U/L Final   • AST (SGOT) 03/22/2022 17  1 - 32 U/L Final   • Alkaline Phosphatase 03/22/2022 87  39 - 117 U/L Final   • Total Bilirubin 03/22/2022 0.4  0.0 - 1.2 mg/dL Final   • Globulin 03/22/2022 2.6  gm/dL Final   • A/G Ratio 03/22/2022 1.7  g/dL  Final   • BUN/Creatinine Ratio 03/22/2022 17.3  7.0 - 25.0 Final   • Anion Gap 03/22/2022 9.5  5.0 - 15.0 mmol/L Final   • eGFR 03/22/2022 92.4  >60.0 mL/min/1.73 Final    National Kidney Foundation and American Society of Nephrology (ASN) Task Force recommended calculation based on the Chronic Kidney Disease Epidemiology Collaboration (CKD-EPI) equation refit without adjustment for race.   • Total Cholesterol 03/22/2022 127  0 - 200 mg/dL Final   • Triglycerides 03/22/2022 75  0 - 150 mg/dL Final   • HDL Cholesterol 03/22/2022 64 (A) 40 - 60 mg/dL Final   • LDL Cholesterol  03/22/2022 48  0 - 100 mg/dL Final   • VLDL Cholesterol 03/22/2022 15  5 - 40 mg/dL Final   • LDL/HDL Ratio 03/22/2022 0.75   Final   • Magnesium 03/22/2022 2.0  1.6 - 2.6 mg/dL Final   • TSH 03/22/2022 1.360  0.270 - 4.200 uIU/mL Final   • Vitamin B-12 03/22/2022 805  211 - 946 pg/mL Final   • 25 Hydroxy, Vitamin D 03/22/2022 45.0  30.0 - 100.0 ng/ml Final   Lab on 03/08/2022   Component Date Value Ref Range Status   • Ferritin 03/08/2022 59.70  13.00 - 150.00 ng/mL Final   • Iron 03/08/2022 120  37 - 145 mcg/dL Final   • Iron Saturation 03/08/2022 35  20 - 50 % Final   • Transferrin 03/08/2022 233  200 - 360 mg/dL Final   • TIBC 03/08/2022 347  298 - 536 mcg/dL Final       Assessment/Plan   Problems Addressed this Visit        Mental Health    KIRSTIE (generalized anxiety disorder) (Chronic)    Moderate episode of recurrent major depressive disorder (HCC) - Primary (Chronic)      Diagnoses       Codes Comments    Moderate episode of recurrent major depressive disorder (HCC)    -  Primary ICD-10-CM: F33.1  ICD-9-CM: 296.32     KIRSTIE (generalized anxiety disorder)     ICD-10-CM: F41.1  ICD-9-CM: 300.02           Visit Diagnoses:    ICD-10-CM ICD-9-CM   1. Moderate episode of recurrent major depressive disorder (HCC)  F33.1 296.32   2. KIRSTIE (generalized anxiety disorder)  F41.1 300.02       TREATMENT PLAN/GOALS: Continue supportive psychotherapy efforts  and medications as indicated. Treatment and medication options discussed during today's visit. Patient ackowledged and verbally consented to continue with current treatment plan and was educated on the importance of compliance with treatment and follow-up appointments.    MEDICATION ISSUES:  INSPECT reviewed as expected  Discussed medication options and treatment plan of prescribed medication as well as the risks, benefits, and side effects including potential falls, possible impaired driving and metabolic adversities among others. Patient is agreeable to call the office with any worsening of symptoms or onset of side effects. Patient is agreeable to call 911 or go to the nearest ER should he/she begin having SI/HI. No medication side effects or related complaints today.     Patient is doing better, coping better with the loss of her son  Continue Trintellix 5mg daily for depression  Continue Buspar 15mg TID prn anxiety, usually only takes it once daily  Continue Lamictal 25 mg BID for mood stabilizer  Continue L-methylfolate daily      MEDS ORDERED DURING VISIT:  No orders of the defined types were placed in this encounter.      Return in about 6 months (around 10/28/2022) for video visit.         This document has been electronically signed by Laisha Mendiola PA-C  April 28, 2022 14:12 EDT

## 2022-04-29 ENCOUNTER — APPOINTMENT (OUTPATIENT)
Dept: LAB | Facility: HOSPITAL | Age: 57
End: 2022-04-29

## 2022-05-02 ENCOUNTER — OFFICE VISIT (OUTPATIENT)
Dept: NEUROLOGY | Facility: CLINIC | Age: 57
End: 2022-05-02

## 2022-05-02 VITALS
HEIGHT: 59 IN | SYSTOLIC BLOOD PRESSURE: 129 MMHG | HEART RATE: 88 BPM | TEMPERATURE: 97.8 F | WEIGHT: 231 LBS | DIASTOLIC BLOOD PRESSURE: 70 MMHG | BODY MASS INDEX: 46.57 KG/M2 | RESPIRATION RATE: 16 BRPM

## 2022-05-02 DIAGNOSIS — G47.33 OBSTRUCTIVE SLEEP APNEA: Primary | ICD-10-CM

## 2022-05-02 PROCEDURE — 99213 OFFICE O/P EST LOW 20 MIN: CPT | Performed by: PSYCHIATRY & NEUROLOGY

## 2022-05-16 ENCOUNTER — OFFICE VISIT (OUTPATIENT)
Dept: ORTHOPEDIC SURGERY | Facility: CLINIC | Age: 57
End: 2022-05-16

## 2022-05-16 VITALS
WEIGHT: 231 LBS | HEIGHT: 59 IN | HEART RATE: 96 BPM | SYSTOLIC BLOOD PRESSURE: 130 MMHG | BODY MASS INDEX: 46.57 KG/M2 | DIASTOLIC BLOOD PRESSURE: 86 MMHG

## 2022-05-16 DIAGNOSIS — M17.0 BILATERAL PRIMARY OSTEOARTHRITIS OF KNEE: Primary | ICD-10-CM

## 2022-05-16 PROCEDURE — 99213 OFFICE O/P EST LOW 20 MIN: CPT | Performed by: ORTHOPAEDIC SURGERY

## 2022-05-16 RX ORDER — TURMERIC 400 MG
CAPSULE ORAL
COMMUNITY

## 2022-05-16 RX ORDER — NAPROXEN 375 MG/1
TABLET ORAL
COMMUNITY
End: 2022-06-28 | Stop reason: SDUPTHER

## 2022-05-16 RX ORDER — LEVOMEFOLATE CALCIUM 7.5 MG TABLET
TABLET
COMMUNITY
End: 2022-06-28 | Stop reason: SDUPTHER

## 2022-05-16 RX ORDER — LEVOCETIRIZINE DIHYDROCHLORIDE 5 MG/1
TABLET, FILM COATED ORAL
COMMUNITY

## 2022-05-16 NOTE — PROGRESS NOTES
"     Patient ID: Merissa Yusuf is a 56 y.o. female.  Bilateral knee pain  Returns with bilateral knee pain secondary to degenerative joint disease.  Had recent flareup all medication flareups are becoming a little more frequent and severe.  Last had injections in September of last year with some mild to moderate relief    Review of Systems:    Bilateral knee pain    Objective:    /86   Pulse 96   Ht 149.9 cm (59\")   Wt 105 kg (231 lb)   LMP  (LMP Unknown)   BMI 46.66 kg/m²     Physical Examination:     Both knees demonstrate mild effusions range of motion 2 to 100 degrees bilateral negative Daniela    Imaging:       Assessment:    Bilateral knee degenerative joint disease    Plan:   Options discussed.  She would like to consider knee replacement surgery so I referred her to my partner for consideration      Procedures          Disclaimer: Part of this note may be an electronic transcription/translation of spoken language to printed text using the Dragon Dictation System  "

## 2022-05-31 ENCOUNTER — OFFICE VISIT (OUTPATIENT)
Dept: ORTHOPEDIC SURGERY | Facility: CLINIC | Age: 57
End: 2022-05-31

## 2022-05-31 DIAGNOSIS — M17.0 BILATERAL PRIMARY OSTEOARTHRITIS OF KNEE: Primary | ICD-10-CM

## 2022-05-31 DIAGNOSIS — E66.01 OBESITY, MORBID, BMI 40.0-49.9: ICD-10-CM

## 2022-05-31 PROCEDURE — 99215 OFFICE O/P EST HI 40 MIN: CPT | Performed by: ORTHOPAEDIC SURGERY

## 2022-05-31 PROCEDURE — 20610 DRAIN/INJ JOINT/BURSA W/O US: CPT | Performed by: ORTHOPAEDIC SURGERY

## 2022-05-31 RX ADMIN — LIDOCAINE HYDROCHLORIDE 2 ML: 10 INJECTION, SOLUTION INFILTRATION; PERINEURAL at 13:38

## 2022-05-31 RX ADMIN — TRIAMCINOLONE ACETONIDE 80 MG: 40 INJECTION, SUSPENSION INTRA-ARTICULAR; INTRAMUSCULAR at 13:38

## 2022-05-31 RX ADMIN — TRIAMCINOLONE ACETONIDE 80 MG: 40 INJECTION, SUSPENSION INTRA-ARTICULAR; INTRAMUSCULAR at 13:39

## 2022-05-31 RX ADMIN — LIDOCAINE HYDROCHLORIDE 2 ML: 10 INJECTION, SOLUTION INFILTRATION; PERINEURAL at 13:39

## 2022-06-07 RX ORDER — TRIAMCINOLONE ACETONIDE 40 MG/ML
80 INJECTION, SUSPENSION INTRA-ARTICULAR; INTRAMUSCULAR
Status: COMPLETED | OUTPATIENT
Start: 2022-05-31 | End: 2022-05-31

## 2022-06-07 RX ORDER — LIDOCAINE HYDROCHLORIDE 10 MG/ML
2 INJECTION, SOLUTION INFILTRATION; PERINEURAL
Status: COMPLETED | OUTPATIENT
Start: 2022-05-31 | End: 2022-05-31

## 2022-06-28 ENCOUNTER — OFFICE VISIT (OUTPATIENT)
Dept: CARDIOLOGY | Facility: CLINIC | Age: 57
End: 2022-06-28

## 2022-06-28 VITALS
DIASTOLIC BLOOD PRESSURE: 74 MMHG | WEIGHT: 234 LBS | HEIGHT: 59 IN | OXYGEN SATURATION: 98 % | SYSTOLIC BLOOD PRESSURE: 138 MMHG | BODY MASS INDEX: 47.17 KG/M2 | HEART RATE: 74 BPM

## 2022-06-28 DIAGNOSIS — R03.0 ELEVATED BLOOD PRESSURE READING WITHOUT DIAGNOSIS OF HYPERTENSION: Primary | ICD-10-CM

## 2022-06-28 DIAGNOSIS — E66.01 CLASS 3 SEVERE OBESITY DUE TO EXCESS CALORIES WITH SERIOUS COMORBIDITY AND BODY MASS INDEX (BMI) OF 45.0 TO 49.9 IN ADULT: ICD-10-CM

## 2022-06-28 DIAGNOSIS — E78.2 MIXED HYPERLIPIDEMIA: ICD-10-CM

## 2022-06-28 PROBLEM — R06.02 SOB (SHORTNESS OF BREATH): Status: ACTIVE | Noted: 2022-06-28

## 2022-06-28 PROCEDURE — 99214 OFFICE O/P EST MOD 30 MIN: CPT | Performed by: INTERNAL MEDICINE

## 2022-06-28 NOTE — PROGRESS NOTES
"Cardiology Office Visit      Encounter Date:  06/28/2022    Patient ID:   Merissa Yusuf is a 56 y.o. female.      Reason For Followup:  Chest discomfort  Shortness of breath    Brief Clinical History:  Dear Dr. Duarte, Melissa Petty MD    I had the pleasure of seeing Merissa Yusuf today. As you are well aware, this is a 56 y.o. female with past medical history that is significant for history of no known coronary artery disease history of hyperlipidemia history of obesity resented with symptoms of dizziness and lightheadedness    Patient had a cardiac evaluation and middle of 2019 with an echocardiogram with normal LV systolic function and stress test with no ischemic burden      Interval History:  No new complaints  No cardiac symptoms of chest pain shortness of breath is unchanged from baseline  Unable to tolerate Zetia secondary to side effects  Unable to take statin secondary to underlying hemachromatosis  Denies any orthopnea PND      Assessment & Plan    Impressions:  Dizziness/improved  Chest pain  Shortness of breath  Hyperlipidemia  hemochromatosis  Normal echocardiogram with normal LV systolic function  Stress test with no significant reversible ischemia    Recommendations:  Intolerant to Zetia  Unable to take statin secondary to underlying hemochromatosis  Continue PCSK9 inhibitor to help with hyperlipidemia/lipids are optimal  Likely shortness of breath secondary to underlying diastolic dysfunction and LV hypertrophy  Labs and test results reviewed and discussed with patient  Advised patient to consider a vascular screening study and also coronary artery calcium score for further stratification  Continued aggressive risk factor modification  Follow up in office in 6 months      Objective:    Vitals:  Vitals:    06/28/22 1506   BP: 138/74   Pulse: 74   SpO2: 98%   Weight: 106 kg (234 lb)   Height: 149.9 cm (59\")       Physical Exam:    General: Alert, cooperative, no distress, appears stated " age  Head:  Normocephalic, atraumatic, mucous membranes moist  Eyes:  Conjunctiva/corneas clear, EOM's intact     Neck:  Supple,  no adenopathy;      Lungs: Clear to auscultation bilaterally, no wheezes rhonchi rales are noted  Chest wall: No tenderness  Heart::  Regular rate and rhythm, S1 and S2 normal, no murmur, rub or gallop  Abdomen: Soft, non-tender, nondistended bowel sounds active  Extremities: No cyanosis, clubbing, or edema  Pulses: 2+ and symmetric all extremities  Skin:  No rashes or lesions  Neuro/psych: A&O x3. CN II through XII are grossly intact with appropriate affect      Allergies:  Allergies   Allergen Reactions   • Amoxicillin Swelling   • Erythromycin GI Intolerance   • Escitalopram Anxiety   • Prozac  [Fluoxetine Hcl] Rash   • Penicillin G Rash       Medication Review:     Current Outpatient Medications:   •  acetaminophen (TYLENOL) 500 MG tablet, Take 500 mg by mouth Every 4 (Four) Hours As Needed., Disp: , Rfl:   •  busPIRone (BUSPAR) 15 MG tablet, TAKE 1 TABLET BY MOUTH THREE TIMES DAILY FOR ANXIETY (Patient taking differently: 2 (Two) Times a Day. TAKE 1 TABLET BY MOUTH THREE TIMES DAILY FOR ANXIETY), Disp: 270 tablet, Rfl: 1  •  Calcium Carbonate Antacid (TUMS PO), Take 2,000 mg by mouth At Night As Needed., Disp: , Rfl:   •  COLLAGEN PO, Take  by mouth., Disp: , Rfl:   •  diclofenac (VOLTAREN) 1 % gel gel, Apply 4 g topically to the appropriate area as directed 4 (Four) Times a Day As Needed., Disp: , Rfl:   •  Evolocumab (REPATHA) solution auto-injector SureClick injection, Inject 1 mL under the skin into the appropriate area as directed Every 14 (Fourteen) Days., Disp: 6 mL, Rfl: 3  •  guaifenesin-dextromethorphan (MUCINEX DM)  MG tablet sustained-release 12 hour tablet, Take  by mouth 2 (Two) Times a Day As Needed., Disp: , Rfl:   •  l-methylfolate calcium (DEPLIN) 7.5 MG tablet tablet, Take 7.5 mg by mouth 2 (Two) Times a Day., Disp: , Rfl:   •  lamoTRIgine (LaMICtal) 25 MG  tablet, Take 1 tablet by mouth 2 (Two) Times a Day. For mood stabilier, Disp: 180 tablet, Rfl: 1  •  levocetirizine (XYZAL) 5 MG tablet, , Disp: , Rfl:   •  Naproxen Sodium 220 MG capsule, Take 1 tablet by mouth As Needed., Disp: , Rfl:   •  Trintellix 5 MG tablet, TAKE 1 TABLET DAILY WITH BREAKFAST, Disp: 90 tablet, Rfl: 3  •  Turmeric 400 MG capsule, , Disp: , Rfl:     Family History:  Family History   Problem Relation Age of Onset   • Coronary artery disease Other    • Diabetes Other    • Dementia Other    • Diabetes Mother    • Cancer Mother    • Heart disease Father    • Liver disease Father    • Hypertension Father    • Cancer Brother    • Breast cancer Neg Hx    • Ovarian cancer Neg Hx        Past Medical History:  Past Medical History:   Diagnosis Date   • Anesthesia complication     drops B/P, and slow to arouse   • GERD (gastroesophageal reflux disease)    • Hereditary hemochromatosis (HCC)    • Hyperlipidemia    • Seasonal allergies        Past surgical History:  Past Surgical History:   Procedure Laterality Date   • BLADDER SUSPENSION     • BREAST BIOPSY Right    • FINGER SURGERY  2008    removed cysts from right index finger    • HERNIA REPAIR     • SUBTOTAL HYSTERECTOMY     • TUBAL ABDOMINAL LIGATION     • VENTRAL/INCISIONAL HERNIA REPAIR N/A 9/24/2020    Procedure: LAPAROSCOPIC VENTRAL HERNIA;  Surgeon: Reuben Molina MD;  Location: [facility] MAIN OR;  Service: General;  Laterality: N/A;  0855 TAP block   • WISDOM TOOTH EXTRACTION         Social History:  Social History     Socioeconomic History   • Marital status:    Tobacco Use   • Smoking status: Never Smoker   • Smokeless tobacco: Never Used   Vaping Use   • Vaping Use: Never used   Substance and Sexual Activity   • Alcohol use: Yes     Alcohol/week: 1.0 standard drink     Types: 1 Glasses of wine per week     Comment: rarely   • Drug use: No   • Sexual activity: Defer       Review of Systems:  The following systems were reviewed as  they relate to the cardiovascular system: Constitutional, Eyes, ENT, Cardiovascular, Respiratory, Gastrointestinal, Integumentary, Neurological, Psychiatric, Hematologic, Endocrine, Musculoskeletal, and Genitourinary. The pertinent cardiovascular findings are reported above with all other cardiovascular points within those systems being negative.    Diagnostic Study Review:     Current Electrocardiogram:  Procedures      NOTE: The following portions of the patient's history were reviewed and updated this visit as appropriate: allergies, current medications, past family history, past medical history, past social history, past surgical history and problem list.

## 2022-07-01 ENCOUNTER — TELEPHONE (OUTPATIENT)
Dept: ONCOLOGY | Facility: CLINIC | Age: 57
End: 2022-07-01

## 2022-07-01 NOTE — TELEPHONE ENCOUNTER
Caller: Merissa Yusuf    Relationship to patient: Self    Best call back number: [phone]    Chief complaint: PATIENT TO RESCHEDULE 7/13/22 APPT    Type of visit: VITALS AND FU    Requested date: NEEDS TO RESCHED PRIOR TO 7/21/22 DUE TO SURGERY. CANNOT DOPRIOR TO 7/13/22

## 2022-07-06 ENCOUNTER — LAB (OUTPATIENT)
Dept: LAB | Facility: HOSPITAL | Age: 57
End: 2022-07-06

## 2022-07-06 DIAGNOSIS — E83.19 IRON OVERLOAD: ICD-10-CM

## 2022-07-06 DIAGNOSIS — E83.119 HEMOCHROMATOSIS, UNSPECIFIED HEMOCHROMATOSIS TYPE: ICD-10-CM

## 2022-07-06 DIAGNOSIS — R71.8 ELEVATED HEMATOCRIT: ICD-10-CM

## 2022-07-06 DIAGNOSIS — E78.2 MIXED HYPERLIPIDEMIA: ICD-10-CM

## 2022-07-06 DIAGNOSIS — D64.9 ANEMIA, UNSPECIFIED TYPE: ICD-10-CM

## 2022-07-06 LAB
BASOPHILS # BLD AUTO: 0.04 10*3/MM3 (ref 0–0.2)
BASOPHILS NFR BLD AUTO: 0.5 % (ref 0–1.5)
DEPRECATED RDW RBC AUTO: 51.2 FL (ref 37–54)
EOSINOPHIL # BLD AUTO: 0.08 10*3/MM3 (ref 0–0.4)
EOSINOPHIL NFR BLD AUTO: 1 % (ref 0.3–6.2)
ERYTHROCYTE [DISTWIDTH] IN BLOOD BY AUTOMATED COUNT: 13.4 % (ref 12.3–15.4)
FERRITIN SERPL-MCNC: 62.62 NG/ML (ref 13–150)
HCT VFR BLD AUTO: 39 % (ref 34–46.6)
HGB BLD-MCNC: 13.7 G/DL (ref 12–15.9)
IRON 24H UR-MRATE: 128 MCG/DL (ref 37–145)
IRON SATN MFR SERPL: 36 % (ref 20–50)
LYMPHOCYTES # BLD AUTO: 2.23 10*3/MM3 (ref 0.7–3.1)
LYMPHOCYTES NFR BLD AUTO: 28.4 % (ref 19.6–45.3)
MCH RBC QN AUTO: 36.8 PG (ref 26.6–33)
MCHC RBC AUTO-ENTMCNC: 35.1 G/DL (ref 31.5–35.7)
MCV RBC AUTO: 104.8 FL (ref 79–97)
MONOCYTES # BLD AUTO: 0.72 10*3/MM3 (ref 0.1–0.9)
MONOCYTES NFR BLD AUTO: 9.2 % (ref 5–12)
NEUTROPHILS NFR BLD AUTO: 4.79 10*3/MM3 (ref 1.7–7)
NEUTROPHILS NFR BLD AUTO: 60.9 % (ref 42.7–76)
PLATELET # BLD AUTO: 407 10*3/MM3 (ref 140–450)
PMV BLD AUTO: 8.4 FL (ref 6–12)
RBC # BLD AUTO: 3.72 10*6/MM3 (ref 3.77–5.28)
TIBC SERPL-MCNC: 356 MCG/DL (ref 298–536)
TRANSFERRIN SERPL-MCNC: 239 MG/DL (ref 200–360)
WBC NRBC COR # BLD: 7.86 10*3/MM3 (ref 3.4–10.8)

## 2022-07-06 PROCEDURE — 85025 COMPLETE CBC W/AUTO DIFF WBC: CPT

## 2022-07-06 PROCEDURE — 82728 ASSAY OF FERRITIN: CPT

## 2022-07-06 PROCEDURE — 83540 ASSAY OF IRON: CPT

## 2022-07-06 PROCEDURE — 36415 COLL VENOUS BLD VENIPUNCTURE: CPT

## 2022-07-06 PROCEDURE — 84466 ASSAY OF TRANSFERRIN: CPT

## 2022-07-13 ENCOUNTER — APPOINTMENT (OUTPATIENT)
Dept: LAB | Facility: HOSPITAL | Age: 57
End: 2022-07-13

## 2022-07-13 NOTE — PROGRESS NOTES
ONCOLOGY/HEMATOLOGY FOLLOW UP    PATIENT: Merissa Yusuf  YOB: 1965  MEDICAL RECORD NUMBER: [id number] OF SERVICE: 07/14/22      Chief complaint:  Hereditary hemochromatosis, homozygous mutation in H63D.  Iron overload  Macrocytosis    History of Present Illness:   · Ms. Yusuf has a history of COPD and also depression.  Laboratory work up was obtained in March 2019 which was abnormal.  Patient was sent to the [facility] and seen initially on 3/13/19.   · 3/5/19 - WBC 4.7, hemoglobin 14.1, MCV elevated to 103 and platelet count of 592,000.  Vitamin D  20.  Sed rate 35.  TSH 1.62.  Vitamin B12 476.  Creatinine 0.8.    · 3/13/19 - ZACKERY-2 mutation negative.  Hemochromatosis gene mutation, homozygous mutation in H63D.  · 3/13/19 - Vitamin B12 507.  Ferritin 178.  Creatinine 0.7.  Iron level 156.  Iron binding  capacity 257.  Percentage iron saturation 61.   · 1/8/2020 Ferritin 304 iron 177 iron saturation 56 TIBC 316  · 7/21/2020 iron 247 iron saturation 82 iron binding capacity 302 ferritin 271 hemoglobin 13.7 WBC 6.1 platelet count 372.  · 6/19/2020 CMP is normal including LFTs.  TSH 3.01  · 10/2020 ferritin 233.40 iron sat 43  · 8/2021 ferritin 341.40, iron sat 76  · 11/2/2021 -ferritin 112  · 3/8/2022 -CBC with hemoglobin 13.5, hematocrit 38.7.  Ferritin 59.7, iron saturation 35  · 7/6/2022 - Hb 13.7, ferritin 62 iron 36      SUBJECTIVE:   Has increasing fatigue, eye issues.         Past Medical History:   Diagnosis Date   • Anesthesia complication     drops B/P, and slow to arouse   • GERD (gastroesophageal reflux disease)    • Hereditary hemochromatosis (HCC)    • Hyperlipidemia    • Seasonal allergies        Past Surgical History:   Procedure Laterality Date   • BLADDER SUSPENSION     • BREAST BIOPSY Right    • FINGER SURGERY  2008    removed cysts from right index finger    • HERNIA REPAIR     • SUBTOTAL HYSTERECTOMY     • TUBAL ABDOMINAL LIGATION     •  VENTRAL/INCISIONAL HERNIA REPAIR N/A 9/24/2020    Procedure: LAPAROSCOPIC VENTRAL HERNIA;  Surgeon: Reuben Molina MD;  Location: [facility] MAIN OR;  Service: General;  Laterality: N/A;  0855 TAP block   • WISDOM TOOTH EXTRACTION         Family History   Problem Relation Age of Onset   • Coronary artery disease Other    • Diabetes Other    • Dementia Other    • Diabetes Mother    • Cancer Mother    • Heart disease Father    • Liver disease Father    • Hypertension Father    • Cancer Brother    • Breast cancer Neg Hx    • Ovarian cancer Neg Hx        Social History     Socioeconomic History   • Marital status:    Tobacco Use   • Smoking status: Never Smoker   • Smokeless tobacco: Never Used   Vaping Use   • Vaping Use: Never used   Substance and Sexual Activity   • Alcohol use: Yes     Alcohol/week: 1.0 standard drink     Types: 1 Glasses of wine per week     Comment: rarely   • Drug use: No   • Sexual activity: Defer       ALLERGIES:  Amoxicillin, Erythromycin, Escitalopram, Prozac  [fluoxetine hcl], and Penicillin g    MEDICATION:  Current Outpatient Medications   Medication Sig Dispense Refill   • acetaminophen (TYLENOL) 500 MG tablet Take 500 mg by mouth Every 4 (Four) Hours As Needed.     • busPIRone (BUSPAR) 15 MG tablet TAKE 1 TABLET BY MOUTH THREE TIMES DAILY FOR ANXIETY (Patient taking differently: 2 (Two) Times a Day. TAKE 1 TABLET BY MOUTH THREE TIMES DAILY FOR ANXIETY) 270 tablet 1   • Calcium Carbonate Antacid (TUMS PO) Take 2,000 mg by mouth At Night As Needed.     • COLLAGEN PO Take  by mouth.     • diclofenac (VOLTAREN) 1 % gel gel Apply 4 g topically to the appropriate area as directed 4 (Four) Times a Day As Needed.     • Evolocumab (REPATHA) solution auto-injector SureClick injection Inject 1 mL under the skin into the appropriate area as directed Every 14 (Fourteen) Days. 6 mL 3   • guaifenesin-dextromethorphan (MUCINEX DM)  MG tablet sustained-release 12 hour tablet Take  by  "mouth 2 (Two) Times a Day As Needed.     • l-methylfolate calcium (DEPLIN) 7.5 MG tablet tablet Take 7.5 mg by mouth 2 (Two) Times a Day.     • lamoTRIgine (LaMICtal) 25 MG tablet Take 1 tablet by mouth 2 (Two) Times a Day. For mood stabilier 180 tablet 1   • levocetirizine (XYZAL) 5 MG tablet      • Naproxen Sodium 220 MG capsule Take 1 tablet by mouth As Needed.     • Trintellix 5 MG tablet TAKE 1 TABLET DAILY WITH BREAKFAST 90 tablet 3   • Turmeric 400 MG capsule        No current facility-administered medications for this visit.     PHYSICAL EXAM:    Current Vitals:  /74   Pulse 70   Temp 97.1 °F (36.2 °C)   Ht 149.9 cm (59\")   Wt 106 kg (233 lb 11 oz) Comment: last visit  LMP  (LMP Unknown)   SpO2 98%   BMI 47.20 kg/m²     VITAL signs in the last 24 hours: [unfilled]       07/14/22  0846   Weight: 106 kg (233 lb 11 oz) (last visit)       Physical Exam  Constitutional:       Appearance: Normal appearance.   HENT:      Head: Normocephalic and atraumatic.   Eyes:      Pupils: Pupils are equal, round, and reactive to light.   Cardiovascular:      Rate and Rhythm: Normal rate and regular rhythm.      Pulses: Normal pulses.      Heart sounds: No murmur heard.  Pulmonary:      Effort: Pulmonary effort is normal.      Breath sounds: Normal breath sounds.   Abdominal:      General: There is no distension.      Palpations: Abdomen is soft. There is no mass.      Tenderness: There is no abdominal tenderness.   Musculoskeletal:         General: Normal range of motion.      Cervical back: Normal range of motion and neck supple.   Skin:     General: Skin is warm.   Neurological:      General: No focal deficit present.      Mental Status: She is alert.   Psychiatric:         Mood and Affect: Mood normal.      no change in exam      LABORATORY/DATA:  Lab Results   Component Value Date    WBC 7.86 07/06/2022    WBC 6.64 03/24/2022    HGB 13.7 07/06/2022    HGB 13.6 03/24/2022    HCT 39.0 07/06/2022    HCT 40.1 " 03/24/2022    NEUTROABS 4.79 07/06/2022       Lab Results   Component Value Date    GLUCOSE 98 03/22/2022    BUN 13 03/22/2022    CREATININE 0.75 03/22/2022    EGFRIFNONA 81 09/21/2021    EGFRIFAFRI >60 02/26/2019    BCR 17.3 03/22/2022    K 4.2 03/22/2022    CO2 24.5 03/22/2022    CALCIUM 9.8 03/22/2022    ALBUMIN 4.40 03/22/2022    LABIL2 1.5 03/13/2019    AST 17 03/22/2022    ALT 13 03/22/2022       Lab Results   Component Value Date    IRON 128 07/06/2022    TIBC 356 07/06/2022    FERRITIN 62.62 07/06/2022            Assessment & Plan    Patient is a 56-year-old female with iron overload secondary to homozygous H63D mutation for hemochromatosis.    Hemochromatosis  Patient does not have the classic phenotype however has an alternate with homozygous H63D.  She has a propensity towards iron overload.  However less likely to cause significant liver damage.  Ferritin is continuing to be stable. This is at 62.62  Ideally ferritin should be below 75 to prevent liver damage.  We will continue to monitor.     I will recheck a CBC iron studies and 6 months discussed the further phlebotomy schedule with her.    Patient agrees with the plan.

## 2022-07-14 ENCOUNTER — APPOINTMENT (OUTPATIENT)
Dept: LAB | Facility: HOSPITAL | Age: 57
End: 2022-07-14

## 2022-07-14 ENCOUNTER — OFFICE VISIT (OUTPATIENT)
Dept: ONCOLOGY | Facility: CLINIC | Age: 57
End: 2022-07-14

## 2022-07-14 VITALS
WEIGHT: 233.69 LBS | BODY MASS INDEX: 47.11 KG/M2 | OXYGEN SATURATION: 98 % | DIASTOLIC BLOOD PRESSURE: 74 MMHG | TEMPERATURE: 97.1 F | HEIGHT: 59 IN | SYSTOLIC BLOOD PRESSURE: 113 MMHG | HEART RATE: 70 BPM

## 2022-07-14 DIAGNOSIS — D64.9 ANEMIA, UNSPECIFIED TYPE: ICD-10-CM

## 2022-07-14 DIAGNOSIS — D72.829 LEUKOCYTOSIS, UNSPECIFIED TYPE: ICD-10-CM

## 2022-07-14 DIAGNOSIS — E83.119 HEMOCHROMATOSIS, UNSPECIFIED HEMOCHROMATOSIS TYPE: Primary | ICD-10-CM

## 2022-07-14 PROCEDURE — 99213 OFFICE O/P EST LOW 20 MIN: CPT | Performed by: INTERNAL MEDICINE

## 2022-07-19 PROBLEM — Z20.822 ENCOUNTER FOR LABORATORY TESTING FOR COVID-19 VIRUS: Status: ACTIVE | Noted: 2022-07-19

## 2022-07-19 PROCEDURE — U0004 COV-19 TEST NON-CDC HGH THRU: HCPCS | Performed by: FAMILY MEDICINE

## 2022-07-20 ENCOUNTER — TELEPHONE (OUTPATIENT)
Dept: FAMILY MEDICINE CLINIC | Facility: CLINIC | Age: 57
End: 2022-07-20

## 2022-07-20 NOTE — TELEPHONE ENCOUNTER
HUB TO READ:  ----- Message from Melissa Duarte MD sent at 7/20/2022 11:52 AM EDT -----  If you are still having symptoms of cough and cold by the end of the week we should probably repeat your COVID PCR call the office and we will come out and swab you behind it.

## 2022-07-20 NOTE — TELEPHONE ENCOUNTER
Made patient aware. She is scheduled for eye surgery on Friday, if she needs us before then she will call.

## 2022-08-17 DIAGNOSIS — J06.9 VIRAL URI WITH COUGH: ICD-10-CM

## 2022-08-17 RX ORDER — BENZONATATE 200 MG/1
CAPSULE ORAL
Qty: 21 CAPSULE | Refills: 0 | Status: SHIPPED | OUTPATIENT
Start: 2022-08-17 | End: 2022-09-22

## 2022-09-07 DIAGNOSIS — F41.8 MIXED ANXIETY DEPRESSIVE DISORDER: Chronic | ICD-10-CM

## 2022-09-07 RX ORDER — BUSPIRONE HYDROCHLORIDE 15 MG/1
TABLET ORAL
Qty: 270 TABLET | Refills: 1 | Status: SHIPPED | OUTPATIENT
Start: 2022-09-07

## 2022-09-07 RX ORDER — LAMOTRIGINE 25 MG/1
TABLET ORAL
Qty: 180 TABLET | Refills: 1 | Status: SHIPPED | OUTPATIENT
Start: 2022-09-07 | End: 2023-03-14

## 2022-09-20 NOTE — PATIENT INSTRUCTIONS
Health Maintenance Due   Topic Date Due    ZOSTER VACCINE (1 of 2) Never done    COLORECTAL CANCER SCREENING  06/27/2020    COVID-19 Vaccine (4 - Booster for Moderna series) 01/27/2022    ANNUAL PHYSICAL  09/22/2022    Call what antibiotic eye doctor puts her on tomorrow.    Patient had Dilaudid 9/20  Call Behavioral Health and make sure still can do video visit

## 2022-09-21 RX ORDER — ALPRAZOLAM 1 MG/1
TABLET ORAL EVERY 8 HOURS
COMMUNITY
End: 2022-09-22

## 2022-09-21 RX ORDER — LEVOTHYROXINE SODIUM 50 UG/1
1 CAPSULE ORAL DAILY
COMMUNITY
End: 2022-09-22

## 2022-09-21 RX ORDER — GABAPENTIN 300 MG/1
1 CAPSULE ORAL EVERY 8 HOURS
COMMUNITY
End: 2022-09-22

## 2022-09-21 RX ORDER — OXYCODONE HYDROCHLORIDE AND ACETAMINOPHEN 5; 325 MG/1; MG/1
TABLET ORAL EVERY 6 HOURS
COMMUNITY
End: 2022-09-22

## 2022-09-21 RX ORDER — PREDNISOLONE ACETATE 10 MG/ML
1 SUSPENSION/ DROPS OPHTHALMIC EVERY 6 HOURS
COMMUNITY
Start: 2022-07-15 | End: 2022-10-24

## 2022-09-21 RX ORDER — OFLOXACIN 3 MG/ML
1 SOLUTION/ DROPS OPHTHALMIC EVERY 6 HOURS
COMMUNITY
Start: 2022-07-15 | End: 2022-10-24

## 2022-09-22 ENCOUNTER — OFFICE VISIT (OUTPATIENT)
Dept: FAMILY MEDICINE CLINIC | Facility: CLINIC | Age: 57
End: 2022-09-22

## 2022-09-22 VITALS
DIASTOLIC BLOOD PRESSURE: 83 MMHG | SYSTOLIC BLOOD PRESSURE: 131 MMHG | HEIGHT: 59 IN | TEMPERATURE: 98.1 F | HEART RATE: 78 BPM | BODY MASS INDEX: 46.12 KG/M2 | WEIGHT: 228.8 LBS | OXYGEN SATURATION: 96 %

## 2022-09-22 DIAGNOSIS — E55.9 VITAMIN D DEFICIENCY: ICD-10-CM

## 2022-09-22 DIAGNOSIS — R03.0 ELEVATED BLOOD PRESSURE READING WITHOUT DIAGNOSIS OF HYPERTENSION: ICD-10-CM

## 2022-09-22 DIAGNOSIS — Z00.01 ENCOUNTER FOR ANNUAL GENERAL MEDICAL EXAMINATION WITH ABNORMAL FINDINGS IN ADULT: Primary | ICD-10-CM

## 2022-09-22 DIAGNOSIS — K21.9 GASTROESOPHAGEAL REFLUX DISEASE, UNSPECIFIED WHETHER ESOPHAGITIS PRESENT: ICD-10-CM

## 2022-09-22 DIAGNOSIS — R41.3 MEMORY LOSS: ICD-10-CM

## 2022-09-22 DIAGNOSIS — F33.1 MODERATE EPISODE OF RECURRENT MAJOR DEPRESSIVE DISORDER: Chronic | ICD-10-CM

## 2022-09-22 DIAGNOSIS — E83.110 HEREDITARY HEMOCHROMATOSIS: ICD-10-CM

## 2022-09-22 DIAGNOSIS — G47.33 OBSTRUCTIVE SLEEP APNEA: ICD-10-CM

## 2022-09-22 DIAGNOSIS — T14.8XXA OPEN BITE WOUND OF SKIN: ICD-10-CM

## 2022-09-22 DIAGNOSIS — E66.01 CLASS 3 SEVERE OBESITY DUE TO EXCESS CALORIES WITH SERIOUS COMORBIDITY AND BODY MASS INDEX (BMI) OF 45.0 TO 49.9 IN ADULT: ICD-10-CM

## 2022-09-22 DIAGNOSIS — E78.2 MIXED HYPERLIPIDEMIA: ICD-10-CM

## 2022-09-22 DIAGNOSIS — Z80.8 FAMILY HISTORY OF SKIN CANCER: ICD-10-CM

## 2022-09-22 PROBLEM — H35.341 MACULAR PSEUDOHOLE OF RIGHT EYE: Status: ACTIVE | Noted: 2022-05-03

## 2022-09-22 PROBLEM — H35.342: Status: ACTIVE | Noted: 2022-05-03

## 2022-09-22 LAB
25(OH)D3 SERPL-MCNC: 46 NG/ML (ref 30–100)
ALBUMIN SERPL-MCNC: 4.6 G/DL (ref 3.5–5.2)
ALBUMIN/GLOB SERPL: 1.6 G/DL
ALP SERPL-CCNC: 79 U/L (ref 39–117)
ALT SERPL W P-5'-P-CCNC: 16 U/L (ref 1–33)
ANION GAP SERPL CALCULATED.3IONS-SCNC: 10.8 MMOL/L (ref 5–15)
AST SERPL-CCNC: 21 U/L (ref 1–32)
BASOPHILS # BLD AUTO: 0.06 10*3/MM3 (ref 0–0.2)
BASOPHILS NFR BLD AUTO: 0.8 % (ref 0–1.5)
BILIRUB SERPL-MCNC: 0.5 MG/DL (ref 0–1.2)
BUN SERPL-MCNC: 9 MG/DL (ref 6–20)
BUN/CREAT SERPL: 11.3 (ref 7–25)
CALCIUM SPEC-SCNC: 10.2 MG/DL (ref 8.6–10.5)
CHLORIDE SERPL-SCNC: 104 MMOL/L (ref 98–107)
CHOLEST SERPL-MCNC: 153 MG/DL (ref 0–200)
CO2 SERPL-SCNC: 24.2 MMOL/L (ref 22–29)
CREAT SERPL-MCNC: 0.8 MG/DL (ref 0.57–1)
DEPRECATED RDW RBC AUTO: 45.2 FL (ref 37–54)
EGFRCR SERPLBLD CKD-EPI 2021: 85 ML/MIN/1.73
EOSINOPHIL # BLD AUTO: 0.18 10*3/MM3 (ref 0–0.4)
EOSINOPHIL NFR BLD AUTO: 2.5 % (ref 0.3–6.2)
ERYTHROCYTE [DISTWIDTH] IN BLOOD BY AUTOMATED COUNT: 11.9 % (ref 12.3–15.4)
GLOBULIN UR ELPH-MCNC: 2.8 GM/DL
GLUCOSE SERPL-MCNC: 103 MG/DL (ref 65–99)
HCT VFR BLD AUTO: 43 % (ref 34–46.6)
HDLC SERPL-MCNC: 73 MG/DL (ref 40–60)
HGB BLD-MCNC: 14.5 G/DL (ref 12–15.9)
IMM GRANULOCYTES # BLD AUTO: 0.07 10*3/MM3 (ref 0–0.05)
IMM GRANULOCYTES NFR BLD AUTO: 1 % (ref 0–0.5)
LDLC SERPL CALC-MCNC: 63 MG/DL (ref 0–100)
LDLC/HDLC SERPL: 0.84 {RATIO}
LYMPHOCYTES # BLD AUTO: 1.93 10*3/MM3 (ref 0.7–3.1)
LYMPHOCYTES NFR BLD AUTO: 26.8 % (ref 19.6–45.3)
MAGNESIUM SERPL-MCNC: 2.1 MG/DL (ref 1.6–2.6)
MCH RBC QN AUTO: 34.9 PG (ref 26.6–33)
MCHC RBC AUTO-ENTMCNC: 33.7 G/DL (ref 31.5–35.7)
MCV RBC AUTO: 103.6 FL (ref 79–97)
MONOCYTES # BLD AUTO: 0.57 10*3/MM3 (ref 0.1–0.9)
MONOCYTES NFR BLD AUTO: 7.9 % (ref 5–12)
NEUTROPHILS NFR BLD AUTO: 4.38 10*3/MM3 (ref 1.7–7)
NEUTROPHILS NFR BLD AUTO: 61 % (ref 42.7–76)
NRBC BLD AUTO-RTO: 0 /100 WBC (ref 0–0.2)
PLATELET # BLD AUTO: 429 10*3/MM3 (ref 140–450)
PMV BLD AUTO: 9.2 FL (ref 6–12)
POTASSIUM SERPL-SCNC: 4.1 MMOL/L (ref 3.5–5.2)
PROT SERPL-MCNC: 7.4 G/DL (ref 6–8.5)
RBC # BLD AUTO: 4.15 10*6/MM3 (ref 3.77–5.28)
SODIUM SERPL-SCNC: 139 MMOL/L (ref 136–145)
TRIGL SERPL-MCNC: 95 MG/DL (ref 0–150)
TSH SERPL DL<=0.05 MIU/L-ACNC: 1.45 UIU/ML (ref 0.27–4.2)
VIT B12 BLD-MCNC: 698 PG/ML (ref 211–946)
VLDLC SERPL-MCNC: 17 MG/DL (ref 5–40)
WBC NRBC COR # BLD: 7.19 10*3/MM3 (ref 3.4–10.8)

## 2022-09-22 PROCEDURE — 80053 COMPREHEN METABOLIC PANEL: CPT | Performed by: PREVENTIVE MEDICINE

## 2022-09-22 PROCEDURE — 99396 PREV VISIT EST AGE 40-64: CPT | Performed by: PREVENTIVE MEDICINE

## 2022-09-22 PROCEDURE — 82306 VITAMIN D 25 HYDROXY: CPT | Performed by: PREVENTIVE MEDICINE

## 2022-09-22 PROCEDURE — 99214 OFFICE O/P EST MOD 30 MIN: CPT | Performed by: PREVENTIVE MEDICINE

## 2022-09-22 PROCEDURE — 36415 COLL VENOUS BLD VENIPUNCTURE: CPT | Performed by: PREVENTIVE MEDICINE

## 2022-09-22 PROCEDURE — 80061 LIPID PANEL: CPT | Performed by: PREVENTIVE MEDICINE

## 2022-09-22 PROCEDURE — 85025 COMPLETE CBC W/AUTO DIFF WBC: CPT | Performed by: PREVENTIVE MEDICINE

## 2022-09-22 PROCEDURE — 83735 ASSAY OF MAGNESIUM: CPT | Performed by: PREVENTIVE MEDICINE

## 2022-09-22 PROCEDURE — 84443 ASSAY THYROID STIM HORMONE: CPT | Performed by: PREVENTIVE MEDICINE

## 2022-09-22 PROCEDURE — 82607 VITAMIN B-12: CPT | Performed by: PREVENTIVE MEDICINE

## 2022-09-22 NOTE — PROGRESS NOTES
"Subjective   Merissa Yusuf is a 57 y.o. female presents for   Chief Complaint   Patient presents with   • Annual Exam     Patient presents today for annual wellness exam and has been advised to wear sunscreen and seatbelt.  She is also here to get a referral for the bariatric clinic to see about weight loss gastric sleeve surgery.  Referral was placed.  She has some sort ofBruising on her right groin area and she is concerned is either a foreign body or some sort of infected hair follicle.  Upon further removal it looked as though it might be some sort of insect bite the darkened scabbed area was removed could be some sort of tick bite but patient does not remember any tick in this area.  The actual area of concern occurred approximately a month ago so it is probably too late to start on any sort of doxycycline at this stage anyway.  She will keep an eye on it and if it fails to improve in another month we will recheck her again.  She has no groin adenopathy also she is here to check on multiple chronic health conditions most of which seem stable.  Depression seems to be under good control no HI or SI uses her CPAP nightly.  Has been advised to get her COVID booster.  Health Maintenance Due   Topic Date Due   • ZOSTER VACCINE (1 of 2) Never done   • COVID-19 Vaccine (4 - Booster for Moderna series) 01/27/2022   • ANNUAL PHYSICAL  09/22/2022       History of Present Illness     Vitals:    09/22/22 0952 09/22/22 0957   BP: 134/85 131/83   BP Location: Right arm Left arm   Patient Position: Sitting Standing   Cuff Size: Adult Adult   Pulse: 78    Temp: 98.1 °F (36.7 °C)    TempSrc: Temporal    SpO2: 96%    Weight: 104 kg (228 lb 12.8 oz)    Height: 149.9 cm (59.02\")      Body mass index is 46.19 kg/m².    Current Outpatient Medications on File Prior to Visit   Medication Sig Dispense Refill   • acetaminophen (TYLENOL) 500 MG tablet Take 500 mg by mouth Every 4 (Four) Hours As Needed.     • busPIRone (BUSPAR) 15 MG " tablet TAKE 1 TABLET THREE TIMES A DAY FOR ANXIETY 270 tablet 1   • Calcium Carbonate Antacid (TUMS PO) Take 2,000 mg by mouth At Night As Needed.     • COLLAGEN PO Take  by mouth.     • Evolocumab (REPATHA) solution auto-injector SureClick injection Inject 1 mL under the skin into the appropriate area as directed Every 14 (Fourteen) Days. 6 mL 3   • l-methylfolate calcium (DEPLIN) 7.5 MG tablet tablet Take 7.5 mg by mouth 2 (Two) Times a Day.     • lamoTRIgine (LaMICtal) 25 MG tablet TAKE 1 TABLET TWICE A DAY FOR MOOD STABILIZER 180 tablet 1   • levocetirizine (XYZAL) 5 MG tablet      • Naproxen Sodium 220 MG capsule Take 1 tablet by mouth As Needed.     • ofloxacin (OCUFLOX) 0.3 % ophthalmic solution Apply 1 drop to eye(s) as directed by provider Every 6 (Six) Hours.     • prednisoLONE acetate (PRED FORTE) 1 % ophthalmic suspension Apply 1 drop to eye(s) as directed by provider Every 6 (Six) Hours.     • Trintellix 5 MG tablet TAKE 1 TABLET DAILY WITH BREAKFAST 90 tablet 3   • Turmeric 400 MG capsule      • prednisoLONE acetate (PRED FORTE) 1 % ophthalmic suspension      • [DISCONTINUED] ALPRAZolam (XANAX) 1 MG tablet Take  by mouth Every 8 (Eight) Hours.     • [DISCONTINUED] benzonatate (TESSALON) 200 MG capsule 1 capsule p.o. every 8 hours as needed cough 21 capsule 0   • [DISCONTINUED] diclofenac (VOLTAREN) 1 % gel gel Apply 4 g topically to the appropriate area as directed 4 (Four) Times a Day As Needed.     • [DISCONTINUED] gabapentin (NEURONTIN) 300 MG capsule Take 1 capsule by mouth Every 8 (Eight) Hours.     • [DISCONTINUED] guaifenesin-dextromethorphan (MUCINEX DM)  MG tablet sustained-release 12 hour tablet Take  by mouth 2 (Two) Times a Day As Needed.     • [DISCONTINUED] levothyroxine sodium (TIROSINT) 50 MCG capsule Take 1 capsule by mouth Daily.     • [DISCONTINUED] ofloxacin (OCUFLOX) 0.3 % ophthalmic solution      • [DISCONTINUED] oxyCODONE-acetaminophen (PERCOCET) 5-325 MG per tablet Take   by mouth Every 6 (Six) Hours.       No current facility-administered medications on file prior to visit.       The following portions of the patient's history were reviewed and updated as appropriate: allergies, current medications, past family history, past medical history, past social history, past surgical history and problem list.    Review of Systems   Skin: Positive for skin lesions and wound.   Neurological: Positive for memory problem.       Objective   Physical Exam  Vitals reviewed.   Constitutional:       General: She is not in acute distress.     Appearance: She is well-developed. She is obese. She is not ill-appearing or toxic-appearing.   HENT:      Head: Normocephalic and atraumatic.      Right Ear: Tympanic membrane, ear canal and external ear normal.      Left Ear: Tympanic membrane, ear canal and external ear normal.      Nose: Nose normal.      Mouth/Throat:      Mouth: Mucous membranes are moist.      Pharynx: No posterior oropharyngeal erythema.   Eyes:      Extraocular Movements: Extraocular movements intact.      Conjunctiva/sclera: Conjunctivae normal.      Pupils: Pupils are equal, round, and reactive to light.   Cardiovascular:      Rate and Rhythm: Normal rate and regular rhythm.      Heart sounds: Normal heart sounds.   Pulmonary:      Effort: Pulmonary effort is normal.      Comments: Decreased breath sounds bilaterally  Abdominal:      General: Bowel sounds are normal. There is no distension.      Palpations: Abdomen is soft. There is no mass.      Tenderness: There is no abdominal tenderness.   Musculoskeletal:      Cervical back: Neck supple.   Skin:     General: Skin is warm.      Findings: Bruising and lesion present.   Neurological:      General: No focal deficit present.      Mental Status: She is alert and oriented to person, place, and time.   Psychiatric:         Mood and Affect: Mood normal.         Behavior: Behavior normal.       PHQ-9 Total Score:      Assessment & Plan    Diagnoses and all orders for this visit:    1. Encounter for annual general medical examination with abnormal findings in adult (Primary)  Comments:  Advised sun screen and seat belt    2. Elevated blood pressure reading without diagnosis of hypertension  Comments:  Controlled    3. Gastroesophageal reflux disease, unspecified whether esophagitis present  Comments:  Minimal-controlled with Tums  Orders:  -     CBC Auto Differential; Future  -     Magnesium; Future  -     CBC Auto Differential  -     Magnesium    4. Hereditary hemochromatosis (HCC)  Comments:  Sees hematology    5. Mixed hyperlipidemia  Comments:  Trying to watch sat fats  Orders:  -     Lipid Panel; Future  -     Lipid Panel    6. Moderate episode of recurrent major depressive disorder (HCC)  Comments:  Good.  No HI or SI    7. Obstructive sleep apnea  Comments:  Use CPAP    8. Vitamin D deficiency  Comments:  No taking  Orders:  -     Vitamin D 25 Hydroxy; Future  -     Vitamin D 25 Hydroxy    9. Memory loss  Comments:  Mom had dementia-seems about the same  Orders:  -     Comprehensive Metabolic Panel; Future  -     TSH; Future  -     Vitamin B12; Future  -     Comprehensive Metabolic Panel  -     TSH  -     Vitamin B12    10. Open bite wound of skin  Comments:  Call if still bruised 3 months    11. Family history of skin cancer  -     Ambulatory Referral to Dermatology    12. Class 3 severe obesity due to excess calories with serious comorbidity and body mass index (BMI) of 45.0 to 49.9 in adult ([facility])  -     Ambulatory Referral to Bariatric Surgery        Patient Instructions     Health Maintenance Due   Topic Date Due   • ZOSTER VACCINE (1 of 2) Never done   • COLORECTAL CANCER SCREENING  06/27/2020   • COVID-19 Vaccine (4 - Booster for Moderna series) 01/27/2022   • ANNUAL PHYSICAL  09/22/2022    Call what antibiotic eye doctor puts her on tomorrow.    Patient had Dilaudid 9/20  Call Behavioral Health and make sure still can do video  visit

## 2022-09-22 NOTE — PROGRESS NOTES
Venipuncture Blood Specimen Collection  Venipuncture performed in left arm by Melissa Duarte MD with good hemostasis. Patient tolerated the procedure well without complications.   09/22/22   MD Melissa Salomon MD

## 2022-09-23 ENCOUNTER — TELEPHONE (OUTPATIENT)
Dept: FAMILY MEDICINE CLINIC | Facility: CLINIC | Age: 57
End: 2022-09-23

## 2022-09-23 NOTE — PROGRESS NOTES
Glucose was 103 so watch carbs and increase walking rest the labs look good hope things go well with your eye surgery call if any other questions or concerns.

## 2022-09-23 NOTE — TELEPHONE ENCOUNTER
HUB TO READ:  ----- Message from Melissa Duarte MD sent at 9/22/2022  8:37 PM EDT -----    Glucose was 103 so watch carbs and increase walking rest the labs look good hope things go well with your eye surgery call if any other questions or concerns.

## 2022-10-05 ENCOUNTER — OFFICE VISIT (OUTPATIENT)
Dept: ORTHOPEDIC SURGERY | Facility: CLINIC | Age: 57
End: 2022-10-05

## 2022-10-05 DIAGNOSIS — M17.12 PRIMARY OSTEOARTHRITIS OF LEFT KNEE: ICD-10-CM

## 2022-10-05 DIAGNOSIS — M17.11 PRIMARY OSTEOARTHRITIS OF RIGHT KNEE: Primary | ICD-10-CM

## 2022-10-05 PROCEDURE — 20610 DRAIN/INJ JOINT/BURSA W/O US: CPT | Performed by: ORTHOPAEDIC SURGERY

## 2022-10-05 RX ORDER — METHYLPREDNISOLONE ACETATE 80 MG/ML
160 INJECTION, SUSPENSION INTRA-ARTICULAR; INTRALESIONAL; INTRAMUSCULAR; SOFT TISSUE
Status: COMPLETED | OUTPATIENT
Start: 2022-10-05 | End: 2022-10-05

## 2022-10-05 RX ORDER — LIDOCAINE HYDROCHLORIDE 10 MG/ML
2 INJECTION, SOLUTION EPIDURAL; INFILTRATION; INTRACAUDAL; PERINEURAL
Status: COMPLETED | OUTPATIENT
Start: 2022-10-05 | End: 2022-10-05

## 2022-10-05 RX ADMIN — METHYLPREDNISOLONE ACETATE 160 MG: 80 INJECTION, SUSPENSION INTRA-ARTICULAR; INTRALESIONAL; INTRAMUSCULAR; SOFT TISSUE at 13:51

## 2022-10-05 RX ADMIN — LIDOCAINE HYDROCHLORIDE 2 ML: 10 INJECTION, SOLUTION EPIDURAL; INFILTRATION; INTRACAUDAL; PERINEURAL at 13:50

## 2022-10-05 RX ADMIN — METHYLPREDNISOLONE ACETATE 160 MG: 80 INJECTION, SUSPENSION INTRA-ARTICULAR; INTRALESIONAL; INTRAMUSCULAR; SOFT TISSUE at 13:50

## 2022-10-05 RX ADMIN — LIDOCAINE HYDROCHLORIDE 2 ML: 10 INJECTION, SOLUTION EPIDURAL; INFILTRATION; INTRACAUDAL; PERINEURAL at 13:51

## 2022-10-05 NOTE — PROGRESS NOTES
INJECTION    Patient: Merissa Yusuf    YOB: 1965    MRN: [id number]    Chief Complaint   Patient presents with   • Left Knee - Follow-up   • Right Knee - Follow-up       History of Present Illness: Patient returns today for bilateral knee pain.  Her pain is located over the medial and lateral aspect of the joint.  The pain has been progressive in nature and remains constant.  Her pain is worsened by kneeling, rising after sitting. There has not been improvement in the past with heat, NSAIDS and injections.     This problem is not new to this examiner.     Allergies:   Allergies   Allergen Reactions   • Amoxicillin Swelling   • Erythromycin GI Intolerance   • Escitalopram Anxiety   • Prozac  [Fluoxetine Hcl] Rash   • Penicillin G Rash       Medications:   Home Medications:  Current Outpatient Medications on File Prior to Visit   Medication Sig   • acetaminophen (TYLENOL) 500 MG tablet Take 500 mg by mouth Every 4 (Four) Hours As Needed.   • busPIRone (BUSPAR) 15 MG tablet TAKE 1 TABLET THREE TIMES A DAY FOR ANXIETY   • Calcium Carbonate Antacid (TUMS PO) Take 2,000 mg by mouth At Night As Needed.   • COLLAGEN PO Take  by mouth.   • Evolocumab (REPATHA) solution auto-injector SureClick injection Inject 1 mL under the skin into the appropriate area as directed Every 14 (Fourteen) Days.   • l-methylfolate calcium (DEPLIN) 7.5 MG tablet tablet Take 7.5 mg by mouth 2 (Two) Times a Day.   • lamoTRIgine (LaMICtal) 25 MG tablet TAKE 1 TABLET TWICE A DAY FOR MOOD STABILIZER   • levocetirizine (XYZAL) 5 MG tablet    • Naproxen Sodium 220 MG capsule Take 1 tablet by mouth As Needed.   • ofloxacin (OCUFLOX) 0.3 % ophthalmic solution Apply 1 drop to eye(s) as directed by provider Every 6 (Six) Hours.   • prednisoLONE acetate (PRED FORTE) 1 % ophthalmic suspension    • prednisoLONE acetate (PRED FORTE) 1 % ophthalmic suspension Apply 1 drop to eye(s) as directed by provider Every 6 (Six) Hours.   • Trintellix 5 MG  tablet TAKE 1 TABLET DAILY WITH BREAKFAST   • Turmeric 400 MG capsule      No current facility-administered medications on file prior to visit.     Current Medications:  Scheduled Meds:  PRN Meds:.    I have reviewed the patient's medical history in detail and updated the computerized patient record.  Review and summarization of old records include:    Past Medical History:   Diagnosis Date   • Anesthesia complication     drops B/P, and slow to arouse   • Encounter for laboratory testing for COVID-19 virus 07/19/2022   • GERD (gastroesophageal reflux disease)    • Hereditary hemochromatosis (HCC)    • Hyperlipidemia    • Seasonal allergies      Past Surgical History:   Procedure Laterality Date   • BLADDER SUSPENSION     • BREAST BIOPSY Right    • FINGER SURGERY  2008    removed cysts from right index finger    • HERNIA REPAIR     • SUBTOTAL HYSTERECTOMY     • TUBAL ABDOMINAL LIGATION     • VENTRAL/INCISIONAL HERNIA REPAIR N/A 9/24/2020    Procedure: LAPAROSCOPIC VENTRAL HERNIA;  Surgeon: Reuben Molina MD;  Location: [facility] MAIN OR;  Service: General;  Laterality: N/A;  0855 TAP block   • WISDOM TOOTH EXTRACTION       Social History     Occupational History   • Not on file   Tobacco Use   • Smoking status: Never Smoker   • Smokeless tobacco: Never Used   Vaping Use   • Vaping Use: Never used   Substance and Sexual Activity   • Alcohol use: Yes     Alcohol/week: 1.0 standard drink     Types: 1 Glasses of wine per week     Comment: rarely   • Drug use: No   • Sexual activity: Defer      Social History     Social History Narrative   • Not on file     Family History   Problem Relation Age of Onset   • Coronary artery disease Other    • Diabetes Other    • Dementia Other    • Diabetes Mother    • Cancer Mother    • Heart disease Father    • Liver disease Father    • Hypertension Father    • Cancer Brother    • Breast cancer Neg Hx    • Ovarian cancer Neg Hx        Review of Systems        Wt Readings from Last  "3 Encounters:   09/22/22 104 kg (228 lb 12.8 oz)   07/19/22 106 kg (233 lb)   07/14/22 106 kg (233 lb 11 oz)     Ht Readings from Last 3 Encounters:   09/22/22 149.9 cm (59.02\")   07/19/22 149.9 cm (59\")   07/14/22 149.9 cm (59\")     There is no height or weight on file to calculate BMI.  No height and weight on file for this encounter.  There were no vitals filed for this visit.    Physical Exam    Musculoskeletal:    Bilateral knee (varus). Patient has crepitus throughout range of motion. Positive patellar grind test. Mild effusion. Lachman is negative. Pivot shift is negative. Anterior and posterior drawer signs are negative. Significant joint line tenderness is noted on the medial aspect of the knee. Patient has a varus orientation of the knee. There is fullness and tenderness in the Popliteal fossa. Mild distention of a Popliteal cyst is noted in this location. Range of motion in flexion is from 0-100 degrees. Neurovascular status is intact.  Dorsalis pedis and posterior tibial artery pulses are palpable. Common peroneal nerve function is well preserved. Patient's gait is cautious and antalgic. Skin and soft tissues are mildly swollen, consistent with synovitis and effusion. The patient has a significant limp with the first few steps after starting the gait cycle. Getting out of a chair takes a lot of effort due to pain on knee flexion.       Diagnostics:      Procedure:  Large Joint Arthrocentesis: R knee  Date/Time: 10/5/2022 1:50 PM  Consent given by: patient  Site marked: site marked  Timeout: Immediately prior to procedure a time out was called to verify the correct patient, procedure, equipment, support staff and site/side marked as required   Supporting Documentation  Indications: pain   Procedure Details  Location: knee - R knee  Preparation: Patient was prepped and draped in the usual sterile fashion  Needle size: 25 G  Approach: anteromedial  Medications administered: 160 mg methylPREDNISolone acetate " 80 MG/ML; 2 mL lidocaine PF 1% 1 %  Patient tolerance: patient tolerated the procedure well with no immediate complications    Large Joint Arthrocentesis: L knee  Date/Time: 10/5/2022 1:51 PM  Consent given by: patient  Site marked: site marked  Timeout: Immediately prior to procedure a time out was called to verify the correct patient, procedure, equipment, support staff and site/side marked as required   Supporting Documentation  Indications: pain   Procedure Details  Location: knee - L knee  Preparation: Patient was prepped and draped in the usual sterile fashion  Needle size: 25 G  Approach: anteromedial  Medications administered: 160 mg methylPREDNISolone acetate 80 MG/ML; 2 mL lidocaine PF 1% 1 %  Patient tolerance: patient tolerated the procedure well with no immediate complications            Assessment:   Diagnoses and all orders for this visit:    1. Primary osteoarthritis of right knee (Primary)    2. Primary osteoarthritis of left knee    Other orders  -     Large Joint Arthrocentesis: R knee  -     Large Joint Arthrocentesis: L knee          Plan:   · Injected patient's bilateral knee joint(s)with Depo-Medrol from an anteromedial approach   · Compression/brace   · Rest, ice, compression, and elevation (RICE) therapy  · OTC Tylenol 500-1000mg by mouth every 6 hours as needed for pain   · Follow up in 3 month(s)    Date of encounter: 10/05/2022   Brian Rodriguez MD

## 2022-10-24 ENCOUNTER — TELEMEDICINE (OUTPATIENT)
Dept: PSYCHIATRY | Facility: CLINIC | Age: 57
End: 2022-10-24

## 2022-10-24 DIAGNOSIS — F41.1 GAD (GENERALIZED ANXIETY DISORDER): Chronic | ICD-10-CM

## 2022-10-24 DIAGNOSIS — F33.1 MODERATE EPISODE OF RECURRENT MAJOR DEPRESSIVE DISORDER: Primary | Chronic | ICD-10-CM

## 2022-10-24 PROCEDURE — 99214 OFFICE O/P EST MOD 30 MIN: CPT | Performed by: PHYSICIAN ASSISTANT

## 2022-10-24 NOTE — PROGRESS NOTES
"Subjective   Merissa Yusuf is a 57 y.o.white female who presents today for follow up via telehealth.    This provider is located in [place], [place] using a secure Linked [employer] Video Visit through [employer]. Patient is being seen remotely via telehealth at their home address in [place], and stated they are in a secure environment for this session. The patient's condition being diagnosed/treated is appropriate for telemedicine. The provider identified herself as well as her credentials.   The patient, and/or patients guardian, consent to be seen remotely, and when consent is given they understand that the consent allows for patient identifiable information to be sent to a third party as needed.   They may refuse to be seen remotely at any time. The electronic data is encrypted and password protected, and the patient and/or guardian has been advised of the potential risks to privacy not withstanding such measures.   PT Identifiers used: Name and .    You have chosen to receive care through a telehealth visit.  Do you consent to use a video/audio connection for your medical care today? Yes    Chief Complaint:  Anxiety and depression    History of Present Illness:   Her 39 yr old son  in 2021, she was out of town in [place] with her sister who's  was dying and passed when she got back. His birthday is also in October, so the last few weeks were harder but does well when she keeps herself busy.   She started grief share with a friend a few weeks ago at [facility], has already gotten a lot out of it.   Her niece is coming over to clean their house once or twice a week, keeps her busy  Patient is doing better on Trintellix, her daughter said she is happier on it, no restless legs yet on the 5mg  Lamictal has helped her irritability, does not need increase  \"My  is driving me crazy\"..he has TBI and can push her buttons  Mom has Alzheimer's dementia, they moved her to [facility], " feels guilty for not visiting her more often.  Her step-dad had been irritable and asked his kids to come visit him, but instead forced him to leave and go stay with a daughter  Patient lives the closest to Mom, has 6 siblings  Anxiety 4/10 with Buspar again, takes it twice daily most days, occasionally takes a thrid  Depression /10  Denies SI/HI      The following portions of the patient's history were reviewed and updated as appropriate: allergies, current medications, past family history, past medical history, past social history, past surgical history and problem list.    PAST PSYCHIATRIC HISTORY  Axis I  Affective/Bipoloar Disorder, Anxiety/Panic Disorder  Axis II  None    PAST OUTPATIENT TREATMENT  Diagnosis treated:  Affective Disorder, Anxiety/Panic Disorder  Treatment Type:  Family Therapy, Medication Management  No hospitalizations  Genesight testing done March 2019  Prior Psychiatric Medications:  Lexapro increased anxiety  Prozac, hives  Trintellix helped but gave her restless legs  Buspar   Viibryd, could not take  Pristiq  Topamax, increased her anxiety and cause SOA  Lamictal  Support Groups:  None  Sequelae Of Mental Disorder:  social isolation, family disruption, emotional distress      Interval History  Improved    Side Effects  Restless legs with Trintellix at 10 mg    Past psychiatric history was reviewed and compared to visit and 4/28/22 appropriate updates were made.    Past Medical History:  Past Medical History:   Diagnosis Date   • Anesthesia complication     drops B/P, and slow to arouse   • Encounter for laboratory testing for COVID-19 virus 07/19/2022   • GERD (gastroesophageal reflux disease)    • Hereditary hemochromatosis (HCC)    • Hyperlipidemia    • Seasonal allergies        Social History:  Social History     Socioeconomic History   • Marital status:    Tobacco Use   • Smoking status: Never   • Smokeless tobacco: Never   Vaping Use   • Vaping Use: Never used   Substance and  Sexual Activity   • Alcohol use: Yes     Alcohol/week: 1.0 standard drink     Types: 1 Glasses of wine per week     Comment: rarely   • Drug use: No   • Sexual activity: Defer       Family History:  Family History   Problem Relation Age of Onset   • Coronary artery disease Other    • Diabetes Other    • Dementia Other    • Diabetes Mother    • Cancer Mother    • Heart disease Father    • Liver disease Father    • Hypertension Father    • Cancer Brother    • Breast cancer Neg Hx    • Ovarian cancer Neg Hx        Past Surgical History:  Past Surgical History:   Procedure Laterality Date   • BLADDER SUSPENSION     • BREAST BIOPSY Right    • FINGER SURGERY  2008    removed cysts from right index finger    • HERNIA REPAIR     • SUBTOTAL HYSTERECTOMY     • TUBAL ABDOMINAL LIGATION     • VENTRAL/INCISIONAL HERNIA REPAIR N/A 9/24/2020    Procedure: LAPAROSCOPIC VENTRAL HERNIA;  Surgeon: Reuben Molina MD;  Location: [facility] OR;  Service: General;  Laterality: N/A;  0855 TAP block   • WISDOM TOOTH EXTRACTION         Problem List:  Patient Active Problem List   Diagnosis   • Elevated blood pressure reading without diagnosis of hypertension   • Hearing deficit   • Hyperlipidemia   • Knee pain   • Low back pain   • Memory loss   • Morbid (severe) obesity due to excess calories ([facility])   • Obstructive sleep apnea   • Thrombocytosis   • Vitamin D deficiency   • Combined forms of age-related cataract, bilateral   • Convergence insufficiency   • Corneal pannus of right eye   • Corneal pannus   • Meibomian gland dysfunction (MGD) of both eyes   • Bilateral presbyopia   • Presbyopia   • Vitreous floaters   • Hereditary hemochromatosis ([facility])   • GERD (gastroesophageal reflux disease)   • Seasonal allergies   • Class 3 severe obesity due to excess calories with serious comorbidity and body mass index (BMI) of 45.0 to 49.9 in adult ([facility])   • Elevated hematocrit   • KIRSTIE (generalized anxiety disorder)   • Moderate episode of  recurrent major depressive disorder (HCC)   • SOB (shortness of breath)   • Encounter for laboratory testing for COVID-19 virus   • Macular pseudohole of right eye   • Partial thickness macular hole of left eye   • Open bite wound of skin   • Primary osteoarthritis of right knee   • Primary osteoarthritis of left knee       Allergy:   Allergies   Allergen Reactions   • Amoxicillin Swelling   • Erythromycin GI Intolerance   • Escitalopram Anxiety   • Prozac  [Fluoxetine Hcl] Rash   • Penicillin G Rash        Discontinued Medications:  Medications Discontinued During This Encounter   Medication Reason   • prednisoLONE acetate (PRED FORTE) 1 % ophthalmic suspension *Therapy completed   • prednisoLONE acetate (PRED FORTE) 1 % ophthalmic suspension *Therapy completed   • ofloxacin (OCUFLOX) 0.3 % ophthalmic solution *Therapy completed       Current Medications:   Current Outpatient Medications   Medication Sig Dispense Refill   • acetaminophen (TYLENOL) 500 MG tablet Take 500 mg by mouth Every 4 (Four) Hours As Needed.     • busPIRone (BUSPAR) 15 MG tablet TAKE 1 TABLET THREE TIMES A DAY FOR ANXIETY 270 tablet 1   • Calcium Carbonate Antacid (TUMS PO) Take 2,000 mg by mouth At Night As Needed.     • COLLAGEN PO Take  by mouth.     • Evolocumab (REPATHA) solution auto-injector SureClick injection Inject 1 mL under the skin into the appropriate area as directed Every 14 (Fourteen) Days. 6 mL 3   • l-methylfolate calcium (DEPLIN) 7.5 MG tablet tablet Take 7.5 mg by mouth 2 (Two) Times a Day.     • lamoTRIgine (LaMICtal) 25 MG tablet TAKE 1 TABLET TWICE A DAY FOR MOOD STABILIZER 180 tablet 1   • levocetirizine (XYZAL) 5 MG tablet      • Naproxen Sodium 220 MG capsule Take 1 tablet by mouth As Needed.     • Trintellix 5 MG tablet TAKE 1 TABLET DAILY WITH BREAKFAST 90 tablet 3   • Turmeric 400 MG capsule        No current facility-administered medications for this visit.         Review of Symptoms:    Psychiatric/Behavioral:  Negative for agitation, behavioral problems, confusion, decreased concentration, dysphoric mood, hallucinations, self-injury, sleep disturbance and suicidal ideas. The patient not nervous/anxious and is not hyperactive.        Physical Exam:   not currently breastfeeding.    Mental Status Exam:   Hygiene:   good  Cooperation:  Cooperative  Eye Contact:  Good  Psychomotor Behavior:  Appropriate  Affect:  Appropriate  Mood: Mildly anxious  Hopelessness: Denies  Speech:  Normal  Thought Process:  Goal directed  Thought Content:  Normal  Suicidal:  None  Homicidal:  None  Hallucinations:  None  Delusion:  None  Memory:  Intact  Orientation:  Person, Place, Time and Situation  Reliability:  good  Insight:  Good  Judgement:  Good  Impulse Control:  Good  Physical/Medical Issues:  No      Mental status exam was reviewed and compared to 4/28/22 visit and appropriate updates were made.    PHQ-9 Depression Screening  Little interest or pleasure in doing things? (P) 0   Feeling down, depressed, or hopeless? (P) 0   Trouble falling or staying asleep, or sleeping too much? (P) 1   Feeling tired or having little energy? (P) 0   Poor appetite or overeating? (P) 0   Feeling bad about yourself - or that you are a failure or have let yourself or your family down? (P) 0   Trouble concentrating on things, such as reading the newspaper or watching television? (P) 0   Moving or speaking so slowly that other people could have noticed? Or the opposite - being so fidgety or restless that you have been moving around a lot more than usual? (P) 0   Thoughts that you would be better off dead, or of hurting yourself in some way? (P) 0   PHQ-9 Total Score (P) 1   If you checked off any problems, how difficult have these problems made it for you to do your work, take care of things at home, or get along with other people? (P) Not difficult at all           Never smoker    I advised Merissa of the risks of tobacco use.     Lab Results:   Office Visit  on 09/22/2022   Component Date Value Ref Range Status   • WBC 09/22/2022 7.19  3.40 - 10.80 10*3/mm3 Final   • RBC 09/22/2022 4.15  3.77 - 5.28 10*6/mm3 Final   • Hemoglobin 09/22/2022 14.5  12.0 - 15.9 g/dL Final   • Hematocrit 09/22/2022 43.0  34.0 - 46.6 % Final   • MCV 09/22/2022 103.6 (H)  79.0 - 97.0 fL Final   • MCH 09/22/2022 34.9 (H)  26.6 - 33.0 pg Final   • MCHC 09/22/2022 33.7  31.5 - 35.7 g/dL Final   • RDW 09/22/2022 11.9 (L)  12.3 - 15.4 % Final   • RDW-SD 09/22/2022 45.2  37.0 - 54.0 fl Final   • MPV 09/22/2022 9.2  6.0 - 12.0 fL Final   • Platelets 09/22/2022 429  140 - 450 10*3/mm3 Final   • Neutrophil % 09/22/2022 61.0  42.7 - 76.0 % Final   • Lymphocyte % 09/22/2022 26.8  19.6 - 45.3 % Final   • Monocyte % 09/22/2022 7.9  5.0 - 12.0 % Final   • Eosinophil % 09/22/2022 2.5  0.3 - 6.2 % Final   • Basophil % 09/22/2022 0.8  0.0 - 1.5 % Final   • Immature Grans % 09/22/2022 1.0 (H)  0.0 - 0.5 % Final   • Neutrophils, Absolute 09/22/2022 4.38  1.70 - 7.00 10*3/mm3 Final   • Lymphocytes, Absolute 09/22/2022 1.93  0.70 - 3.10 10*3/mm3 Final   • Monocytes, Absolute 09/22/2022 0.57  0.10 - 0.90 10*3/mm3 Final   • Eosinophils, Absolute 09/22/2022 0.18  0.00 - 0.40 10*3/mm3 Final   • Basophils, Absolute 09/22/2022 0.06  0.00 - 0.20 10*3/mm3 Final   • Immature Grans, Absolute 09/22/2022 0.07 (H)  0.00 - 0.05 10*3/mm3 Final   • nRBC 09/22/2022 0.0  0.0 - 0.2 /100 WBC Final   • Glucose 09/22/2022 103 (H)  65 - 99 mg/dL Final   • BUN 09/22/2022 9  6 - 20 mg/dL Final   • Creatinine 09/22/2022 0.80  0.57 - 1.00 mg/dL Final   • Sodium 09/22/2022 139  136 - 145 mmol/L Final   • Potassium 09/22/2022 4.1  3.5 - 5.2 mmol/L Final   • Chloride 09/22/2022 104  98 - 107 mmol/L Final   • CO2 09/22/2022 24.2  22.0 - 29.0 mmol/L Final   • Calcium 09/22/2022 10.2  8.6 - 10.5 mg/dL Final   • Total Protein 09/22/2022 7.4  6.0 - 8.5 g/dL Final   • Albumin 09/22/2022 4.60  3.50 - 5.20 g/dL Final   • ALT (SGPT) 09/22/2022 16  1 -  33 U/L Final   • AST (SGOT) 09/22/2022 21  1 - 32 U/L Final   • Alkaline Phosphatase 09/22/2022 79  39 - 117 U/L Final   • Total Bilirubin 09/22/2022 0.5  0.0 - 1.2 mg/dL Final   • Globulin 09/22/2022 2.8  gm/dL Final   • A/G Ratio 09/22/2022 1.6  g/dL Final   • BUN/Creatinine Ratio 09/22/2022 11.3  7.0 - 25.0 Final   • Anion Gap 09/22/2022 10.8  5.0 - 15.0 mmol/L Final   • eGFR 09/22/2022 85.0  >60.0 mL/min/1.73 Final    National Kidney Foundation and American Society of Nephrology (ASN) Task Force recommended calculation based on the Chronic Kidney Disease Epidemiology Collaboration (CKD-EPI) equation refit without adjustment for race.   • Total Cholesterol 09/22/2022 153  0 - 200 mg/dL Final   • Triglycerides 09/22/2022 95  0 - 150 mg/dL Final   • HDL Cholesterol 09/22/2022 73 (H)  40 - 60 mg/dL Final   • LDL Cholesterol  09/22/2022 63  0 - 100 mg/dL Final   • VLDL Cholesterol 09/22/2022 17  5 - 40 mg/dL Final   • LDL/HDL Ratio 09/22/2022 0.84   Final   • Magnesium 09/22/2022 2.1  1.6 - 2.6 mg/dL Final   • TSH 09/22/2022 1.450  0.270 - 4.200 uIU/mL Final   • Vitamin B-12 09/22/2022 698  211 - 946 pg/mL Final   • 25 Hydroxy, Vitamin D 09/22/2022 46.0  30.0 - 100.0 ng/ml Final       Assessment & Plan   Problems Addressed this Visit        Mental Health    KIRSTIE (generalized anxiety disorder) (Chronic)    Moderate episode of recurrent major depressive disorder (HCC) - Primary (Chronic)   Diagnoses       Codes Comments    Moderate episode of recurrent major depressive disorder (HCC)    -  Primary ICD-10-CM: F33.1  ICD-9-CM: 296.32     KIRSTIE (generalized anxiety disorder)     ICD-10-CM: F41.1  ICD-9-CM: 300.02           Visit Diagnoses:    ICD-10-CM ICD-9-CM   1. Moderate episode of recurrent major depressive disorder (HCC)  F33.1 296.32   2. KIRSTIE (generalized anxiety disorder)  F41.1 300.02       TREATMENT PLAN/GOALS: Continue supportive psychotherapy efforts and medications as indicated. Treatment and medication options  discussed during today's visit. Patient ackowledged and verbally consented to continue with current treatment plan and was educated on the importance of compliance with treatment and follow-up appointments.    MEDICATION ISSUES:  INSPECT reviewed as expected  Discussed medication options and treatment plan of prescribed medication as well as the risks, benefits, and side effects including potential falls, possible impaired driving and metabolic adversities among others. Patient is agreeable to call the office with any worsening of symptoms or onset of side effects. Patient is agreeable to call 911 or go to the nearest ER should he/she begin having SI/HI. No medication side effects or related complaints today.     Patient is doing well, coping better with the loss of her son, has started with a grief group.   Continue Trintellix 5mg daily for depression  Continue Buspar 15mg TID prn anxiety, usually only takes it twice daily, only TID on rare occasion  Continue Lamictal 25 mg BID for mood stabilizer  Continue L-methylfolate daily      MEDS ORDERED DURING VISIT:  No orders of the defined types were placed in this encounter.      Return in about 6 months (around 4/24/2023) for video visit.         This document has been electronically signed by Laisha Mendiola PA-C  October 24, 2022 11:27 EDT

## 2023-01-04 ENCOUNTER — OFFICE VISIT (OUTPATIENT)
Dept: ORTHOPEDIC SURGERY | Facility: CLINIC | Age: 58
End: 2023-01-04
Payer: OTHER GOVERNMENT

## 2023-01-04 DIAGNOSIS — G89.29 CHRONIC PAIN OF RIGHT KNEE: Primary | ICD-10-CM

## 2023-01-04 DIAGNOSIS — M25.562 CHRONIC PAIN OF LEFT KNEE: ICD-10-CM

## 2023-01-04 DIAGNOSIS — M25.561 CHRONIC PAIN OF RIGHT KNEE: Primary | ICD-10-CM

## 2023-01-04 DIAGNOSIS — G89.29 CHRONIC PAIN OF LEFT KNEE: ICD-10-CM

## 2023-01-04 PROCEDURE — 20610 DRAIN/INJ JOINT/BURSA W/O US: CPT | Performed by: ORTHOPAEDIC SURGERY

## 2023-01-04 RX ADMIN — TRIAMCINOLONE ACETONIDE 80 MG: 40 INJECTION, SUSPENSION INTRA-ARTICULAR; INTRAMUSCULAR at 13:09

## 2023-01-04 RX ADMIN — LIDOCAINE HYDROCHLORIDE 2 ML: 10 INJECTION, SOLUTION EPIDURAL; INFILTRATION; INTRACAUDAL; PERINEURAL at 13:09

## 2023-01-04 NOTE — PROGRESS NOTES
INJECTION    Patient: Merissa Yusuf    YOB: 1965    MRN: [id number]    Chief Complaint   Patient presents with   • Left Knee - Follow-up   • Right Knee - Follow-up       History of Present Illness: Patient returns today for bilateral knee pain.  Her pain is located over the medial and lateral aspect of the joint.  The pain has been progressive in nature and remains intermittent .  Her pain is worsened by kneeling, rising after sitting, squatting. There has been improvement in the past with heat, NSAIDS and injections.     This problem is not new to this examiner.     Allergies:   Allergies   Allergen Reactions   • Amoxicillin Swelling   • Erythromycin GI Intolerance   • Escitalopram Anxiety   • Prozac  [Fluoxetine Hcl] Rash   • Penicillin G Rash       Medications:   Home Medications:  Current Outpatient Medications on File Prior to Visit   Medication Sig   • acetaminophen (TYLENOL) 500 MG tablet Take 500 mg by mouth Every 4 (Four) Hours As Needed.   • busPIRone (BUSPAR) 15 MG tablet TAKE 1 TABLET THREE TIMES A DAY FOR ANXIETY   • Calcium Carbonate Antacid (TUMS PO) Take 2,000 mg by mouth At Night As Needed.   • COLLAGEN PO Take  by mouth.   • Evolocumab (REPATHA) solution auto-injector SureClick injection Inject 1 mL under the skin into the appropriate area as directed Every 14 (Fourteen) Days.   • l-methylfolate calcium (DEPLIN) 7.5 MG tablet tablet Take 7.5 mg by mouth 2 (Two) Times a Day.   • lamoTRIgine (LaMICtal) 25 MG tablet TAKE 1 TABLET TWICE A DAY FOR MOOD STABILIZER   • levocetirizine (XYZAL) 5 MG tablet    • Naproxen Sodium 220 MG capsule Take 1 tablet by mouth As Needed.   • Trintellix 5 MG tablet TAKE 1 TABLET DAILY WITH BREAKFAST   • Turmeric 400 MG capsule      No current facility-administered medications on file prior to visit.     Current Medications:  Scheduled Meds:  PRN Meds:.    I have reviewed the patient's medical history in detail and updated the computerized patient record.   "Review and summarization of old records include:    Past Medical History:   Diagnosis Date   • Anesthesia complication     drops B/P, and slow to arouse   • Encounter for laboratory testing for COVID-19 virus 07/19/2022   • GERD (gastroesophageal reflux disease)    • Hereditary hemochromatosis (HCC)    • Hyperlipidemia    • Seasonal allergies      Past Surgical History:   Procedure Laterality Date   • BLADDER SUSPENSION     • BREAST BIOPSY Right    • FINGER SURGERY  2008    removed cysts from right index finger    • HERNIA REPAIR     • SUBTOTAL HYSTERECTOMY     • TUBAL ABDOMINAL LIGATION     • VENTRAL/INCISIONAL HERNIA REPAIR N/A 9/24/2020    Procedure: LAPAROSCOPIC VENTRAL HERNIA;  Surgeon: Reuben Molina MD;  Location: [facility] MAIN OR;  Service: General;  Laterality: N/A;  0855 TAP block   • WISDOM TOOTH EXTRACTION       Social History     Occupational History   • Not on file   Tobacco Use   • Smoking status: Never   • Smokeless tobacco: Never   Vaping Use   • Vaping Use: Never used   Substance and Sexual Activity   • Alcohol use: Yes     Alcohol/week: 1.0 standard drink     Types: 1 Glasses of wine per week     Comment: rarely   • Drug use: No   • Sexual activity: Defer      Social History     Social History Narrative   • Not on file     Family History   Problem Relation Age of Onset   • Coronary artery disease Other    • Diabetes Other    • Dementia Other    • Diabetes Mother    • Cancer Mother    • Heart disease Father    • Liver disease Father    • Hypertension Father    • Cancer Brother    • Breast cancer Neg Hx    • Ovarian cancer Neg Hx        Review of Systems        Wt Readings from Last 3 Encounters:   01/10/23 104 kg (230 lb)   09/22/22 104 kg (228 lb 12.8 oz)   07/19/22 106 kg (233 lb)     Ht Readings from Last 3 Encounters:   01/10/23 149.9 cm (59\")   09/22/22 149.9 cm (59.02\")   07/19/22 149.9 cm (59\")     There is no height or weight on file to calculate BMI.  No height and weight on file " for this encounter.  There were no vitals filed for this visit.    Physical Exam    Musculoskeletal:    Bilateral knee (varus). Patient has crepitus throughout range of motion. Positive patellar grind test. Mild effusion. Lachman is negative. Pivot shift is negative. Anterior and posterior drawer signs are negative. Significant joint line tenderness is noted on the medial aspect of the knee. Patient has a varus orientation of the knee. There is fullness and tenderness in the Popliteal fossa. Mild distention of a Popliteal cyst is noted in this location. Range of motion in flexion is from 0-110 degrees. Neurovascular status is intact.  Dorsalis pedis and posterior tibial artery pulses are palpable. Common peroneal nerve function is well preserved. Patient's gait is cautious and antalgic. Skin and soft tissues are mildly swollen, consistent with synovitis and effusion. The patient has a significant limp with the first few steps after starting the gait cycle. Getting out of a chair takes a lot of effort due to pain on knee flexion.       Diagnostics:      Procedure:  Large Joint Arthrocentesis: R knee  Date/Time: 1/4/2023 1:09 PM  Consent given by: patient  Site marked: site marked  Timeout: Immediately prior to procedure a time out was called to verify the correct patient, procedure, equipment, support staff and site/side marked as required   Supporting Documentation  Indications: pain   Procedure Details  Location: knee - R knee  Preparation: Patient was prepped and draped in the usual sterile fashion  Needle size: 25 G  Approach: anteromedial  Medications administered: 2 mL lidocaine PF 1% 1 %; 80 mg triamcinolone acetonide 40 MG/ML  Patient tolerance: patient tolerated the procedure well with no immediate complications    Large Joint Arthrocentesis: L knee  Date/Time: 1/4/2023 1:09 PM  Consent given by: patient  Site marked: site marked  Timeout: Immediately prior to procedure a time out was called to verify the  correct patient, procedure, equipment, support staff and site/side marked as required   Supporting Documentation  Indications: pain   Procedure Details  Location: knee - L knee  Preparation: Patient was prepped and draped in the usual sterile fashion  Needle size: 25 G  Approach: anteromedial  Medications administered: 2 mL lidocaine PF 1% 1 %; 80 mg triamcinolone acetonide 40 MG/ML  Patient tolerance: patient tolerated the procedure well with no immediate complications            Assessment:   Diagnoses and all orders for this visit:    1. Chronic pain of right knee (Primary)    2. Chronic pain of left knee    Other orders  -     Large Joint Arthrocentesis: R knee  -     Large Joint Arthrocentesis: L knee          Plan:   · Injected patient's bilateral knee joint(s)with Kenalog from an anteromedial approach   · Compression/brace   · Rest, ice, compression, and elevation (RICE) therapy  · OTC Tylenol 500-1000mg by mouth every 6 hours as needed for pain   · Follow up in 3 month(s)    Date of encounter: 01/04/2023   Brian Rodriguez MD

## 2023-01-10 ENCOUNTER — OFFICE VISIT (OUTPATIENT)
Dept: CARDIOLOGY | Facility: CLINIC | Age: 58
End: 2023-01-10
Payer: OTHER GOVERNMENT

## 2023-01-10 VITALS
DIASTOLIC BLOOD PRESSURE: 82 MMHG | OXYGEN SATURATION: 97 % | SYSTOLIC BLOOD PRESSURE: 130 MMHG | BODY MASS INDEX: 46.37 KG/M2 | HEIGHT: 59 IN | WEIGHT: 230 LBS | HEART RATE: 75 BPM

## 2023-01-10 DIAGNOSIS — R03.0 ELEVATED BLOOD PRESSURE READING WITHOUT DIAGNOSIS OF HYPERTENSION: ICD-10-CM

## 2023-01-10 DIAGNOSIS — R07.9 CHEST PAIN, UNSPECIFIED TYPE: Primary | ICD-10-CM

## 2023-01-10 DIAGNOSIS — E83.110 HEREDITARY HEMOCHROMATOSIS: ICD-10-CM

## 2023-01-10 DIAGNOSIS — E78.2 MIXED HYPERLIPIDEMIA: ICD-10-CM

## 2023-01-10 PROCEDURE — 93000 ELECTROCARDIOGRAM COMPLETE: CPT | Performed by: INTERNAL MEDICINE

## 2023-01-10 PROCEDURE — 99214 OFFICE O/P EST MOD 30 MIN: CPT | Performed by: INTERNAL MEDICINE

## 2023-01-10 NOTE — PROGRESS NOTES
Cardiology Office Visit      Encounter Date:  01/10/2023    Patient ID:   Merissa Yusuf is a 57 y.o. female.      Reason For Followup:  Chest discomfort  Shortness of breath    Brief Clinical History:  Dear Dr. Duarte, Melissa Petty MD    I had the pleasure of seeing Merissa Yusuf today. As you are well aware, this is a 57 y.o. female with past medical history that is significant for history of no known coronary artery disease history of hyperlipidemia history of obesity resented with symptoms of dizziness and lightheadedness    Patient had a cardiac evaluation and middle of 2019 with an echocardiogram with normal LV systolic function and stress test with no ischemic burden      Interval History:  No new complaints  No cardiac symptoms of chest pain shortness of breath is unchanged from baseline  Unable to tolerate Zetia secondary to side effects  Unable to take statin secondary to underlying hemachromatosis  Denies any orthopnea PND      Assessment & Plan    Impressions:  Dizziness/improved  Chest pain  Shortness of breath  Hyperlipidemia  hemochromatosis  Normal echocardiogram with normal LV systolic function  Stress test with no significant reversible ischemia    Recommendations:  Intolerant to Zetia  Unable to take statin secondary to underlying hemochromatosis  Continue PCSK9 inhibitor to help with hyperlipidemia/lipids are optimal  Lipids are optimal  Likely shortness of breath secondary to underlying diastolic dysfunction and LV hypertrophy  Labs and test results reviewed and discussed with patient  Advised patient to consider a vascular screening study and also coronary artery calcium score for further stratification  Continued aggressive risk factor modification  Follow up in office in 12 months    Objective:    Vitals:  Vitals:    01/10/23 1418   BP: 130/82   BP Location: Left arm   Patient Position: Sitting   Cuff Size: Large Adult   Pulse: 75   SpO2: 97%   Weight: 104 kg (230 lb)   Height: 149.9 cm  (59\")       Physical Exam:    General: Alert, cooperative, no distress, appears stated age  Head:  Normocephalic, atraumatic, mucous membranes moist  Eyes:  Conjunctiva/corneas clear, EOM's intact     Neck:  Supple,  no adenopathy;      Lungs: Clear to auscultation bilaterally, no wheezes rhonchi rales are noted  Chest wall: No tenderness  Heart::  Regular rate and rhythm, S1 and S2 normal, no murmur, rub or gallop  Abdomen: Soft, non-tender, nondistended bowel sounds active  Extremities: No cyanosis, clubbing, or edema  Pulses: 2+ and symmetric all extremities  Skin:  No rashes or lesions  Neuro/psych: A&O x3. CN II through XII are grossly intact with appropriate affect      Allergies:  Allergies   Allergen Reactions   • Amoxicillin Swelling   • Erythromycin GI Intolerance   • Escitalopram Anxiety   • Prozac  [Fluoxetine Hcl] Rash   • Penicillin G Rash       Medication Review:     Current Outpatient Medications:   •  acetaminophen (TYLENOL) 500 MG tablet, Take 500 mg by mouth Every 4 (Four) Hours As Needed., Disp: , Rfl:   •  busPIRone (BUSPAR) 15 MG tablet, TAKE 1 TABLET THREE TIMES A DAY FOR ANXIETY, Disp: 270 tablet, Rfl: 1  •  Calcium Carbonate Antacid (TUMS PO), Take 2,000 mg by mouth At Night As Needed., Disp: , Rfl:   •  COLLAGEN PO, Take  by mouth., Disp: , Rfl:   •  Evolocumab (REPATHA) solution auto-injector SureClick injection, Inject 1 mL under the skin into the appropriate area as directed Every 14 (Fourteen) Days., Disp: 6 mL, Rfl: 3  •  l-methylfolate calcium (DEPLIN) 7.5 MG tablet tablet, Take 7.5 mg by mouth 2 (Two) Times a Day., Disp: , Rfl:   •  lamoTRIgine (LaMICtal) 25 MG tablet, TAKE 1 TABLET TWICE A DAY FOR MOOD STABILIZER, Disp: 180 tablet, Rfl: 1  •  levocetirizine (XYZAL) 5 MG tablet, , Disp: , Rfl:   •  Naproxen Sodium 220 MG capsule, Take 1 tablet by mouth As Needed., Disp: , Rfl:   •  Trintellix 5 MG tablet, TAKE 1 TABLET DAILY WITH BREAKFAST, Disp: 90 tablet, Rfl: 3  •  Turmeric 400 MG  capsule, , Disp: , Rfl:     Family History:  Family History   Problem Relation Age of Onset   • Coronary artery disease Other    • Diabetes Other    • Dementia Other    • Diabetes Mother    • Cancer Mother    • Heart disease Father    • Liver disease Father    • Hypertension Father    • Cancer Brother    • Breast cancer Neg Hx    • Ovarian cancer Neg Hx        Past Medical History:  Past Medical History:   Diagnosis Date   • Anesthesia complication     drops B/P, and slow to arouse   • Encounter for laboratory testing for COVID-19 virus 07/19/2022   • GERD (gastroesophageal reflux disease)    • Hereditary hemochromatosis (HCC)    • Hyperlipidemia    • Seasonal allergies        Past surgical History:  Past Surgical History:   Procedure Laterality Date   • BLADDER SUSPENSION     • BREAST BIOPSY Right    • FINGER SURGERY  2008    removed cysts from right index finger    • HERNIA REPAIR     • SUBTOTAL HYSTERECTOMY     • TUBAL ABDOMINAL LIGATION     • VENTRAL/INCISIONAL HERNIA REPAIR N/A 9/24/2020    Procedure: LAPAROSCOPIC VENTRAL HERNIA;  Surgeon: Reuben Molina MD;  Location: [facility] MAIN OR;  Service: General;  Laterality: N/A;  0855 TAP block   • WISDOM TOOTH EXTRACTION         Social History:  Social History     Socioeconomic History   • Marital status:    Tobacco Use   • Smoking status: Never   • Smokeless tobacco: Never   Vaping Use   • Vaping Use: Never used   Substance and Sexual Activity   • Alcohol use: Yes     Alcohol/week: 1.0 standard drink     Types: 1 Glasses of wine per week     Comment: rarely   • Drug use: No   • Sexual activity: Defer       Review of Systems:  The following systems were reviewed as they relate to the cardiovascular system: Constitutional, Eyes, ENT, Cardiovascular, Respiratory, Gastrointestinal, Integumentary, Neurological, Psychiatric, Hematologic, Endocrine, Musculoskeletal, and Genitourinary. The pertinent cardiovascular findings are reported above with all other  cardiovascular points within those systems being negative.    Diagnostic Study Review:     Current Electrocardiogram:    ECG 12 Lead    Date/Time: 1/10/2023 2:44 PM  Performed by: Chely Arnold MD  Authorized by: Chely Arnold MD   Comparison: compared with previous ECG   Similar to previous ECG  Rhythm: sinus rhythm  Rate: normal  BPM: 75  Conduction: conduction normal  QRS axis: normal  Other findings: non-specific ST-T wave changes    Clinical impression: abnormal EKG              NOTE: The following portions of the patient's history were reviewed and updated this visit as appropriate: allergies, current medications, past family history, past medical history, past social history, past surgical history and problem list.

## 2023-01-11 ENCOUNTER — LAB (OUTPATIENT)
Dept: LAB | Facility: HOSPITAL | Age: 58
End: 2023-01-11
Payer: OTHER GOVERNMENT

## 2023-01-11 ENCOUNTER — TELEPHONE (OUTPATIENT)
Dept: FAMILY MEDICINE CLINIC | Facility: CLINIC | Age: 58
End: 2023-01-11
Payer: OTHER GOVERNMENT

## 2023-01-11 DIAGNOSIS — D64.9 ANEMIA, UNSPECIFIED TYPE: ICD-10-CM

## 2023-01-11 DIAGNOSIS — E83.119 HEMOCHROMATOSIS, UNSPECIFIED HEMOCHROMATOSIS TYPE: ICD-10-CM

## 2023-01-11 DIAGNOSIS — D72.829 LEUKOCYTOSIS, UNSPECIFIED TYPE: Primary | ICD-10-CM

## 2023-01-11 LAB
BASOPHILS # BLD AUTO: 0.02 10*3/MM3 (ref 0–0.2)
BASOPHILS NFR BLD AUTO: 0.1 % (ref 0–1.5)
DEPRECATED RDW RBC AUTO: 43.8 FL (ref 37–54)
EOSINOPHIL # BLD AUTO: 0.38 10*3/MM3 (ref 0–0.4)
EOSINOPHIL NFR BLD AUTO: 2.8 % (ref 0.3–6.2)
ERYTHROCYTE [DISTWIDTH] IN BLOOD BY AUTOMATED COUNT: 11.7 % (ref 12.3–15.4)
FERRITIN SERPL-MCNC: 152.9 NG/ML (ref 13–150)
HCT VFR BLD AUTO: 39.9 % (ref 34–46.6)
HGB BLD-MCNC: 13.7 G/DL (ref 12–15.9)
IRON 24H UR-MRATE: 224 MCG/DL (ref 37–145)
IRON SATN MFR SERPL: 69 % (ref 20–50)
LYMPHOCYTES # BLD AUTO: 4.28 10*3/MM3 (ref 0.7–3.1)
LYMPHOCYTES NFR BLD AUTO: 31.3 % (ref 19.6–45.3)
MCH RBC QN AUTO: 36 PG (ref 26.6–33)
MCHC RBC AUTO-ENTMCNC: 34.3 G/DL (ref 31.5–35.7)
MCV RBC AUTO: 104.7 FL (ref 79–97)
MONOCYTES # BLD AUTO: 1.07 10*3/MM3 (ref 0.1–0.9)
MONOCYTES NFR BLD AUTO: 7.8 % (ref 5–12)
NEUTROPHILS NFR BLD AUTO: 58 % (ref 42.7–76)
NEUTROPHILS NFR BLD AUTO: 7.94 10*3/MM3 (ref 1.7–7)
PLATELET # BLD AUTO: 384 10*3/MM3 (ref 140–450)
PMV BLD AUTO: 8.3 FL (ref 6–12)
RBC # BLD AUTO: 3.81 10*6/MM3 (ref 3.77–5.28)
TIBC SERPL-MCNC: 323 MCG/DL (ref 298–536)
TRANSFERRIN SERPL-MCNC: 217 MG/DL (ref 200–360)
WBC NRBC COR # BLD: 13.69 10*3/MM3 (ref 3.4–10.8)

## 2023-01-11 PROCEDURE — 82728 ASSAY OF FERRITIN: CPT

## 2023-01-11 PROCEDURE — 83540 ASSAY OF IRON: CPT

## 2023-01-11 PROCEDURE — 85652 RBC SED RATE AUTOMATED: CPT | Performed by: INTERNAL MEDICINE

## 2023-01-11 PROCEDURE — 84466 ASSAY OF TRANSFERRIN: CPT

## 2023-01-11 PROCEDURE — 36415 COLL VENOUS BLD VENIPUNCTURE: CPT

## 2023-01-11 PROCEDURE — 85025 COMPLETE CBC W/AUTO DIFF WBC: CPT | Performed by: INTERNAL MEDICINE

## 2023-01-11 NOTE — TELEPHONE ENCOUNTER
HUB TO READ:  ----- Message from Melissa Duarte MD sent at 1/11/2023  2:52 PM EST -----  Ferritin was minimally elevated and iron is elevated so discussed with Dr. Pimentel.

## 2023-01-11 NOTE — TELEPHONE ENCOUNTER
HUB TO READ:  ----- Message from Melissa Duarte MD sent at 1/11/2023  2:51 PM EST -----  White blood cell count is elevated please call Dr. Pimentel as he was the ordering physician.    *Multiple msgs

## 2023-01-12 DIAGNOSIS — D72.829 LEUKOCYTOSIS, UNSPECIFIED TYPE: Primary | ICD-10-CM

## 2023-01-12 LAB — ERYTHROCYTE [SEDIMENTATION RATE] IN BLOOD: 12 MM/HR (ref 0–30)

## 2023-01-13 ENCOUNTER — LAB (OUTPATIENT)
Dept: LAB | Facility: HOSPITAL | Age: 58
End: 2023-01-13
Payer: OTHER GOVERNMENT

## 2023-01-13 ENCOUNTER — TELEPHONE (OUTPATIENT)
Dept: ONCOLOGY | Facility: CLINIC | Age: 58
End: 2023-01-13
Payer: OTHER GOVERNMENT

## 2023-01-13 DIAGNOSIS — D72.829 LEUKOCYTOSIS, UNSPECIFIED TYPE: Primary | ICD-10-CM

## 2023-01-13 DIAGNOSIS — D72.829 LEUKOCYTOSIS, UNSPECIFIED TYPE: ICD-10-CM

## 2023-01-13 LAB
BACTERIA UR QL AUTO: ABNORMAL /HPF
BILIRUB UR QL STRIP: NEGATIVE
CLARITY UR: CLEAR
COLOR UR: YELLOW
ERYTHROCYTE [SEDIMENTATION RATE] IN BLOOD: 12 MM/HR (ref 0–30)
GLUCOSE UR STRIP-MCNC: NEGATIVE MG/DL
HGB UR QL STRIP.AUTO: NEGATIVE
HYALINE CASTS UR QL AUTO: ABNORMAL /LPF
KETONES UR QL STRIP: ABNORMAL
LEUKOCYTE ESTERASE UR QL STRIP.AUTO: NEGATIVE
NITRITE UR QL STRIP: NEGATIVE
PH UR STRIP.AUTO: 5.5 [PH] (ref 5–8)
PROT UR QL STRIP: NEGATIVE
RBC # UR STRIP: ABNORMAL /HPF
REF LAB TEST METHOD: ABNORMAL
SP GR UR STRIP: 1.02 (ref 1–1.03)
SQUAMOUS #/AREA URNS HPF: ABNORMAL /HPF
UROBILINOGEN UR QL STRIP: ABNORMAL
WBC # UR STRIP: ABNORMAL /HPF

## 2023-01-13 PROCEDURE — 81015 MICROSCOPIC EXAM OF URINE: CPT

## 2023-01-13 PROCEDURE — 81001 URINALYSIS AUTO W/SCOPE: CPT

## 2023-01-13 PROCEDURE — 36415 COLL VENOUS BLD VENIPUNCTURE: CPT

## 2023-01-13 PROCEDURE — 85652 RBC SED RATE AUTOMATED: CPT

## 2023-01-13 PROCEDURE — 81003 URINALYSIS AUTO W/O SCOPE: CPT

## 2023-01-13 NOTE — TELEPHONE ENCOUNTER
Per CHARLINE Lamar pt was called to see if she was having any s/s of infection. Pt denied any illness or fevers at this time but did state her urine has been cloudy for a little bit now. Per CHARLINE Lamar lab appt was made for the pt to come in today for a UA. Pt was made aware and v/u. She had no other questions.

## 2023-01-17 RX ORDER — TRIAMCINOLONE ACETONIDE 40 MG/ML
80 INJECTION, SUSPENSION INTRA-ARTICULAR; INTRAMUSCULAR
Status: COMPLETED | OUTPATIENT
Start: 2023-01-04 | End: 2023-01-04

## 2023-01-17 RX ORDER — LIDOCAINE HYDROCHLORIDE 10 MG/ML
2 INJECTION, SOLUTION EPIDURAL; INFILTRATION; INTRACAUDAL; PERINEURAL
Status: COMPLETED | OUTPATIENT
Start: 2023-01-04 | End: 2023-01-04

## 2023-01-18 ENCOUNTER — APPOINTMENT (OUTPATIENT)
Dept: LAB | Facility: HOSPITAL | Age: 58
End: 2023-01-18
Payer: OTHER GOVERNMENT

## 2023-01-18 ENCOUNTER — OFFICE VISIT (OUTPATIENT)
Dept: ONCOLOGY | Facility: CLINIC | Age: 58
End: 2023-01-18
Payer: OTHER GOVERNMENT

## 2023-01-18 VITALS
WEIGHT: 231.8 LBS | HEART RATE: 81 BPM | OXYGEN SATURATION: 97 % | DIASTOLIC BLOOD PRESSURE: 76 MMHG | HEIGHT: 59 IN | SYSTOLIC BLOOD PRESSURE: 128 MMHG | BODY MASS INDEX: 46.73 KG/M2 | TEMPERATURE: 98.1 F

## 2023-01-18 DIAGNOSIS — R71.8 ELEVATED HEMATOCRIT: ICD-10-CM

## 2023-01-18 DIAGNOSIS — D72.829 LEUKOCYTOSIS, UNSPECIFIED TYPE: ICD-10-CM

## 2023-01-18 DIAGNOSIS — E83.119 HEMOCHROMATOSIS, UNSPECIFIED HEMOCHROMATOSIS TYPE: Primary | ICD-10-CM

## 2023-01-18 DIAGNOSIS — E83.19 IRON OVERLOAD: ICD-10-CM

## 2023-01-18 DIAGNOSIS — E83.110 HEREDITARY HEMOCHROMATOSIS: ICD-10-CM

## 2023-01-18 PROCEDURE — 99214 OFFICE O/P EST MOD 30 MIN: CPT | Performed by: NURSE PRACTITIONER

## 2023-01-18 NOTE — PROGRESS NOTES
ONCOLOGY/HEMATOLOGY FOLLOW UP    PATIENT: Merissa Yusuf  YOB: 1965  MEDICAL RECORD NUMBER: [id number] OF SERVICE: 01/18/23      Chief complaint:  Hereditary hemochromatosis, homozygous mutation in H63D.  Iron overload  Macrocytosis    History of Present Illness:   · Ms. Yusuf has a history of COPD and also depression.  Laboratory work up was obtained in March 2019 which was abnormal.  Patient was sent to the [facility] and seen initially on 3/13/19.   · 3/5/19 - WBC 4.7, hemoglobin 14.1, MCV elevated to 103 and platelet count of 592,000.  Vitamin D  20.  Sed rate 35.  TSH 1.62.  Vitamin B12 476.  Creatinine 0.8.    · 3/13/19 - ZACKERY-2 mutation negative.  Hemochromatosis gene mutation, homozygous mutation in H63D.  · 3/13/19 - Vitamin B12 507.  Ferritin 178.  Creatinine 0.7.  Iron level 156.  Iron binding  capacity 257.  Percentage iron saturation 61.   · 1/8/2020 Ferritin 304 iron 177 iron saturation 56 TIBC 316  · 7/21/2020 iron 247 iron saturation 82 iron binding capacity 302 ferritin 271 hemoglobin 13.7 WBC 6.1 platelet count 372.  · 6/19/2020 CMP is normal including LFTs.  TSH 3.01  · 10/2020 ferritin 233.40 iron sat 43  · 8/2021 ferritin 341.40, iron sat 76  · 11/2/2021 -ferritin 112  · 3/8/2022 -CBC with hemoglobin 13.5, hematocrit 38.7.  Ferritin 59.7, iron saturation 35  · 7/6/2022 - Hb 13.7, ferritin 62 iron 36  · 1/18/23: Labs from 1/11/23. WBC 13.6; Hgb 13.7; Hct 39.9; Iron 224; Saturation 69%; TIBC 323; Ferritin 152.9.  Restart phlebotomy for Hct above 32.0 and Ferritin above 100   · 1/19/23: Phlebotomy    History of present illness was reviewed and is unchanged from the previous visit. 01/18/23    SUBJECTIVE:   The patient presents for FU of hemochromatosis, iron overload, macrocytosis.  The patient states she was eating cereal fortified with iron for 3-4 days with orange juice for breakfast before the labs were drawn, and believes that is what caused  elevation. Advised patient not to eat iron fortified foods.  The patient has not had phlebotomy since August 2021 with last ferritin level at 62.  Today, ferritin level up to 152.9 and Hct at 39.9 with increased fatigue but no SOB, chest or abdominal pain. We will proceed with phlebotomy arranged for tomorrow and continue monthly if needed.  The patient also had steroid injection in both knees 2 day prior to CBC which accounts for the elevation in WBC.  She continues to have increase in fatigue, and had macular hole in left eye repaired and will get glasses in Feb.         Past Medical History:   Diagnosis Date   • Anesthesia complication     drops B/P, and slow to arouse   • Encounter for laboratory testing for COVID-19 virus 07/19/2022   • GERD (gastroesophageal reflux disease)    • Hereditary hemochromatosis (HCC)    • Hyperlipidemia    • Seasonal allergies        Past Surgical History:   Procedure Laterality Date   • BLADDER SUSPENSION     • BREAST BIOPSY Right    • FINGER SURGERY  2008    removed cysts from right index finger    • HERNIA REPAIR     • SUBTOTAL HYSTERECTOMY     • TUBAL ABDOMINAL LIGATION     • VENTRAL/INCISIONAL HERNIA REPAIR N/A 9/24/2020    Procedure: LAPAROSCOPIC VENTRAL HERNIA;  Surgeon: Reuben Molina MD;  Location: [facility];  Service: General;  Laterality: N/A;  0855 TAP block   • WISDOM TOOTH EXTRACTION         Family History   Problem Relation Age of Onset   • Coronary artery disease Other    • Diabetes Other    • Dementia Other    • Diabetes Mother    • Cancer Mother    • Heart disease Father    • Liver disease Father    • Hypertension Father    • Cancer Brother    • Breast cancer Neg Hx    • Ovarian cancer Neg Hx        Social History     Socioeconomic History   • Marital status:    Tobacco Use   • Smoking status: Never   • Smokeless tobacco: Never   Vaping Use   • Vaping Use: Never used   Substance and Sexual Activity   • Alcohol use: Yes     Alcohol/week: 1.0  "standard drink     Types: 1 Glasses of wine per week     Comment: rarely   • Drug use: No   • Sexual activity: Defer       ALLERGIES:  Amoxicillin, Erythromycin, Escitalopram, Prozac  [fluoxetine hcl], and Penicillin g    MEDICATION:  Current Outpatient Medications   Medication Sig Dispense Refill   • acetaminophen (TYLENOL) 500 MG tablet Take 500 mg by mouth Every 4 (Four) Hours As Needed.     • busPIRone (BUSPAR) 15 MG tablet TAKE 1 TABLET THREE TIMES A DAY FOR ANXIETY 270 tablet 1   • Calcium Carbonate Antacid (TUMS PO) Take 2,000 mg by mouth At Night As Needed.     • COLLAGEN PO Take  by mouth.     • Evolocumab (REPATHA) solution auto-injector SureClick injection Inject 1 mL under the skin into the appropriate area as directed Every 14 (Fourteen) Days. 6 mL 3   • l-methylfolate calcium (DEPLIN) 7.5 MG tablet tablet Take 7.5 mg by mouth 2 (Two) Times a Day.     • lamoTRIgine (LaMICtal) 25 MG tablet TAKE 1 TABLET TWICE A DAY FOR MOOD STABILIZER 180 tablet 1   • levocetirizine (XYZAL) 5 MG tablet      • Naproxen Sodium 220 MG capsule Take 1 tablet by mouth As Needed.     • Trintellix 5 MG tablet TAKE 1 TABLET DAILY WITH BREAKFAST 90 tablet 3   • Turmeric 400 MG capsule        No current facility-administered medications for this visit.   Review of Systems   Constitutional: Positive for fatigue. Negative for chills and fever.   HENT: Negative.    Eyes: Negative.    Respiratory: Negative for cough and shortness of breath.    Cardiovascular: Negative for chest pain and palpitations.   Gastrointestinal: Negative for abdominal pain.   Endocrine: Negative.    Genitourinary: Negative.    Musculoskeletal: Negative.    Skin: Negative.    Neurological: Negative for dizziness and headaches.   Psychiatric/Behavioral: Negative for behavioral problems.       PHYSICAL EXAM:    Current Vitals:  /76   Pulse 81   Temp 98.1 °F (36.7 °C) (Oral)   Ht 149.9 cm (59\")   Wt 105 kg (231 lb 12.8 oz)   LMP  (LMP Unknown)   SpO2 " 97%   BMI 46.82 kg/m²     Physical Exam  Constitutional:       Appearance: Normal appearance.   HENT:      Head: Normocephalic and atraumatic.   Eyes:      Pupils: Pupils are equal, round, and reactive to light.   Cardiovascular:      Rate and Rhythm: Normal rate and regular rhythm.      Pulses: Normal pulses.      Heart sounds: No murmur heard.  Pulmonary:      Effort: Pulmonary effort is normal.      Breath sounds: Normal breath sounds.   Abdominal:      General: There is no distension.      Palpations: Abdomen is soft. There is no mass.      Tenderness: There is no abdominal tenderness.   Musculoskeletal:         General: Normal range of motion.      Cervical back: Normal range of motion and neck supple.   Skin:     General: Skin is warm.   Neurological:      General: No focal deficit present.      Mental Status: She is alert.   Psychiatric:         Mood and Affect: Mood normal.      no change in exam      LABORATORY/DATA:  Lab Results   Component Value Date    WBC 13.69 (H) 01/11/2023    WBC 7.19 09/22/2022    HGB 13.7 01/11/2023    HGB 14.5 09/22/2022    HCT 39.9 01/11/2023    HCT 43.0 09/22/2022    NEUTROABS 7.94 (H) 01/11/2023       Lab Results   Component Value Date    GLUCOSE 103 (H) 09/22/2022    BUN 9 09/22/2022    CREATININE 0.80 09/22/2022    EGFRIFNONA 81 09/21/2021    EGFRIFAFRI >60 02/26/2019    BCR 11.3 09/22/2022    K 4.1 09/22/2022    CO2 24.2 09/22/2022    CALCIUM 10.2 09/22/2022    ALBUMIN 4.60 09/22/2022    LABIL2 1.5 03/13/2019    AST 21 09/22/2022    ALT 16 09/22/2022       Lab Results   Component Value Date    IRON 224 (H) 01/11/2023    TIBC 323 01/11/2023    FERRITIN 152.90 (H) 01/11/2023            Assessment & Plan    Patient is a 56-year-old female with iron overload secondary to homozygous H63D mutation for hemochromatosis.    Hemochromatosis  Patient does not have the classic phenotype however has an alternate with homozygous H63D.  She has a propensity towards iron overload.  However  less likely to cause significant liver damage. Ideally ferritin should be below 75 to prevent liver damage.  Last ferritin 62.6 in July 2022, Hct 39.9  Last phlebotomy in August 2021  Ferritin is elevated at 152.9, Hct 39.9 - restart phlebotomies for Hct greater than 32 and ferritin greater than 100      RTC Dr Pimentel in 2 months with CBC, Iron profile, Ferritin and adjust phlebotomy schedule if needed  Patient advised not to eat iron fortified foods  Patient verbalized understanding and agreement with restart of phlebotomies    I spent 30 minutes in physical assessment, review of chart, review of last phlebotomy, assessment fatigue, discussion of need for phlebotomy, discussion of parameters for phlebotomy and plan for next appt with phlebotomy schedule, ordering, communication of phlebotomy plan to staff and treatment room, discussion not to eat iron fortified foods, and documentation.    Electronically signed by CHARLINE Power, 01/18/23, 11:50 AM EST.

## 2023-01-19 ENCOUNTER — HOSPITAL ENCOUNTER (OUTPATIENT)
Dept: ONCOLOGY | Facility: HOSPITAL | Age: 58
Setting detail: INFUSION SERIES
Discharge: HOME | End: 2023-01-19
Payer: OTHER GOVERNMENT

## 2023-01-19 VITALS
TEMPERATURE: 98.2 F | HEIGHT: 59 IN | DIASTOLIC BLOOD PRESSURE: 70 MMHG | WEIGHT: 231.7 LBS | SYSTOLIC BLOOD PRESSURE: 104 MMHG | OXYGEN SATURATION: 97 % | HEART RATE: 85 BPM | BODY MASS INDEX: 46.71 KG/M2 | RESPIRATION RATE: 18 BRPM

## 2023-01-19 DIAGNOSIS — R71.8 ELEVATED HEMATOCRIT: Primary | ICD-10-CM

## 2023-01-19 DIAGNOSIS — E78.2 MIXED HYPERLIPIDEMIA: ICD-10-CM

## 2023-01-19 LAB
FERRITIN SERPL-MCNC: 176.2 NG/ML (ref 13–150)
HCT VFR BLD AUTO: 40 % (ref 34–46.6)

## 2023-01-19 PROCEDURE — 99195 PHLEBOTOMY: CPT

## 2023-01-19 PROCEDURE — 85014 HEMATOCRIT: CPT | Performed by: NURSE PRACTITIONER

## 2023-01-19 PROCEDURE — 82728 ASSAY OF FERRITIN: CPT | Performed by: NURSE PRACTITIONER

## 2023-01-19 NOTE — PROGRESS NOTES
TP done per protocol.  400ml drawn off.    Patient had no complaints.  Patient to return in February for possible TP.  Appts given

## 2023-01-30 RX ORDER — EVOLOCUMAB 140 MG/ML
INJECTION, SOLUTION SUBCUTANEOUS
Qty: 6 ML | Refills: 3 | Status: SHIPPED | OUTPATIENT
Start: 2023-01-30

## 2023-02-16 ENCOUNTER — HOSPITAL ENCOUNTER (OUTPATIENT)
Dept: ONCOLOGY | Facility: HOSPITAL | Age: 58
Discharge: HOME | End: 2023-02-16
Admitting: INTERNAL MEDICINE
Payer: OTHER GOVERNMENT

## 2023-02-16 VITALS
RESPIRATION RATE: 16 BRPM | HEIGHT: 59 IN | WEIGHT: 232 LBS | SYSTOLIC BLOOD PRESSURE: 113 MMHG | TEMPERATURE: 98.3 F | HEART RATE: 75 BPM | BODY MASS INDEX: 46.77 KG/M2 | DIASTOLIC BLOOD PRESSURE: 73 MMHG | OXYGEN SATURATION: 96 %

## 2023-02-16 DIAGNOSIS — R71.8 ELEVATED HEMATOCRIT: Primary | ICD-10-CM

## 2023-02-16 DIAGNOSIS — E78.2 MIXED HYPERLIPIDEMIA: ICD-10-CM

## 2023-02-16 LAB
FERRITIN SERPL-MCNC: 65.08 NG/ML (ref 13–150)
HCT VFR BLD AUTO: 41.2 % (ref 34–46.6)

## 2023-02-16 PROCEDURE — G0463 HOSPITAL OUTPT CLINIC VISIT: HCPCS

## 2023-02-16 PROCEDURE — 36415 COLL VENOUS BLD VENIPUNCTURE: CPT

## 2023-02-16 PROCEDURE — 85014 HEMATOCRIT: CPT | Performed by: INTERNAL MEDICINE

## 2023-02-16 PROCEDURE — 82728 ASSAY OF FERRITIN: CPT | Performed by: INTERNAL MEDICINE

## 2023-02-16 NOTE — PROGRESS NOTES
Venipuncture performed in right hand by Keturah Beck with good hemostasis. 10 cc blood drawn. Patient tolerated well. (2/16/23) (AM). Patient here for phlebotomy but hematocrit and ferritin labs were not done. Notified Dr. Pimentel and he stated that she needs ferritin value. Patient requested to have labs drawn today and be called with results. Patient will return later if labs values indicate phlebotomy required.  1620 Spoke with patient on the phone; she does not require phlebotomy this week due to lab results. Follow-up lab and infusion dates were provided.

## 2023-02-17 ENCOUNTER — TELEPHONE (OUTPATIENT)
Dept: FAMILY MEDICINE CLINIC | Facility: CLINIC | Age: 58
End: 2023-02-17
Payer: OTHER GOVERNMENT

## 2023-02-17 NOTE — TELEPHONE ENCOUNTER
HUB TO READ:  ----- Message from Melissa Duarte MD sent at 2/16/2023  7:21 PM EST -----  Ferritin and hematocrit are now normal.

## 2023-02-21 ENCOUNTER — TELEPHONE (OUTPATIENT)
Dept: ONCOLOGY | Facility: CLINIC | Age: 58
End: 2023-02-21

## 2023-02-21 NOTE — TELEPHONE ENCOUNTER
Caller: Merissa Yusuf    Relationship: Self    Best call back number: [phone]    What is the best time to reach you: ANYTIME    Who are you requesting to speak with (clinical staff, provider, specific staff member): SCHEDULING    What was the call regarding: PT CALLING TO R/S HER 3/15 AND 3/23 APPTS.     Do you require a callback: YES

## 2023-03-14 RX ORDER — LAMOTRIGINE 25 MG/1
TABLET ORAL
Qty: 180 TABLET | Refills: 3 | Status: SHIPPED | OUTPATIENT
Start: 2023-03-14

## 2023-03-31 ENCOUNTER — TELEPHONE (OUTPATIENT)
Dept: ONCOLOGY | Facility: HOSPITAL | Age: 58
End: 2023-03-31
Payer: OTHER GOVERNMENT

## 2023-03-31 NOTE — TELEPHONE ENCOUNTER
Spoke with patient regarding up coming phlebotomy appt on Monday.  She reports that she did not have the labs drawn as scheduled this week and would like to reschedule her MD and phlebotomy appt. She states that her  is having neck surgery next week and was going to reschedule it anyways.  New lab appt made for Monday as requested.  Scheduling notified of the need to reschedule pt for MD and phlebotomy.

## 2023-04-03 ENCOUNTER — LAB (OUTPATIENT)
Dept: LAB | Facility: HOSPITAL | Age: 58
End: 2023-04-03
Payer: OTHER GOVERNMENT

## 2023-04-03 ENCOUNTER — TELEPHONE (OUTPATIENT)
Dept: ORTHOPEDIC SURGERY | Facility: CLINIC | Age: 58
End: 2023-04-03

## 2023-04-03 ENCOUNTER — TELEPHONE (OUTPATIENT)
Dept: FAMILY MEDICINE CLINIC | Facility: CLINIC | Age: 58
End: 2023-04-03
Payer: OTHER GOVERNMENT

## 2023-04-03 DIAGNOSIS — D72.829 LEUKOCYTOSIS, UNSPECIFIED TYPE: ICD-10-CM

## 2023-04-03 LAB
BASOPHILS # BLD AUTO: 0.03 10*3/MM3 (ref 0–0.2)
BASOPHILS NFR BLD AUTO: 0.4 % (ref 0–1.5)
DEPRECATED RDW RBC AUTO: 47.9 FL (ref 37–54)
EOSINOPHIL # BLD AUTO: 0.2 10*3/MM3 (ref 0–0.4)
EOSINOPHIL NFR BLD AUTO: 2.9 % (ref 0.3–6.2)
ERYTHROCYTE [DISTWIDTH] IN BLOOD BY AUTOMATED COUNT: 12.4 % (ref 12.3–15.4)
FERRITIN SERPL-MCNC: 76.28 NG/ML (ref 13–150)
HCT VFR BLD AUTO: 40 % (ref 34–46.6)
HGB BLD-MCNC: 14.1 G/DL (ref 12–15.9)
IRON 24H UR-MRATE: 125 MCG/DL (ref 37–145)
IRON SATN MFR SERPL: 38 % (ref 20–50)
LYMPHOCYTES # BLD AUTO: 2.3 10*3/MM3 (ref 0.7–3.1)
LYMPHOCYTES NFR BLD AUTO: 33.4 % (ref 19.6–45.3)
MCH RBC QN AUTO: 36.8 PG (ref 26.6–33)
MCHC RBC AUTO-ENTMCNC: 35.3 G/DL (ref 31.5–35.7)
MCV RBC AUTO: 104.4 FL (ref 79–97)
MONOCYTES # BLD AUTO: 0.57 10*3/MM3 (ref 0.1–0.9)
MONOCYTES NFR BLD AUTO: 8.3 % (ref 5–12)
NEUTROPHILS NFR BLD AUTO: 3.79 10*3/MM3 (ref 1.7–7)
NEUTROPHILS NFR BLD AUTO: 55 % (ref 42.7–76)
PLATELET # BLD AUTO: 372 10*3/MM3 (ref 140–450)
PMV BLD AUTO: 8.4 FL (ref 6–12)
RBC # BLD AUTO: 3.83 10*6/MM3 (ref 3.77–5.28)
TIBC SERPL-MCNC: 326 MCG/DL (ref 298–536)
TRANSFERRIN SERPL-MCNC: 219 MG/DL (ref 200–360)
WBC NRBC COR # BLD: 6.89 10*3/MM3 (ref 3.4–10.8)

## 2023-04-03 PROCEDURE — 84466 ASSAY OF TRANSFERRIN: CPT | Performed by: NURSE PRACTITIONER

## 2023-04-03 PROCEDURE — 36415 COLL VENOUS BLD VENIPUNCTURE: CPT

## 2023-04-03 PROCEDURE — 83540 ASSAY OF IRON: CPT | Performed by: NURSE PRACTITIONER

## 2023-04-03 PROCEDURE — 85025 COMPLETE CBC W/AUTO DIFF WBC: CPT

## 2023-04-03 PROCEDURE — 82728 ASSAY OF FERRITIN: CPT | Performed by: NURSE PRACTITIONER

## 2023-04-03 NOTE — TELEPHONE ENCOUNTER
Caller: Merissa Yusuf    Relationship to patient: Self    Best call back number:     Patient is needing: UNABLE TO WARM TRANSFER.  BILAT KNEE INJECTION NEEDS TO BE RESCHEDULED

## 2023-04-03 NOTE — TELEPHONE ENCOUNTER
HUB TO READ:  ----- Message from Melissa Duarte MD sent at 4/3/2023 12:47 PM EDT -----  RBC's look large in size but rest of labs look good-we'll continue to monitor

## 2023-04-05 ENCOUNTER — OFFICE VISIT (OUTPATIENT)
Dept: ORTHOPEDIC SURGERY | Facility: CLINIC | Age: 58
End: 2023-04-05
Payer: OTHER GOVERNMENT

## 2023-04-05 DIAGNOSIS — M17.12 PRIMARY OSTEOARTHRITIS OF LEFT KNEE: ICD-10-CM

## 2023-04-05 DIAGNOSIS — M17.11 PRIMARY OSTEOARTHRITIS OF RIGHT KNEE: Primary | ICD-10-CM

## 2023-04-05 PROCEDURE — 20610 DRAIN/INJ JOINT/BURSA W/O US: CPT | Performed by: ORTHOPAEDIC SURGERY

## 2023-04-05 RX ORDER — TRIAMCINOLONE ACETONIDE 40 MG/ML
80 INJECTION, SUSPENSION INTRA-ARTICULAR; INTRAMUSCULAR
Status: COMPLETED | OUTPATIENT
Start: 2023-04-05 | End: 2023-04-05

## 2023-04-05 RX ORDER — LIDOCAINE HYDROCHLORIDE 10 MG/ML
2 INJECTION, SOLUTION EPIDURAL; INFILTRATION; INTRACAUDAL; PERINEURAL
Status: COMPLETED | OUTPATIENT
Start: 2023-04-05 | End: 2023-04-05

## 2023-04-05 RX ADMIN — TRIAMCINOLONE ACETONIDE 80 MG: 40 INJECTION, SUSPENSION INTRA-ARTICULAR; INTRAMUSCULAR at 12:39

## 2023-04-05 RX ADMIN — LIDOCAINE HYDROCHLORIDE 2 ML: 10 INJECTION, SOLUTION EPIDURAL; INFILTRATION; INTRACAUDAL; PERINEURAL at 12:39

## 2023-04-05 NOTE — PROGRESS NOTES
INJECTION    Patient: Merissa Yusuf    YOB: 1965    MRN: [id number]    Chief Complaint   Patient presents with   • Left Knee - Follow-up   • Right Knee - Follow-up       History of Present Illness: Patient returns today for bilateral knee pain.  Her pain is located over the medial aspect of the joint.  The pain has been progressive in nature and remains intermittent .  Her pain is worsened by kneeling, rising after sitting. There has been improvement in the past with injections.     This problem is not new to this examiner.     Allergies:   Allergies   Allergen Reactions   • Amoxicillin Swelling   • Erythromycin GI Intolerance   • Escitalopram Anxiety   • Prozac  [Fluoxetine Hcl] Rash   • Penicillin G Rash       Medications:   Home Medications:  Current Outpatient Medications on File Prior to Visit   Medication Sig   • acetaminophen (TYLENOL) 500 MG tablet Take 500 mg by mouth Every 4 (Four) Hours As Needed.   • busPIRone (BUSPAR) 15 MG tablet TAKE 1 TABLET THREE TIMES A DAY FOR ANXIETY   • Calcium Carbonate Antacid (TUMS PO) Take 2,000 mg by mouth At Night As Needed.   • COLLAGEN PO Take  by mouth.   • l-methylfolate calcium (DEPLIN) 7.5 MG tablet tablet Take 7.5 mg by mouth 2 (Two) Times a Day.   • lamoTRIgine (LaMICtal) 25 MG tablet TAKE 1 TABLET TWICE A DAY FOR MOOD STABILIZER   • levocetirizine (XYZAL) 5 MG tablet    • Naproxen Sodium 220 MG capsule Take 1 tablet by mouth As Needed.   • Repatha SureClick solution auto-injector SureClick injection INJECT 1 ML UNDER THE SKIN INTO THE APPROPRIATE AREA AS DIRECTED EVERY 14 DAYS   • Trintellix 5 MG tablet TAKE 1 TABLET DAILY WITH BREAKFAST   • Turmeric 400 MG capsule      No current facility-administered medications on file prior to visit.     Current Medications:  Scheduled Meds:  PRN Meds:.    I have reviewed the patient's medical history in detail and updated the computerized patient record.  Review and summarization of old records include:   "  Past Medical History:   Diagnosis Date   • Anesthesia complication     drops B/P, and slow to arouse   • Encounter for laboratory testing for COVID-19 virus 07/19/2022   • GERD (gastroesophageal reflux disease)    • Hereditary hemochromatosis    • Hyperlipidemia    • Seasonal allergies      Past Surgical History:   Procedure Laterality Date   • BLADDER SUSPENSION     • BREAST BIOPSY Right    • FINGER SURGERY  2008    removed cysts from right index finger    • HERNIA REPAIR     • SUBTOTAL HYSTERECTOMY     • TUBAL ABDOMINAL LIGATION     • VENTRAL/INCISIONAL HERNIA REPAIR N/A 9/24/2020    Procedure: LAPAROSCOPIC VENTRAL HERNIA;  Surgeon: Reuben Molina MD;  Location: [facility] MAIN OR;  Service: General;  Laterality: N/A;  0855 TAP block   • WISDOM TOOTH EXTRACTION       Social History     Occupational History   • Not on file   Tobacco Use   • Smoking status: Never   • Smokeless tobacco: Never   Vaping Use   • Vaping Use: Never used   Substance and Sexual Activity   • Alcohol use: Yes     Alcohol/week: 1.0 standard drink     Types: 1 Glasses of wine per week     Comment: rarely   • Drug use: No   • Sexual activity: Defer      Social History     Social History Narrative   • Not on file     Family History   Problem Relation Age of Onset   • Coronary artery disease Other    • Diabetes Other    • Dementia Other    • Diabetes Mother    • Cancer Mother    • Heart disease Father    • Liver disease Father    • Hypertension Father    • Cancer Brother    • Breast cancer Neg Hx    • Ovarian cancer Neg Hx        Review of Systems        Wt Readings from Last 3 Encounters:   02/16/23 105 kg (232 lb)   01/19/23 105 kg (231 lb 11.2 oz)   01/18/23 105 kg (231 lb 12.8 oz)     Ht Readings from Last 3 Encounters:   02/16/23 149.9 cm (59\")   01/19/23 149.9 cm (59\")   01/18/23 149.9 cm (59\")     There is no height or weight on file to calculate BMI.  No height and weight on file for this encounter.  There were no vitals filed for " this visit.    Physical Exam    Musculoskeletal:    Bilateral knee (varus). Patient has crepitus throughout range of motion. Positive patellar grind test. Mild effusion. Lachman is negative. Pivot shift is negative. Anterior and posterior drawer signs are negative. Significant joint line tenderness is noted on the medial aspect of the knee. Patient has a varus orientation of the knee. There is fullness and tenderness in the Popliteal fossa. Mild distention of a Popliteal cyst is noted in this location. Range of motion in flexion is from 0-100 degrees. Neurovascular status is intact.  Dorsalis pedis and posterior tibial artery pulses are palpable. Common peroneal nerve function is well preserved. Patient's gait is cautious and antalgic. Skin and soft tissues are mildly swollen, consistent with synovitis and effusion. The patient has a significant limp with the first few steps after starting the gait cycle. Getting out of a chair takes a lot of effort due to pain on knee flexion.       Diagnostics:      Procedure:  Large Joint Arthrocentesis: R knee  Date/Time: 4/5/2023 12:39 PM  Consent given by: patient  Site marked: site marked  Timeout: Immediately prior to procedure a time out was called to verify the correct patient, procedure, equipment, support staff and site/side marked as required   Supporting Documentation  Indications: pain   Procedure Details  Location: knee - R knee  Preparation: Patient was prepped and draped in the usual sterile fashion  Needle size: 25 G  Approach: anteromedial  Medications administered: 80 mg triamcinolone acetonide 40 MG/ML; 2 mL lidocaine PF 1% 1 %  Patient tolerance: patient tolerated the procedure well with no immediate complications    Large Joint Arthrocentesis: L knee  Date/Time: 4/5/2023 12:39 PM  Consent given by: patient  Site marked: site marked  Timeout: Immediately prior to procedure a time out was called to verify the correct patient, procedure, equipment, support staff  and site/side marked as required   Supporting Documentation  Indications: pain   Procedure Details  Location: knee - L knee  Preparation: Patient was prepped and draped in the usual sterile fashion  Needle size: 25 G  Approach: anteromedial  Medications administered: 80 mg triamcinolone acetonide 40 MG/ML; 2 mL lidocaine PF 1% 1 %  Patient tolerance: patient tolerated the procedure well with no immediate complications            Assessment:   Diagnoses and all orders for this visit:    1. Primary osteoarthritis of right knee (Primary)    2. Primary osteoarthritis of left knee    Other orders  -     Large Joint Arthrocentesis: R knee  -     Large Joint Arthrocentesis: L knee          Plan:   · Injected patient's bilateral knee joint(s)with Kenalog from an anteromedial approach   · Compression/brace   · Rest, ice, compression, and elevation (RICE) therapy  · OTC Alternate Ibuprofen and Tylenol as needed  · Follow up in 3 month(s)    Date of encounter: 04/05/2023   Brian Rodriguez MD

## 2023-04-06 NOTE — PROGRESS NOTES
ONCOLOGY/HEMATOLOGY FOLLOW UP    PATIENT: Merissa Yusuf  YOB: 1965  MEDICAL RECORD NUMBER: [id number] OF SERVICE: 04/10/23      Chief complaint:  Hereditary hemochromatosis, homozygous mutation in H63D.  Iron overload  Macrocytosis    History of Present Illness:   · Ms. Yusuf has a history of COPD and also depression.  Laboratory work up was obtained in March 2019 which was abnormal.  Patient was sent to the [facility] and seen initially on 3/13/19.   · 3/5/19 - WBC 4.7, hemoglobin 14.1, MCV elevated to 103 and platelet count of 592,000.  Vitamin D  20.  Sed rate 35.  TSH 1.62.  Vitamin B12 476.  Creatinine 0.8.    · 3/13/19 - ZACKERY-2 mutation negative.  Hemochromatosis gene mutation, homozygous mutation in H63D.  · 3/13/19 - Vitamin B12 507.  Ferritin 178.  Creatinine 0.7.  Iron level 156.  Iron binding  capacity 257.  Percentage iron saturation 61.   · 1/8/2020 Ferritin 304 iron 177 iron saturation 56 TIBC 316  · 7/21/2020 iron 247 iron saturation 82 iron binding capacity 302 ferritin 271 hemoglobin 13.7 WBC 6.1 platelet count 372.  · 6/19/2020 CMP is normal including LFTs.  TSH 3.01  · 10/2020 ferritin 233.40 iron sat 43  · 8/2021 ferritin 341.40, iron sat 76  · 11/2/2021 -ferritin 112  · 3/8/2022 -CBC with hemoglobin 13.5, hematocrit 38.7.  Ferritin 59.7, iron saturation 35  · 7/6/2022 - Hb 13.7, ferritin 62 iron 36  · 4/3/2023 ferritin 76, iron sat 38,       SUBJECTIVE:   Fatigue mild, no other new symptoms.      Past Medical History:   Diagnosis Date   • Anesthesia complication     drops B/P, and slow to arouse   • Encounter for laboratory testing for COVID-19 virus 07/19/2022   • GERD (gastroesophageal reflux disease)    • Hereditary hemochromatosis    • Hyperlipidemia    • Seasonal allergies        Past Surgical History:   Procedure Laterality Date   • BLADDER SUSPENSION     • BREAST BIOPSY Right    • CATARACT EXTRACTION  03/2023   • FINGER SURGERY  2008    removed  cysts from right index finger    • HERNIA REPAIR     • SUBTOTAL HYSTERECTOMY     • TUBAL ABDOMINAL LIGATION     • VENTRAL/INCISIONAL HERNIA REPAIR N/A 09/24/2020    Procedure: LAPAROSCOPIC VENTRAL HERNIA;  Surgeon: Reuben Molina MD;  Location: [facility] MAIN OR;  Service: General;  Laterality: N/A;  0855 TAP block   • WISDOM TOOTH EXTRACTION         Family History   Problem Relation Age of Onset   • Coronary artery disease Other    • Diabetes Other    • Dementia Other    • Diabetes Mother    • Cancer Mother    • Heart disease Father    • Liver disease Father    • Hypertension Father    • Cancer Brother    • Breast cancer Neg Hx    • Ovarian cancer Neg Hx        Social History     Socioeconomic History   • Marital status:    Tobacco Use   • Smoking status: Never   • Smokeless tobacco: Never   Vaping Use   • Vaping Use: Never used   Substance and Sexual Activity   • Alcohol use: Yes     Alcohol/week: 1.0 standard drink     Types: 1 Glasses of wine per week     Comment: rarely   • Drug use: No   • Sexual activity: Defer       ALLERGIES:  Amoxicillin, Erythromycin, Escitalopram, Prozac  [fluoxetine hcl], and Penicillin g    MEDICATION:  Current Outpatient Medications   Medication Sig Dispense Refill   • acetaminophen (TYLENOL) 500 MG tablet Take 1 tablet by mouth Every 4 (Four) Hours As Needed.     • busPIRone (BUSPAR) 15 MG tablet TAKE 1 TABLET THREE TIMES A DAY FOR ANXIETY 270 tablet 1   • Calcium Carbonate Antacid (TUMS PO) Take 2,000 mg by mouth At Night As Needed.     • COLLAGEN PO Take  by mouth.     • l-methylfolate calcium (DEPLIN) 7.5 MG tablet tablet Take 1 tablet by mouth 2 (Two) Times a Day.     • lamoTRIgine (LaMICtal) 25 MG tablet TAKE 1 TABLET TWICE A DAY FOR MOOD STABILIZER 180 tablet 3   • levocetirizine (XYZAL) 5 MG tablet      • Naproxen Sodium 220 MG capsule Take 1 tablet by mouth As Needed.     • Repatha SureClick solution auto-injector SureClick injection INJECT 1 ML UNDER THE SKIN  "INTO THE APPROPRIATE AREA AS DIRECTED EVERY 14 DAYS 6 mL 3   • Trintellix 5 MG tablet TAKE 1 TABLET DAILY WITH BREAKFAST 90 tablet 3   • Turmeric 400 MG capsule        No current facility-administered medications for this visit.     PHYSICAL EXAM:    Current Vitals:  /77   Pulse 77   Ht 149.9 cm (59\")   Wt 106 kg (232 lb 9.6 oz)   LMP  (LMP Unknown)   SpO2 95%   BMI 46.98 kg/m²     VITAL signs in the last 24 hours: [unfilled]       04/10/23  1405   Weight: 106 kg (232 lb 9.6 oz)       Physical Exam  Constitutional:       Appearance: Normal appearance.   HENT:      Head: Normocephalic and atraumatic.   Eyes:      Extraocular Movements: Extraocular movements intact.      Pupils: Pupils are equal, round, and reactive to light.   Cardiovascular:      Rate and Rhythm: Normal rate and regular rhythm.      Pulses: Normal pulses.      Heart sounds: No murmur heard.  Pulmonary:      Effort: Pulmonary effort is normal.      Breath sounds: Normal breath sounds.   Abdominal:      General: There is no distension.      Palpations: Abdomen is soft. There is no mass.      Tenderness: There is no abdominal tenderness.   Musculoskeletal:         General: Normal range of motion.      Cervical back: Normal range of motion and neck supple.   Skin:     General: Skin is warm.   Neurological:      General: No focal deficit present.      Mental Status: She is alert.   Psychiatric:         Mood and Affect: Mood normal.           LABORATORY/DATA:  Lab Results   Component Value Date    WBC 6.89 04/03/2023    WBC 13.69 (H) 01/11/2023    HGB 14.1 04/03/2023    HGB 13.7 01/11/2023    HCT 40.0 04/03/2023    HCT 41.2 02/16/2023    NEUTROABS 3.79 04/03/2023       Lab Results   Component Value Date    GLUCOSE 103 (H) 09/22/2022    BUN 9 09/22/2022    CREATININE 0.80 09/22/2022    EGFRIFNONA 81 09/21/2021    EGFRIFAFRI >60 02/26/2019    BCR 11.3 09/22/2022    K 4.1 09/22/2022    CO2 24.2 09/22/2022    CALCIUM 10.2 09/22/2022    ALBUMIN " 4.60 09/22/2022    LABIL2 1.5 03/13/2019    AST 21 09/22/2022    ALT 16 09/22/2022       Lab Results   Component Value Date    IRON 125 04/03/2023    TIBC 326 04/03/2023    FERRITIN 76.28 04/03/2023            Assessment & Plan    Patient is a 57-year-old female with iron overload secondary to homozygous H63D mutation for hemochromatosis.    Hemochromatosis  Patient does not have the classic phenotype however has an alternate with homozygous H63D.  She has a propensity towards iron overload.  However less likely to cause significant liver damage.  Ferritin is increased slightly. Repeat in 6 months and follow up  Ideally ferritin should be below 75 to prevent liver damage.  We will continue to monitor.     I will recheck a CBC iron studies and 6 months discussed the further phlebotomy schedule with her.    Patient agrees with the plan.

## 2023-04-10 ENCOUNTER — OFFICE VISIT (OUTPATIENT)
Dept: ONCOLOGY | Facility: CLINIC | Age: 58
End: 2023-04-10
Payer: OTHER GOVERNMENT

## 2023-04-10 VITALS
BODY MASS INDEX: 46.89 KG/M2 | WEIGHT: 232.6 LBS | HEIGHT: 59 IN | SYSTOLIC BLOOD PRESSURE: 112 MMHG | DIASTOLIC BLOOD PRESSURE: 77 MMHG | HEART RATE: 77 BPM | OXYGEN SATURATION: 95 %

## 2023-04-10 DIAGNOSIS — E83.110 HEREDITARY HEMOCHROMATOSIS: ICD-10-CM

## 2023-04-10 DIAGNOSIS — D72.829 LEUKOCYTOSIS, UNSPECIFIED TYPE: Primary | ICD-10-CM

## 2023-04-10 PROCEDURE — 99213 OFFICE O/P EST LOW 20 MIN: CPT | Performed by: INTERNAL MEDICINE

## 2023-04-13 RX ORDER — VORTIOXETINE 5 MG/1
TABLET, FILM COATED ORAL
Qty: 90 TABLET | Refills: 0 | Status: SHIPPED | OUTPATIENT
Start: 2023-04-13

## 2023-04-24 ENCOUNTER — TELEMEDICINE (OUTPATIENT)
Dept: PSYCHIATRY | Facility: CLINIC | Age: 58
End: 2023-04-24
Payer: OTHER GOVERNMENT

## 2023-04-24 DIAGNOSIS — F41.1 GAD (GENERALIZED ANXIETY DISORDER): Chronic | ICD-10-CM

## 2023-04-24 DIAGNOSIS — F33.1 MODERATE EPISODE OF RECURRENT MAJOR DEPRESSIVE DISORDER: Primary | Chronic | ICD-10-CM

## 2023-04-24 PROCEDURE — 99214 OFFICE O/P EST MOD 30 MIN: CPT | Performed by: PHYSICIAN ASSISTANT

## 2023-04-24 NOTE — PROGRESS NOTES
"Subjective   Merissa Yusuf is a 57 y.o.white female who presents today for follow up via telehealth.    This provider is located in [place], [place] using a secure [employer] Video Visit through [employer]. Patient is being seen remotely via telehealth at their home address in [place], and stated they are in a secure environment for this session. The patient's condition being diagnosed/treated is appropriate for telemedicine. The provider identified herself as well as her credentials.   The patient, and/or patients guardian, consent to be seen remotely, and when consent is given they understand that the consent allows for patient identifiable information to be sent to a third party as needed.   They may refuse to be seen remotely at any time. The electronic data is encrypted and password protected, and the patient and/or guardian has been advised of the potential risks to privacy not withstanding such measures.   PT Identifiers used: Name and .    You have chosen to receive care through a telehealth visit.  Do you consent to use a video/audio connection for your medical care today? Yes    Chief Complaint:  Anxiety and depression    History of Present Illness:   Had cataract surgery on one eye in March, having the second eye done next week.    Her 39 yr old son  in 2021, she was out of town in [place] with her sister who's  was dying and passed when she got back. His birthday is also in October, so the last few weeks were harder but does well when she keeps herself busy.   Patient is doing better on Trintellix, her daughter said she is happier on it, no restless legs yet on the 5mg  Lamictal has helped her irritability, does not need increase  \"My  is driving me crazy\"..he has TBI and can push her buttons  Mom has Alzheimer's dementia, they moved her to [facility], feels guilty for not visiting her more often.  Her step-dad had been irritable and asked his kids to come visit " him, but instead forced him to leave and go stay with a daughter  Patient lives the closest to Mom, has 6 siblings  Anxiety 6/10 due to her  having neck surgery, taking the twice daily most days, occasionally takes a third  Depression 3/10, helping with her grandkids a lot, staying busy.   Denies SI/HI      The following portions of the patient's history were reviewed and updated as appropriate: allergies, current medications, past family history, past medical history, past social history, past surgical history and problem list.    PAST PSYCHIATRIC HISTORY  Axis I  Affective/Bipoloar Disorder, Anxiety/Panic Disorder  Axis II  None    PAST OUTPATIENT TREATMENT  Diagnosis treated:  Affective Disorder, Anxiety/Panic Disorder  Treatment Type:  Family Therapy, Medication Management  No hospitalizations  Genesight testing done March 2019  Prior Psychiatric Medications:  Lexapro increased anxiety  Prozac hives  Trintellix helped but gave her restless legs  Buspar   Viibryd, could not take  Pristiq  Topamax, increased her anxiety and cause SOA  Lamictal  Support Groups:  None  Sequelae Of Mental Disorder:  social isolation, family disruption, emotional distress      Interval History  Improved    Side Effects  Restless legs with Trintellix at 10 mg    Past psychiatric history was reviewed and compared to visit and 10/24/22 appropriate updates were made.    Past Medical History:  Past Medical History:   Diagnosis Date   • Anesthesia complication     drops B/P, and slow to arouse   • Encounter for laboratory testing for COVID-19 virus 07/19/2022   • GERD (gastroesophageal reflux disease)    • Hereditary hemochromatosis    • Hyperlipidemia    • Seasonal allergies        Social History:  Social History     Socioeconomic History   • Marital status:    Tobacco Use   • Smoking status: Never   • Smokeless tobacco: Never   Vaping Use   • Vaping Use: Never used   Substance and Sexual Activity   • Alcohol use: Yes      Alcohol/week: 1.0 standard drink     Types: 1 Glasses of wine per week     Comment: rarely   • Drug use: No   • Sexual activity: Defer       Family History:  Family History   Problem Relation Age of Onset   • Coronary artery disease Other    • Diabetes Other    • Dementia Other    • Diabetes Mother    • Cancer Mother    • Heart disease Father    • Liver disease Father    • Hypertension Father    • Cancer Brother    • Breast cancer Neg Hx    • Ovarian cancer Neg Hx        Past Surgical History:  Past Surgical History:   Procedure Laterality Date   • BLADDER SUSPENSION     • BREAST BIOPSY Right    • CATARACT EXTRACTION  03/2023   • FINGER SURGERY  2008    removed cysts from right index finger    • HERNIA REPAIR     • SUBTOTAL HYSTERECTOMY     • TUBAL ABDOMINAL LIGATION     • VENTRAL/INCISIONAL HERNIA REPAIR N/A 09/24/2020    Procedure: LAPAROSCOPIC VENTRAL HERNIA;  Surgeon: Reuben Molina MD;  Location: [facility] OR;  Service: General;  Laterality: N/A;  0855 TAP block   • WISDOM TOOTH EXTRACTION         Problem List:  Patient Active Problem List   Diagnosis   • Elevated blood pressure reading without diagnosis of hypertension   • Hearing deficit   • Hyperlipidemia   • Knee pain   • Low back pain   • Memory loss   • Morbid (severe) obesity due to excess calories   • Obstructive sleep apnea   • Thrombocytosis   • Vitamin D deficiency   • Combined forms of age-related cataract, bilateral   • Convergence insufficiency   • Corneal pannus of right eye   • Corneal pannus   • Meibomian gland dysfunction (MGD) of both eyes   • Bilateral presbyopia   • Presbyopia   • Vitreous floaters   • Hereditary hemochromatosis   • GERD (gastroesophageal reflux disease)   • Seasonal allergies   • Class 3 severe obesity due to excess calories with serious comorbidity and body mass index (BMI) of 45.0 to 49.9 in adult   • Elevated hematocrit   • KIRSTIE (generalized anxiety disorder)   • Moderate episode of recurrent major depressive  disorder   • SOB (shortness of breath)   • Encounter for laboratory testing for COVID-19 virus   • Macular pseudohole of right eye   • Partial thickness macular hole of left eye   • Open bite wound of skin   • Primary osteoarthritis of right knee   • Primary osteoarthritis of left knee       Allergy:   Allergies   Allergen Reactions   • Amoxicillin Swelling   • Erythromycin GI Intolerance   • Escitalopram Anxiety   • Prozac  [Fluoxetine Hcl] Rash   • Penicillin G Rash        Discontinued Medications:  There are no discontinued medications.    Current Medications:   Current Outpatient Medications   Medication Sig Dispense Refill   • acetaminophen (TYLENOL) 500 MG tablet Take 1 tablet by mouth Every 4 (Four) Hours As Needed.     • busPIRone (BUSPAR) 15 MG tablet TAKE 1 TABLET THREE TIMES A DAY FOR ANXIETY 270 tablet 1   • Calcium Carbonate Antacid (TUMS PO) Take 2,000 mg by mouth At Night As Needed.     • COLLAGEN PO Take  by mouth.     • l-methylfolate calcium (DEPLIN) 7.5 MG tablet tablet Take 1 tablet by mouth 2 (Two) Times a Day.     • lamoTRIgine (LaMICtal) 25 MG tablet TAKE 1 TABLET TWICE A DAY FOR MOOD STABILIZER 180 tablet 3   • levocetirizine (XYZAL) 5 MG tablet      • Naproxen Sodium 220 MG capsule Take 1 tablet by mouth As Needed.     • Repatha SureClick solution auto-injector SureClick injection INJECT 1 ML UNDER THE SKIN INTO THE APPROPRIATE AREA AS DIRECTED EVERY 14 DAYS 6 mL 3   • Trintellix 5 MG tablet tablet TAKE 1 TABLET DAILY WITH BREAKFAST 90 tablet 0   • Turmeric 400 MG capsule        No current facility-administered medications for this visit.         Review of Symptoms:    Psychiatric/Behavioral: Negative for agitation, behavioral problems, confusion, decreased concentration, dysphoric mood, hallucinations, self-injury, sleep disturbance and suicidal ideas. The patient not nervous/anxious and is not hyperactive.        Physical Exam:   not currently breastfeeding.    Mental Status Exam:    Hygiene:   good  Cooperation:  Cooperative  Eye Contact:  Good  Psychomotor Behavior:  Appropriate  Affect:  Appropriate  Mood: Mildly anxious  Hopelessness: Denies  Speech:  Normal  Thought Process:  Goal directed  Thought Content:  Normal  Suicidal:  None  Homicidal:  None  Hallucinations:  None  Delusion:  None  Memory:  Intact  Orientation:  Person, Place, Time and Situation  Reliability:  good  Insight:  Good  Judgement:  Good  Impulse Control:  Good  Physical/Medical Issues:  No      Mental status exam was reviewed and compared to 10/24/22 visit and appropriate updates were made.    PHQ-9 Depression Screening  Little interest or pleasure in doing things? (P) 1-->several days   Feeling down, depressed, or hopeless? (P) 0-->not at all   Trouble falling or staying asleep, or sleeping too much? (P) 0-->not at all   Feeling tired or having little energy? (P) 1-->several days   Poor appetite or overeating? (P) 0-->not at all   Feeling bad about yourself - or that you are a failure or have let yourself or your family down? (P) 0-->not at all   Trouble concentrating on things, such as reading the newspaper or watching television? (P) 2-->more than half the days   Moving or speaking so slowly that other people could have noticed? Or the opposite - being so fidgety or restless that you have been moving around a lot more than usual? (P) 0-->not at all   Thoughts that you would be better off dead, or of hurting yourself in some way? (P) 0-->not at all   PHQ-9 Total Score (P) 4   If you checked off any problems, how difficult have these problems made it for you to do your work, take care of things at home, or get along with other people? (P) not difficult at all         Never smoker    I advised Merissa of the risks of tobacco use.     Lab Results:   Lab on 04/03/2023   Component Date Value Ref Range Status   • Iron 04/03/2023 125  37 - 145 mcg/dL Final   • Iron Saturation 04/03/2023 38  20 - 50 % Final   • Transferrin  04/03/2023 219  200 - 360 mg/dL Final   • TIBC 04/03/2023 326  298 - 536 mcg/dL Final   • Ferritin 04/03/2023 76.28  13.00 - 150.00 ng/mL Final   • WBC 04/03/2023 6.89  3.40 - 10.80 10*3/mm3 Final   • RBC 04/03/2023 3.83  3.77 - 5.28 10*6/mm3 Final   • Hemoglobin 04/03/2023 14.1  12.0 - 15.9 g/dL Final   • Hematocrit 04/03/2023 40.0  34.0 - 46.6 % Final   • MCV 04/03/2023 104.4 (H)  79.0 - 97.0 fL Final   • MCH 04/03/2023 36.8 (H)  26.6 - 33.0 pg Final   • MCHC 04/03/2023 35.3  31.5 - 35.7 g/dL Final   • RDW 04/03/2023 12.4  12.3 - 15.4 % Final   • RDW-SD 04/03/2023 47.9  37.0 - 54.0 fl Final   • MPV 04/03/2023 8.4  6.0 - 12.0 fL Final   • Platelets 04/03/2023 372  140 - 450 10*3/mm3 Final   • Neutrophil % 04/03/2023 55.0  42.7 - 76.0 % Final   • Lymphocyte % 04/03/2023 33.4  19.6 - 45.3 % Final   • Monocyte % 04/03/2023 8.3  5.0 - 12.0 % Final   • Eosinophil % 04/03/2023 2.9  0.3 - 6.2 % Final   • Basophil % 04/03/2023 0.4  0.0 - 1.5 % Final   • Neutrophils, Absolute 04/03/2023 3.79  1.70 - 7.00 10*3/mm3 Final   • Lymphocytes, Absolute 04/03/2023 2.30  0.70 - 3.10 10*3/mm3 Final   • Monocytes, Absolute 04/03/2023 0.57  0.10 - 0.90 10*3/mm3 Final   • Eosinophils, Absolute 04/03/2023 0.20  0.00 - 0.40 10*3/mm3 Final   • Basophils, Absolute 04/03/2023 0.03  0.00 - 0.20 10*3/mm3 Final   Hospital Outpatient Visit on 02/16/2023   Component Date Value Ref Range Status   • Hematocrit 02/16/2023 41.2  34.0 - 46.6 % Final   • Ferritin 02/16/2023 65.08  13.00 - 150.00 ng/mL Final       Assessment & Plan   Problems Addressed this Visit        Mental Health    KIRSTIE (generalized anxiety disorder) (Chronic)    Moderate episode of recurrent major depressive disorder - Primary (Chronic)   Diagnoses       Codes Comments    Moderate episode of recurrent major depressive disorder    -  Primary ICD-10-CM: F33.1  ICD-9-CM: 296.32     KIRSTIE (generalized anxiety disorder)     ICD-10-CM: F41.1  ICD-9-CM: 300.02           Visit Diagnoses:     ICD-10-CM ICD-9-CM   1. Moderate episode of recurrent major depressive disorder  F33.1 296.32   2. KIRSTIE (generalized anxiety disorder)  F41.1 300.02       TREATMENT PLAN/GOALS: Continue supportive psychotherapy efforts and medications as indicated. Treatment and medication options discussed during today's visit. Patient ackowledged and verbally consented to continue with current treatment plan and was educated on the importance of compliance with treatment and follow-up appointments.    MEDICATION ISSUES:  INSPECT reviewed as expected  Discussed medication options and treatment plan of prescribed medication as well as the risks, benefits, and side effects including potential falls, possible impaired driving and metabolic adversities among others. Patient is agreeable to call the office with any worsening of symptoms or onset of side effects. Patient is agreeable to call 911 or go to the nearest ER should he/she begin having SI/HI. No medication side effects or related complaints today.     Patient is doing well, coping better with the loss of her son, helping a lot with her grand kids and denies significant depression currently.   Continue Trintellix 5mg daily for depression  Continue Buspar 15mg TID prn anxiety, usually only takes it twice daily, only TID on rare occasion  Continue Lamictal 25 mg BID for mood stabilizer  Continue L-methylfolate daily      MEDS ORDERED DURING VISIT:  No orders of the defined types were placed in this encounter.      Return in about 6 months (around 10/24/2023) for video visit.           This document has been electronically signed by Laisha Mendiola PA-C  April 24, 2023 11:01 EDT

## 2023-05-03 NOTE — PROGRESS NOTES
"Chief Complaint  Sleep Apnea    Subjective          Merissa Yusuf presents to [facility] NEUROLOGY  History of Present Illness  DAVE  F/U on bipap compliance, patient states she is benefiting from therapy, she uses a nasal mask and goes through [facility] for supplies.     Sleep testing history:    On NPSG at [facility] , 08/15/2017 patient had Severe obstructive sleep apnea syndrome with apnea-hypopnea index of 28.8 per sleep hour, minimum SpO2 of 85%    PAP download:  The patient is on biPAP therapy atmax 22 epap7-15/0-12/ cm/H2O.   Data indicates Excellent compliance. With 96% usage for more than 4 hours with an average usage of 8 hours 23 minutes. AHI down to 1.5 .  Average pressures 12/8.  Average large leak 11min.     The patient's hypersomnia has resolved       [place] Sleepiness Scale:  Sitting and reading 0 WatchingTV 1  Sitting, inactive, in a public place 0  As a passenger in a car for 1 hour w/o a break  0  Lying down to rest in the afternoon  3  Sitting and talking to someone  0  Sitting quietly after a lunch  0  In a car, while stopped for traffic or a light  0  Total 4      Review of Systems   Constitutional: Positive for fatigue. Negative for chills.   HENT: Positive for postnasal drip and sneezing.    Eyes: Negative.    Respiratory: Positive for cough and shortness of breath.    Gastrointestinal: Positive for constipation. Negative for abdominal distention.   Endocrine: Negative.    Genitourinary: Negative.    Musculoskeletal: Positive for back pain, gait problem, joint swelling and neck pain.   Allergic/Immunologic: Positive for environmental allergies.   Neurological: Negative for dizziness and facial asymmetry.   Hematological: Negative.    Psychiatric/Behavioral: Positive for confusion and decreased concentration. The patient is nervous/anxious.          Objective   Vital Signs:   /86   Pulse 86   Ht 149.9 cm (59\")   Wt 103 kg (228 lb)   BMI 46.05 kg/m²     Physical " Exam  Vitals reviewed.   Constitutional:       Appearance: Normal appearance.   HENT:      Nose: Nose normal.      Mouth/Throat:      Mouth: Mucous membranes are moist.   Eyes:      Pupils: Pupils are equal, round, and reactive to light.   Pulmonary:      Effort: Pulmonary effort is normal. No respiratory distress.   Neurological:      General: No focal deficit present.      Mental Status: She is alert and oriented to person, place, and time.   Psychiatric:         Mood and Affect: Mood normal.        Result Review :                 Assessment and Plan    Diagnoses and all orders for this visit:    1. Obstructive sleep apnea (Primary)        Continue auto BiPAP sv at current pressure  The patient is compliant with and benefiting from PAP therapy.      Follow Up   Return in about 1 year (around 5/4/2024).    Patient was given instructions and counseling regarding her condition or for health maintenance advice. Please see specific information pulled into the AVS if appropriate.       This document has been electronically signed by Joseph Seipel, MD on May 4, 2023 14:28 EDT

## 2023-05-04 ENCOUNTER — OFFICE VISIT (OUTPATIENT)
Dept: NEUROLOGY | Facility: CLINIC | Age: 58
End: 2023-05-04
Payer: OTHER GOVERNMENT

## 2023-05-04 VITALS
BODY MASS INDEX: 45.96 KG/M2 | WEIGHT: 228 LBS | HEART RATE: 86 BPM | SYSTOLIC BLOOD PRESSURE: 129 MMHG | HEIGHT: 59 IN | DIASTOLIC BLOOD PRESSURE: 86 MMHG

## 2023-05-04 DIAGNOSIS — G47.33 OBSTRUCTIVE SLEEP APNEA: Primary | ICD-10-CM

## 2023-05-04 PROCEDURE — 99213 OFFICE O/P EST LOW 20 MIN: CPT | Performed by: PSYCHIATRY & NEUROLOGY

## 2023-05-24 ENCOUNTER — TELEPHONE (OUTPATIENT)
Dept: FAMILY MEDICINE CLINIC | Facility: CLINIC | Age: 58
End: 2023-05-24
Payer: OTHER GOVERNMENT

## 2023-05-24 NOTE — TELEPHONE ENCOUNTER
PATIENT CALLED FOR APPT COUGH, DRAINAGE, SOA - NOTHING AVAILABLE TODAY. PATIENT STATED SHE WILL GO TO URGENT CARE.

## 2023-05-29 DIAGNOSIS — F41.8 MIXED ANXIETY DEPRESSIVE DISORDER: Chronic | ICD-10-CM

## 2023-05-30 RX ORDER — BUSPIRONE HYDROCHLORIDE 15 MG/1
TABLET ORAL
Qty: 270 TABLET | Refills: 3 | Status: SHIPPED | OUTPATIENT
Start: 2023-05-30

## 2023-06-05 ENCOUNTER — OFFICE VISIT (OUTPATIENT)
Dept: FAMILY MEDICINE CLINIC | Facility: CLINIC | Age: 58
End: 2023-06-05
Payer: OTHER GOVERNMENT

## 2023-06-05 VITALS
DIASTOLIC BLOOD PRESSURE: 80 MMHG | OXYGEN SATURATION: 95 % | BODY MASS INDEX: 47.13 KG/M2 | SYSTOLIC BLOOD PRESSURE: 123 MMHG | WEIGHT: 233.8 LBS | HEART RATE: 70 BPM | HEIGHT: 59 IN | TEMPERATURE: 98 F

## 2023-06-05 DIAGNOSIS — D22.9 MULTIPLE ATYPICAL SKIN MOLES: ICD-10-CM

## 2023-06-05 DIAGNOSIS — F33.1 MODERATE EPISODE OF RECURRENT MAJOR DEPRESSIVE DISORDER: Chronic | ICD-10-CM

## 2023-06-05 DIAGNOSIS — R05.9 COUGH, UNSPECIFIED TYPE: Primary | ICD-10-CM

## 2023-06-05 DIAGNOSIS — Z12.31 ENCOUNTER FOR SCREENING MAMMOGRAM FOR MALIGNANT NEOPLASM OF BREAST: ICD-10-CM

## 2023-06-05 DIAGNOSIS — T14.8XXA OPEN BITE WOUND OF SKIN: ICD-10-CM

## 2023-06-05 DIAGNOSIS — R03.0 ELEVATED BLOOD PRESSURE READING WITHOUT DIAGNOSIS OF HYPERTENSION: ICD-10-CM

## 2023-06-05 DIAGNOSIS — H66.001 NON-RECURRENT ACUTE SUPPURATIVE OTITIS MEDIA OF RIGHT EAR WITHOUT SPONTANEOUS RUPTURE OF TYMPANIC MEMBRANE: ICD-10-CM

## 2023-06-05 DIAGNOSIS — E66.01 CLASS 3 SEVERE OBESITY DUE TO EXCESS CALORIES WITH SERIOUS COMORBIDITY AND BODY MASS INDEX (BMI) OF 45.0 TO 49.9 IN ADULT: ICD-10-CM

## 2023-06-05 DIAGNOSIS — R42 DIZZINESS: ICD-10-CM

## 2023-06-05 RX ORDER — BENZONATATE 100 MG/1
100 CAPSULE ORAL 3 TIMES DAILY PRN
Qty: 30 CAPSULE | Refills: 0 | Status: SHIPPED | OUTPATIENT
Start: 2023-06-05

## 2023-06-05 NOTE — PROGRESS NOTES
Subjective   Merissa Yusuf is a 57 y.o. female presents for   Chief Complaint   Patient presents with    Cough     After double ear infection. Feels like draining down throat. Still having some pain in her ears with some vertigo today.       Health Maintenance Due   Topic Date Due    ZOSTER VACCINE (1 of 2) Never done    MAMMOGRAM  04/13/2023       Merissa Yusuf is a 57-year-old female presented today due to cough, dizziness, recheck of an open bite wound, elevated blood pressure, moderate episodes of recurrent depressive depression, class III severe obesity with multiple comorbidities, dizziness, and suppurative otitis media.    She was in the Urgent care on 05/24/2023 due to an ear infection. They diagnosed her with a double ear infection she also had a bad cough and prescribed her a Z-Miguel. Her cough has gotten a little better, Today on , 06/05/2023 She has started getting dizzy. She is nought sure if the dizziness is coming her ears. She denies any chest pressure. When she shakes her head that causes to be dizzy along with rolling over in her bed. She denies any diarrhea, she has been constipated. She had to use MiraLAX today. She denies any blood or burning while urinating. Her sputum to her cough is clear. Her cough gets bad still she will use the albuterol they prescribed her. She uses the benzoylate as well which does help with her cough. She has started Singular. She has Nasacort as well, but forgets to take it.      She did just get a new puppy so she is walking and moving around more. She does need to watch her meal sizes still. The puppy has been helping with her depression as well. She is still on Buspar and methyl folate and Suloturgine. She denies any feeling of homicidal or suicidal.     He open wound has resolved.     Her blood pressure was normal in the office today.     She has not had her mammogram yet.     Vitals:    06/05/23 1139 06/05/23 1146   BP: 121/76 123/80   BP Location: Right arm Left  "arm   Patient Position: Sitting Sitting   Cuff Size: Large Adult Large Adult   Pulse: 70    Temp: 98 °F (36.7 °C)    TempSrc: Tympanic    SpO2: 95%    Weight: 106 kg (233 lb 12.8 oz)    Height: 149.9 cm (59.02\")      Body mass index is 47.2 kg/m².    Current Outpatient Medications on File Prior to Visit   Medication Sig Dispense Refill    acetaminophen (TYLENOL) 500 MG tablet Take 1 tablet by mouth Every 4 (Four) Hours As Needed.      albuterol sulfate  (90 Base) MCG/ACT inhaler Inhale 2 puffs Every 4 (Four) Hours As Needed for Wheezing. 6.7 g 0    busPIRone (BUSPAR) 15 MG tablet TAKE 1 TABLET THREE TIMES A DAY FOR ANXIETY 270 tablet 3    Calcium Carbonate Antacid (TUMS PO) Take 2,000 mg by mouth At Night As Needed.      COLLAGEN PO Take  by mouth.      l-methylfolate calcium (DEPLIN) 7.5 MG tablet tablet Take 1 tablet by mouth 2 (Two) Times a Day.      lamoTRIgine (LaMICtal) 25 MG tablet TAKE 1 TABLET TWICE A DAY FOR MOOD STABILIZER 180 tablet 3    montelukast (SINGULAIR) 10 MG tablet Take 1 tablet by mouth Every Night for 30 days. 30 tablet 0    Naproxen Sodium 220 MG capsule Take 1 tablet by mouth As Needed.      Repatha SureClick solution auto-injector SureClick injection INJECT 1 ML UNDER THE SKIN INTO THE APPROPRIATE AREA AS DIRECTED EVERY 14 DAYS 6 mL 3    Trintellix 5 MG tablet tablet TAKE 1 TABLET DAILY WITH BREAKFAST 90 tablet 0    Turmeric 400 MG capsule        No current facility-administered medications on file prior to visit.       The following portions of the patient's history were reviewed and updated as appropriate: allergies, current medications, past family history, past medical history, past social history, past surgical history, and problem list.    Review of Systems   Constitutional:  Negative for chills, diaphoresis and unexpected weight gain.   HENT:  Negative for hearing loss and trouble swallowing.    Respiratory:  Positive for cough. Negative for chest tightness and wheezing.  "   Cardiovascular:  Negative for chest pain and palpitations.   Gastrointestinal:  Positive for constipation.   Genitourinary:  Negative for difficulty urinating, dysuria and urgency.   Neurological:  Positive for dizziness.       Objective   Physical Exam  HENT:      Right Ear: Hearing normal.      Left Ear: Hearing and tympanic membrane normal.      Ears:      Comments: There is a diffuse light reflex on the tympanic membrane on the right and it is almost back to normal. The left is pretty much back to normal.      Mouth/Throat:      Mouth: Mucous membranes are moist.      Pharynx: Oropharynx is clear.   Eyes:      Pupils: Pupils are equal, round, and reactive to light.   Neck:      Vascular: No carotid bruit.   Cardiovascular:      Rate and Rhythm: Normal rate and regular rhythm.      Pulses: Normal pulses.      Heart sounds: Normal heart sounds. No murmur heard.  Pulmonary:      Effort: Pulmonary effort is normal. No respiratory distress.      Breath sounds: Normal breath sounds. No wheezing.   Abdominal:      General: Bowel sounds are normal. There is no distension.      Palpations: There is no mass.      Tenderness: There is no abdominal tenderness.   Musculoskeletal:      Cervical back: Normal range of motion. No tenderness.   Lymphadenopathy:      Cervical: No cervical adenopathy.   Neurological:      Comments: No nystagmus lying down with head tip to the right or the left, although she did state that that recreated her symptoms. When her head was tipped to the side and she did get up and down.        PHQ-9 Total Score:      Assessment & Plan   Diagnoses and all orders for this visit:    1. Cough, unspecified type (Primary)    2. Class 3 severe obesity due to excess calories with serious comorbidity and body mass index (BMI) of 45.0 to 49.9 in adult    3. Moderate episode of recurrent major depressive disorder    4. Elevated blood pressure reading without diagnosis of hypertension    5. Open bite wound of  skin    6. Dizziness    7. Non-recurrent acute suppurative otitis media of right ear without spontaneous rupture of tympanic membrane    8. Encounter for screening mammogram for malignant neoplasm of breast  -     Mammo Screening Digital Tomosynthesis Bilateral With CAD; Future    9. Multiple atypical skin moles    Other orders  -     benzonatate (Tessalon Perles) 100 MG capsule; Take 1 capsule by mouth 3 (Three) Times a Day As Needed for Cough.  Dispense: 30 capsule; Refill: 0        1.  Encounter for screening mammogram for malignant neoplasm of breast  -     Mammo Screening Digital Tomosynthesis Bilateral With CAD; Future    2. Multiple atypical skin moles  - I will place referral for dermatology.    3. Cough  -     benzonatate (Tessalon Perles) 100 MG capsule; Take 1 capsule by mouth 3 (Three) Times a Day As Needed for Cough.  Dispense: 30 capsule; Refill: 0  - She can continue to use her allergy medication as well.    If her symptoms do not improve or get worse by the end of the week she will call our office.     Patient Instructions     Health Maintenance Due   Topic Date Due    ZOSTER VACCINE (1 of 2) Never done    MAMMOGRAM  04/13/2023    Patient to call insurance and see what weightloss meds are covered and what her copay.  Call end of week if still dizzy.  Patient to start Zyrtec or Xyzall.  Call 10 days if cough persists and will get chest xray   .    Transcribed from ambient dictation for Melissa Duarte MD by Ayaka No.  06/05/23   14:26 EDT    Patient or patient representative verbalized consent to the visit recording.  I have personally performed the services described in this document as transcribed by the above individual, and it is both accurate and complete.

## 2023-06-05 NOTE — PATIENT INSTRUCTIONS
Health Maintenance Due   Topic Date Due    ZOSTER VACCINE (1 of 2) Never done    MAMMOGRAM  04/13/2023    Patient to call insurance and see what weightloss meds are covered and what her copay.  Call end of week if still dizzy.  Patient to start Zyrtec or Xyzall.  Call 10 days if cough persists and will get chest xray

## 2023-07-12 ENCOUNTER — OFFICE VISIT (OUTPATIENT)
Dept: ORTHOPEDIC SURGERY | Facility: CLINIC | Age: 58
End: 2023-07-12
Payer: OTHER GOVERNMENT

## 2023-07-12 VITALS — BODY MASS INDEX: 34.04 KG/M2 | HEIGHT: 69 IN | WEIGHT: 229.8 LBS

## 2023-07-12 DIAGNOSIS — M17.11 PRIMARY OSTEOARTHRITIS OF RIGHT KNEE: ICD-10-CM

## 2023-07-12 DIAGNOSIS — M17.12 PRIMARY OSTEOARTHRITIS OF LEFT KNEE: Primary | ICD-10-CM

## 2023-07-12 RX ADMIN — METHYLPREDNISOLONE ACETATE 160 MG: 80 INJECTION, SUSPENSION INTRA-ARTICULAR; INTRALESIONAL; INTRAMUSCULAR; SOFT TISSUE at 14:09

## 2023-07-12 RX ADMIN — LIDOCAINE HYDROCHLORIDE 2 ML: 10 INJECTION, SOLUTION EPIDURAL; INFILTRATION; INTRACAUDAL; PERINEURAL at 14:09

## 2023-07-12 NOTE — PROGRESS NOTES
Orthopedic follow-up visit.    Patient: Merissa Yusuf    YOB: 1965    MRN: [id number]    Chief Complaint   Patient presents with    Left Knee - Follow-up    Right Knee - Follow-up       History of Present Illness: Patient returns today for right knee pain.  Her pain is located over the medial and lateral aspect of the joint.  The pain has been progressive in nature and remains intermittent .  Her pain is worsened by rising after sitting, squatting. There has been improvement in the past with ice and injections.  She continues to complain of pain and discomfort on the medial aspect of the right knee.  She finds it difficult to ambulate on uneven surfaces.  She states that there is a shooting type of pain on the lateral aspect of the leg that goes down from the knee joint to the ankle joint.  The patient feels that her knee is unstable and she might actually fall because of the instability.  There is no history of trauma to the knee.    This problem is not new to this examiner.     Allergies:   Allergies   Allergen Reactions    Amoxicillin Swelling    Erythromycin GI Intolerance    Escitalopram Anxiety    Prozac  [Fluoxetine Hcl] Rash    Penicillin G Rash       Medications:   Home Medications:  Current Outpatient Medications on File Prior to Visit   Medication Sig    acetaminophen (TYLENOL) 500 MG tablet Take 1 tablet by mouth Every 4 (Four) Hours As Needed.    albuterol sulfate  (90 Base) MCG/ACT inhaler Inhale 2 puffs Every 4 (Four) Hours As Needed for Wheezing.    benzonatate (Tessalon Perles) 100 MG capsule Take 1 capsule by mouth 3 (Three) Times a Day As Needed for Cough.    busPIRone (BUSPAR) 15 MG tablet TAKE 1 TABLET THREE TIMES A DAY FOR ANXIETY    Calcium Carbonate Antacid (TUMS PO) Take 2,000 mg by mouth At Night As Needed.    COLLAGEN PO Take  by mouth.    Evolocumab (Repatha SureClick) solution auto-injector SureClick injection Inject 1 ml under the skin into the appropriate area  as directed every 14 days Strength 140mg/ml    l-methylfolate calcium (DEPLIN) 7.5 MG tablet tablet Take 1 tablet by mouth 2 (Two) Times a Day.    montelukast (SINGULAIR) 10 MG tablet Take 1 tablet by mouth Every Night for 30 days.    Naproxen Sodium 220 MG capsule Take 1 tablet by mouth As Needed.    Turmeric 400 MG capsule      No current facility-administered medications on file prior to visit.     Current Medications:  Scheduled Meds:  PRN Meds:.    I have reviewed the patient's medical history in detail and updated the computerized patient record.  Review and summarization of old records include:    Past Medical History:   Diagnosis Date    Anesthesia complication     drops B/P, and slow to arouse    Encounter for laboratory testing for COVID-19 virus 07/19/2022    GERD (gastroesophageal reflux disease)     Hereditary hemochromatosis     Hyperlipidemia     Seasonal allergies      Past Surgical History:   Procedure Laterality Date    BLADDER SUSPENSION      BREAST BIOPSY Right     CATARACT EXTRACTION  03/2023    FINGER SURGERY  2008    removed cysts from right index finger     HERNIA REPAIR      SUBTOTAL HYSTERECTOMY      TUBAL ABDOMINAL LIGATION      VENTRAL/INCISIONAL HERNIA REPAIR N/A 09/24/2020    Procedure: LAPAROSCOPIC VENTRAL HERNIA;  Surgeon: Reuben Molina MD;  Location: [facility] OR;  Service: General;  Laterality: N/A;  0855 TAP block    WISDOM TOOTH EXTRACTION       Social History     Occupational History    Not on file   Tobacco Use    Smoking status: Never     Passive exposure: Past    Smokeless tobacco: Never   Vaping Use    Vaping Use: Never used   Substance and Sexual Activity    Alcohol use: Yes     Alcohol/week: 1.0 standard drink     Types: 1 Glasses of wine per week     Comment: rarely    Drug use: No    Sexual activity: Defer      Social History     Social History Narrative    Not on file     Family History   Problem Relation Age of Onset    Coronary artery disease Other      "Diabetes Other     Dementia Other     Diabetes Mother     Cancer Mother     Heart disease Father     Liver disease Father     Hypertension Father     Cancer Brother     Breast cancer Neg Hx     Ovarian cancer Neg Hx        Review of Systems        Wt Readings from Last 3 Encounters:   07/12/23 104 kg (229 lb 12.8 oz)   06/05/23 106 kg (233 lb 12.8 oz)   05/24/23 104 kg (230 lb)     Ht Readings from Last 3 Encounters:   07/12/23 175.3 cm (69\")   06/05/23 149.9 cm (59.02\")   05/24/23 149.9 cm (59\")     Body mass index is 33.94 kg/m².  Facility age limit for growth percentiles is 20 years.  There were no vitals filed for this visit.    Physical Exam    Musculoskeletal:    Right knee (varus). Patient has crepitus throughout range of motion. Positive patellar grind test. Mild effusion. Lachman is negative. Pivot shift is negative. Anterior and posterior drawer signs are negative. Significant joint line tenderness is noted on the medial aspect of the knee. Patient has a varus orientation of the knee. There is fullness and tenderness in the Popliteal fossa. Mild distention of a Popliteal cyst is noted in this location. Range of motion in flexion is from 0-100 degrees. Neurovascular status is intact.  Dorsalis pedis and posterior tibial artery pulses are palpable. Common peroneal nerve function is well preserved. Patient's gait is cautious and antalgic. Skin and soft tissues are mildly swollen, consistent with synovitis and effusion. The patient has a significant limp with the first few steps after starting the gait cycle. Getting out of a chair takes a lot of effort due to pain on knee flexion.       Diagnostics:  bilateral Knee X-Ray  Indication: Evaluation of pain and discomfort on the medial aspect of the knee.  AP, Lateral views  Findings: Moderately advanced osteoarthritis with some narrowing of the joint space.  There is a suprapatellar effusion noted.  no bony lesion  Soft tissues within normal limits  decreased " joint spaces  Hardware appropriately positioned not applicable      yes prior studies available for comparison.    This patient's x-ray report was graded according to the Kellgren and Romaine classification.  This took into account the joint space narrowing, osteophyte formation, sclerosis of the distal femur/proximal tibia along with deformity of those bones.  The findings were indicative of K L grade 2.    X-RAY was ordered and reviewed by Brian Rodriguez MD     Procedure:  Large Joint Arthrocentesis: R knee  Date/Time: 7/12/2023 2:09 PM  Consent given by: patient  Site marked: site marked  Timeout: Immediately prior to procedure a time out was called to verify the correct patient, procedure, equipment, support staff and site/side marked as required   Supporting Documentation  Indications: pain   Procedure Details  Location: knee - R knee  Preparation: Patient was prepped and draped in the usual sterile fashion  Needle size: 25 G  Approach: anteromedial  Medications administered: 160 mg methylPREDNISolone acetate 80 MG/ML; 2 mL lidocaine PF 1% 1 %  Patient tolerance: patient tolerated the procedure well with no immediate complications        Assessment:   Diagnoses and all orders for this visit:    1. Primary osteoarthritis of left knee (Primary)  -     XR Knee 3 View Bilateral    2. Primary osteoarthritis of right knee  -     XR Knee 3 View Bilateral  -     Large Joint Arthrocentesis: R knee          Plan:   Injected patient's right knee joint(s)with Depo-Medrol from an anteromedial approach   Compression/brace.  Use a supportive brace to prevent the knee from buckling and giving out.  Calcium and vitamin D for bone health.  Glucosamine, chondroitin and turmeric for cartilage health.  Rest, ice, compression, and elevation (RICE) therapy  OTC Tylenol 500-1000mg by mouth every 6 hours as needed for pain   Follow up in 3 month(s)    Date of encounter: 07/12/2023   Brian Rodriguez MD

## 2023-07-25 RX ORDER — METHYLPREDNISOLONE ACETATE 80 MG/ML
160 INJECTION, SUSPENSION INTRA-ARTICULAR; INTRALESIONAL; INTRAMUSCULAR; SOFT TISSUE
Status: COMPLETED | OUTPATIENT
Start: 2023-07-12 | End: 2023-07-12

## 2023-07-25 RX ORDER — LIDOCAINE HYDROCHLORIDE 10 MG/ML
2 INJECTION, SOLUTION EPIDURAL; INFILTRATION; INTRACAUDAL; PERINEURAL
Status: COMPLETED | OUTPATIENT
Start: 2023-07-12 | End: 2023-07-12

## 2023-08-18 ENCOUNTER — TELEPHONE (OUTPATIENT)
Dept: FAMILY MEDICINE CLINIC | Facility: CLINIC | Age: 58
End: 2023-08-18
Payer: OTHER GOVERNMENT

## 2023-08-18 ENCOUNTER — TELEPHONE (OUTPATIENT)
Dept: ONCOLOGY | Facility: CLINIC | Age: 58
End: 2023-08-18

## 2023-08-18 DIAGNOSIS — H69.93 DISORDER OF BOTH EUSTACHIAN TUBES: ICD-10-CM

## 2023-08-18 DIAGNOSIS — H65.91 FLUID LEVEL BEHIND TYMPANIC MEMBRANE OF RIGHT EAR: Primary | ICD-10-CM

## 2023-08-18 NOTE — TELEPHONE ENCOUNTER
Caller: Merissa Yusuf    Relationship: Self    Best call back number: [phone]    What is the best time to reach you: ANY.[email or web address] VM    Who are you requesting to speak with (clinical staff, provider,  specific staff member): SCHEDULING        What was the call regarding: PATIENT CALLED TO [facility] APPT FROM 10/5/23 TO 9/28TH FOR 9/29TH IF POSSIBLE.    Is it okay if the provider responds through MyChart: NO

## 2023-08-18 NOTE — TELEPHONE ENCOUNTER
Caller: Merissa Yusuf    Relationship: Self    Best call back number: [phone]    PATIENT CALLED AND SAID THAT SHE WANTS TO GO TO [facility]    SHE SAID THAT SHE SPOKE WITH THE PRACTICE AND THEY TOLD HER SHE DOES NOT NEED A REFERRAL BUT DOES NEED AN AUTHORIZATION THROUGH HER INSURANCE COMPANY TO GO THERE FROM HER DOCTOR'S OFFICE

## 2023-08-28 ENCOUNTER — TELEPHONE (OUTPATIENT)
Dept: FAMILY MEDICINE CLINIC | Facility: CLINIC | Age: 58
End: 2023-08-28
Payer: OTHER GOVERNMENT

## 2023-09-11 ENCOUNTER — TELEPHONE (OUTPATIENT)
Dept: ORTHOPEDIC SURGERY | Facility: CLINIC | Age: 58
End: 2023-09-11

## 2023-09-11 NOTE — TELEPHONE ENCOUNTER
Caller: Merissa Yusuf    Relationship to patient: Self    Best call back number:     Chief complaint: LEFT KNEE PAIN    Type of visit: CORTISONE INJECTION    Requested date: ASAP

## 2023-09-29 ENCOUNTER — LAB (OUTPATIENT)
Dept: LAB | Facility: HOSPITAL | Age: 58
End: 2023-09-29
Payer: OTHER GOVERNMENT

## 2023-09-29 ENCOUNTER — TELEPHONE (OUTPATIENT)
Dept: FAMILY MEDICINE CLINIC | Facility: CLINIC | Age: 58
End: 2023-09-29
Payer: OTHER GOVERNMENT

## 2023-09-29 DIAGNOSIS — D64.9 ANEMIA, UNSPECIFIED TYPE: ICD-10-CM

## 2023-09-29 DIAGNOSIS — D72.829 LEUKOCYTOSIS, UNSPECIFIED TYPE: Primary | ICD-10-CM

## 2023-09-29 DIAGNOSIS — E83.119 HEMOCHROMATOSIS, UNSPECIFIED HEMOCHROMATOSIS TYPE: ICD-10-CM

## 2023-09-29 LAB
BASOPHILS # BLD AUTO: 0.04 10*3/MM3 (ref 0–0.2)
BASOPHILS NFR BLD AUTO: 0.6 % (ref 0–1.5)
DEPRECATED RDW RBC AUTO: 47.2 FL (ref 37–54)
EOSINOPHIL # BLD AUTO: 0.25 10*3/MM3 (ref 0–0.4)
EOSINOPHIL NFR BLD AUTO: 4 % (ref 0.3–6.2)
ERYTHROCYTE [DISTWIDTH] IN BLOOD BY AUTOMATED COUNT: 12.5 % (ref 12.3–15.4)
FERRITIN SERPL-MCNC: 152.4 NG/ML (ref 13–150)
HCT VFR BLD AUTO: 39.4 % (ref 34–46.6)
HGB BLD-MCNC: 14 G/DL (ref 12–15.9)
IRON 24H UR-MRATE: 191 MCG/DL (ref 37–145)
IRON SATN MFR SERPL: 61 % (ref 20–50)
LYMPHOCYTES # BLD AUTO: 2.4 10*3/MM3 (ref 0.7–3.1)
LYMPHOCYTES NFR BLD AUTO: 38.2 % (ref 19.6–45.3)
MCH RBC QN AUTO: 36.9 PG (ref 26.6–33)
MCHC RBC AUTO-ENTMCNC: 35.5 G/DL (ref 31.5–35.7)
MCV RBC AUTO: 104 FL (ref 79–97)
MONOCYTES # BLD AUTO: 0.5 10*3/MM3 (ref 0.1–0.9)
MONOCYTES NFR BLD AUTO: 7.9 % (ref 5–12)
NEUTROPHILS NFR BLD AUTO: 3.1 10*3/MM3 (ref 1.7–7)
NEUTROPHILS NFR BLD AUTO: 49.3 % (ref 42.7–76)
PLATELET # BLD AUTO: 395 10*3/MM3 (ref 140–450)
PMV BLD AUTO: 8.8 FL (ref 6–12)
RBC # BLD AUTO: 3.79 10*6/MM3 (ref 3.77–5.28)
TIBC SERPL-MCNC: 311 MCG/DL (ref 298–536)
TRANSFERRIN SERPL-MCNC: 209 MG/DL (ref 200–360)
WBC NRBC COR # BLD: 6.29 10*3/MM3 (ref 3.4–10.8)

## 2023-09-29 PROCEDURE — 84466 ASSAY OF TRANSFERRIN: CPT | Performed by: INTERNAL MEDICINE

## 2023-09-29 PROCEDURE — 36415 COLL VENOUS BLD VENIPUNCTURE: CPT

## 2023-09-29 PROCEDURE — 83540 ASSAY OF IRON: CPT | Performed by: INTERNAL MEDICINE

## 2023-09-29 PROCEDURE — 85025 COMPLETE CBC W/AUTO DIFF WBC: CPT

## 2023-09-29 PROCEDURE — 82728 ASSAY OF FERRITIN: CPT | Performed by: INTERNAL MEDICINE

## 2023-09-29 NOTE — PROGRESS NOTES
Iron and ferritin are both elevated again please follow-up with Dr. Pimentel to see what we are going to do next.

## 2023-09-29 NOTE — TELEPHONE ENCOUNTER
Hub to read:  ----- Message from Melissa Duarte MD sent at 9/29/2023 11:45 AM EDT -----  Iron and ferritin are both elevated again please follow-up with Dr. Pimentel to see what we are going to do next.

## 2023-10-06 NOTE — PROGRESS NOTES
ONCOLOGY/HEMATOLOGY FOLLOW UP    PATIENT: Merissa Yusuf  YOB: 1965  MEDICAL RECORD NUMBER: [id number] OF SERVICE: 10/09/23      Chief complaint:  Hereditary hemochromatosis, homozygous mutation in H63D.  Iron overload  Macrocytosis    History of Present Illness:   Ms. Yusuf has a history of COPD and also depression.  Laboratory work up was obtained in March 2019 which was abnormal.  Patient was sent to the [facility] and seen initially on 3/13/19.   3/5/19 - WBC 4.7, hemoglobin 14.1, MCV elevated to 103 and platelet count of 592,000.  Vitamin D  20.  Sed rate 35.  TSH 1.62.  Vitamin B12 476.  Creatinine 0.8.    3/13/19 - ZACKERY-2 mutation negative.  Hemochromatosis gene mutation, homozygous mutation in H63D.  3/13/19 - Vitamin B12 507.  Ferritin 178.  Creatinine 0.7.  Iron level 156.  Iron binding  capacity 257.  Percentage iron saturation 61.   1/8/2020 Ferritin 304 iron 177 iron saturation 56 TIBC 316  7/21/2020 iron 247 iron saturation 82 iron binding capacity 302 ferritin 271 hemoglobin 13.7 WBC 6.1 platelet count 372.  6/19/2020 CMP is normal including LFTs.  TSH 3.01  10/2020 ferritin 233.40 iron sat 43  8/2021 ferritin 341.40, iron sat 76  11/2/2021 -ferritin 112  3/8/2022 -CBC with hemoglobin 13.5, hematocrit 38.7.  Ferritin 59.7, iron saturation 35  7/6/2022 - Hb 13.7, ferritin 62 iron 36  4/3/2023 ferritin 76, iron sat 38,   9/29/2023-ferritin 152.40, iron 191, iron sat 61, hemoglobin 14.0, hematocrit 39.4.      SUBJECTIVE:   Merissa is here today for her routine follow-up.  She reports that she is doing well and has no new complaints.  She tries to utilize diet modification to help keep her iron levels under control.      Past Medical History:   Diagnosis Date    Anesthesia complication     drops B/P, and slow to arouse    Encounter for laboratory testing for COVID-19 virus 07/19/2022    GERD (gastroesophageal reflux disease)     Hereditary hemochromatosis      Hyperlipidemia     Seasonal allergies        Past Surgical History:   Procedure Laterality Date    BLADDER SUSPENSION      BREAST BIOPSY Right     CATARACT EXTRACTION  03/2023    FINGER SURGERY  2008    removed cysts from right index finger     HERNIA REPAIR      SUBTOTAL HYSTERECTOMY      TUBAL ABDOMINAL LIGATION      VENTRAL/INCISIONAL HERNIA REPAIR N/A 09/24/2020    Procedure: LAPAROSCOPIC VENTRAL HERNIA;  Surgeon: Reuben Molina MD;  Location: [facility] OR;  Service: General;  Laterality: N/A;  0855 TAP block    WISDOM TOOTH EXTRACTION         Family History   Problem Relation Age of Onset    Coronary artery disease Other     Diabetes Other     Dementia Other     Diabetes Mother     Cancer Mother     Heart disease Father     Liver disease Father     Hypertension Father     Cancer Brother     Breast cancer Neg Hx     Ovarian cancer Neg Hx        Social History     Socioeconomic History    Marital status:    Tobacco Use    Smoking status: Never     Passive exposure: Past    Smokeless tobacco: Never   Vaping Use    Vaping Use: Never used   Substance and Sexual Activity    Alcohol use: Yes     Alcohol/week: 1.0 standard drink of alcohol     Types: 1 Glasses of wine per week     Comment: rarely    Drug use: Yes     Frequency: 7.0 times per week     Comment: CBD  gummies    Sexual activity: Defer       ALLERGIES:  Amoxicillin, Erythromycin, Escitalopram, Prozac  [fluoxetine hcl], and Penicillin g    MEDICATION:  Current Outpatient Medications   Medication Sig Dispense Refill    acetaminophen (TYLENOL) 500 MG tablet Take 1 tablet by mouth Every 4 (Four) Hours As Needed.      albuterol sulfate  (90 Base) MCG/ACT inhaler Inhale 2 puffs Every 4 (Four) Hours As Needed for Wheezing. 6.7 g 0    busPIRone (BUSPAR) 15 MG tablet TAKE 1 TABLET THREE TIMES A DAY FOR ANXIETY 270 tablet 3    Calcium Carbonate Antacid (TUMS PO) Take 2,000 mg by mouth At Night As Needed.      Evolocumab (Repatha SureClick)  "solution auto-injector SureClick injection Inject 1 ml under the skin into the appropriate area as directed every 14 days Strength 140mg/ml 6 mL 3    l-methylfolate calcium (DEPLIN) 7.5 MG tablet tablet Take 1 tablet by mouth 2 (Two) Times a Day.      lamoTRIgine (LaMICtal) 100 MG tablet Take 0.5 tablets by mouth 2 (Two) Times a Day. For mood stabilizer 30 tablet 2    Trintellix 5 MG tablet tablet TAKE 1 TABLET DAILY WITH BREAKFAST 90 tablet 3    Turmeric 400 MG capsule       benzonatate (Tessalon Perles) 100 MG capsule Take 1 capsule by mouth 3 (Three) Times a Day As Needed for Cough. (Patient not taking: Reported on 10/9/2023) 30 capsule 0    COLLAGEN PO Take  by mouth. (Patient not taking: Reported on 10/9/2023)      montelukast (SINGULAIR) 10 MG tablet Take 1 tablet by mouth Every Night for 30 days. 30 tablet 0    montelukast (SINGULAIR) 10 MG tablet Take 1 tablet by mouth Every Night. (Patient not taking: Reported on 10/9/2023) 30 tablet 0    Naproxen Sodium 220 MG capsule Take 1 tablet by mouth As Needed. (Patient not taking: Reported on 10/9/2023)       No current facility-administered medications for this visit.     PHYSICAL EXAM:    Current Vitals:  /80   Pulse 82   Ht 149.9 cm (59\")   Wt 104 kg (228 lb 12.8 oz)   LMP  (LMP Unknown)   SpO2 98%   BMI 46.21 kg/mý     VITAL signs in the last 24 hours: [unfilled]       10/09/23  1042   Weight: 104 kg (228 lb 12.8 oz)         Physical Exam  Constitutional:       Appearance: Normal appearance.   HENT:      Head: Normocephalic and atraumatic.   Eyes:      Extraocular Movements: Extraocular movements intact.      Pupils: Pupils are equal, round, and reactive to light.   Cardiovascular:      Rate and Rhythm: Normal rate and regular rhythm.      Pulses: Normal pulses.      Heart sounds: No murmur heard.  Pulmonary:      Effort: Pulmonary effort is normal.      Breath sounds: Normal breath sounds.   Abdominal:      General: There is no distension.      " Palpations: Abdomen is soft. There is no mass.      Tenderness: There is no abdominal tenderness.   Musculoskeletal:         General: Normal range of motion.      Cervical back: Normal range of motion and neck supple.   Skin:     General: Skin is warm.   Neurological:      General: No focal deficit present.      Mental Status: She is alert.   Psychiatric:         Mood and Affect: Mood normal.           LABORATORY/DATA:  Lab Results   Component Value Date    WBC 6.29 09/29/2023    WBC 6.89 04/03/2023    HGB 14.0 09/29/2023    HGB 14.1 04/03/2023    HCT 39.4 09/29/2023    HCT 40.0 04/03/2023    NEUTROABS 3.10 09/29/2023       Lab Results   Component Value Date    GLUCOSE 103 (H) 09/22/2022    BUN 9 09/22/2022    CREATININE 0.80 09/22/2022    EGFRIFNONA 81 09/21/2021    EGFRIFAFRI >60 02/26/2019    BCR 11.3 09/22/2022    K 4.1 09/22/2022    CO2 24.2 09/22/2022    CALCIUM 10.2 09/22/2022    ALBUMIN 4.60 09/22/2022    LABIL2 1.5 03/13/2019    AST 21 09/22/2022    ALT 16 09/22/2022       Lab Results   Component Value Date    IRON 191 (H) 09/29/2023    TIBC 311 09/29/2023    FERRITIN 152.40 (H) 09/29/2023            Assessment & Plan    Patient is a 57-year-old female with iron overload secondary to homozygous H63D mutation for hemochromatosis.    Hemochromatosis  Patient does not have the classic phenotype however has an alternate with homozygous H63D.  She has a propensity towards iron overload.  However less likely to cause significant liver damage.  Ideally ferritin should be below 75 to prevent liver damage.  We will continue to monitor.   Restart phlebotomies for hematocrit greater than 32 and ferritin greater than 100 (ferritin 152.40, hematocrit 39.4)  Last phlebotomy was 1/18/2023    We will recheck with CBC and ferritin in 3 months with phlebotomy if needed and follow-up with Dr. Pimentel in 6 months with CBC and ferritin 1 week prior to the appointment and phlebotomy with a follow-up if needed.    Patient agrees  with the plan.

## 2023-10-09 ENCOUNTER — TELEPHONE (OUTPATIENT)
Dept: FAMILY MEDICINE CLINIC | Facility: CLINIC | Age: 58
End: 2023-10-09
Payer: OTHER GOVERNMENT

## 2023-10-09 ENCOUNTER — OFFICE VISIT (OUTPATIENT)
Dept: ONCOLOGY | Facility: CLINIC | Age: 58
End: 2023-10-09
Payer: OTHER GOVERNMENT

## 2023-10-09 ENCOUNTER — HOSPITAL ENCOUNTER (OUTPATIENT)
Dept: ONCOLOGY | Facility: HOSPITAL | Age: 58
Discharge: HOME | End: 2023-10-09
Admitting: PREVENTIVE MEDICINE
Payer: OTHER GOVERNMENT

## 2023-10-09 VITALS
SYSTOLIC BLOOD PRESSURE: 113 MMHG | WEIGHT: 228.8 LBS | HEIGHT: 59 IN | OXYGEN SATURATION: 98 % | HEART RATE: 82 BPM | DIASTOLIC BLOOD PRESSURE: 80 MMHG | BODY MASS INDEX: 46.12 KG/M2

## 2023-10-09 VITALS — HEART RATE: 72 BPM | DIASTOLIC BLOOD PRESSURE: 76 MMHG | OXYGEN SATURATION: 99 % | SYSTOLIC BLOOD PRESSURE: 113 MMHG

## 2023-10-09 DIAGNOSIS — E83.110 HEREDITARY HEMOCHROMATOSIS: Primary | ICD-10-CM

## 2023-10-09 DIAGNOSIS — E83.119 HEMOCHROMATOSIS, UNSPECIFIED HEMOCHROMATOSIS TYPE: Primary | ICD-10-CM

## 2023-10-09 PROCEDURE — 99195 PHLEBOTOMY: CPT

## 2023-10-09 NOTE — PROGRESS NOTES
Pt. Was seen by Katya OLIVIER today and was added on to the infusion schedule for phlebotomy.   Message below sent to JUSTIN Aldana, pt's labs were done on 9/29 Is it ok to use the cbc and Ferritin from then or do you want me to redraw it?  there's also no supportive plan, how much do you want me to remove?  Orders given, OK to use lab results from 9/29 and remove 400ml blood per Katya OLIVIER.   TP performed as ordered and pt. Tolerated well.   Reviewed post phlebotomy care instructions with pt. And pt. Verbalized understanding.   Pt. Discharged from clinic with no complaints and AVS was given.            Detail Level: Detailed Quality 226: Preventive Care And Screening: Tobacco Use: Screening And Cessation Intervention: Patient screened for tobacco use and is an ex/non-smoker Quality 110: Preventive Care And Screening: Influenza Immunization: Influenza Immunization Administered during Influenza season Quality 431: Preventive Care And Screening: Unhealthy Alcohol Use - Screening: Patient screened for unhealthy alcohol use using a single question and scores 2 or greater episodes per year and brief intervention occurred Quality 130: Documentation Of Current Medications In The Medical Record: Current Medications Documented

## 2023-10-16 ENCOUNTER — TELEPHONE (OUTPATIENT)
Dept: PSYCHIATRY | Facility: CLINIC | Age: 58
End: 2023-10-16
Payer: OTHER GOVERNMENT

## 2023-10-16 RX ORDER — LAMOTRIGINE 100 MG/1
100 TABLET ORAL 2 TIMES DAILY
Qty: 180 TABLET | Refills: 1 | Status: SHIPPED | OUTPATIENT
Start: 2023-10-16

## 2023-10-16 NOTE — TELEPHONE ENCOUNTER
Pt called and stated they need their [facility] stating she takes it two times a day. [facility].

## 2023-10-18 ENCOUNTER — OFFICE VISIT (OUTPATIENT)
Dept: ORTHOPEDIC SURGERY | Facility: CLINIC | Age: 58
End: 2023-10-18
Payer: OTHER GOVERNMENT

## 2023-10-18 VITALS — WEIGHT: 227.2 LBS | BODY MASS INDEX: 45.8 KG/M2 | HEIGHT: 59 IN

## 2023-10-18 DIAGNOSIS — M17.0 BILATERAL PRIMARY OSTEOARTHRITIS OF KNEE: Primary | ICD-10-CM

## 2023-10-18 PROCEDURE — 99214 OFFICE O/P EST MOD 30 MIN: CPT | Performed by: ORTHOPAEDIC SURGERY

## 2023-10-18 NOTE — PROGRESS NOTES
"Chief Complaint  Follow-up of the Left Knee and Follow-up of the Right Knee    Subjective    History of Present Illness      Merissa Yusuf is a 58 y.o. female who presents to [facility] for bilateral knee pain and discomfort.  History of Present Illness the patient states that she is having increasing difficulty with ambulation because of knee pain and discomfort.  There is difficulty in going up and down the steps and difficulty with squatting on the ground.  The patient has a progressive varus alignment of both the knees.  The pain starts on the medial joint line and radiates to the proximal tibia.  The patient has swelling of the knees with a sense of tightness of the knee as well.  She has difficulty with squatting on the ground and difficulty from raising from a squatted position as well.  She has tried intra-articular injections over the years and that seems to have helped her but only in a temporary fashion.  The patient states that she would like to walk to lose weight but is unable to do so because of the pain and discomfort in the knee.  She is now thinking about proceeding with knee replacement surgery on the more symptomatic side of her knee.  Pain Location:  BILATERAL knee  Radiation: none  Quality: dull, aching  Intensity/Severity: moderate to severe  Duration:  2-3 years  Progression of symptoms: yes, progressive worsening  Onset quality: gradual   Timing: intermittent  Aggravating Factors: rising after sitting, squatting  Alleviating Factors: NSAIDs  Previous Episodes: yes  Associated Symptoms: pain, swelling, clicking/popping  ADLs Affected: ambulating, work related activities, recreational activities/sports  Previous Treatment: NSAIDs       Objective   Vital Signs:   Ht 149.9 cm (59\")   Wt 103 kg (227 lb 3.2 oz)   BMI 45.89 kg/m²     Physical Exam  Physical Exam  Vitals signs and nursing note reviewed.   Constitutional:       Appearance: Normal appearance. "   Pulmonary:      Effort: Pulmonary effort is normal.   Skin:     General: Skin is warm and dry.      Capillary Refill: Capillary refill takes less than 2 seconds.   Neurological:      General: No focal deficit present.      Mental Status: He is alert and oriented to person, place, and time. Mental status is at baseline.   Psychiatric:         Mood and Affect: Mood normal.         Behavior: Behavior normal.         Thought Content: Thought content normal.         Judgment: Judgment normal.     Ortho Exam   Bilateral knee (varus). Patient has crepitus throughout range of motion. Positive patellar grind test. Mild effusion. Lachman is negative. Pivot shift is negative. Anterior and posterior drawer signs are negative. Significant joint line tenderness is noted on the medial aspect of the knee. Patient has a varus orientation of the knee. There is fullness and tenderness in the Popliteal fossa. Mild distention of a Popliteal cyst is noted in this location. Range of motion in flexion is from 0-90 degrees. Neurovascular status is intact.  Dorsalis pedis and posterior tibial artery pulses are palpable. Common peroneal nerve function is well preserved. Patient's gait is cautious and antalgic. Skin and soft tissues are mildly swollen, consistent with synovitis and effusion. The patient has a significant limp with the first few steps after starting the gait cycle. Getting out of a chair takes a lot of effort due to pain on knee flexion.             Result Review :  The following data was reviewed by: Brian Rodriguez MD on 10/18/2023:              Procedures           Assessment   Assessment and Plan    Diagnoses and all orders for this visit:    1. Bilateral primary osteoarthritis of knee (Primary)  -     Cancel: Large Joint Arthrocentesis: R knee  -     Cancel: Large Joint Arthrocentesis: L knee          Follow Up   Compression/brace  Rest, ice, compression, and elevation (RICE) therapy  Stretching and strengthening  exercises  OTC Tylenol 500-1000mg by mouth every 6 hours as needed for pain 4  Follow up in 3 month(s)  Patient was given instructions and counseling regarding her condition or for health maintenance advice. Please see specific information pulled into the AVS if appropriate.   The patient was seen today for preoperative discussion.  The patient has been tried on over-the-counter and prescription NSAID's despite the risks of anti-inflammatory bleeding, peptic ulcers and erosive gastritis with short term benefit only.  Braces have been prescribed for mechanical support.  Patient has been participating in an exercise program specifically targeting joint pain relief with limited benefit. Intraarticular injections have been used periodically with some but not complete relief of pain.  Ambulation aids have also been utilized.      The details of the surgical procedure were explained including the location of probable incisions and a description of the likely hardware/grafts to be used. The patient understands the likely convalescence after surgery as well as the rehabilitation required.  Also, we have thoroughly discussed with the patient the risks, benefits and alternatives to surgery.  Risks include but are not limited to the risk of infection, joint stiffness, limited range of motion, wound healing problems, scar tissue build up, myocardial infarction, stroke, blood clots (including DVT and/or pulmonary embolus along with the risk of death) neurologic and/or vascular injury, limb length discrepancy, fracture, dislocation, nonunion, malunion, continued pain and need for further surgery including hardware failure requiring revision.    The patient was counseled regarding reduction of BMI including, but not limited to the following: Reduction of portion sizes, reduction of carbohydrate intake, decreased calorie count, increased physical exercise and activity, counseling with a professional such as a registered dietitian  and consultation with a bariatric surgeon for possible bariatric procedure.  Should the patient decide to proceed with a bariatric procedure this would not be considered a cosmetic procedure for this patient, but a means to improve the quality of life and to improve the musculoskeletal function.  Patient's current BMI is 45.89 and her body weight is 227 lbs and would need to lose weight which is required for elective orthopedic surgery to minimize perioperative complications.  The patient was counseled regarding reduction of BMI including, but not limited to the following: Reduction of portion sizes, reduction of carbohydrate intake, decreased calorie count, increased physical exercise and activity, counseling with a professional such as a registered dietitian and consultation with a bariatric surgeon for possible bariatric procedure.  Should the patient decide to proceed with a bariatric procedure this would not be considered a cosmetic procedure for this patient, but a means to improve the quality of life and to improve the musculoskeletal function.  Patient's current BMI is 45.77 and her body weight is 226 pounds and would need to lose weight which is required for elective orthopedic surgery to minimize perioperative complications.     Brian Rodriguez MD   Date of Encounter: 10/18/2023      EMR Dragon/Transcription disclaimer:  Much of this encounter note is an electronic transcription/translation of spoken language to printed text. The electronic translation of spoken language may permit erroneous, or at times, nonsensical words or phrases to be inadvertently transcribed; Although I have reviewed the note for such errors, some may still exist.

## 2023-10-18 NOTE — PROGRESS NOTES
INJECTION    Patient: Merissa Yusuf    YOB: 1965    MRN: [id number]    Chief Complaint   Patient presents with    Left Knee - Follow-up    Right Knee - Follow-up       History of Present Illness: {Joint injection HPI AS:19487}    This problem {IS/ IS NOT:83373} new to this examiner.     Allergies:   Allergies   Allergen Reactions    Amoxicillin Swelling    Erythromycin GI Intolerance    Escitalopram Anxiety    Prozac  [Fluoxetine Hcl] Rash    Penicillin G Rash       Medications:   Home Medications:  Current Outpatient Medications on File Prior to Visit   Medication Sig    acetaminophen (TYLENOL) 500 MG tablet Take 1 tablet by mouth Every 4 (Four) Hours As Needed.    albuterol sulfate  (90 Base) MCG/ACT inhaler Inhale 2 puffs Every 4 (Four) Hours As Needed for Wheezing.    benzonatate (Tessalon Perles) 100 MG capsule Take 1 capsule by mouth 3 (Three) Times a Day As Needed for Cough. (Patient not taking: Reported on 10/9/2023)    busPIRone (BUSPAR) 15 MG tablet TAKE 1 TABLET THREE TIMES A DAY FOR ANXIETY    Calcium Carbonate Antacid (TUMS PO) Take 2,000 mg by mouth At Night As Needed.    COLLAGEN PO Take  by mouth. (Patient not taking: Reported on 10/9/2023)    Evolocumab (Repatha SureClick) solution auto-injector SureClick injection Inject 1 ml under the skin into the appropriate area as directed every 14 days Strength 140mg/ml    l-methylfolate calcium (DEPLIN) 7.5 MG tablet tablet Take 1 tablet by mouth 2 (Two) Times a Day.    lamoTRIgine (LaMICtal) 100 MG tablet Take 1 tablet by mouth 2 (Two) Times a Day. For mood stabilizer    montelukast (SINGULAIR) 10 MG tablet Take 1 tablet by mouth Every Night for 30 days.    montelukast (SINGULAIR) 10 MG tablet Take 1 tablet by mouth Every Night. (Patient not taking: Reported on 10/9/2023)    Naproxen Sodium 220 MG capsule Take 1 tablet by mouth As Needed. (Patient not taking: Reported on 10/9/2023)    Trintellix 5 MG tablet tablet TAKE 1 TABLET DAILY  WITH BREAKFAST    Turmeric 400 MG capsule      No current facility-administered medications on file prior to visit.     Current Medications:  Scheduled Meds:  PRN Meds:.    I have reviewed the patient's medical history in detail and updated the computerized patient record.  Review and summarization of old records include:    Past Medical History:   Diagnosis Date    Anesthesia complication     drops B/P, and slow to arouse    Encounter for laboratory testing for COVID-19 virus 07/19/2022    GERD (gastroesophageal reflux disease)     Hereditary hemochromatosis     Hyperlipidemia     Seasonal allergies      Past Surgical History:   Procedure Laterality Date    BLADDER SUSPENSION      BREAST BIOPSY Right     CATARACT EXTRACTION  03/2023    FINGER SURGERY  2008    removed cysts from right index finger     HERNIA REPAIR      SUBTOTAL HYSTERECTOMY      TUBAL ABDOMINAL LIGATION      VENTRAL/INCISIONAL HERNIA REPAIR N/A 09/24/2020    Procedure: LAPAROSCOPIC VENTRAL HERNIA;  Surgeon: Reuben Molina MD;  Location: [facility] OR;  Service: General;  Laterality: N/A;  0855 TAP block    WISDOM TOOTH EXTRACTION       Social History     Occupational History    Not on file   Tobacco Use    Smoking status: Never     Passive exposure: Past    Smokeless tobacco: Never   Vaping Use    Vaping Use: Never used   Substance and Sexual Activity    Alcohol use: Yes     Alcohol/week: 1.0 standard drink of alcohol     Types: 1 Glasses of wine per week     Comment: rarely    Drug use: Yes     Frequency: 7.0 times per week     Comment: CBD  gummies    Sexual activity: Defer      Social History     Social History Narrative    Not on file     Family History   Problem Relation Age of Onset    Coronary artery disease Other     Diabetes Other     Dementia Other     Diabetes Mother     Cancer Mother     Heart disease Father     Liver disease Father     Hypertension Father     Cancer Brother     Breast cancer Neg Hx     Ovarian cancer Neg Hx   "      Review of Systems        Wt Readings from Last 3 Encounters:   10/09/23 104 kg (228 lb 12.8 oz)   08/04/23 104 kg (230 lb)   07/12/23 104 kg (229 lb 12.8 oz)     Ht Readings from Last 3 Encounters:   10/09/23 149.9 cm (59\")   08/04/23 149.9 cm (59\")   07/12/23 175.3 cm (69\")     There is no height or weight on file to calculate BMI.  No height and weight on file for this encounter.  There were no vitals filed for this visit.    Physical Exam    Musculoskeletal:    {Musculoskeletal Exams:40832}      Diagnostics:      Procedure:  Procedures      Assessment:   Diagnoses and all orders for this visit:    1. Bilateral primary osteoarthritis of knee (Primary)  -     Large Joint Arthrocentesis: R knee  -     Large Joint Arthrocentesis: L knee          Plan:   Injected patient's {RIGHT/LEFT:86266} {Body Location for PLAN ORTHO:32094} joint(s)with {Joint Injection Medication Choices:77864} from an {anteromedial, anterolateral:63904} approach   Compression/brace   Rest, ice, compression, and elevation (RICE) therapy  OTC {OTC pain:63178}  Follow up in *** {WEEKS/MONTHS:37803}    Date of encounter: 10/18/2023   Jelly Roger MA  " Nino Bernabe(Resident)

## 2023-10-24 ENCOUNTER — TELEMEDICINE (OUTPATIENT)
Dept: PSYCHIATRY | Facility: CLINIC | Age: 58
End: 2023-10-24
Payer: OTHER GOVERNMENT

## 2023-10-24 DIAGNOSIS — F41.1 GAD (GENERALIZED ANXIETY DISORDER): Chronic | ICD-10-CM

## 2023-10-24 DIAGNOSIS — F33.1 MODERATE EPISODE OF RECURRENT MAJOR DEPRESSIVE DISORDER: Primary | Chronic | ICD-10-CM

## 2023-10-24 PROCEDURE — 99214 OFFICE O/P EST MOD 30 MIN: CPT | Performed by: PHYSICIAN ASSISTANT

## 2023-10-24 RX ORDER — LAMOTRIGINE 100 MG/1
50 TABLET ORAL 2 TIMES DAILY
COMMUNITY

## 2023-10-24 NOTE — PROGRESS NOTES
"Subjective   Merissa Yusuf is a 58 y.o.white female who presents today for follow up via telehealth.    This provider is located in [place], [place] using a secure [employer] Video Visit through [employer]. Patient is being seen remotely via telehealth at their home address in [place], and stated they are in a secure environment for this session. The patient's condition being diagnosed/treated is appropriate for telemedicine. The provider identified herself as well as her credentials.   The patient, and/or patients guardian, consent to be seen remotely, and when consent is given they understand that the consent allows for patient identifiable information to be sent to a third party as needed.   They may refuse to be seen remotely at any time. The electronic data is encrypted and password protected, and the patient and/or guardian has been advised of the potential risks to privacy not withstanding such measures.   PT Identifiers used: Name and .    You have chosen to receive care through a telehealth visit.  Do you consent to use a video/audio connection for your medical care today? Yes    Chief Complaint:  Anxiety and depression    History of Present Illness:   Had both cataracts replaced this year and doing well.      She sees Dr. Rodriguez, needs bilateral TKR but her BMI is 45 and needs to be under 40, going to talk to her PCP this week about starting Wegovy    Her 39 yr old son  in 2021, she was out of town in [place] with her sister who's  was dying and passed when she got back. His birthday is also in October, so the last few weeks were harder but does well when she keeps herself busy.   Patient is doing better on Trintellix, her daughter said she is happier on it, no restless legs yet on the 5mg  Lamictal has helped her irritability, does not need increase  \"My  is driving me crazy\"..he has TBI and can push her buttons  She has hemochromatosis, had to have a phlebotomy done " last week.     Patient lives the closest to Mom, has 6 siblings  Anxiety 6/10 due to looking at houses, planning to move into a house with her daughter and live in basement.  Depression 3/10, helping with her grandkids a lot, staying busy.   Denies SI/HI      The following portions of the patient's history were reviewed and updated as appropriate: allergies, current medications, past family history, past medical history, past social history, past surgical history and problem list.    PAST PSYCHIATRIC HISTORY  Axis I  Affective/Bipoloar Disorder, Anxiety/Panic Disorder  Axis II  None    PAST OUTPATIENT TREATMENT  Diagnosis treated:  Affective Disorder, Anxiety/Panic Disorder  Treatment Type:  Family Therapy, Medication Management  No hospitalizations  Genesight testing done March 2019  Prior Psychiatric Medications:  Lexapro increased anxiety  Prozac, hives  Trintellix helped but gave her restless legs  Buspar   Viibryd, could not take  Pristiq  Topamax, increased her anxiety and cause SOA  Lamictal  Support Groups:  None  Sequelae Of Mental Disorder:  social isolation, family disruption, emotional distress      Interval History  Improved    Side Effects  Restless legs with Trintellix at 10 mg    Past psychiatric history was reviewed and compared to visit and 4/24/23 appropriate updates were made.    Past Medical History:  Past Medical History:   Diagnosis Date    Anesthesia complication     drops B/P, and slow to arouse    Encounter for laboratory testing for COVID-19 virus 07/19/2022    GERD (gastroesophageal reflux disease)     Hereditary hemochromatosis     Hyperlipidemia     Seasonal allergies        Social History:  Social History     Socioeconomic History    Marital status:    Tobacco Use    Smoking status: Never     Passive exposure: Past    Smokeless tobacco: Never   Vaping Use    Vaping Use: Never used   Substance and Sexual Activity    Alcohol use: Yes     Alcohol/week: 1.0 standard drink of alcohol      Types: 1 Glasses of wine per week     Comment: rarely    Drug use: Yes     Frequency: 7.0 times per week     Comment: CBD  gummies    Sexual activity: Defer       Family History:  Family History   Problem Relation Age of Onset    Coronary artery disease Other     Diabetes Other     Dementia Other     Diabetes Mother     Cancer Mother     Heart disease Father     Liver disease Father     Hypertension Father     Cancer Brother     Breast cancer Neg Hx     Ovarian cancer Neg Hx        Past Surgical History:  Past Surgical History:   Procedure Laterality Date    BLADDER SUSPENSION      BREAST BIOPSY Right     CATARACT EXTRACTION  03/2023    FINGER SURGERY  2008    removed cysts from right index finger     HERNIA REPAIR      SUBTOTAL HYSTERECTOMY      TUBAL ABDOMINAL LIGATION      VENTRAL/INCISIONAL HERNIA REPAIR N/A 09/24/2020    Procedure: LAPAROSCOPIC VENTRAL HERNIA;  Surgeon: Reuben Molina MD;  Location: [facility] MAIN OR;  Service: General;  Laterality: N/A;  0855 TAP block    WISDOM TOOTH EXTRACTION         Problem List:  Patient Active Problem List   Diagnosis    Elevated blood pressure reading without diagnosis of hypertension    Hearing deficit    Hyperlipidemia    Knee pain    Low back pain    Memory loss    Morbid (severe) obesity due to excess calories    Obstructive sleep apnea    Thrombocytosis    Vitamin D deficiency    Combined forms of age-related cataract, bilateral    Convergence insufficiency    Corneal pannus of right eye    Corneal pannus    Meibomian gland dysfunction (MGD) of both eyes    Bilateral presbyopia    Presbyopia    Vitreous floaters    Hereditary hemochromatosis    GERD (gastroesophageal reflux disease)    Seasonal allergies    Class 3 severe obesity due to excess calories with serious comorbidity and body mass index (BMI) of 45.0 to 49.9 in adult    Elevated hematocrit    KIRSTIE (generalized anxiety disorder)    Moderate episode of recurrent major depressive disorder    SOB  (shortness of breath)    Encounter for laboratory testing for COVID-19 virus    Macular pseudohole of right eye    Partial thickness macular hole of left eye    Primary osteoarthritis of right knee    Primary osteoarthritis of left knee    Non-recurrent acute suppurative otitis media of right ear without spontaneous rupture of tympanic membrane    Multiple atypical skin moles       Allergy:   Allergies   Allergen Reactions    Amoxicillin Swelling    Erythromycin GI Intolerance    Escitalopram Anxiety    Prozac  [Fluoxetine Hcl] Rash    Penicillin G Rash        Discontinued Medications:  Medications Discontinued During This Encounter   Medication Reason    montelukast (SINGULAIR) 10 MG tablet *Therapy completed    montelukast (SINGULAIR) 10 MG tablet *Therapy completed    lamoTRIgine (LaMICtal) 100 MG tablet Dose adjustment       Current Medications:   Current Outpatient Medications   Medication Sig Dispense Refill    acetaminophen (TYLENOL) 500 MG tablet Take 1 tablet by mouth Every 4 (Four) Hours As Needed.      albuterol sulfate  (90 Base) MCG/ACT inhaler Inhale 2 puffs Every 4 (Four) Hours As Needed for Wheezing. 6.7 g 0    benzonatate (Tessalon Perles) 100 MG capsule Take 1 capsule by mouth 3 (Three) Times a Day As Needed for Cough. (Patient not taking: Reported on 10/9/2023) 30 capsule 0    busPIRone (BUSPAR) 15 MG tablet TAKE 1 TABLET THREE TIMES A DAY FOR ANXIETY 270 tablet 3    Calcium Carbonate Antacid (TUMS PO) Take 2,000 mg by mouth At Night As Needed.      COLLAGEN PO Take  by mouth. (Patient not taking: Reported on 10/9/2023)      Evolocumab (Repatha SureClick) solution auto-injector SureClick injection Inject 1 ml under the skin into the appropriate area as directed every 14 days Strength 140mg/ml 6 mL 3    l-methylfolate calcium (DEPLIN) 7.5 MG tablet tablet Take 1 tablet by mouth 2 (Two) Times a Day.      lamoTRIgine (LaMICtal) 100 MG tablet Take 0.5 tablets by mouth 2 (Two) Times a Day.       Naproxen Sodium 220 MG capsule Take 1 tablet by mouth As Needed. (Patient not taking: Reported on 10/9/2023)      Trintellix 5 MG tablet tablet TAKE 1 TABLET DAILY WITH BREAKFAST 90 tablet 3    Turmeric 400 MG capsule        No current facility-administered medications for this visit.         Review of Symptoms:    Psychiatric/Behavioral: Negative for agitation, behavioral problems, confusion, decreased concentration, dysphoric mood, hallucinations, self-injury, sleep disturbance and suicidal ideas. The patient not nervous/anxious and is not hyperactive.        Physical Exam:   not currently breastfeeding.    Mental Status Exam:   Hygiene:   good  Cooperation:  Cooperative  Eye Contact:  Good  Psychomotor Behavior:  Appropriate  Affect:  Appropriate  Mood: Mildly anxious  Hopelessness: Denies  Speech:  Normal  Thought Process:  Goal directed  Thought Content:  Normal  Suicidal:  None  Homicidal:  None  Hallucinations:  None  Delusion:  None  Memory:  Intact  Orientation:  Person, Place, Time and Situation  Reliability:  good  Insight:  Good  Judgement:  Good  Impulse Control:  Good  Physical/Medical Issues:  No      Mental status exam was reviewed and compared to 4/24/23 visit and appropriate updates were made.    PHQ-9 Depression Screening  Little interest or pleasure in doing things? (P) 0-->not at all   Feeling down, depressed, or hopeless? (P) 0-->not at all   Trouble falling or staying asleep, or sleeping too much? (P) 0-->not at all   Feeling tired or having little energy? (P) 0-->not at all   Poor appetite or overeating? (P) 0-->not at all   Feeling bad about yourself - or that you are a failure or have let yourself or your family down? (P) 0-->not at all   Trouble concentrating on things, such as reading the newspaper or watching television? (P) 0-->not at all   Moving or speaking so slowly that other people could have noticed? Or the opposite - being so fidgety or restless that you have been moving around a  lot more than usual? (P) 0-->not at all   Thoughts that you would be better off dead, or of hurting yourself in some way? (P) 0-->not at all   PHQ-9 Total Score (P) 0   If you checked off any problems, how difficult have these problems made it for you to do your work, take care of things at home, or get along with other people?           Never smoker    I advised Merissa of the risks of tobacco use.     Lab Results:   Lab on 09/29/2023   Component Date Value Ref Range Status    Ferritin 09/29/2023 152.40 (H)  13.00 - 150.00 ng/mL Final    Iron 09/29/2023 191 (H)  37 - 145 mcg/dL Final    Iron Saturation (TSAT) 09/29/2023 61 (H)  20 - 50 % Final    Transferrin 09/29/2023 209  200 - 360 mg/dL Final    TIBC 09/29/2023 311  298 - 536 mcg/dL Final    WBC 09/29/2023 6.29  3.40 - 10.80 10*3/mm3 Final    RBC 09/29/2023 3.79  3.77 - 5.28 10*6/mm3 Final    Hemoglobin 09/29/2023 14.0  12.0 - 15.9 g/dL Final    Hematocrit 09/29/2023 39.4  34.0 - 46.6 % Final    MCV 09/29/2023 104.0 (H)  79.0 - 97.0 fL Final    MCH 09/29/2023 36.9 (H)  26.6 - 33.0 pg Final    MCHC 09/29/2023 35.5  31.5 - 35.7 g/dL Final    RDW 09/29/2023 12.5  12.3 - 15.4 % Final    RDW-SD 09/29/2023 47.2  37.0 - 54.0 fl Final    MPV 09/29/2023 8.8  6.0 - 12.0 fL Final    Platelets 09/29/2023 395  140 - 450 10*3/mm3 Final    Neutrophil % 09/29/2023 49.3  42.7 - 76.0 % Final    Lymphocyte % 09/29/2023 38.2  19.6 - 45.3 % Final    Monocyte % 09/29/2023 7.9  5.0 - 12.0 % Final    Eosinophil % 09/29/2023 4.0  0.3 - 6.2 % Final    Basophil % 09/29/2023 0.6  0.0 - 1.5 % Final    Neutrophils, Absolute 09/29/2023 3.10  1.70 - 7.00 10*3/mm3 Final    Lymphocytes, Absolute 09/29/2023 2.40  0.70 - 3.10 10*3/mm3 Final    Monocytes, Absolute 09/29/2023 0.50  0.10 - 0.90 10*3/mm3 Final    Eosinophils, Absolute 09/29/2023 0.25  0.00 - 0.40 10*3/mm3 Final    Basophils, Absolute 09/29/2023 0.04  0.00 - 0.20 10*3/mm3 Final       Assessment & Plan   Problems Addressed this Visit           Mental Health    KIRSTIE (generalized anxiety disorder) (Chronic)    Moderate episode of recurrent major depressive disorder - Primary (Chronic)     Diagnoses         Codes Comments    Moderate episode of recurrent major depressive disorder    -  Primary ICD-10-CM: F33.1  ICD-9-CM: 296.32     KIRSTIE (generalized anxiety disorder)     ICD-10-CM: F41.1  ICD-9-CM: 300.02             Visit Diagnoses:    ICD-10-CM ICD-9-CM   1. Moderate episode of recurrent major depressive disorder  F33.1 296.32   2. KIRSTIE (generalized anxiety disorder)  F41.1 300.02         TREATMENT PLAN/GOALS: Continue supportive psychotherapy efforts and medications as indicated. Treatment and medication options discussed during today's visit. Patient ackowledged and verbally consented to continue with current treatment plan and was educated on the importance of compliance with treatment and follow-up appointments.    MEDICATION ISSUES:  INSPECT reviewed as expected  Discussed medication options and treatment plan of prescribed medication as well as the risks, benefits, and side effects including potential falls, possible impaired driving and metabolic adversities among others. Patient is agreeable to call the office with any worsening of symptoms or onset of side effects. Patient is agreeable to call 911 or go to the nearest ER should he/she begin having SI/HI. No medication side effects or related complaints today.     Patient is doing well, coping better with the loss of her son, helping a lot with her grand kids and denies significant depression currently.   Continue Trintellix 5mg daily for depression  Continue Buspar 15mg TID prn anxiety, usually only takes it twice daily, only TID on rare occasion  Continue Lamictal 100 mg tabs take 1/2 tab (50 mg) BID for mood stabilizer  Continue L-methylfolate daily      MEDS ORDERED DURING VISIT:  No orders of the defined types were placed in this encounter.      Return in about 6 months (around 4/24/2024)  for video visit.           This document has been electronically signed by Laisha Mendiola PA-C  October 24, 2023 10:58 EDT

## 2023-10-26 NOTE — PATIENT INSTRUCTIONS
Health Maintenance Due   Topic Date Due    ZOSTER VACCINE (1 of 2) Never done    BMI FOLLOWUP  10/26/2022    INFLUENZA VACCINE  08/01/2023    COVID-19 Vaccine (5 - 2023-24 season) 09/01/2023    ANNUAL PHYSICAL  09/22/2023    LIPID PANEL  09/22/2023    You are due for Shingrix vaccination series ( the newest shingles vaccine).  It is a two shot series spaced 2-6 months apart. Please get this vaccine series started at your earliest convenience at your local pharmacy to help avoid shingles outbreak. It is more effective than the old Zostavax vaccine and is recommended even if you have had the Zostavax vaccine in the past.  Once the Shingrix series is completed, it does not need to be repeated.   For more information, please look at the website below:  [facility] Shingrix Vaccine Information    Patient advised to get Covid vaccination at pharmacy any time    Patient to ask  if they cover and what is your copay  for meds such as Rybelsus, Trulicy, Ozempic, Mounjaro.    Patient to call mask supplier or Dr. Seipel about sleep masak and Left neck pain/popping.    Patient to call Dr. Dawson about moles

## 2023-10-27 ENCOUNTER — OFFICE VISIT (OUTPATIENT)
Dept: FAMILY MEDICINE CLINIC | Facility: CLINIC | Age: 58
End: 2023-10-27
Payer: OTHER GOVERNMENT

## 2023-10-27 VITALS
DIASTOLIC BLOOD PRESSURE: 76 MMHG | HEIGHT: 59 IN | BODY MASS INDEX: 45.68 KG/M2 | SYSTOLIC BLOOD PRESSURE: 123 MMHG | WEIGHT: 226.6 LBS | OXYGEN SATURATION: 96 % | HEART RATE: 84 BPM | TEMPERATURE: 97.3 F

## 2023-10-27 DIAGNOSIS — Z23 NEED FOR PNEUMOCOCCAL VACCINE: ICD-10-CM

## 2023-10-27 DIAGNOSIS — E78.2 MIXED HYPERLIPIDEMIA: ICD-10-CM

## 2023-10-27 DIAGNOSIS — Z00.01 ENCOUNTER FOR ANNUAL GENERAL MEDICAL EXAMINATION WITH ABNORMAL FINDINGS IN ADULT: Primary | ICD-10-CM

## 2023-10-27 DIAGNOSIS — J30.2 SEASONAL ALLERGIES: ICD-10-CM

## 2023-10-27 DIAGNOSIS — E66.01 CLASS 3 SEVERE OBESITY DUE TO EXCESS CALORIES WITH SERIOUS COMORBIDITY AND BODY MASS INDEX (BMI) OF 45.0 TO 49.9 IN ADULT: ICD-10-CM

## 2023-10-27 DIAGNOSIS — M25.569 CHRONIC KNEE PAIN, UNSPECIFIED LATERALITY: ICD-10-CM

## 2023-10-27 DIAGNOSIS — E55.9 VITAMIN D DEFICIENCY: ICD-10-CM

## 2023-10-27 DIAGNOSIS — F33.1 MODERATE EPISODE OF RECURRENT MAJOR DEPRESSIVE DISORDER: Chronic | ICD-10-CM

## 2023-10-27 DIAGNOSIS — R03.0 ELEVATED BLOOD PRESSURE READING WITHOUT DIAGNOSIS OF HYPERTENSION: ICD-10-CM

## 2023-10-27 DIAGNOSIS — G89.29 CHRONIC KNEE PAIN, UNSPECIFIED LATERALITY: ICD-10-CM

## 2023-10-27 DIAGNOSIS — D22.9 MULTIPLE ATYPICAL SKIN MOLES: ICD-10-CM

## 2023-10-27 DIAGNOSIS — G47.33 OBSTRUCTIVE SLEEP APNEA: ICD-10-CM

## 2023-10-27 DIAGNOSIS — E83.110 HEREDITARY HEMOCHROMATOSIS: ICD-10-CM

## 2023-10-27 DIAGNOSIS — K21.9 GASTROESOPHAGEAL REFLUX DISEASE, UNSPECIFIED WHETHER ESOPHAGITIS PRESENT: ICD-10-CM

## 2023-10-27 LAB
25(OH)D3 SERPL-MCNC: 37.4 NG/ML (ref 30–100)
ALBUMIN SERPL-MCNC: 4.4 G/DL (ref 3.5–5.2)
ALBUMIN/GLOB SERPL: 1.7 G/DL
ALP SERPL-CCNC: 66 U/L (ref 39–117)
ALT SERPL W P-5'-P-CCNC: 14 U/L (ref 1–33)
ANION GAP SERPL CALCULATED.3IONS-SCNC: 10.8 MMOL/L (ref 5–15)
AST SERPL-CCNC: 19 U/L (ref 1–32)
BILIRUB SERPL-MCNC: 0.3 MG/DL (ref 0–1.2)
BUN SERPL-MCNC: 13 MG/DL (ref 8–23)
BUN/CREAT SERPL: 18.6 (ref 7–25)
CALCIUM SPEC-SCNC: 9.8 MG/DL (ref 8.6–10.5)
CHLORIDE SERPL-SCNC: 103 MMOL/L (ref 98–107)
CHOLEST SERPL-MCNC: 130 MG/DL (ref 0–200)
CO2 SERPL-SCNC: 22.2 MMOL/L (ref 22–29)
CREAT SERPL-MCNC: 0.7 MG/DL (ref 0.57–1)
EGFRCR SERPLBLD CKD-EPI 2021: 99.2 ML/MIN/1.73
GLOBULIN UR ELPH-MCNC: 2.6 GM/DL
GLUCOSE SERPL-MCNC: 85 MG/DL (ref 65–99)
HDLC SERPL-MCNC: 63 MG/DL (ref 40–60)
LDLC SERPL CALC-MCNC: 48 MG/DL (ref 0–100)
LDLC/HDLC SERPL: 0.73 {RATIO}
MAGNESIUM SERPL-MCNC: 2.3 MG/DL (ref 1.6–2.4)
POTASSIUM SERPL-SCNC: 4 MMOL/L (ref 3.5–5.2)
PROT SERPL-MCNC: 7 G/DL (ref 6–8.5)
SODIUM SERPL-SCNC: 136 MMOL/L (ref 136–145)
TRIGL SERPL-MCNC: 106 MG/DL (ref 0–150)
TSH SERPL DL<=0.05 MIU/L-ACNC: 1.47 UIU/ML (ref 0.27–4.2)
VIT B12 BLD-MCNC: 497 PG/ML (ref 211–946)
VLDLC SERPL-MCNC: 19 MG/DL (ref 5–40)

## 2023-10-27 PROCEDURE — 82607 VITAMIN B-12: CPT | Performed by: PREVENTIVE MEDICINE

## 2023-10-27 PROCEDURE — 83735 ASSAY OF MAGNESIUM: CPT | Performed by: PREVENTIVE MEDICINE

## 2023-10-27 PROCEDURE — 82306 VITAMIN D 25 HYDROXY: CPT | Performed by: PREVENTIVE MEDICINE

## 2023-10-27 PROCEDURE — 84443 ASSAY THYROID STIM HORMONE: CPT | Performed by: PREVENTIVE MEDICINE

## 2023-10-27 PROCEDURE — 80053 COMPREHEN METABOLIC PANEL: CPT | Performed by: PREVENTIVE MEDICINE

## 2023-10-27 PROCEDURE — 80061 LIPID PANEL: CPT | Performed by: PREVENTIVE MEDICINE

## 2023-10-27 RX ORDER — MONTELUKAST SODIUM 10 MG/1
10 TABLET ORAL NIGHTLY
Qty: 90 TABLET | Refills: 1 | Status: SHIPPED | OUTPATIENT
Start: 2023-10-27

## 2023-10-27 NOTE — PROGRESS NOTES
Venipuncture performed on Left Arm by Lata Avendano MA  with good hemostasis. Patient tolerated well. 10/27/23      Injection  Injection performed in left  Deltoid by Lata Avendano MA. Patient tolerated the procedure well without complications.FLU  10/27/23   Lata Avendano MA       Injection  Injection performed in left  Deltoid by Lata Avendano MA. Patient tolerated the procedure well without complications. Prevnar 20  10/27/23   Lata Avendano MA

## 2023-10-27 NOTE — PROGRESS NOTES
"Subjective   Merissa Yusuf is a 58 y.o. female presents for   Chief Complaint   Patient presents with    Annual Exam     Patient wants flu shot and Covid will get done at the pharmacy.    Patient is fasting    Knee Pain     Wanting opinions on knee surgery.       Health Maintenance Due   Topic Date Due    ZOSTER VACCINE (1 of 2) Never done    BMI FOLLOWUP  10/26/2022    COVID-19 Vaccine (5 - 2023-24 season) 09/01/2023    ANNUAL PHYSICAL  09/22/2023    LIPID PANEL  09/22/2023       DAXHPI    Merissa Yusuf presents today for annual wellness exam, vitamin D, obstructive sleep apnea, multiple atypical skin moles, moderate episode of recurrent major depressive disorder, hyperlipidemia, hereditary hemochromatosis, GERD, elevated blood pressure without diagnosis of hypertension, class III, severe obesity with serious comorbidities and a body mass index of 45.    Her blood pressure is within normal range today. She is fasting for lab work.     She complains of bilateral knee pain. She occasionally has difficulty with ambulation. She continues to use a CPAP machine, but notes the mask causes her pain. She notes her depression is well controlled. She denies any homicidal or suicidal thoughts or intentions.     She had cataract surgery this year.    She is allergic to ERYTHROMYCIN, AMOXICILLIN, LEXAPRO, and PROZAC.    She denies the use of vitamin D. Confirms the use of buspirone, lamotrigine, and famotidine. She would like to start Singular.    She is due for a COVID-19, pneumonia, shingles, and influenza vaccine.   Vitals:    10/27/23 0818 10/27/23 0819   BP: 117/75 123/76   BP Location: Right arm Left arm   Patient Position: Sitting Sitting   Cuff Size: Adult Adult   Pulse: 81 84   Temp: 97.3 °F (36.3 °C)    TempSrc: Temporal    SpO2: 96%    Weight: 103 kg (226 lb 9.6 oz)    Height: 149.9 cm (59\")      Body mass index is 45.77 kg/m².    Current Outpatient Medications on File Prior to Visit   Medication Sig Dispense Refill "    acetaminophen (TYLENOL) 500 MG tablet Take 1 tablet by mouth Every 4 (Four) Hours As Needed.      albuterol sulfate  (90 Base) MCG/ACT inhaler Inhale 2 puffs Every 4 (Four) Hours As Needed for Wheezing. 6.7 g 0    busPIRone (BUSPAR) 15 MG tablet TAKE 1 TABLET THREE TIMES A DAY FOR ANXIETY 270 tablet 3    Calcium Carbonate Antacid (TUMS PO) Take 2,000 mg by mouth At Night As Needed.      Evolocumab (Repatha SureClick) solution auto-injector SureClick injection Inject 1 ml under the skin into the appropriate area as directed every 14 days Strength 140mg/ml 6 mL 3    l-methylfolate calcium (DEPLIN) 7.5 MG tablet tablet Take 1 tablet by mouth 2 (Two) Times a Day.      lamoTRIgine (LaMICtal) 100 MG tablet Take 0.5 tablets by mouth 2 (Two) Times a Day.      Trintellix 5 MG tablet tablet TAKE 1 TABLET DAILY WITH BREAKFAST 90 tablet 3    Turmeric 400 MG capsule        No current facility-administered medications on file prior to visit.       The following portions of the patient's history were reviewed and updated as appropriate: allergies, current medications, past family history, past medical history, past social history, past surgical history, and problem list.    Review of Systems   Constitutional:  Negative for chills, fever and unexpected weight loss.   HENT:  Negative for hearing loss.    Respiratory:  Negative for cough and wheezing.    Cardiovascular:  Negative for chest pain and palpitations.   Gastrointestinal:  Positive for constipation. Negative for diarrhea.   Genitourinary:  Negative for dysuria, hematuria and vaginal discharge.     DAXROS    Objective   Physical Exam  HENT:      Ears:      Comments: Tympanic membranes are normal.      Mouth/Throat:      Comments: Throat is normal  Eyes:      Pupils: Pupils are equal, round, and reactive to light.   Neck:      Vascular: No carotid bruit.      Comments: No thyromegaly, thyroid masses, cervical or suboccipital adenopathy.   Cardiovascular:      Rate  and Rhythm: Normal rate and regular rhythm.   Pulmonary:      Comments: Lungs are clear and normal in all six lung fields   Abdominal:      Comments: Bowel sounds were normal active and there were no masses, tenderness or organomegaly.    Musculoskeletal:      Comments: No pretibial edema.       DAXEXAM  PHQ-9 Total Score:      Assessment & Plan   Diagnoses and all orders for this visit:    1. Encounter for annual general medical examination with abnormal findings in adult (Primary)    2. Vitamin D deficiency  -     Vitamin D,25-Hydroxy; Future  -     Vitamin D,25-Hydroxy    3. Obstructive sleep apnea  Advised patient to follow up with sleep medicine for a different mask.   4. Multiple atypical skin moles  She was advised to follow up with dermatology.   5. Moderate episode of recurrent major depressive disorder  -     Vitamin B12; Future  -     TSH; Future  -     Vitamin B12  -     TSH  Well controlled    6. Mixed hyperlipidemia  -     Comprehensive Metabolic Panel; Future  -     Lipid Panel; Future  -     Comprehensive Metabolic Panel  -     Lipid Panel  She was advised to watch her saturated fats intake.     7. Hereditary hemochromatosis    8. Gastroesophageal reflux disease, unspecified whether esophagitis present  -     Magnesium; Future  -     Magnesium  Well controlled when avoiding fried and spicy foods.       9. Elevated blood pressure reading without diagnosis of hypertension  Well controlled.   10. Class 3 severe obesity due to excess calories with serious comorbidity and body mass index (BMI) of 45.0 to 49.9 in adult  She was encouraged to lower her portion sizes with meals and increase her walking for exercise.     11. Chronic knee pain, unspecified laterality    12. Seasonal allergies  She will receive a prescription for Singulair.  13. Need for pneumococcal vaccine    Other orders  -     Pneumococcal Conjugate Vaccine 20-Valent (PCV20)  -     Fluzone >6 Months (9670-3423)  -     montelukast  (Singulair) 10 MG tablet; Take 1 tablet by mouth Every Night.  Dispense: 90 tablet; Refill: 1        DAXPLAN  DAXRESULTS    Patient Instructions     Health Maintenance Due   Topic Date Due    ZOSTER VACCINE (1 of 2) Never done    BMI FOLLOWUP  10/26/2022    INFLUENZA VACCINE  08/01/2023    COVID-19 Vaccine (5 - 2023-24 season) 09/01/2023    ANNUAL PHYSICAL  09/22/2023    LIPID PANEL  09/22/2023    You are due for Shingrix vaccination series ( the newest shingles vaccine).  It is a two shot series spaced 2-6 months apart. Please get this vaccine series started at your earliest convenience at your local pharmacy to help avoid shingles outbreak. It is more effective than the old Zostavax vaccine and is recommended even if you have had the Zostavax vaccine in the past.  Once the Shingrix series is completed, it does not need to be repeated.   For more information, please look at the website below:  [facility] Shingrix Vaccine Information    Patient advised to get Covid vaccination at pharmacy any time    Patient to ask  if they cover and what is your copay  for meds such as Rybelsus, Trulicy, Ozempic, Mounjaro.    Patient to call mask supplier or Dr. Seipel about sleep masak and Left neck pain/popping.    Patient to call Dr. Dawson about moles       Transcribed from ambient dictation for Melissa Duarte MD by Brie Diana.  10/27/23   09:14 EDT    Patient or patient representative verbalized consent to the visit recording.  I have personally performed the services described in this document as transcribed by the above individual, and it is both accurate and complete.

## 2023-10-30 ENCOUNTER — TELEPHONE (OUTPATIENT)
Dept: FAMILY MEDICINE CLINIC | Facility: CLINIC | Age: 58
End: 2023-10-30
Payer: OTHER GOVERNMENT

## 2023-10-30 NOTE — TELEPHONE ENCOUNTER
----- Message from Melissa Duarte MD sent at 10/29/2023  8:52 AM EDT -----  Labs look normal- keep up good work

## 2023-10-31 PROBLEM — M17.0 BILATERAL PRIMARY OSTEOARTHRITIS OF KNEE: Status: ACTIVE | Noted: 2023-10-31

## 2023-11-02 ENCOUNTER — TELEPHONE (OUTPATIENT)
Dept: FAMILY MEDICINE CLINIC | Facility: CLINIC | Age: 58
End: 2023-11-02
Payer: OTHER GOVERNMENT

## 2023-11-02 DIAGNOSIS — E66.01 CLASS 3 SEVERE OBESITY DUE TO EXCESS CALORIES WITH SERIOUS COMORBIDITY AND BODY MASS INDEX (BMI) OF 45.0 TO 49.9 IN ADULT: Primary | ICD-10-CM

## 2023-11-06 NOTE — ADDENDUM NOTE
Addended by: BERTA GARCIA on: 11/6/2023 09:21 AM     Modules accepted: Orders    
Addended by: ROSA AUGUSTIN on: 11/6/2023 09:08 AM     Modules accepted: Orders    
(2) potential problem

## 2023-11-30 ENCOUNTER — TELEPHONE (OUTPATIENT)
Dept: PSYCHIATRY | Facility: CLINIC | Age: 58
End: 2023-11-30
Payer: OTHER GOVERNMENT

## 2023-11-30 RX ORDER — LAMOTRIGINE 100 MG/1
100 TABLET ORAL 2 TIMES DAILY
Qty: 180 TABLET | Refills: 1 | Status: SHIPPED | OUTPATIENT
Start: 2023-11-30

## 2023-11-30 RX ORDER — LAMOTRIGINE 100 MG/1
50 TABLET ORAL 2 TIMES DAILY
Qty: 90 TABLET | Refills: 1 | Status: SHIPPED | OUTPATIENT
Start: 2023-11-30 | End: 2023-11-30 | Stop reason: SDUPTHER

## 2023-11-30 NOTE — TELEPHONE ENCOUNTER
Pt states the 100mg is difficult to split and it splits everywhere and she loses some of the medication. Pt wants to know if you could send in 25mg to take Two twice daily. Or if you think it would be okay for her to take one 100mg tablet a day? Please advise

## 2023-11-30 NOTE — TELEPHONE ENCOUNTER
Pt stated that the Lamictal was supposed to be sent in for 50mg twice a day and the script has never been sent in. Please advise

## 2024-01-11 ENCOUNTER — OFFICE VISIT (OUTPATIENT)
Dept: BARIATRICS/WEIGHT MGMT | Facility: CLINIC | Age: 59
End: 2024-01-11
Payer: OTHER GOVERNMENT

## 2024-01-11 VITALS
WEIGHT: 225.2 LBS | HEIGHT: 59 IN | HEART RATE: 75 BPM | BODY MASS INDEX: 45.4 KG/M2 | DIASTOLIC BLOOD PRESSURE: 72 MMHG | OXYGEN SATURATION: 97 % | SYSTOLIC BLOOD PRESSURE: 123 MMHG

## 2024-01-11 DIAGNOSIS — E66.01 OBESITY, CLASS III, BMI 40-49.9 (MORBID OBESITY): Primary | ICD-10-CM

## 2024-01-11 DIAGNOSIS — Z71.3 WEIGHT LOSS COUNSELING, ENCOUNTER FOR: ICD-10-CM

## 2024-01-11 NOTE — PROGRESS NOTES
[facility] BAR SURG [facility] BARIATRIC SURGERY  [address] [place] [address]  [address] [place] [address]  Dept: [phone]  1/11/2024      Merissa Yusuf.  [id number]  [id number]  1965  female      Chief Complaint of weight gain; unable to maintain weight loss    History of Present Illness:   Merissa is a 58 y.o. female who presents today for evaluation, education and consultation regarding medical weight management.      Diet History:See dietician/RN/MA documentation for complete history of weight and diet.     Past weight loss attempts: calorie counting, atkins diet, HMR, weight watchers,       Past Medical History:   Diagnosis Date    Anesthesia complication     drops B/P, and slow to arouse    Encounter for laboratory testing for COVID-19 virus 07/19/2022    GERD (gastroesophageal reflux disease)     Hereditary hemochromatosis     Hyperlipidemia     Seasonal allergies    GERD food triggered   Sleep apnea- on bipap    Past Surgical History:   Procedure Laterality Date    BLADDER SUSPENSION      BREAST BIOPSY Right     CATARACT EXTRACTION  03/2023    FINGER SURGERY  2008    removed cysts from right index finger     HERNIA REPAIR      SUBTOTAL HYSTERECTOMY      TUBAL ABDOMINAL LIGATION      VENTRAL/INCISIONAL HERNIA REPAIR N/A 09/24/2020    Procedure: LAPAROSCOPIC VENTRAL HERNIA;  Surgeon: Reuben Molina MD;  Location: [facility] MAIN OR;  Service: General;  Laterality: N/A;  0855 TAP block    WISDOM TOOTH EXTRACTION     Bilateral cataract repair  Umbilical hernia repair   Macular repair of hole in eye - left     Allergies   Allergen Reactions    Amoxicillin Swelling    Erythromycin GI Intolerance    Escitalopram Anxiety    Prozac  [Fluoxetine Hcl] Rash    Penicillin G Rash         Current Outpatient Medications:     acetaminophen (TYLENOL) 500 MG tablet, Take 1 tablet by mouth Every 4 (Four) Hours As Needed., Disp: , Rfl:     albuterol sulfate   (90 Base) MCG/ACT inhaler, Inhale 2 puffs Every 4 (Four) Hours As Needed for Wheezing., Disp: 6.7 g, Rfl: 0    busPIRone (BUSPAR) 15 MG tablet, TAKE 1 TABLET THREE TIMES A DAY FOR ANXIETY, Disp: 270 tablet, Rfl: 3    Calcium Carbonate Antacid (TUMS PO), Take 2,000 mg by mouth At Night As Needed., Disp: , Rfl:     Evolocumab (Repatha SureClick) solution auto-injector SureClick injection, Inject 1 ml under the skin into the appropriate area as directed every 14 days Strength 140mg/ml, Disp: 6 mL, Rfl: 3    Ginkgo Biloba (GINKGO PO), Take 1,000 mg by mouth Daily. W/ Gensing, Disp: , Rfl:     l-methylfolate calcium (DEPLIN) 7.5 MG tablet tablet, Take 1 tablet by mouth 2 (Two) Times a Day., Disp: , Rfl:     lamoTRIgine (LaMICtal) 100 MG tablet, Take 1 tablet by mouth 2 (Two) Times a Day., Disp: 180 tablet, Rfl: 1    montelukast (Singulair) 10 MG tablet, Take 1 tablet by mouth Every Night., Disp: 90 tablet, Rfl: 1    Trintellix 5 MG tablet tablet, TAKE 1 TABLET DAILY WITH BREAKFAST, Disp: 90 tablet, Rfl: 3    Turmeric 400 MG capsule, , Disp: , Rfl:     Tirzepatide 2.5 MG/0.5ML solution pen-injector, Inject 0.5 mL under the skin into the appropriate area as directed Every 7 (Seven) Days. (Patient not taking: Reported on 1/11/2024), Disp: 2 mL, Rfl: 0    Social History     Socioeconomic History    Marital status:    Tobacco Use    Smoking status: Never     Passive exposure: Past    Smokeless tobacco: Never   Vaping Use    Vaping Use: Never used   Substance and Sexual Activity    Alcohol use: Yes     Alcohol/week: 1.0 standard drink of alcohol     Types: 1 Glasses of wine per week     Comment: rarely    Drug use: Yes     Frequency: 7.0 times per week     Comment: CBD  gummies    Sexual activity: Defer       Family History   Problem Relation Age of Onset    Coronary artery disease Other     Diabetes Other     Dementia Other     Diabetes Mother     Cancer Mother     Heart disease Father     Liver disease Father      Hypertension Father     Cancer Brother     Breast cancer Neg Hx     Ovarian cancer Neg Hx          Review of Systems:  Review of Systems   Constitutional:  Positive for fatigue.   Respiratory:          Sleep apna, on bipap   Cardiovascular:         Seeing a cardiology due to significant family hx of cardiac problems    Gastrointestinal: Negative.    Genitourinary: Negative.    Musculoskeletal:  Positive for back pain and myalgias.        Arthritis    Hematological:         Hereditary hemochromatosis    Psychiatric/Behavioral:          Anxiety and depression        Physical Exam:  Vital Signs:  Weight: 102 kg (225 lb 3.2 oz)   Body mass index is 45.48 kg/m².      Heart Rate: 75   BP: 123/72     Physical Exam  Constitutional:       Appearance: Normal appearance. She is obese.   Cardiovascular:      Rate and Rhythm: Normal rate.   Pulmonary:      Effort: Pulmonary effort is normal.      Breath sounds: Normal breath sounds.   Abdominal:      General: Abdomen is flat.      Palpations: Abdomen is soft.   Skin:     General: Skin is warm and dry.   Neurological:      General: No focal deficit present.      Mental Status: She is alert and oriented to person, place, and time.   Psychiatric:         Mood and Affect: Mood normal.         Behavior: Behavior normal.         Thought Content: Thought content normal.         Judgment: Judgment normal.            Assessment:         Merissa Yusuf is a 58 y.o. year old female with medically complicated severe obesity. Weight: 102 kg (225 lb 3.2 oz), Body mass index is 45.48 kg/m². and weight related problems.    CBC, CMP,  TSH, lipid panel, Vitamin D and HgbA1C will be drawn.       The patient was advised to start a high protein, low fat and low carbohydrate diet. The patient was given individualized information  along with general group information and handouts.     The patient was encouraged to start routine exercise including but not limited to 150 minutes per week.     The  consultation plan was reviewed with the patient.        I think she is a good candidate for medical weight management. If appropriate, weight loss medications will be discussed at next visit. Pt did state she would be interested in compounded medications if needed. Will check labs first before prescribing medications.     Encounter Diagnosis   Name Primary?    Obesity, Class III, BMI 40-49.9 (morbid obesity) Yes       Plan:    Patient will have evaluations and follow up with bariatric dietician before undergoing a multidisciplinary review of their candidacy.  We also discussed other program requirements.      Total time spent with pt 60 minutes of which 45 minutes were spent on education.     Izabella Green, APRN  1/11/2024

## 2024-01-11 NOTE — PROGRESS NOTES
Nutrition Services    Patient Name: Merissa Yusuf  YOB: 1965  MRN: [id number]  Date of Service: 01/11/24      ICD-10-CM ICD-9-CM   1. Obesity, Class III, BMI 40-49.9 (morbid obesity)  E66.01 278.01        NUTRITION ASSESSMENT - BARIATRIC SURGERY      Reason for Visit MWM, new patient appt     H&P      Past Medical History:   Diagnosis Date    Anesthesia complication     drops B/P, and slow to arouse    Encounter for laboratory testing for COVID-19 virus 07/19/2022    GERD (gastroesophageal reflux disease)     Hereditary hemochromatosis     Hyperlipidemia     Seasonal allergies        Past Surgical History:   Procedure Laterality Date    BLADDER SUSPENSION      BREAST BIOPSY Right     CATARACT EXTRACTION  03/2023    FINGER SURGERY  2008    removed cysts from right index finger     HERNIA REPAIR      SUBTOTAL HYSTERECTOMY      TUBAL ABDOMINAL LIGATION      VENTRAL/INCISIONAL HERNIA REPAIR N/A 09/24/2020    Procedure: LAPAROSCOPIC VENTRAL HERNIA;  Surgeon: Reuben Molina MD;  Location: [facility] MAIN OR;  Service: General;  Laterality: N/A;  0855 TAP block    WISDOM TOOTH EXTRACTION          Previous Goals          Encounter Information        Visit Narrative     Patient reports she monitors iron intake d/t hemochromatosis. Patient unable to exercise d/t knee pain. Patient to focus on beverages and increasing water intake overall.     Diet Recall:   Breakfast: 2 toasts (cinnamon toast) with butter and milk/coffee or raisin bran crunch/honey nut cherrios cereal  Lunch: 1/2 PB & jelly sandwich or chicken salad with crackers  Dinner: pork/chicken/fish   Snacks: nuts/seeds granola with protein chips, chips, protein bar, cereal before bed sometimes  Beverages: patient reports she doesn't drink much. Water, coke 1x/day, sweet tea    Exercise: no current exercise d/t knee pain    Supplements: no current MV    Self Monitoring:          Anthropometrics        Current Height, Weight Height: 149.9 cm  "(59\")  Weight: 102 kg (225 lb 3.2 oz) (01/11/24 1002)            Wt Readings from Last 30 Encounters:   01/11/24 1002 102 kg (225 lb 3.2 oz)   10/27/23 0818 103 kg (226 lb 9.6 oz)   10/18/23 1349 103 kg (227 lb 3.2 oz)   10/09/23 1042 104 kg (228 lb 12.8 oz)   08/04/23 0847 104 kg (230 lb)   07/12/23 1332 104 kg (229 lb 12.8 oz)   06/05/23 1139 106 kg (233 lb 12.8 oz)   05/24/23 1520 104 kg (230 lb)   05/04/23 1356 103 kg (228 lb)   04/10/23 1405 106 kg (232 lb 9.6 oz)   02/16/23 1010 105 kg (232 lb)   01/19/23 1329 105 kg (231 lb 11.2 oz)   01/18/23 1033 105 kg (231 lb 12.8 oz)   01/10/23 1418 104 kg (230 lb)   09/22/22 0952 104 kg (228 lb 12.8 oz)   07/19/22 0911 106 kg (233 lb)   07/14/22 0846 106 kg (233 lb 11 oz)   06/28/22 1506 106 kg (234 lb)   05/16/22 1026 105 kg (231 lb)   05/02/22 1427 105 kg (231 lb)   04/12/22 1048 105 kg (232 lb)   03/24/22 1112 106 kg (232 lb 9.6 oz)   03/22/22 0902 104 kg (229 lb 12.8 oz)   03/14/22 1322 104 kg (230 lb)   02/24/22 1441 103 kg (226 lb)   01/11/22 1049 103 kg (228 lb)   01/06/22 1109 102 kg (224 lb 6.4 oz)   12/07/21 1054 101 kg (222 lb)   11/02/21 1056 101 kg (222 lb)   09/27/21 1050 102 kg (224 lb)      BMI kg/m2 Body mass index is 45.48 kg/m².       Nutrition Diagnosis         Nutrition Dx Statement Overweight/obesity RT multifactorial biochemical, behavioral and environmental contributors to disease AEB BMI 45.48 kg/m^2         Nutrition Intervention         Nutrition Intervention Nutrition education related to diet modification and physical activity        Monitor/Evaluation        New Goals Patient to aim for 24-32 oz of water each day  Patient to switch over to unsweet tea and cut out coke  Set up workout equipment for next appt       Total time spent with pt 15 minutes of which 15 minutes were spent on education.       Electronically signed by:  Susu Samuel RD  01/11/24 10:18 EST  "

## 2024-01-12 ENCOUNTER — HOSPITAL ENCOUNTER (OUTPATIENT)
Dept: ONCOLOGY | Facility: HOSPITAL | Age: 59
Discharge: HOME | End: 2024-01-12
Payer: OTHER GOVERNMENT

## 2024-01-12 ENCOUNTER — LAB (OUTPATIENT)
Dept: LAB | Facility: HOSPITAL | Age: 59
End: 2024-01-12
Payer: OTHER GOVERNMENT

## 2024-01-12 VITALS
OXYGEN SATURATION: 99 % | TEMPERATURE: 97.8 F | RESPIRATION RATE: 16 BRPM | WEIGHT: 228 LBS | SYSTOLIC BLOOD PRESSURE: 108 MMHG | HEART RATE: 75 BPM | BODY MASS INDEX: 45.96 KG/M2 | HEIGHT: 59 IN | DIASTOLIC BLOOD PRESSURE: 74 MMHG

## 2024-01-12 DIAGNOSIS — M17.12 PRIMARY OSTEOARTHRITIS OF LEFT KNEE: ICD-10-CM

## 2024-01-12 DIAGNOSIS — E78.2 MIXED HYPERLIPIDEMIA: ICD-10-CM

## 2024-01-12 DIAGNOSIS — E83.110 HEREDITARY HEMOCHROMATOSIS: Primary | ICD-10-CM

## 2024-01-12 DIAGNOSIS — R71.8 ELEVATED HEMATOCRIT: ICD-10-CM

## 2024-01-12 DIAGNOSIS — E83.110 HEREDITARY HEMOCHROMATOSIS: ICD-10-CM

## 2024-01-12 DIAGNOSIS — E66.01 OBESITY, CLASS III, BMI 40-49.9 (MORBID OBESITY): ICD-10-CM

## 2024-01-12 LAB
25(OH)D3 SERPL-MCNC: 46.1 NG/ML (ref 30–100)
ALBUMIN SERPL-MCNC: 4.4 G/DL (ref 3.5–5.2)
ALBUMIN/GLOB SERPL: 1.7 G/DL
ALP SERPL-CCNC: 69 U/L (ref 39–117)
ALT SERPL W P-5'-P-CCNC: 15 U/L (ref 1–33)
ANION GAP SERPL CALCULATED.3IONS-SCNC: 12 MMOL/L (ref 5–15)
AST SERPL-CCNC: 17 U/L (ref 1–32)
BASOPHILS # BLD AUTO: 0.04 10*3/MM3 (ref 0–0.2)
BASOPHILS NFR BLD AUTO: 0.6 % (ref 0–1.5)
BILIRUB SERPL-MCNC: 0.5 MG/DL (ref 0–1.2)
BUN SERPL-MCNC: 14 MG/DL (ref 8–23)
BUN/CREAT SERPL: 17.9 (ref 7–25)
CALCIUM SPEC-SCNC: 9.9 MG/DL (ref 8.6–10.5)
CHLORIDE SERPL-SCNC: 104 MMOL/L (ref 98–107)
CHOLEST SERPL-MCNC: 143 MG/DL (ref 0–200)
CO2 SERPL-SCNC: 25 MMOL/L (ref 22–29)
CREAT SERPL-MCNC: 0.78 MG/DL (ref 0.57–1)
DEPRECATED RDW RBC AUTO: 46.1 FL (ref 37–54)
EGFRCR SERPLBLD CKD-EPI 2021: 87.1 ML/MIN/1.73
EOSINOPHIL # BLD AUTO: 0.17 10*3/MM3 (ref 0–0.4)
EOSINOPHIL NFR BLD AUTO: 2.7 % (ref 0.3–6.2)
ERYTHROCYTE [DISTWIDTH] IN BLOOD BY AUTOMATED COUNT: 12 % (ref 12.3–15.4)
FERRITIN SERPL-MCNC: 152.8 NG/ML (ref 13–150)
GLOBULIN UR ELPH-MCNC: 2.6 GM/DL
GLUCOSE SERPL-MCNC: 99 MG/DL (ref 65–99)
HBA1C MFR BLD: 5.5 % (ref 4.8–5.6)
HCT VFR BLD AUTO: 40.3 % (ref 34–46.6)
HDLC SERPL-MCNC: 65 MG/DL (ref 40–60)
HGB BLD-MCNC: 13.4 G/DL (ref 12–15.9)
LDLC SERPL CALC-MCNC: 59 MG/DL (ref 0–100)
LDLC/HDLC SERPL: 0.88 {RATIO}
LYMPHOCYTES # BLD AUTO: 2.27 10*3/MM3 (ref 0.7–3.1)
LYMPHOCYTES NFR BLD AUTO: 36.3 % (ref 19.6–45.3)
MCH RBC QN AUTO: 35.4 PG (ref 26.6–33)
MCHC RBC AUTO-ENTMCNC: 33.3 G/DL (ref 31.5–35.7)
MCV RBC AUTO: 106.3 FL (ref 79–97)
MONOCYTES # BLD AUTO: 0.64 10*3/MM3 (ref 0.1–0.9)
MONOCYTES NFR BLD AUTO: 10.2 % (ref 5–12)
NEUTROPHILS NFR BLD AUTO: 3.13 10*3/MM3 (ref 1.7–7)
NEUTROPHILS NFR BLD AUTO: 50.2 % (ref 42.7–76)
PLATELET # BLD AUTO: 409 10*3/MM3 (ref 140–450)
PMV BLD AUTO: 8.4 FL (ref 6–12)
POTASSIUM SERPL-SCNC: 4.4 MMOL/L (ref 3.5–5.2)
PROT SERPL-MCNC: 7 G/DL (ref 6–8.5)
RBC # BLD AUTO: 3.79 10*6/MM3 (ref 3.77–5.28)
SODIUM SERPL-SCNC: 141 MMOL/L (ref 136–145)
TRIGL SERPL-MCNC: 105 MG/DL (ref 0–150)
VLDLC SERPL-MCNC: 19 MG/DL (ref 5–40)
WBC NRBC COR # BLD AUTO: 6.25 10*3/MM3 (ref 3.4–10.8)

## 2024-01-12 PROCEDURE — 80061 LIPID PANEL: CPT | Performed by: NURSE PRACTITIONER

## 2024-01-12 PROCEDURE — 99195 PHLEBOTOMY: CPT

## 2024-01-12 PROCEDURE — 85025 COMPLETE CBC W/AUTO DIFF WBC: CPT

## 2024-01-12 PROCEDURE — 80053 COMPREHEN METABOLIC PANEL: CPT | Performed by: NURSE PRACTITIONER

## 2024-01-12 PROCEDURE — 36415 COLL VENOUS BLD VENIPUNCTURE: CPT

## 2024-01-12 PROCEDURE — 83036 HEMOGLOBIN GLYCOSYLATED A1C: CPT | Performed by: NURSE PRACTITIONER

## 2024-01-12 PROCEDURE — 82306 VITAMIN D 25 HYDROXY: CPT | Performed by: NURSE PRACTITIONER

## 2024-01-12 PROCEDURE — 82728 ASSAY OF FERRITIN: CPT | Performed by: NURSE PRACTITIONER

## 2024-01-24 NOTE — PROGRESS NOTES
Nutrition Services    Patient Name: Merissa Yusuf  YOB: 1965  MRN: [id number]  Date of Service: 01/29/24      ICD-10-CM ICD-9-CM   1. Obesity, Class III, BMI 40-49.9 (morbid obesity)  E66.01 278.01        NUTRITION ASSESSMENT - BARIATRIC SURGERY      Reason for Visit MWM appt 2, follow up     H&P      Past Medical History:   Diagnosis Date    Anesthesia complication     drops B/P, and slow to arouse    Encounter for laboratory testing for COVID-19 virus 07/19/2022    GERD (gastroesophageal reflux disease)     Hereditary hemochromatosis     Hyperlipidemia     Seasonal allergies        Past Surgical History:   Procedure Laterality Date    BLADDER SUSPENSION      BREAST BIOPSY Right     CATARACT EXTRACTION  03/2023    FINGER SURGERY  2008    removed cysts from right index finger     HERNIA REPAIR      SUBTOTAL HYSTERECTOMY      TUBAL ABDOMINAL LIGATION      VENTRAL/INCISIONAL HERNIA REPAIR N/A 09/24/2020    Procedure: LAPAROSCOPIC VENTRAL HERNIA;  Surgeon: Reuben Molina MD;  Location: [facility] MAIN OR;  Service: General;  Laterality: N/A;  0855 TAP block    WISDOM TOOTH EXTRACTION          Previous Goals   Patient to aim for 24-32 oz of water each day - working on  Patient to switch over to unsweet tea and cut out coke - met   Set up workout equipment for next appt - working on       Encounter Information        Visit Narrative     Patient reports she has been trying to drink more fluids each day. She has been planning out meals and snacks and they all have good protein with balance of color and carbs.     Diet Recall:   Breakfast: 2 raisin toast with butter or honey nut cherrios, coffee or milk  Lunch: 1/2 sandwich or ptoein bar or soup with veggie straws  Dinner: chicken or fish with potatoes and veggies  Snacks: granola or nuts or PB crackers or trail mix or carrots with hummus, sometimes dark chocolate  Beverages: only had 1 coke recently and only 2 sweet teas, 20 oz of water    Exercise:  "patient reports she has most of her workout equipment set up but has not used yet.     Supplements:    Self Monitoring:          Anthropometrics        Current Height, Weight Height: 149.9 cm (59\")  Weight: 102 kg (223 lb 12.8 oz) (01/29/24 1104)            Wt Readings from Last 30 Encounters:   01/29/24 1104 102 kg (223 lb 12.8 oz)   01/12/24 1319 103 kg (228 lb)   01/11/24 1002 102 kg (225 lb 3.2 oz)   10/27/23 0818 103 kg (226 lb 9.6 oz)   10/18/23 1349 103 kg (227 lb 3.2 oz)   10/09/23 1042 104 kg (228 lb 12.8 oz)   08/04/23 0847 104 kg (230 lb)   07/12/23 1332 104 kg (229 lb 12.8 oz)   06/05/23 1139 106 kg (233 lb 12.8 oz)   05/24/23 1520 104 kg (230 lb)   05/04/23 1356 103 kg (228 lb)   04/10/23 1405 106 kg (232 lb 9.6 oz)   02/16/23 1010 105 kg (232 lb)   01/19/23 1329 105 kg (231 lb 11.2 oz)   01/18/23 1033 105 kg (231 lb 12.8 oz)   01/10/23 1418 104 kg (230 lb)   09/22/22 0952 104 kg (228 lb 12.8 oz)   07/19/22 0911 106 kg (233 lb)   07/14/22 0846 106 kg (233 lb 11 oz)   06/28/22 1506 106 kg (234 lb)   05/16/22 1026 105 kg (231 lb)   05/02/22 1427 105 kg (231 lb)   04/12/22 1048 105 kg (232 lb)   03/24/22 1112 106 kg (232 lb 9.6 oz)   03/22/22 0902 104 kg (229 lb 12.8 oz)   03/14/22 1322 104 kg (230 lb)   02/24/22 1441 103 kg (226 lb)   01/11/22 1049 103 kg (228 lb)   01/06/22 1109 102 kg (224 lb 6.4 oz)   12/07/21 1054 101 kg (222 lb)      BMI kg/m2 Body mass index is 45.2 kg/m².       Nutrition Diagnosis         Nutrition Dx Statement Overweight/obesity RT multifactorial biochemical, behavioral and environmental contributors to disease AEB BMI 45.2 kg/m^2         Nutrition Intervention         Nutrition Intervention Nutrition education related to diet modification and physical activity        Monitor/Evaluation        New Goals Patient to add in exercise 2 days each week for 15 minutes   Patient to continue balanced meals and snacks        Total time spent with pt 15 minutes of which 15 minutes were spent " on education.       Electronically signed by:  Susu Samuel RD  01/29/24 11:29 EST

## 2024-01-29 ENCOUNTER — OFFICE VISIT (OUTPATIENT)
Dept: BARIATRICS/WEIGHT MGMT | Facility: CLINIC | Age: 59
End: 2024-01-29
Payer: OTHER GOVERNMENT

## 2024-01-29 ENCOUNTER — PRIOR AUTHORIZATION (OUTPATIENT)
Dept: BARIATRICS/WEIGHT MGMT | Facility: CLINIC | Age: 59
End: 2024-01-29

## 2024-01-29 VITALS
DIASTOLIC BLOOD PRESSURE: 76 MMHG | HEART RATE: 83 BPM | BODY MASS INDEX: 45.12 KG/M2 | WEIGHT: 223.8 LBS | OXYGEN SATURATION: 97 % | HEIGHT: 59 IN | SYSTOLIC BLOOD PRESSURE: 116 MMHG

## 2024-01-29 DIAGNOSIS — E66.01 OBESITY, CLASS III, BMI 40-49.9 (MORBID OBESITY): Primary | ICD-10-CM

## 2024-01-29 DIAGNOSIS — Z79.899 MEDICATION MANAGEMENT: ICD-10-CM

## 2024-01-29 PROCEDURE — 99214 OFFICE O/P EST MOD 30 MIN: CPT | Performed by: NURSE PRACTITIONER

## 2024-01-29 RX ORDER — SEMAGLUTIDE 0.25 MG/.5ML
0.25 INJECTION, SOLUTION SUBCUTANEOUS WEEKLY
Qty: 2 ML | Refills: 0 | Status: SHIPPED | OUTPATIENT
Start: 2024-01-29 | End: 2024-02-02

## 2024-01-29 RX ORDER — ONDANSETRON 8 MG/1
8 TABLET, ORALLY DISINTEGRATING ORAL EVERY 8 HOURS PRN
Qty: 30 TABLET | Refills: 5 | Status: SHIPPED | OUTPATIENT
Start: 2024-01-29

## 2024-01-29 NOTE — PROGRESS NOTES
[facility]K BAR SURG [facility] BARIATRIC SURGERY  [address] [place] [address]  [address] [place] [address]  Dept: [phone]  1/29/2024      Merissa Yusuf.  [id number]  [id number]  1965  female      Chief Complaint   Patient presents with    Weight Loss     MWM #2       The patient is here for month 2 of medical weight management. She had a loss of 2 lbs. The patient states that she is following the recommendations given by our office and dietician including a high lean protein, low carb and low fat diet. We recommended adequate fruits and vegetable intake along with limited portion sizes. Patient is working on eliminating fast foods, fried foods, sweets and soda. Merissa Yusuf has been increasing her daily water intake. She has been exercising: walking some.  Wt Readings from Last 10 Encounters:   01/29/24 102 kg (223 lb 12.8 oz)   01/12/24 103 kg (228 lb)   01/11/24 102 kg (225 lb 3.2 oz)   10/27/23 103 kg (226 lb 9.6 oz)   10/18/23 103 kg (227 lb 3.2 oz)   10/09/23 104 kg (228 lb 12.8 oz)   08/04/23 104 kg (230 lb)   07/12/23 104 kg (229 lb 12.8 oz)   06/05/23 106 kg (233 lb 12.8 oz)   05/24/23 104 kg (230 lb)     Patient states they have made positive changes including eating more protein, smaller portion sizes  The patient admits to be struggling with none    Review of Systems   Constitutional:  Positive for fatigue.   Respiratory: Negative.     Cardiovascular: Negative.    Gastrointestinal: Negative.    Musculoskeletal:  Positive for myalgias.     Vitals:    01/29/24 1104   BP: 116/76   Pulse: 83   SpO2: 97%       Body mass index is 45.2 kg/m².    The following portions of the patient's history were reviewed and updated as appropriate: active problem list, medication list, allergies, social history, notes from last encounter  Past Medical History:   Diagnosis Date    Anesthesia complication     drops B/P, and slow to arouse    Encounter for  laboratory testing for COVID-19 virus 07/19/2022    GERD (gastroesophageal reflux disease)     Hereditary hemochromatosis     Hyperlipidemia     Seasonal allergies      Past Surgical History:   Procedure Laterality Date    BLADDER SUSPENSION      BREAST BIOPSY Right     CATARACT EXTRACTION  03/2023    FINGER SURGERY  2008    removed cysts from right index finger     HERNIA REPAIR      SUBTOTAL HYSTERECTOMY      TUBAL ABDOMINAL LIGATION      VENTRAL/INCISIONAL HERNIA REPAIR N/A 09/24/2020    Procedure: LAPAROSCOPIC VENTRAL HERNIA;  Surgeon: Reuben Molina MD;  Location: [facility] MAIN OR;  Service: General;  Laterality: N/A;  0855 TAP block    WISDOM TOOTH EXTRACTION          Physical Exam  Constitutional:       Appearance: Normal appearance. She is obese.   Pulmonary:      Effort: Pulmonary effort is normal.   Abdominal:      General: Abdomen is flat.      Palpations: Abdomen is soft.   Skin:     General: Skin is warm and dry.   Neurological:      General: No focal deficit present.      Mental Status: She is alert and oriented to person, place, and time.   Psychiatric:         Mood and Affect: Mood normal.         Behavior: Behavior normal.         Thought Content: Thought content normal.         Judgment: Judgment normal.         Discussion/Plan:  BMI 45.20, Class 2 obesity, medication management: wegovy    Obesity/Morbid Obesity: Currently the patient's weight is decreased. Treatment plan includes prescribed diet, prescribed exercise regimen and behavior modification.     Medication options for weight loss were discussed with the pt and based upon the patient's history and insurance , wegovy will be prescribed. Pt did state if wegovy is not approved, will consider compounded semaglutide instead.       Pt  denies any hx of multiple endocrine neoplasia type 2 or medullary thyroid cancer for herself or her immediate family with GLP-1's. Pt encouraged to call the office with any nausea/ vomiting or abdominal  pain with GLP-1's. Pt also informed constipation can happen with these medications due to slowing of gut. Pt encouraged to take a stool softener/ laxative if constipation occurs.     I reviewed the appropriate dietary choices with the patient and encouraged the necessary changes. Recommended at least 70 grams of protein per day, around 35 grams of fats and less than 100 grams of carbohydrates. Reviewed calorie intake if patient wanted to calorie count and/or had BMR. Instructed patient to drink half of body weight in ounces per day and exercise a minimum of 150 minutes per week including both cardio and strength training. Discussed the option of keeping a food journal which will help patient become more aware of the nutritional value of foods .     The patient was given written materials from our office for diet plans and medications.   I answered all of the patients questions regarding dietary changes, exercise, and medications.   The patient will follow up in 1 month. The total time spent face to face was 30 minutes with 15 minutes spent counseling.    Encounter Diagnosis   Name Primary?    Obesity, Class III, BMI 40-49.9 (morbid obesity) Yes       CHARLINE Quesada  [facility] Bariatrics

## 2024-01-29 NOTE — TELEPHONE ENCOUNTER
Prior authorization for wegovy initiated through cover my meds- Key [id number]- up to 72 hr for response.

## 2024-02-02 RX ORDER — SEMAGLUTIDE 0.25 MG/.5ML
0.25 INJECTION, SOLUTION SUBCUTANEOUS WEEKLY
Qty: 2 ML | Refills: 0 | Status: SHIPPED | OUTPATIENT
Start: 2024-02-02

## 2024-02-09 NOTE — TELEPHONE ENCOUNTER
LVM  
Pt notified  
She can discontinue the Topamax.  I will send a new prescription of Lamictal 25 mg twice daily for mood stabilizer instead of the Topamax.  
Thinks the Topamax is causing extra anxiety with SOA. Symptoms improve with taking Buspar. She is not going to take it today and wanted to know if she needed to taper off of it. She is requesting something different.  
show

## 2024-02-26 ENCOUNTER — OFFICE VISIT (OUTPATIENT)
Dept: BARIATRICS/WEIGHT MGMT | Facility: CLINIC | Age: 59
End: 2024-02-26
Payer: OTHER GOVERNMENT

## 2024-02-26 VITALS
BODY MASS INDEX: 44.76 KG/M2 | SYSTOLIC BLOOD PRESSURE: 133 MMHG | HEIGHT: 59 IN | WEIGHT: 222 LBS | HEART RATE: 81 BPM | OXYGEN SATURATION: 96 % | DIASTOLIC BLOOD PRESSURE: 81 MMHG

## 2024-02-26 DIAGNOSIS — E66.01 OBESITY, CLASS III, BMI 40-49.9 (MORBID OBESITY): Primary | ICD-10-CM

## 2024-02-26 DIAGNOSIS — Z79.899 MEDICATION MANAGEMENT: ICD-10-CM

## 2024-02-26 NOTE — PROGRESS NOTES
MGK BAR SURG [facility] GROUP BARIATRIC SURGERY  [address] [place] [address]  [address] [place] [address]  Dept: [phone]  2/26/2024      Merissa Yusuf.  [id number]  [id number]  1965  female      Chief Complaint   Patient presents with    Weight Loss     MWM #3       The patient is here for month 3 of medical weight management. She had a loss of 1 lbs. The patient states that they are following the recommendations given by our office and dietician including a high lean protein, low carb and low fat diet. We recommended adequate fruits and vegetable intake along with limited portion sizes. Patient is working on eliminating fast foods, fried foods, sweets and soda. Merissa Yusuf has been increasing her daily water intake. She has been exercising: none.  Wt Readings from Last 10 Encounters:   02/26/24 101 kg (222 lb)   01/29/24 102 kg (223 lb 12.8 oz)   01/12/24 103 kg (228 lb)   01/11/24 102 kg (225 lb 3.2 oz)   10/27/23 103 kg (226 lb 9.6 oz)   10/18/23 103 kg (227 lb 3.2 oz)   10/09/23 104 kg (228 lb 12.8 oz)   08/04/23 104 kg (230 lb)   07/12/23 104 kg (229 lb 12.8 oz)   06/05/23 106 kg (233 lb 12.8 oz)     Patient states they have made positive changes including protein bars  The patient admits to be struggling with physical limitations    Not as much hunger with 1 dose of compound semaglutide , on 0.25 mg dosage  No nausea/ vomiting, abdominal pain    Breakfast: raisin bran or raisin toast  Lunch: turkey or ham sandwich  Dinner; chicken, and veggies  Snacks: yogurt, fruit, chocolate protein bar  Drinks: water  Exercise: none    Review of Systems   Constitutional:  Positive for activity change and appetite change.   Respiratory: Negative.     Cardiovascular: Negative.    Gastrointestinal: Negative.    Musculoskeletal:  Positive for back pain and myalgias.     Vitals:    02/26/24 1430   BP: 133/81   Pulse: 81   SpO2: 96%         Body mass index  is 44.84 kg/m².    The following portions of the patient's history were reviewed and updated as appropriate: active problem list, medication list, allergies, social history, notes from last encounter  Past Medical History:   Diagnosis Date    Anesthesia complication     drops B/P, and slow to arouse    Encounter for laboratory testing for COVID-19 virus 07/19/2022    GERD (gastroesophageal reflux disease)     Hereditary hemochromatosis     Hyperlipidemia     Seasonal allergies      Past Surgical History:   Procedure Laterality Date    BLADDER SUSPENSION      BREAST BIOPSY Right     CATARACT EXTRACTION  03/2023    FINGER SURGERY  2008    removed cysts from right index finger     HERNIA REPAIR      SUBTOTAL HYSTERECTOMY      TUBAL ABDOMINAL LIGATION      VENTRAL/INCISIONAL HERNIA REPAIR N/A 09/24/2020    Procedure: LAPAROSCOPIC VENTRAL HERNIA;  Surgeon: Reuben Molina MD;  Location: [facility] MAIN OR;  Service: General;  Laterality: N/A;  0[address] T[address] block    WISDOM TOOTH EXTRACTION          Physical Exam  Constitutional:       Appearance: Normal appearance. She is obese.   Pulmonary:      Effort: Pulmonary effort is normal.   Abdominal:      General: Abdomen is flat.      Palpations: Abdomen is soft.   Skin:     General: Skin is warm and dry.   Neurological:      General: No focal deficit present.      Mental Status: She is alert and oriented to person, place, and time.   Psychiatric:         Mood and Affect: Mood normal.         Behavior: Behavior normal.         Thought Content: Thought content normal.         Judgment: Judgment normal.         Discussion/Plan:  BMI 44.84, Class 3 obesity, medication management: compound semaglutide     Obesity/Morbid Obesity: Currently the patient's weight is decreased. Treatment plan includes prescribed diet, prescribed exercise regimen and behavior modification.     Medication options for weight loss were discussed with the pt and based upon the patient's history and  insurance , compound semaglutide will be continued. Pt has completed less than 1 month of this medication and is on the 0.25 mg weekly dosage. Wegovy was prescribed and a PA was attempted but pt would have to try and fail qsymia, contrave and phentermine before this medication would be approved. I spoke with pt about this and she would like to instead continue with the compounded semaglutide.       Pt  denies any hx of multiple endocrine neoplasia type 2 or medullary thyroid cancer for herself or her immediate family with GLP-1's. Pt encouraged to call the office with any nausea/ vomiting or abdominal pain with GLP-1's. Pt also informed constipation can happen with these medications due to slowing of gut. Pt encouraged to take a stool softener/ laxative if constipation occurs.     Will refill compounded semaglutide for pt today.     I reviewed the appropriate dietary choices with the patient and encouraged the necessary changes. Recommended at least 70 grams of protein per day, around 35 grams of fats and less than 100 grams of carbohydrates. Reviewed calorie intake if patient wanted to calorie count and/or had BMR. Instructed patient to drink half of body weight in ounces per day and exercise a minimum of 150 minutes per week including both cardio and strength training. Discussed the option of keeping a food journal which will help patient become more aware of the nutritional value of foods .     The patient was given written materials from our office for diet plans and medications.   I answered all of the patients questions regarding dietary changes, exercise, and medications.   The patient will follow up in 1 month with dietician. The total time spent face to face was 30 minutes with 15 minutes spent counseling.      CHARLINE Quesada  [facility] Bariatrics

## 2024-03-14 RX ORDER — MONTELUKAST SODIUM 10 MG/1
10 TABLET ORAL NIGHTLY
Qty: 90 TABLET | Refills: 3 | Status: SHIPPED | OUTPATIENT
Start: 2024-03-14

## 2024-03-21 ENCOUNTER — OFFICE VISIT (OUTPATIENT)
Dept: BARIATRICS/WEIGHT MGMT | Facility: CLINIC | Age: 59
End: 2024-03-21
Payer: OTHER GOVERNMENT

## 2024-03-21 VITALS — BODY MASS INDEX: 44.11 KG/M2 | WEIGHT: 218.8 LBS | HEIGHT: 59 IN

## 2024-03-21 DIAGNOSIS — E66.01 OBESITY, CLASS III, BMI 40-49.9 (MORBID OBESITY): Primary | ICD-10-CM

## 2024-03-21 NOTE — PATIENT INSTRUCTIONS
"Patient will begin taking a multivitamin daily.  Patient will begin to add sitting exercise or sitting work-out machines 2 days a week for 15-20 minutes.   Patient will eat 3 \"meals\" a day with protein first.   "

## 2024-03-21 NOTE — PROGRESS NOTES
Nutrition Services    Patient Name: Merissa Yusuf  YOB: 1965  MRN: [id number]  Date of Service: 03/21/24      ICD-10-CM ICD-9-CM   1. Obesity, Class III, BMI 40-49.9 (morbid obesity)  E66.01 278.01          NUTRITION ASSESSMENT - BARIATRIC SURGERY      Reason for Visit MWM-4     H&P      Past Medical History:   Diagnosis Date    Anesthesia complication     drops B/P, and slow to arouse    Encounter for laboratory testing for COVID-19 virus 07/19/2022    GERD (gastroesophageal reflux disease)     Hereditary hemochromatosis     Hyperlipidemia     Seasonal allergies        Past Surgical History:   Procedure Laterality Date    BLADDER SUSPENSION      BREAST BIOPSY Right     CATARACT EXTRACTION  03/2023    FINGER SURGERY  2008    removed cysts from right index finger     HERNIA REPAIR      SUBTOTAL HYSTERECTOMY      TUBAL ABDOMINAL LIGATION      VENTRAL/INCISIONAL HERNIA REPAIR N/A 09/24/2020    Procedure: LAPAROSCOPIC VENTRAL HERNIA;  Surgeon: Reuben Molina MD;  Location: [facility] MAIN OR;  Service: General;  Laterality: N/A;  0[address] T[address] block    WISDOM TOOTH EXTRACTION          Previous Goals   Patient to add in exercise 2 days each week for 15 minutes - not done. Working on  Patient to continue balanced meals and snacks - working on       Encounter Information        Visit Narrative     Patient increased dose of Wegovy yesterday. Patient just moved in with daughter and son-in-law. Patient states that she has been very active in moving and has to walk steps often.  Patient reports that she does have a gym available to her now that she can use with machines that allow her to workout without having to be standing.  Patient has difficultly walking long distances and requires a cane most of the time.     Diet Recall:   Breakfast: Cheerios or raisin bran or egg or frozen breakfast sandwich - usually half of it  Lunch: skip or maybe apple or banana with PNB   Dinner: low-fat meat pork/chicken, rice or  "potatoes, vegetable - small portions  Snacks: Girl  cookies,banana  Beverages: drinks very little, water or tea, coffee    Exercise: None currently. Active in moving    Supplements: No MV, takes methyl-folate, tumeric, gingko    Self Monitoring:          Anthropometrics        Current Height, Weight Height: 149.9 cm (59\")  Weight: 99.2 kg (218 lb 12.8 oz) (03/21/24 1059)       Trending Weight Hx  4 pound weight loss since last visit 2/26    Wt Readings from Last 30 Encounters:   03/21/24 1059 99.2 kg (218 lb 12.8 oz)   02/26/24 1430 101 kg (222 lb)   01/29/24 1104 102 kg (223 lb 12.8 oz)   01/12/24 1319 103 kg (228 lb)   01/11/24 1002 102 kg (225 lb 3.2 oz)   10/27/23 0818 103 kg (226 lb 9.6 oz)   10/18/23 1349 103 kg (227 lb 3.2 oz)   10/09/23 1042 104 kg (228 lb 12.8 oz)   08/04/23 0847 104 kg (230 lb)   07/12/23 1332 104 kg (229 lb 12.8 oz)   06/05/23 1139 106 kg (233 lb 12.8 oz)   05/24/23 1520 104 kg (230 lb)   05/04/23 1356 103 kg (228 lb)   04/10/23 1405 106 kg (232 lb 9.6 oz)   02/16/23 1010 105 kg (232 lb)   01/19/23 1329 105 kg (231 lb 11.2 oz)   01/18/23 1033 105 kg (231 lb 12.8 oz)   01/10/23 1418 104 kg (230 lb)   09/22/22 0952 104 kg (228 lb 12.8 oz)   07/19/22 0911 106 kg (233 lb)   07/14/22 0846 106 kg (233 lb 11 oz)   06/28/22 1506 106 kg (234 lb)   05/16/22 1026 105 kg (231 lb)   05/02/22 1427 105 kg (231 lb)   04/12/22 1048 105 kg (232 lb)   03/24/22 1112 106 kg (232 lb 9.6 oz)   03/22/22 0902 104 kg (229 lb 12.8 oz)   03/14/22 1322 104 kg (230 lb)   02/24/22 1441 103 kg (226 lb)   01/11/22 1049 103 kg (228 lb)      BMI kg/m2 Body mass index is 44.19 kg/m².       Nutrition Diagnosis         Nutrition Dx Statement Overweight/obesity RT multifactorial biochemical, behavioral and environmental contributors to disease AEB BMI 44.19 kg/m^2.           Nutrition Intervention         Nutrition Intervention Nutrition education related to diet modification and physical activity      " "    Monitor/Evaluation        New Goals Patient will begin taking a multivitamin daily.  Patient will begin to add sitting exercise or sitting work-out machines 2 days a week for 15-20 minutes.   Patient will eat 3 \"meals\" a day with protein first.        Total time spent with pt 15 minutes of which 15 minutes were spent on education.       Electronically signed by:  Aliza Menjivar RD  03/21/24 15:52 EDT   "

## 2024-04-01 ENCOUNTER — LAB (OUTPATIENT)
Dept: LAB | Facility: HOSPITAL | Age: 59
End: 2024-04-01
Payer: OTHER GOVERNMENT

## 2024-04-01 DIAGNOSIS — E83.110 HEREDITARY HEMOCHROMATOSIS: Primary | ICD-10-CM

## 2024-04-01 LAB
BASOPHILS # BLD AUTO: 0.02 10*3/MM3 (ref 0–0.2)
BASOPHILS NFR BLD AUTO: 0.3 % (ref 0–1.5)
DEPRECATED RDW RBC AUTO: 44 FL (ref 37–54)
EOSINOPHIL # BLD AUTO: 0.56 10*3/MM3 (ref 0–0.4)
EOSINOPHIL NFR BLD AUTO: 7.5 % (ref 0.3–6.2)
ERYTHROCYTE [DISTWIDTH] IN BLOOD BY AUTOMATED COUNT: 11.7 % (ref 12.3–15.4)
FERRITIN SERPL-MCNC: 98.03 NG/ML (ref 13–150)
HCT VFR BLD AUTO: 39 % (ref 34–46.6)
HGB BLD-MCNC: 13.3 G/DL (ref 12–15.9)
LYMPHOCYTES # BLD AUTO: 2.69 10*3/MM3 (ref 0.7–3.1)
LYMPHOCYTES NFR BLD AUTO: 36.2 % (ref 19.6–45.3)
MCH RBC QN AUTO: 36 PG (ref 26.6–33)
MCHC RBC AUTO-ENTMCNC: 34.1 G/DL (ref 31.5–35.7)
MCV RBC AUTO: 105.7 FL (ref 79–97)
MONOCYTES # BLD AUTO: 0.67 10*3/MM3 (ref 0.1–0.9)
MONOCYTES NFR BLD AUTO: 9 % (ref 5–12)
NEUTROPHILS NFR BLD AUTO: 3.49 10*3/MM3 (ref 1.7–7)
NEUTROPHILS NFR BLD AUTO: 47 % (ref 42.7–76)
PLATELET # BLD AUTO: 369 10*3/MM3 (ref 140–450)
PMV BLD AUTO: 8.2 FL (ref 6–12)
RBC # BLD AUTO: 3.69 10*6/MM3 (ref 3.77–5.28)
WBC NRBC COR # BLD AUTO: 7.43 10*3/MM3 (ref 3.4–10.8)

## 2024-04-01 PROCEDURE — 36415 COLL VENOUS BLD VENIPUNCTURE: CPT | Performed by: INTERNAL MEDICINE

## 2024-04-01 PROCEDURE — 82728 ASSAY OF FERRITIN: CPT | Performed by: INTERNAL MEDICINE

## 2024-04-01 PROCEDURE — 85025 COMPLETE CBC W/AUTO DIFF WBC: CPT | Performed by: INTERNAL MEDICINE

## 2024-04-11 NOTE — PROGRESS NOTES
ONCOLOGY/HEMATOLOGY FOLLOW UP    PATIENT: Merissa Yusuf  YOB: 1965  MEDICAL RECORD NUMBER: [id number] OF SERVICE: 04/15/24      Chief complaint:  Hereditary hemochromatosis, homozygous mutation in H63D.  Iron overload  Macrocytosis    History of Present Illness:   Ms. Yusuf has a history of COPD and also depression.  Laboratory work up was obtained in March 2019 which was abnormal.  Patient was sent to the [facility] and seen initially on 3/13/19.   3/5/19 - WBC 4.7, hemoglobin 14.1, MCV elevated to 103 and platelet count of 592,000.  Vitamin D  20.  Sed rate 35.  TSH 1.62.  Vitamin B12 476.  Creatinine 0.8.    3/13/19 - ZACKERY-2 mutation negative.  Hemochromatosis gene mutation, homozygous mutation in H63D.  3/13/19 - Vitamin B12 507.  Ferritin 178.  Creatinine 0.7.  Iron level 156.  Iron binding  capacity 257.  Percentage iron saturation 61.   1/8/2020 Ferritin 304 iron 177 iron saturation 56 TIBC 316  7/21/2020 iron 247 iron saturation 82 iron binding capacity 302 ferritin 271 hemoglobin 13.7 WBC 6.1 platelet count 372.  6/19/2020 CMP is normal including LFTs.  TSH 3.01  10/2020 ferritin 233.40 iron sat 43  8/2021 ferritin 341.40, iron sat 76  11/2/2021 -ferritin 112  3/8/2022 -CBC with hemoglobin 13.5, hematocrit 38.7.  Ferritin 59.7, iron saturation 35  7/6/2022 - Hb 13.7, ferritin 62 iron 36  4/3/2023 ferritin 76, iron sat 38,   9/29/2023-ferritin 152.40, iron 191, iron sat 61, hemoglobin 14.0, hematocrit 39.4.  4/1/2024-ferritin 98.03, hemoglobin 13.3, hematocrit 39.0      SUBJECTIVE:   Merissa is here today for her routine follow-up.  She reports that she is doing well and has no new complaints.  She tries to utilize diet modification to help keep her iron levels under control. She has began Wegovy for weight loss in anticipation of bilateral knee replacement surgery once she meets the BMI goal. She is having some left ear pain and is concerned for infection.        Past Medical History:   Diagnosis Date    Anesthesia complication     drops B/P, and slow to arouse    Encounter for laboratory testing for COVID-19 virus 07/19/2022    GERD (gastroesophageal reflux disease)     Hereditary hemochromatosis     Hyperlipidemia     Seasonal allergies        Past Surgical History:   Procedure Laterality Date    BLADDER SUSPENSION      BREAST BIOPSY Right     CATARACT EXTRACTION  03/2023    FINGER SURGERY  2008    removed cysts from right index finger     HERNIA REPAIR      SUBTOTAL HYSTERECTOMY      TUBAL ABDOMINAL LIGATION      VENTRAL/INCISIONAL HERNIA REPAIR N/A 09/24/2020    Procedure: LAPAROSCOPIC VENTRAL HERNIA;  Surgeon: Reuben Molina MD;  Location: [facility] MAIN OR;  Service: General;  Laterality: N/A;  0[address] TAP block    WISDOM TOOTH EXTRACTION         Family History   Problem Relation Age of Onset    Coronary artery disease Other     Diabetes Other     Dementia Other     Diabetes Mother     Cancer Mother     Heart disease Father     Liver disease Father     Hypertension Father     Cancer Brother     Breast cancer Neg Hx     Ovarian cancer Neg Hx        Social History     Socioeconomic History    Marital status:    Tobacco Use    Smoking status: Never     Passive exposure: Past    Smokeless tobacco: Never   Vaping Use    Vaping status: Never Used   Substance and Sexual Activity    Alcohol use: Yes     Alcohol/week: 1.0 standard drink of alcohol     Types: 1 Glasses of wine per week     Comment: rarely    Drug use: Yes     Frequency: 7.0 times per week     Comment: CBD  gummies    Sexual activity: Defer       ALLERGIES:  Amoxicillin, Erythromycin, Escitalopram, Prozac  [fluoxetine hcl], and Penicillin g    MEDICATION:  Current Outpatient Medications   Medication Sig Dispense Refill    acetaminophen (TYLENOL) 500 MG tablet Take 1 tablet by mouth Every 4 (Four) Hours As Needed.      albuterol sulfate  (90 Base) MCG/ACT inhaler Inhale 2 puffs Every 4  "(Four) Hours As Needed for Wheezing. 6.7 g 0    busPIRone (BUSPAR) 15 MG tablet TAKE 1 TABLET THREE TIMES A DAY FOR ANXIETY (Patient taking differently: TAKE up to THREE TIMES A DAY FOR ANXIETY- usually 2 a day) 270 tablet 3    Calcium Carbonate Antacid (TUMS PO) Take 2,000 mg by mouth At Night As Needed.      Evolocumab (Repatha SureClick) solution auto-injector SureClick injection Inject 1 ml under the skin into the appropriate area as directed every 14 days Strength 140mg/ml 6 mL 3    Ginkgo Biloba (GINKGO PO) Take 1,000 mg by mouth Daily. W/ Gensing      l-methylfolate calcium (DEPLIN) 7.5 MG tablet tablet Take 1 tablet by mouth 2 (Two) Times a Day.      lamoTRIgine (LaMICtal) 100 MG tablet Take 1 tablet by mouth 2 (Two) Times a Day. 180 tablet 1    montelukast (SINGULAIR) 10 MG tablet TAKE 1 TABLET EVERY NIGHT 90 tablet 3    ondansetron ODT (ZOFRAN-ODT) 8 MG disintegrating tablet Place 1 tablet on the tongue Every 8 (Eight) Hours As Needed for Nausea or Vomiting. 30 tablet 5    Semaglutide-Weight Management 0.25 MG/0.5ML solution auto-injector Inject 0.5 mL under the skin into the appropriate area as directed 1 (One) Time Per Week. 2 mL 0    Trintellix 5 MG tablet tablet TAKE 1 TABLET DAILY WITH BREAKFAST 90 tablet 3    Turmeric 400 MG capsule        No current facility-administered medications for this visit.     PHYSICAL EXAM:    Current Vitals:  /84   Pulse 83   Ht 149.9 cm (59\")   Wt 98.9 kg (218 lb)   LMP  (LMP Unknown)   SpO2 97%   BMI 44.03 kg/m²     VITAL signs in the last 24 hours: [unfilled]       04/15/24  1511   Weight: 98.9 kg (218 lb)           Physical Exam  Constitutional:       Appearance: Normal appearance.   HENT:      Head: Normocephalic and atraumatic.      Right Ear: Tympanic membrane, ear canal and external ear normal. There is no impacted cerumen.      Left Ear: Tympanic membrane, ear canal and external ear normal. There is no impacted cerumen.   Eyes:      Extraocular " Movements: Extraocular movements intact.      Pupils: Pupils are equal, round, and reactive to light.   Cardiovascular:      Rate and Rhythm: Normal rate and regular rhythm.      Pulses: Normal pulses.      Heart sounds: No murmur heard.  Pulmonary:      Effort: Pulmonary effort is normal.      Breath sounds: Normal breath sounds.   Abdominal:      General: There is no distension.      Palpations: Abdomen is soft. There is no mass.      Tenderness: There is no abdominal tenderness.   Musculoskeletal:         General: Normal range of motion.      Cervical back: Normal range of motion and neck supple.   Skin:     General: Skin is warm.   Neurological:      General: No focal deficit present.      Mental Status: She is alert.   Psychiatric:         Mood and Affect: Mood normal.           LABORATORY/DATA:  Lab Results   Component Value Date    WBC 7.43 04/01/2024    WBC 6.25 01/12/2024    HGB 13.3 04/01/2024    HGB 13.4 01/12/2024    HCT 39.0 04/01/2024    HCT 40.3 01/12/2024    NEUTROABS 3.49 04/01/2024       Lab Results   Component Value Date    GLUCOSE 99 01/12/2024    BUN 14 01/12/2024    CREATININE 0.78 01/12/2024    EGFRIFNONA 81 09/21/2021    EGFRIFAFRI >60 02/26/2019    BCR 17.9 01/12/2024    K 4.4 01/12/2024    CO2 25.0 01/12/2024    CALCIUM 9.9 01/12/2024    ALBUMIN 4.4 01/12/2024    LABIL2 1.5 03/13/2019    AST 17 01/12/2024    ALT 15 01/12/2024       Lab Results   Component Value Date    IRON 191 (H) 09/29/2023    TIBC 311 09/29/2023    FERRITIN 98.03 04/01/2024        Assessment & Plan    Patient is a 57-year-old female with iron overload secondary to homozygous H63D mutation for hemochromatosis.    Hemochromatosis  Patient does not have the classic phenotype however has an alternate with homozygous H63D.  She has a propensity towards iron overload.  However less likely to cause significant liver damage.  Ideally ferritin should be below 75 to prevent liver damage.  We will continue to monitor.   Continue  phlebotomies for hematocrit greater than 32 and ferritin greater than 100   Last phlebotomy was 1/12/2024    Left ETD  Advise continued intranasal steroid and f/u w/ PCP if needed     We will recheck with CBC and ferritin in 3 months with phlebotomy if needed and follow-up with Dr. Pimentel in 6 months with CBC and ferritin 1 week prior to the appointment and phlebotomy with a follow-up if needed.    Patient agrees with the plan.

## 2024-04-12 ENCOUNTER — TELEPHONE (OUTPATIENT)
Dept: ONCOLOGY | Facility: HOSPITAL | Age: 59
End: 2024-04-12
Payer: OTHER GOVERNMENT

## 2024-04-12 NOTE — TELEPHONE ENCOUNTER
Message left that pt did not need to come on monday april 15th until 3pm to see CHARLINE Aldana. Pt did not qualify for phlebotomy based on labs from 4/1.

## 2024-04-15 ENCOUNTER — OFFICE VISIT (OUTPATIENT)
Dept: ONCOLOGY | Facility: CLINIC | Age: 59
End: 2024-04-15
Payer: OTHER GOVERNMENT

## 2024-04-15 VITALS
WEIGHT: 218 LBS | BODY MASS INDEX: 43.95 KG/M2 | OXYGEN SATURATION: 97 % | HEIGHT: 59 IN | DIASTOLIC BLOOD PRESSURE: 84 MMHG | HEART RATE: 83 BPM | SYSTOLIC BLOOD PRESSURE: 121 MMHG

## 2024-04-15 DIAGNOSIS — E83.110 HEREDITARY HEMOCHROMATOSIS: Primary | ICD-10-CM

## 2024-04-15 PROCEDURE — 99214 OFFICE O/P EST MOD 30 MIN: CPT | Performed by: NURSE PRACTITIONER

## 2024-04-18 ENCOUNTER — OFFICE VISIT (OUTPATIENT)
Dept: BARIATRICS/WEIGHT MGMT | Facility: CLINIC | Age: 59
End: 2024-04-18
Payer: OTHER GOVERNMENT

## 2024-04-18 VITALS — BODY MASS INDEX: 43.42 KG/M2 | WEIGHT: 215.4 LBS | HEIGHT: 59 IN

## 2024-04-18 DIAGNOSIS — E66.01 OBESITY, CLASS III, BMI 40-49.9 (MORBID OBESITY): Primary | ICD-10-CM

## 2024-04-18 NOTE — PROGRESS NOTES
"Nutrition Services    Patient Name: Merissa Yusuf  YOB: 1965  MRN: [id number]  Date of Service: 04/18/24      ICD-10-CM ICD-9-CM   1. Obesity, Class III, BMI 40-49.9 (morbid obesity)  E66.01 278.01          NUTRITION ASSESSMENT - BARIATRIC SURGERY      Reason for Visit MWM-5     H&P      Past Medical History:   Diagnosis Date    Anesthesia complication     drops B/P, and slow to arouse    Encounter for laboratory testing for COVID-19 virus 07/19/2022    GERD (gastroesophageal reflux disease)     Hereditary hemochromatosis     Hyperlipidemia     Seasonal allergies        Past Surgical History:   Procedure Laterality Date    BLADDER SUSPENSION      BREAST BIOPSY Right     CATARACT EXTRACTION  03/2023    FINGER SURGERY  2008    removed cysts from right index finger     HERNIA REPAIR      SUBTOTAL HYSTERECTOMY      TUBAL ABDOMINAL LIGATION      VENTRAL/INCISIONAL HERNIA REPAIR N/A 09/24/2020    Procedure: LAPAROSCOPIC VENTRAL HERNIA;  Surgeon: Reuben Molina MD;  Location: [facility];  Service: General;  Laterality: N/A;  0[address] TAP block    WISDOM TOOTH EXTRACTION          Previous Goals   Patient will begin taking a multivitamin daily. - met  Patient will begin to add sitting exercise or sitting work-out machines 2 days a week for 15-20 minutes. - working on  Patient will eat 3 \"meals\" a day with protein first. -met       Encounter Information        Visit Narrative     Patient states that she has not started exercising but has started eating 3 meals a day and taking a multivitamin. Patient is on Wegovy.  Patient was unable to get medicine at [facility]. Patient reports that she has been eating out often due to moving still. Patient reports that her urine was dark and is now better because she is drinking more.     Diet Recall:   Breakfast: eggs with cheese and ham (1 egg)  Lunch: apple and peanut butter.  Occasionally out. Natalie 1/2 fish sandwich  Dinner: pizza often (1/2 slice), chinese, " "eating out, salad at restaurant  Snacks: apple and peanut butter, grapes, lunchables often  Beverages: water - \"not as much as I should\" diet drinks, tea, milk    Exercise: none scheduled     Supplements: multivitamin - store brand gummy     Self Monitoring:          Anthropometrics        Current Height, Weight Height: 149.9 cm (59\")  Weight: 97.7 kg (215 lb 6.4 oz) (04/18/24 0929)       Trending Weight Hx  3.5 pound weight loss since last visit about 1 month ago    Wt Readings from Last 30 Encounters:   04/18/24 0929 97.7 kg (215 lb 6.4 oz)   04/15/24 1511 98.9 kg (218 lb)   03/21/24 1059 99.2 kg (218 lb 12.8 oz)   02/26/24 1430 101 kg (222 lb)   01/29/24 1104 102 kg (223 lb 12.8 oz)   01/12/24 1319 103 kg (228 lb)   01/11/24 1002 102 kg (225 lb 3.2 oz)   10/27/23 0818 103 kg (226 lb 9.6 oz)   10/18/23 1349 103 kg (227 lb 3.2 oz)   10/09/23 1042 104 kg (228 lb 12.8 oz)   08/04/23 0847 104 kg (230 lb)   07/12/23 1332 104 kg (229 lb 12.8 oz)   06/05/23 1139 106 kg (233 lb 12.8 oz)   05/24/23 1520 104 kg (230 lb)   05/04/23 1356 103 kg (228 lb)   04/10/23 1405 106 kg (232 lb 9.6 oz)   02/16/23 1010 105 kg (232 lb)   01/19/23 1329 105 kg (231 lb 11.2 oz)   01/18/23 1033 105 kg (231 lb 12.8 oz)   01/10/23 1418 104 kg (230 lb)   09/22/22 0952 104 kg (228 lb 12.8 oz)   07/19/22 0911 106 kg (233 lb)   07/14/22 0846 106 kg (233 lb 11 oz)   06/28/22 1506 106 kg (234 lb)   05/16/22 1026 105 kg (231 lb)   05/02/22 1427 105 kg (231 lb)   04/12/22 1048 105 kg (232 lb)   03/24/22 1112 106 kg (232 lb 9.6 oz)   03/22/22 0902 104 kg (229 lb 12.8 oz)   03/14/22 1322 104 kg (230 lb)      BMI kg/m2 Body mass index is 43.51 kg/m².       Nutrition Diagnosis         Nutrition Dx Statement Overweight/obesity RT multifactorial biochemical, behavioral and environmental contributors to disease AEB BMI 43.51 kg/m^2.           Nutrition Intervention         Nutrition Intervention Nutrition education related to diet modification and physical " activity          Monitor/Evaluation        New Goals Patient will begin to add sitting exercise or sitting work-out machines 2 days a week for 15-20 minutes.   Patient will switch from lunchables to pre-prepared snacks with less sodium and fat. (Discussed examples)  Patient will drink at least 40-50 oz a day. Patient will carry water with her.        Total time spent with pt 15 minutes of which 15 minutes were spent on education.       Electronically signed by:  Aliza Menjivar RD  04/18/24 11:49 EDT

## 2024-04-18 NOTE — PATIENT INSTRUCTIONS
Patient goal:    Patient will begin to add sitting exercise or sitting work-out machines 2 days a week for 15-20 minutes.   Patient will switch from lunchables to pre-prepared snacks with less sodium and fat. (Discussed examples)  Patient will drink at least 40-50 oz a day. Patient will carry water with her.

## 2024-04-19 ENCOUNTER — TELEPHONE (OUTPATIENT)
Dept: BARIATRICS/WEIGHT MGMT | Facility: CLINIC | Age: 59
End: 2024-04-19
Payer: OTHER GOVERNMENT

## 2024-04-19 NOTE — TELEPHONE ENCOUNTER
Requesting refill of Semaglutide:     PREFERRED PHARMACY: [facility]     ANY CHANCE OF PREGNANCY?: No     CURRENT WEIGHT: 215.4 lbs  CURRENT DOSAGE: 0.5 mg  HOW LONG ON CURRENT DOSAGE:   SYMPTOMS: No abd pain; no n/v; no new symptoms w/meds  REQUESTED TITRATION/CHANGE IN DOSAGE TO:  1 mg (0.2ml)     LAST DOSE: 4/10/24  DOSES REMAININ    PT COMPLIANT? YES

## 2024-04-24 ENCOUNTER — TELEMEDICINE (OUTPATIENT)
Dept: PSYCHIATRY | Facility: CLINIC | Age: 59
End: 2024-04-24
Payer: OTHER GOVERNMENT

## 2024-04-24 DIAGNOSIS — F33.1 MODERATE EPISODE OF RECURRENT MAJOR DEPRESSIVE DISORDER: Primary | Chronic | ICD-10-CM

## 2024-04-24 DIAGNOSIS — F41.1 GAD (GENERALIZED ANXIETY DISORDER): Chronic | ICD-10-CM

## 2024-04-24 PROBLEM — H66.001 NON-RECURRENT ACUTE SUPPURATIVE OTITIS MEDIA OF RIGHT EAR WITHOUT SPONTANEOUS RUPTURE OF TYMPANIC MEMBRANE: Status: RESOLVED | Noted: 2023-06-05 | Resolved: 2024-04-24

## 2024-04-24 NOTE — PROGRESS NOTES
Subjective   Merissa Yusuf is a 58 y.o.white female who presents today for follow up via telehealth.    This provider is located in [place], [place] using a secure [employer] Video Visit through [employer]. Patient is being seen remotely via telehealth at their home address in [place], and stated they are in a secure environment for this session. The patient's condition being diagnosed/treated is appropriate for telemedicine. The provider identified herself as well as her credentials.   The patient, and/or patients guardian, consent to be seen remotely, and when consent is given they understand that the consent allows for patient identifiable information to be sent to a third party as needed.   They may refuse to be seen remotely at any time. The electronic data is encrypted and password protected, and the patient and/or guardian has been advised of the potential risks to privacy not withstanding such measures.   PT Identifiers used: Name and .    You have chosen to receive care through a telehealth visit.  Do you consent to use a video/audio connection for your medical care today? Yes    Chief Complaint:  Anxiety and depression    History of Present Illness:    Bought a larger home in  and moved their daughter and YOBANI and kids in with them, still moving things from their old home and feels overwhelmed with all of the purging/unpacking.  They still have not put their old home up for sale.  She feels like she needs an adjustment in her medication.     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     Her 39 yr old son  in 2021, she was out of town in [place] with her sister who's  was dying and passed when she got back. His birthday is in October, so those few weeks each year are harder,  does well when she keeps herself busy.   Patient is  "doing better on Trintellix, her daughter said she is happier on it, no restless legs yet on the 5mg  Lamictal has helped her irritability, may need increase  \"My  is driving me crazy\"..he has TBI and can push her buttons  She has hemochromatosis, had to have a phlebotomy done last week.     Anxiety 6/10 due to the move  Depression 5/10, low energy and motivation lately.     Denies SI/HI      The following portions of the patient's history were reviewed and updated as appropriate: allergies, current medications, past family history, past medical history, past social history, past surgical history and problem list.    PAST PSYCHIATRIC HISTORY  Axis I  Affective/Bipoloar Disorder, Anxiety/Panic Disorder  Axis II  None    PAST OUTPATIENT TREATMENT  Diagnosis treated:  Affective Disorder, Anxiety/Panic Disorder  Treatment Type:  Family Therapy, Medication Management  No hospitalizations  Genesight testing done March 2019  Prior Psychiatric Medications:  Lexapro increased anxiety  Prozac, hives  Trintellix has caused her restless legs in the past  Buspar   Viibryd, could not take  Pristiq  Topamax, increased her anxiety and cause SOA  Lamictal  Support Groups:  None  Sequelae Of Mental Disorder:  social isolation, family disruption, emotional distress      Interval History  Improved    Side Effects  Restless legs with Trintellix at 10 mg but wants to try it again.    Past psychiatric history was reviewed and compared to visit and 10/24/23 appropriate updates were made.    Past Medical History:  Past Medical History:   Diagnosis Date    Anesthesia complication     drops B/P, and slow to arouse    Encounter for laboratory testing for COVID-19 virus 07/19/2022    GERD (gastroesophageal reflux disease)     Hereditary hemochromatosis     Hyperlipidemia     Seasonal allergies        Social History:  Social History     Socioeconomic History    Marital status:    Tobacco Use    Smoking status: Never     Passive " exposure: Past    Smokeless tobacco: Never   Vaping Use    Vaping status: Never Used   Substance and Sexual Activity    Alcohol use: Yes     Alcohol/week: 1.0 standard drink of alcohol     Types: 1 Glasses of wine per week     Comment: rarely    Drug use: Yes     Frequency: 7.0 times per week     Comment: CBD  gummies    Sexual activity: Defer       Family History:  Family History   Problem Relation Age of Onset    Coronary artery disease Other     Diabetes Other     Dementia Other     Diabetes Mother     Cancer Mother     Heart disease Father     Liver disease Father     Hypertension Father     Cancer Brother     Breast cancer Neg Hx     Ovarian cancer Neg Hx        Past Surgical History:  Past Surgical History:   Procedure Laterality Date    BLADDER SUSPENSION      BREAST BIOPSY Right     CATARACT EXTRACTION  03/2023    FINGER SURGERY  2008    removed cysts from right index finger     HERNIA REPAIR      SUBTOTAL HYSTERECTOMY      TUBAL ABDOMINAL LIGATION      VENTRAL/INCISIONAL HERNIA REPAIR N/A 09/24/2020    Procedure: LAPAROSCOPIC VENTRAL HERNIA;  Surgeon: Reuben Molina MD;  Location: [facility] MAIN OR;  Service: General;  Laterality: N/A;  0855 TAP block    WISDOM TOOTH EXTRACTION         Problem List:  Patient Active Problem List   Diagnosis    Elevated blood pressure reading without diagnosis of hypertension    Hearing deficit    Hyperlipidemia    Knee pain    Low back pain    Memory loss    Morbid (severe) obesity due to excess calories    Obstructive sleep apnea    Thrombocytosis    Vitamin D deficiency    Combined forms of age-related cataract, bilateral    Convergence insufficiency    Corneal pannus of right eye    Corneal pannus    Meibomian gland dysfunction (MGD) of both eyes    Bilateral presbyopia    Presbyopia    Vitreous floaters    Hereditary hemochromatosis    GERD (gastroesophageal reflux disease)    Seasonal allergies    Class 3 severe obesity due to excess calories with serious  comorbidity and body mass index (BMI) of 45.0 to 49.9 in adult    Elevated hematocrit    KIRSTIE (generalized anxiety disorder)    Moderate episode of recurrent major depressive disorder    SOB (shortness of breath)    Encounter for laboratory testing for COVID-19 virus    Macular pseudohole of right eye    Partial thickness macular hole of left eye    Primary osteoarthritis of right knee    Primary osteoarthritis of left knee    Non-recurrent acute suppurative otitis media of right ear without spontaneous rupture of tympanic membrane    Multiple atypical skin moles    Bilateral primary osteoarthritis of knee       Allergy:   Allergies   Allergen Reactions    Amoxicillin Swelling    Erythromycin GI Intolerance    Escitalopram Anxiety    Prozac  [Fluoxetine Hcl] Rash    Penicillin G Rash        Discontinued Medications:  Medications Discontinued During This Encounter   Medication Reason    Trintellix 5 MG tablet tablet          Current Medications:   Current Outpatient Medications   Medication Sig Dispense Refill    Vortioxetine HBr (Trintellix) 10 MG tablet tablet Take 1 tablet by mouth Daily With Breakfast. 90 tablet 1    acetaminophen (TYLENOL) 500 MG tablet Take 1 tablet by mouth Every 4 (Four) Hours As Needed.      albuterol sulfate  (90 Base) MCG/ACT inhaler Inhale 2 puffs Every 4 (Four) Hours As Needed for Wheezing. 6.7 g 0    busPIRone (BUSPAR) 15 MG tablet TAKE 1 TABLET THREE TIMES A DAY FOR ANXIETY (Patient taking differently: TAKE up to THREE TIMES A DAY FOR ANXIETY- usually 2 a day) 270 tablet 3    Calcium Carbonate Antacid (TUMS PO) Take 2,000 mg by mouth At Night As Needed.      Evolocumab (Repatha SureClick) solution auto-injector SureClick injection Inject 1 ml under the skin into the appropriate area as directed every 14 days Strength 140mg/ml 6 mL 3    Ginkgo Biloba (GINKGO PO) Take 1,000 mg by mouth Daily. W/ Gensing      l-methylfolate calcium (DEPLIN) 7.5 MG tablet tablet Take 1 tablet by  mouth 2 (Two) Times a Day.      lamoTRIgine (LaMICtal) 100 MG tablet Take 1 tablet by mouth 2 (Two) Times a Day. 180 tablet 1    montelukast (SINGULAIR) 10 MG tablet TAKE 1 TABLET EVERY NIGHT 90 tablet 3    ondansetron ODT (ZOFRAN-ODT) 8 MG disintegrating tablet Place 1 tablet on the tongue Every 8 (Eight) Hours As Needed for Nausea or Vomiting. 30 tablet 5    Semaglutide-Weight Management 1 MG/0.5ML solution auto-injector Inject 0.5 mL under the skin into the appropriate area as directed 1 (One) Time Per Week. 2 mL 0    Turmeric 400 MG capsule        No current facility-administered medications for this visit.         Review of Symptoms:    Psychiatric/Behavioral: Negative for agitation, behavioral problems, confusion, decreased concentration, dysphoric mood, hallucinations, self-injury, sleep disturbance and suicidal ideas. The patient not nervous/anxious and is not hyperactive.        Physical Exam:   not currently breastfeeding.    Mental Status Exam:   Hygiene:   good  Cooperation:  Cooperative  Eye Contact:  Good  Psychomotor Behavior:  Appropriate  Affect:  Appropriate  Mood: Mildly anxious, depressed  Hopelessness: Denies  Speech:  Normal  Thought Process:  Goal directed  Thought Content:  Normal  Suicidal:  None  Homicidal:  None  Hallucinations:  None  Delusion:  None  Memory:  Intact  Orientation:  Person, Place, Time and Situation  Reliability:  good  Insight:  Good  Judgement:  Good  Impulse Control:  Good  Physical/Medical Issues:  No      Mental status exam was reviewed and compared to 10/24/23 visit and appropriate updates were made.    PHQ-9 Depression Screening  Little interest or pleasure in doing things? (P) 2-->more than half the days   Feeling down, depressed, or hopeless? (P) 1-->several days   Trouble falling or staying asleep, or sleeping too much? (P) 2-->more than half the days   Feeling tired or having little energy? (P) 1-->several days   Poor appetite or overeating? (P) 0-->not at all    Feeling bad about yourself - or that you are a failure or have let yourself or your family down? (P) 1-->several days   Trouble concentrating on things, such as reading the newspaper or watching television? (P) 1-->several days   Moving or speaking so slowly that other people could have noticed? Or the opposite - being so fidgety or restless that you have been moving around a lot more than usual? (P) 0-->not at all   Thoughts that you would be better off dead, or of hurting yourself in some way? (P) 0-->not at all   PHQ-9 Total Score (P) 8   If you checked off any problems, how difficult have these problems made it for you to do your work, take care of things at home, or get along with other people? (P) somewhat difficult         Never smoker    I advised Merissa of the risks of tobacco use.     Lab Results:   Orders Only on 04/01/2024   Component Date Value Ref Range Status    Ferritin 04/01/2024 98.03  13.00 - 150.00 ng/mL Final    WBC 04/01/2024 7.43  3.40 - 10.80 10*3/mm3 Final    RBC 04/01/2024 3.69 (L)  3.77 - 5.28 10*6/mm3 Final    Hemoglobin 04/01/2024 13.3  12.0 - 15.9 g/dL Final    Hematocrit 04/01/2024 39.0  34.0 - 46.6 % Final    MCV 04/01/2024 105.7 (H)  79.0 - 97.0 fL Final    MCH 04/01/2024 36.0 (H)  26.6 - 33.0 pg Final    MCHC 04/01/2024 34.1  31.5 - 35.7 g/dL Final    RDW 04/01/2024 11.7 (L)  12.3 - 15.4 % Final    RDW-SD 04/01/2024 44.0  37.0 - 54.0 fl Final    MPV 04/01/2024 8.2  6.0 - 12.0 fL Final    Platelets 04/01/2024 369  140 - 450 10*3/mm3 Final    Neutrophil % 04/01/2024 47.0  42.7 - 76.0 % Final    Lymphocyte % 04/01/2024 36.2  19.6 - 45.3 % Final    Monocyte % 04/01/2024 9.0  5.0 - 12.0 % Final    Eosinophil % 04/01/2024 7.5 (H)  0.3 - 6.2 % Final    Basophil % 04/01/2024 0.3  0.0 - 1.5 % Final    Neutrophils, Absolute 04/01/2024 3.49  1.70 - 7.00 10*3/mm3 Final    Lymphocytes, Absolute 04/01/2024 2.69  0.70 - 3.10 10*3/mm3 Final    Monocytes, Absolute 04/01/2024 0.67  0.10 - 0.90  10*3/mm3 Final    Eosinophils, Absolute 04/01/2024 0.56 (H)  0.00 - 0.40 10*3/mm3 Final    Basophils, Absolute 04/01/2024 0.02  0.00 - 0.20 10*3/mm3 Final       Assessment & Plan   Problems Addressed this Visit          Mental Health    KIRSTIE (generalized anxiety disorder) (Chronic)    Relevant Medications    Vortioxetine HBr (Trintellix) 10 MG tablet tablet    Moderate episode of recurrent major depressive disorder - Primary (Chronic)    Relevant Medications    Vortioxetine HBr (Trintellix) 10 MG tablet tablet     Diagnoses         Codes Comments    Moderate episode of recurrent major depressive disorder    -  Primary ICD-10-CM: F33.1  ICD-9-CM: 296.32     KIRSTIE (generalized anxiety disorder)     ICD-10-CM: F41.1  ICD-9-CM: 300.02             Visit Diagnoses:    ICD-10-CM ICD-9-CM   1. Moderate episode of recurrent major depressive disorder  F33.1 296.32   2. KIRSTIE (generalized anxiety disorder)  F41.1 300.02           TREATMENT PLAN/GOALS: Continue supportive psychotherapy efforts and medications as indicated. Treatment and medication options discussed during today's visit. Patient ackowledged and verbally consented to continue with current treatment plan and was educated on the importance of compliance with treatment and follow-up appointments.    MEDICATION ISSUES:  INSPECT reviewed as expected  Discussed medication options and treatment plan of prescribed medication as well as the risks, benefits, and side effects including potential falls, possible impaired driving and metabolic adversities among others. Patient is agreeable to call the office with any worsening of symptoms or onset of side effects. Patient is agreeable to call 911 or go to the nearest ER should he/she begin having SI/HI. No medication side effects or related complaints today.     Patient is doing well, coping better with the loss of her son, helping a lot with her grand kids and denies significant depression currently.   Increase Trintellix to 10  mg daily for depression and anxiety and hopefully no restless legs since she has been on the 5 mg for so long.   Continue Buspar 15mg TID prn anxiety, usually only takes it twice daily, only TID on rare occasion  Continue Lamictal 100 mg tabs take one full tab daily for mood stabilizer but can increase to BID after a month on the Trintellix 10 mg if needed.   Continue L-methylfolate daily      MEDS ORDERED DURING VISIT:  New Medications Ordered This Visit   Medications    Vortioxetine HBr (Trintellix) 10 MG tablet tablet     Sig: Take 1 tablet by mouth Daily With Breakfast.     Dispense:  90 tablet     Refill:  1       Return in about 3 months (around 7/24/2024) for video visit.           This document has been electronically signed by Laisha Mendiola PA-C  April 24, 2024 10:59 EDT

## 2024-04-24 NOTE — ASSESSMENT & PLAN NOTE
Patient's (There is no height or weight on file to calculate BMI.) indicates that they are morbidly/severely obese (BMI > 40 or > 35 with obesity - related health condition) with health conditions that include obstructive sleep apnea, dyslipidemias, and GERD . Weight is unchanged. BMI  is above average; BMI management plan is completed. We discussed portion control and increasing exercise.

## 2024-04-24 NOTE — PATIENT INSTRUCTIONS
Health Maintenance Due   Topic Date Due    ZOSTER VACCINE (1 of 2) Never done    RSV Vaccine - Adults (1 - 1-dose 60+ series) Never done    COVID-19 Vaccine (5 - 2023-24 season) 09/01/2023    COLORECTAL CANCER SCREENING  01/13/2024    MAMMOGRAM  06/23/2024

## 2024-04-25 ENCOUNTER — OFFICE VISIT (OUTPATIENT)
Dept: FAMILY MEDICINE CLINIC | Facility: CLINIC | Age: 59
End: 2024-04-25
Payer: OTHER GOVERNMENT

## 2024-04-25 VITALS
OXYGEN SATURATION: 97 % | HEART RATE: 82 BPM | DIASTOLIC BLOOD PRESSURE: 72 MMHG | BODY MASS INDEX: 43.55 KG/M2 | HEIGHT: 59 IN | TEMPERATURE: 98.2 F | SYSTOLIC BLOOD PRESSURE: 108 MMHG | WEIGHT: 216 LBS

## 2024-04-25 DIAGNOSIS — E78.2 MIXED HYPERLIPIDEMIA: Primary | ICD-10-CM

## 2024-04-25 DIAGNOSIS — E83.110 HEREDITARY HEMOCHROMATOSIS: ICD-10-CM

## 2024-04-25 DIAGNOSIS — F33.1 MODERATE EPISODE OF RECURRENT MAJOR DEPRESSIVE DISORDER: Chronic | ICD-10-CM

## 2024-04-25 DIAGNOSIS — Z12.11 SCREENING FOR COLON CANCER: ICD-10-CM

## 2024-04-25 DIAGNOSIS — R03.0 ELEVATED BLOOD PRESSURE READING WITHOUT DIAGNOSIS OF HYPERTENSION: ICD-10-CM

## 2024-04-25 DIAGNOSIS — K21.9 GASTROESOPHAGEAL REFLUX DISEASE, UNSPECIFIED WHETHER ESOPHAGITIS PRESENT: ICD-10-CM

## 2024-04-25 DIAGNOSIS — G47.33 OBSTRUCTIVE SLEEP APNEA: ICD-10-CM

## 2024-04-25 DIAGNOSIS — D22.9 MULTIPLE ATYPICAL SKIN MOLES: ICD-10-CM

## 2024-04-25 DIAGNOSIS — Z12.31 ENCOUNTER FOR SCREENING MAMMOGRAM FOR MALIGNANT NEOPLASM OF BREAST: ICD-10-CM

## 2024-04-25 DIAGNOSIS — E66.01 CLASS 3 SEVERE OBESITY DUE TO EXCESS CALORIES WITH SERIOUS COMORBIDITY AND BODY MASS INDEX (BMI) OF 40.0 TO 44.9 IN ADULT: ICD-10-CM

## 2024-04-25 LAB
ALBUMIN SERPL-MCNC: 4.7 G/DL (ref 3.5–5.2)
ALBUMIN/GLOB SERPL: 2 G/DL
ALP SERPL-CCNC: 92 U/L (ref 39–117)
ALT SERPL W P-5'-P-CCNC: 12 U/L (ref 1–33)
ANION GAP SERPL CALCULATED.3IONS-SCNC: 11 MMOL/L (ref 5–15)
AST SERPL-CCNC: 13 U/L (ref 1–32)
BILIRUB SERPL-MCNC: 0.3 MG/DL (ref 0–1.2)
BUN SERPL-MCNC: 12 MG/DL (ref 8–23)
BUN/CREAT SERPL: 16.9 (ref 7–25)
CALCIUM SPEC-SCNC: 9.7 MG/DL (ref 8.6–10.5)
CHLORIDE SERPL-SCNC: 104 MMOL/L (ref 98–107)
CHOLEST SERPL-MCNC: 128 MG/DL (ref 0–200)
CO2 SERPL-SCNC: 24 MMOL/L (ref 22–29)
CREAT SERPL-MCNC: 0.71 MG/DL (ref 0.57–1)
EGFRCR SERPLBLD CKD-EPI 2021: 97.5 ML/MIN/1.73
GLOBULIN UR ELPH-MCNC: 2.3 GM/DL
GLUCOSE SERPL-MCNC: 86 MG/DL (ref 65–99)
HDLC SERPL-MCNC: 64 MG/DL (ref 40–60)
IRON 24H UR-MRATE: 142 MCG/DL (ref 37–145)
IRON SATN MFR SERPL: 44 % (ref 20–50)
LDLC SERPL CALC-MCNC: 48 MG/DL (ref 0–100)
LDLC/HDLC SERPL: 0.75 {RATIO}
MAGNESIUM SERPL-MCNC: 2.2 MG/DL (ref 1.6–2.4)
POTASSIUM SERPL-SCNC: 4.2 MMOL/L (ref 3.5–5.2)
PROT SERPL-MCNC: 7 G/DL (ref 6–8.5)
SODIUM SERPL-SCNC: 139 MMOL/L (ref 136–145)
TIBC SERPL-MCNC: 325 MCG/DL (ref 298–536)
TRANSFERRIN SERPL-MCNC: 218 MG/DL (ref 200–360)
TRIGL SERPL-MCNC: 79 MG/DL (ref 0–150)
TSH SERPL DL<=0.05 MIU/L-ACNC: 1.47 UIU/ML (ref 0.27–4.2)
VIT B12 BLD-MCNC: 1181 PG/ML (ref 211–946)
VLDLC SERPL-MCNC: 16 MG/DL (ref 5–40)

## 2024-04-25 PROCEDURE — 80053 COMPREHEN METABOLIC PANEL: CPT | Performed by: PREVENTIVE MEDICINE

## 2024-04-25 PROCEDURE — 36415 COLL VENOUS BLD VENIPUNCTURE: CPT | Performed by: PREVENTIVE MEDICINE

## 2024-04-25 PROCEDURE — 80061 LIPID PANEL: CPT | Performed by: PREVENTIVE MEDICINE

## 2024-04-25 PROCEDURE — 84466 ASSAY OF TRANSFERRIN: CPT | Performed by: PREVENTIVE MEDICINE

## 2024-04-25 PROCEDURE — 83735 ASSAY OF MAGNESIUM: CPT | Performed by: PREVENTIVE MEDICINE

## 2024-04-25 PROCEDURE — 83540 ASSAY OF IRON: CPT | Performed by: PREVENTIVE MEDICINE

## 2024-04-25 PROCEDURE — 84443 ASSAY THYROID STIM HORMONE: CPT | Performed by: PREVENTIVE MEDICINE

## 2024-04-25 PROCEDURE — 99214 OFFICE O/P EST MOD 30 MIN: CPT | Performed by: PREVENTIVE MEDICINE

## 2024-04-25 PROCEDURE — 82607 VITAMIN B-12: CPT | Performed by: PREVENTIVE MEDICINE

## 2024-04-25 NOTE — PROGRESS NOTES
Venipuncture Blood Specimen Collection  Venipuncture performed in Right Arm by Jazmine Vera MA with good hemostasis. Patient tolerated the procedure well without complications.   04/25/24   Jazmine Vera MA

## 2024-04-25 NOTE — PROGRESS NOTES
Subjective   Merissa Yusuf is a 58 y.o. female presents for   Chief Complaint   Patient presents with    Hyperlipidemia       Health Maintenance Due   Topic Date Due    ZOSTER VACCINE (1 of 2) Never done    RSV Vaccine - Adults (1 - 1-dose 60+ series) Never done    COVID-19 Vaccine (5 - 2023-24 season) 09/01/2023    COLORECTAL CANCER SCREENING  01/13/2024    MAMMOGRAM  06/23/2024       Hyperlipidemia       History of Present Illness  The patient is a 58-year-old female who is here today for mixed hyperlipidemia, hereditary hemochromacytosis, GERD, elevated blood pressure without diagnosis of hypertension, encounter for mammogram, screening for colon cancer, moderate episode of recurrent major depression, multiple atypical skin moles, obstructive sleep apnea, and class 3 obesity due to excess calories with serious comorbidities and a body mass index of 43.    The patient continues to experience suboptimal vision in her left eye, a condition previously diagnosed as a macular hole, which was subsequently repaired. This vision becomes more pronounced when she looks through the affected eye while watching television.    The patient experienced an earache a few days ago. She also reports a persistent cough, which she attributes to allergies, particularly due to tree pollen exposure. She has observed yellow, loose mucus in the mornings. She denies hemoptysis or wheezing. She is currently using three different allergy medications.    The patient denies experiencing chest pressure or palpitations, which she attributes to gas. She is currently on Wegovy, which induces significant belching when lying down at night. She has lost 14 pounds since 02/21/2024. She has a history of chronic constipation, which has remained unchanged. She performs monthly self-breast examinations, typically in a supine position, but has recently begun to do so. She expresses a preference for Cologuard for colon cancer screening. She recently had  "blood work done by Dr. Pimentel. Her heartburn symptoms are expected with the use of Wegovy. She is mindful of her saturated fat intake. She has no history of thyroid issues, but has been evaluated for hair loss and weight gain over 10 years ago.    Supplemental Information  She has seen her dentist within the year. She takes a lot of Tylenol and Advil for her knees. She usually takes about 3 a day for each. She uses her CPAP faithfully. When she has congestion, she does not use it because it makes her cough.   She has a family history of heart disease.   She is allergic to AMOXICILLIN, PENICILLIN, ERYTHROMYCIN. She gets anxiety from LEXAPRO and a rash from PROZAC.    Vitals:    04/25/24 0955   BP: 108/72   BP Location: Right arm   Patient Position: Sitting   Cuff Size: Large Adult   Pulse: 82   Temp: 98.2 °F (36.8 °C)   TempSrc: Temporal   SpO2: 97%   Weight: 98 kg (216 lb)   Height: 149.9 cm (59\")     Body mass index is 43.63 kg/m².    Current Outpatient Medications on File Prior to Visit   Medication Sig Dispense Refill    acetaminophen (TYLENOL) 500 MG tablet Take 1 tablet by mouth Every 4 (Four) Hours As Needed.      albuterol sulfate  (90 Base) MCG/ACT inhaler Inhale 2 puffs Every 4 (Four) Hours As Needed for Wheezing. 6.7 g 0    busPIRone (BUSPAR) 15 MG tablet TAKE 1 TABLET THREE TIMES A DAY FOR ANXIETY (Patient taking differently: TAKE up to THREE TIMES A DAY FOR ANXIETY- usually 2 a day) 270 tablet 3    Evolocumab (Repatha SureClick) solution auto-injector SureClick injection Inject 1 ml under the skin into the appropriate area as directed every 14 days Strength 140mg/ml 6 mL 3    Ginkgo Biloba (GINKGO PO) Take 1,000 mg by mouth Daily. W/ Gensing      l-methylfolate calcium (DEPLIN) 7.5 MG tablet tablet Take 1 tablet by mouth 2 (Two) Times a Day.      lamoTRIgine (LaMICtal) 100 MG tablet Take 1 tablet by mouth 2 (Two) Times a Day. 180 tablet 1    montelukast (SINGULAIR) 10 MG tablet TAKE 1 TABLET EVERY " NIGHT 90 tablet 3    ondansetron ODT (ZOFRAN-ODT) 8 MG disintegrating tablet Place 1 tablet on the tongue Every 8 (Eight) Hours As Needed for Nausea or Vomiting. 30 tablet 5    Turmeric 400 MG capsule       Vortioxetine HBr (Trintellix) 10 MG tablet tablet Take 1 tablet by mouth Daily With Breakfast. 90 tablet 1    [DISCONTINUED] Semaglutide-Weight Management 1 MG/0.5ML solution auto-injector Inject 0.5 mL under the skin into the appropriate area as directed 1 (One) Time Per Week. 2 mL 0    [DISCONTINUED] Calcium Carbonate Antacid (TUMS PO) Take 2,000 mg by mouth At Night As Needed. (Patient not taking: Reported on 4/25/2024)       No current facility-administered medications on file prior to visit.       The following portions of the patient's history were reviewed and updated as appropriate: allergies, current medications, past family history, past medical history, past social history, past surgical history, and problem list.    Review of Systems   Gastrointestinal:  Positive for constipation and GERD.   Musculoskeletal:  Positive for arthralgias and gait problem.        Knee pain improved after last injection   Skin:  Positive for skin lesions.   Psychiatric/Behavioral:  Positive for depressed mood. The patient is nervous/anxious.        Objective   Physical Exam  Vitals reviewed.   Constitutional:       General: She is not in acute distress.     Appearance: She is well-developed. She is obese. She is not ill-appearing or toxic-appearing.   HENT:      Head: Normocephalic and atraumatic.      Right Ear: Tympanic membrane, ear canal and external ear normal.      Left Ear: Tympanic membrane, ear canal and external ear normal.      Nose: Nose normal.      Mouth/Throat:      Mouth: Mucous membranes are moist.      Pharynx: No posterior oropharyngeal erythema.   Eyes:      Extraocular Movements: Extraocular movements intact.      Conjunctiva/sclera: Conjunctivae normal.      Pupils: Pupils are equal, round, and  reactive to light.   Cardiovascular:      Rate and Rhythm: Normal rate and regular rhythm.      Heart sounds: Normal heart sounds.   Pulmonary:      Effort: Pulmonary effort is normal.      Breath sounds: Normal breath sounds.   Abdominal:      General: Bowel sounds are normal. There is no distension.      Palpations: Abdomen is soft. There is no mass.      Tenderness: There is no abdominal tenderness.   Musculoskeletal:         General: Normal range of motion.      Cervical back: Neck supple.      Comments: Mild antalgia   Skin:     General: Skin is warm.   Neurological:      General: No focal deficit present.      Mental Status: She is alert and oriented to person, place, and time.   Psychiatric:         Mood and Affect: Mood normal.         Behavior: Behavior normal.       Physical Exam  Both ears have a tiny bit of wax, but the eardrums are visible. The throat appears normal.    PHQ-9 Total Score:    Results           Assessment & Plan   Diagnoses and all orders for this visit:    1. Mixed hyperlipidemia (Primary)  -     Comprehensive Metabolic Panel  -     Lipid Panel    2. Screening for colon cancer  -     Cologuard - Stool, Per Rectum; Future    3. Encounter for screening mammogram for malignant neoplasm of breast  -     Mammo Screening Digital Tomosynthesis Bilateral With CAD; Future    4. Elevated blood pressure reading without diagnosis of hypertension    5. Gastroesophageal reflux disease, unspecified whether esophagitis present  -     Magnesium    6. Hereditary hemochromatosis  -     Iron Profile    7. Moderate episode of recurrent major depressive disorder  -     Vitamin B12  -     TSH Rfx On Abnormal To Free T4    8. Multiple atypical skin moles    9. Obstructive sleep apnea    10. Class 3 severe obesity due to excess calories with serious comorbidity and body mass index (BMI) of 40.0 to 44.9 in adult  Assessment & Plan:  Patient's (There is no height or weight on file to calculate BMI.) indicates that  they are morbidly/severely obese (BMI > 40 or > 35 with obesity - related health condition) with health conditions that include obstructive sleep apnea, dyslipidemias, and GERD . Weight is unchanged. BMI  is above average; BMI management plan is completed. We discussed portion control and increasing exercise.         Assessment & Plan  1. Cough.  In the event that the yellow mucus persists throughout the day or if she develops a fever, she is to contact us, at which point a chest x-ray will be considered.    2. Health maintenance.  The patient is advised to perform self-breast examinations while standing, particularly during showers, and to maintain her current regimen of Wegovy. A Cologuard test has been ordered for colon cancer screening. A mammogram has been ordered. Fasting labs have been ordered, including CBC, kidney and liver function test, iron, cholesterol, magnesium, B12, and thyroid. Thyroid function tests have been ordered due to her depression and anxiety.    Follow-up  The patient is scheduled for a follow-up visit in 6 months.    Patient Instructions     Health Maintenance Due   Topic Date Due    ZOSTER VACCINE (1 of 2) Never done    RSV Vaccine - Adults (1 - 1-dose 60+ series) Never done    COVID-19 Vaccine (5 - 2023-24 season) 09/01/2023    COLORECTAL CANCER SCREENING  01/13/2024    MAMMOGRAM  06/23/2024           Patient or patient representative verbalized consent for the use of Ambient Listening during the visit with  Melissa Duarte MD for chart documentation. 4/25/2024  10:30 EDT

## 2024-04-26 ENCOUNTER — TELEPHONE (OUTPATIENT)
Dept: FAMILY MEDICINE CLINIC | Facility: CLINIC | Age: 59
End: 2024-04-26
Payer: OTHER GOVERNMENT

## 2024-04-26 NOTE — PROGRESS NOTES
B12 is elevated so you can decrease a day or 2/week if you are taking by mouth call if any other questions or concerns

## 2024-04-26 NOTE — TELEPHONE ENCOUNTER
RELAY----- Message from Armida DOWNS sent at 4/26/2024  7:14 AM EDT -----   B12 is elevated so you can decrease a day or 2/week if you are taking by mouth call if any other questions or concerns

## 2024-05-07 ENCOUNTER — TELEPHONE (OUTPATIENT)
Dept: PSYCHIATRY | Facility: CLINIC | Age: 59
End: 2024-05-07
Payer: OTHER GOVERNMENT

## 2024-05-08 ENCOUNTER — TELEPHONE (OUTPATIENT)
Dept: PSYCHIATRY | Facility: CLINIC | Age: 59
End: 2024-05-08
Payer: OTHER GOVERNMENT

## 2024-05-16 RX ORDER — EVOLOCUMAB 140 MG/ML
INJECTION, SOLUTION SUBCUTANEOUS
Qty: 6 ML | Refills: 3 | OUTPATIENT
Start: 2024-05-16

## 2024-05-23 ENCOUNTER — OFFICE VISIT (OUTPATIENT)
Dept: BARIATRICS/WEIGHT MGMT | Facility: CLINIC | Age: 59
End: 2024-05-23
Payer: OTHER GOVERNMENT

## 2024-05-23 VITALS
WEIGHT: 208.8 LBS | HEART RATE: 84 BPM | OXYGEN SATURATION: 97 % | DIASTOLIC BLOOD PRESSURE: 82 MMHG | SYSTOLIC BLOOD PRESSURE: 124 MMHG | HEIGHT: 59 IN | BODY MASS INDEX: 42.09 KG/M2

## 2024-05-23 DIAGNOSIS — E66.01 OBESITY, CLASS III, BMI 40-49.9 (MORBID OBESITY): Primary | ICD-10-CM

## 2024-05-23 DIAGNOSIS — Z79.899 MEDICATION MANAGEMENT: ICD-10-CM

## 2024-05-23 NOTE — PROGRESS NOTES
[facility] BAR SURG [facility] BARIATRIC SURGERY  [address] [place] [address]  [address] [place] [address]  Dept: [phone]  5/23/2024      Merissa Yusuf.  [id number]  [id number]  1965  female      Chief Complaint   Patient presents with    Weight Loss     MWM #6        The patient is here for month 2 of medical weight management. She had a loss of 8 lbs. The patient states that she is following the recommendations given by our office and dietician including a high lean protein, low carb and low fat diet. We recommended adequate fruits and vegetable intake along with limited portion sizes. Patient is working on eliminating fast foods, fried foods, sweets and soda. Merissa Yusuf has been increasing her daily water intake. She has been exercising: walking some , rowing machine.  Wt Readings from Last 10 Encounters:   05/23/24 94.7 kg (208 lb 12.8 oz)   04/25/24 98 kg (216 lb)   04/18/24 97.7 kg (215 lb 6.4 oz)   04/15/24 98.9 kg (218 lb)   03/21/24 99.2 kg (218 lb 12.8 oz)   02/26/24 101 kg (222 lb)   01/29/24 102 kg (223 lb 12.8 oz)   01/12/24 103 kg (228 lb)   01/11/24 102 kg (225 lb 3.2 oz)   10/27/23 103 kg (226 lb 9.6 oz)     Patient states they have made positive changes including weight loss , smaller pants sizes, getting close to BMI of 40 and able to qualify for knee replacement    The patient admits to be struggling with none    Breakfast: cheerios  Lunch: cheese, nuts, fruit  Dinner: chicken, fish, potatoes, veggie  Snacks: cheese, nuts, fruit, ice cream,   Drinks: water   Exercise 2-3 times per week walking, rowing machine    Some nausea- controlled with Zofran, no vomiting, no worsening constipation       Review of Systems   Constitutional:  Positive for activity change and appetite change.   Respiratory: Negative.     Cardiovascular: Negative.    Gastrointestinal:  Positive for constipation and nausea.   Musculoskeletal:         Knee  pain     Height 59 inches, weight 208 pounds, BMI 42.17    Body mass index is 42.17 kg/m².    The following portions of the patient's history were reviewed and updated as appropriate: active problem list, medication list, allergies, social history, notes from last encounter  Past Medical History:   Diagnosis Date    Anesthesia complication     drops B/P, and slow to arouse    Encounter for laboratory testing for COVID-19 virus 07/19/2022    GERD (gastroesophageal reflux disease)     Hereditary hemochromatosis     Hyperlipidemia     Seasonal allergies      Past Surgical History:   Procedure Laterality Date    BLADDER SUSPENSION      BREAST BIOPSY Right     CATARACT EXTRACTION  03/2023    FINGER SURGERY  2008    removed cysts from right index finger     HERNIA REPAIR      SUBTOTAL HYSTERECTOMY      TUBAL ABDOMINAL LIGATION      VENTRAL/INCISIONAL HERNIA REPAIR N/A 09/24/2020    Procedure: LAPAROSCOPIC VENTRAL HERNIA;  Surgeon: Reuben Molina MD;  Location: [facility];  Service: General;  Laterality: N/A;  0855 TAP block    WISDOM TOOTH EXTRACTION          Physical Exam  Constitutional:       Appearance: Normal appearance. She is obese.   Pulmonary:      Effort: Pulmonary effort is normal.   Abdominal:      General: Abdomen is flat.      Palpations: Abdomen is soft.   Skin:     General: Skin is warm and dry.   Neurological:      General: No focal deficit present.      Mental Status: She is alert and oriented to person, place, and time.   Psychiatric:         Mood and Affect: Mood normal.         Behavior: Behavior normal.         Thought Content: Thought content normal.         Judgment: Judgment normal.         Discussion/Plan:  BMI 42.17, Class 3 obesity, medication management: compounded semaglutide     Obesity/Morbid Obesity: Currently the patient's weight is decreased. Treatment plan includes prescribed diet, prescribed exercise regimen and behavior modification.     Medication options for weight  loss were discussed with the pt and based upon the patient's history and insurance , compounded semaglutide 1 mg will be prescribed/ continued. She has completed 2 weeks of this and ok to increase to next dose 1.5 mg weekly if not having any increased nausea/ vomiting / abd pain or constipation after this. Pt is aware this is using the 5 mg semaglutide/ 0.25 mg b12/ ml vial.     Compounded Semaglutide from   [facility]      Vial Strength: 5mg/ml  ( 5 mg semaglutide/ 0.25 mg b12)/ml    Prescription dosage                                                              Amount to administer ( in ML)    /   units   0.25mg semaglutide  0.05 ml weekly  5 units   0.5 mg semaglutide  0.1ml weekly 10 units   1 mg semaglutide  0.2 ml weekly 20 units    1.5 mg semaglutide  0.3 ml weekly 30 units   2 mg semaglutide  0.4 ml weekly  40 units   2.4 mg semaglutide  0.48 ml weekly  48 units           Pt  denies any hx of multiple endocrine neoplasia type 2 or medullary thyroid cancer for herself or her immediate family with GLP-1's. Pt encouraged to call the office with any nausea/ vomiting or abdominal pain with GLP-1's. Pt also informed constipation can happen with these medications due to slowing of gut. Pt encouraged to take a stool softener/ laxative if constipation occurs.     I reviewed the appropriate dietary choices with the patient and encouraged the necessary changes. Recommended at least 70 grams of protein per day, around 35 grams of fats and less than 100 grams of carbohydrates. Reviewed calorie intake if patient wanted to calorie count and/or had BMR. Instructed patient to drink half of body weight in ounces per day and exercise a minimum of 150 minutes per week including both cardio and strength training. Discussed the option of keeping a food journal which will help patient become more aware of the nutritional value of foods .     The patient was given written materials from our office for diet plans and  medications.   I answered all of the patients questions regarding dietary changes, exercise, and medications.   The patient will follow up in 1 month. The total time spent face to face was 30 minutes with 15 minutes spent counseling.      CHARLINE Quesada  [facility]

## 2024-05-28 RX ORDER — EVOLOCUMAB 140 MG/ML
INJECTION, SOLUTION SUBCUTANEOUS
Qty: 6 ML | Refills: 3 | Status: SHIPPED | OUTPATIENT
Start: 2024-05-28

## 2024-05-29 ENCOUNTER — TELEPHONE (OUTPATIENT)
Dept: FAMILY MEDICINE CLINIC | Facility: CLINIC | Age: 59
End: 2024-05-29
Payer: OTHER GOVERNMENT

## 2024-05-29 DIAGNOSIS — Z12.11 SCREENING FOR MALIGNANT NEOPLASM OF COLON: Primary | ICD-10-CM

## 2024-05-29 DIAGNOSIS — R19.5 POSITIVE COLORECTAL CANCER SCREENING USING COLOGUARD TEST: ICD-10-CM

## 2024-05-29 NOTE — TELEPHONE ENCOUNTER
"Relay     \"Cologuard is positive so we will need you to undergo colonoscopy.  If you have an established relationship with I I can refer you to them you may need to wait a while till the procedure can be scheduled.  If you would like I can try to schedule you in with our colorectal specialist who would probably be able to see you sooner call if any other questions or concerns and let us know as soon as possible as we would like to get the results of colonoscopy ASAP\"                "

## 2024-05-29 NOTE — TELEPHONE ENCOUNTER
Name: Weidman, Merissa DUVAL      Relationship: Self      Best Callback Number:     [phone] (Mobile)         HUB PROVIDED THE RELAY MESSAGE FROM THE OFFICE      PATIENT: VOICED UNDERSTANDING AND HAS NO FURTHER QUESTIONS AT THIS TIME    ADDITIONAL INFORMATION:      PLEASE PROCEED WITH THE COLORECTAL SPECIALIST REFERRAL PLEASE

## 2024-06-03 DIAGNOSIS — R19.5 POSITIVE COLORECTAL CANCER SCREENING USING COLOGUARD TEST: Primary | ICD-10-CM

## 2024-06-07 ENCOUNTER — PATIENT MESSAGE (OUTPATIENT)
Dept: BARIATRICS/WEIGHT MGMT | Facility: CLINIC | Age: 59
End: 2024-06-07
Payer: OTHER GOVERNMENT

## 2024-06-19 ENCOUNTER — OFFICE VISIT (OUTPATIENT)
Dept: BARIATRICS/WEIGHT MGMT | Facility: CLINIC | Age: 59
End: 2024-06-19
Payer: OTHER GOVERNMENT

## 2024-06-19 VITALS — HEIGHT: 59 IN | WEIGHT: 210.4 LBS | BODY MASS INDEX: 42.42 KG/M2

## 2024-06-19 DIAGNOSIS — E66.01 OBESITY, CLASS III, BMI 40-49.9 (MORBID OBESITY): Primary | ICD-10-CM

## 2024-06-19 NOTE — PROGRESS NOTES
Nutrition Services    Patient Name: Merissa Yusuf  YOB: 1965  MRN: [id number]  Date of Service: 06/19/24      ICD-10-CM ICD-9-CM   1. Obesity, Class III, BMI 40-49.9 (morbid obesity)  E66.01 278.01        NUTRITION ASSESSMENT - BARIATRIC SURGERY      Reason for Visit MW appt 7, follow up     H&P      Past Medical History:   Diagnosis Date    Anesthesia complication     drops B/P, and slow to arouse    Encounter for laboratory testing for COVID-19 virus 07/19/2022    GERD (gastroesophageal reflux disease)     Hereditary hemochromatosis     Hyperlipidemia     Seasonal allergies        Past Surgical History:   Procedure Laterality Date    BLADDER SUSPENSION      BREAST BIOPSY Right     CATARACT EXTRACTION  03/2023    FINGER SURGERY  2008    removed cysts from right index finger     HERNIA REPAIR      SUBTOTAL HYSTERECTOMY      TUBAL ABDOMINAL LIGATION      VENTRAL/INCISIONAL HERNIA REPAIR N/A 09/24/2020    Procedure: LAPAROSCOPIC VENTRAL HERNIA;  Surgeon: Reuben Molina MD;  Location: [facility] OR;  Service: General;  Laterality: N/A;  0855 TAP block    WISDOM TOOTH EXTRACTION          Previous Goals   Patient will begin to add sitting exercise or sitting work-out machines 2 days a week for 15-20 minutes. - working on  Patient will switch from lunchables to pre-prepared snacks with less sodium and fat. (Discussed examples) - met  Patient will drink at least 40-50 oz a day. Patient will carry water with her. - working on       Encounter Information        Visit Narrative     Patient reports nausea with new rx. Patient went back down to previous dose. Patient thinks tracking will help her stay accountable. Patient to start writing down foods. Patient planning to follow up every 2 months since she has now completed visit 7 in [facility] program.     Diet Recall:   Breakfast:  Lunch:  Dinner:  Snacks: nuts, sargento cheese snacks  Beverages: patient is drinking 24-48 oz of water    Exercise: patient  "has not been exercising     Supplements: has MV but has not yet taken    Self Monitoring: patient would like to start tracking         Anthropometrics        Current Height, Weight Height: 149.9 cm (59\")  Weight: 95.4 kg (210 lb 6.4 oz) (06/19/24 1428)       Trending Weight Hx  15lbs down since initial appt    Wt Readings from Last 30 Encounters:   06/19/24 1428 95.4 kg (210 lb 6.4 oz)   06/05/24 1246 94.8 kg (209 lb)   05/23/24 1324 94.7 kg (208 lb 12.8 oz)   04/25/24 0955 98 kg (216 lb)   04/18/24 0929 97.7 kg (215 lb 6.4 oz)   04/15/24 1511 98.9 kg (218 lb)   03/21/24 1059 99.2 kg (218 lb 12.8 oz)   02/26/24 1430 101 kg (222 lb)   01/29/24 1104 102 kg (223 lb 12.8 oz)   01/12/24 1319 103 kg (228 lb)   01/11/24 1002 102 kg (225 lb 3.2 oz)   10/27/23 0818 103 kg (226 lb 9.6 oz)   10/18/23 1349 103 kg (227 lb 3.2 oz)   10/09/23 1042 104 kg (228 lb 12.8 oz)   08/04/23 0847 104 kg (230 lb)   07/12/23 1332 104 kg (229 lb 12.8 oz)   06/05/23 1139 106 kg (233 lb 12.8 oz)   05/24/23 1520 104 kg (230 lb)   05/04/23 1356 103 kg (228 lb)   04/10/23 1405 106 kg (232 lb 9.6 oz)   02/16/23 1010 105 kg (232 lb)   01/19/23 1329 105 kg (231 lb 11.2 oz)   01/18/23 1033 105 kg (231 lb 12.8 oz)   01/10/23 1418 104 kg (230 lb)   09/22/22 0952 104 kg (228 lb 12.8 oz)   07/19/22 0911 106 kg (233 lb)   07/14/22 0846 106 kg (233 lb 11 oz)   06/28/22 1506 106 kg (234 lb)   05/16/22 1026 105 kg (231 lb)   05/02/22 1427 105 kg (231 lb)      BMI kg/m2 Body mass index is 42.5 kg/m².       Nutrition Diagnosis         Nutrition Dx Statement Overweight/obesity RT multifactorial biochemical, behavioral and environmental contributors to disease AEB BMI 42.5 kg/m^2         Nutrition Intervention         Nutrition Intervention Nutrition education related to diet modification and physical activity        Monitor/Evaluation        New Goals Patient to track food and beverage intake  Patient to aim for 40-50 oz of water each day       Total time spent " with pt 15 minutes of which 15 minutes were spent on education.       Electronically signed by:  Susu Samuel RD  06/19/24 14:42 EDT

## 2024-06-25 ENCOUNTER — HOSPITAL ENCOUNTER (OUTPATIENT)
Dept: MAMMOGRAPHY | Facility: HOSPITAL | Age: 59
Discharge: HOME | End: 2024-06-25
Admitting: PREVENTIVE MEDICINE
Payer: OTHER GOVERNMENT

## 2024-06-25 DIAGNOSIS — Z12.31 ENCOUNTER FOR SCREENING MAMMOGRAM FOR MALIGNANT NEOPLASM OF BREAST: ICD-10-CM

## 2024-06-25 PROCEDURE — 77063 BREAST TOMOSYNTHESIS BI: CPT

## 2024-06-25 PROCEDURE — 77067 SCR MAMMO BI INCL CAD: CPT

## 2024-06-26 ENCOUNTER — TELEPHONE (OUTPATIENT)
Dept: FAMILY MEDICINE CLINIC | Facility: CLINIC | Age: 59
End: 2024-06-26
Payer: OTHER GOVERNMENT

## 2024-06-26 NOTE — TELEPHONE ENCOUNTER
RELAY  ----- Message from Melissa Duarte sent at 6/26/2024  2:44 PM EDT -----  mammogram normal.  Ordered for one year unless change in symptoms or physical

## 2024-06-26 NOTE — TELEPHONE ENCOUNTER
Name: Merissa Yusuf      Relationship: Self      Best Callback Number: [phone] (Mobile)       HUB PROVIDED THE RELAY MESSAGE FROM THE OFFICE      PATIENT: VOICED UNDERSTANDING AND HAS NO FURTHER QUESTIONS AT THIS TIME    ADDITIONAL INFORMATION:

## 2024-06-27 ENCOUNTER — TELEPHONE (OUTPATIENT)
Age: 59
End: 2024-06-27
Payer: OTHER GOVERNMENT

## 2024-06-27 NOTE — TELEPHONE ENCOUNTER
06/27/24  LM for pt to call back.  Pt needs to do colonoscopy screening questionnaire to determine if she will need to be seen in office or can be scheduled for surgery.

## 2024-07-05 ENCOUNTER — TELEPHONE (OUTPATIENT)
Dept: ONCOLOGY | Facility: CLINIC | Age: 59
End: 2024-07-05

## 2024-07-05 NOTE — TELEPHONE ENCOUNTER
Caller: Merissa Yusuf    Relationship: Self    Best call back number: [phone]    Who are you requesting to speak with (clinical staff, provider,  specific staff member): scheduling    Do you know the name of the person who called: patient    What was the call regarding: pt is calling in regards to 07/08's appts, she was advised via voice message that they needed to be rescheduled for later in the day

## 2024-07-08 ENCOUNTER — LAB (OUTPATIENT)
Dept: LAB | Facility: HOSPITAL | Age: 59
End: 2024-07-08
Payer: OTHER GOVERNMENT

## 2024-07-08 ENCOUNTER — APPOINTMENT (OUTPATIENT)
Dept: LAB | Facility: HOSPITAL | Age: 59
End: 2024-07-08
Payer: OTHER GOVERNMENT

## 2024-07-08 ENCOUNTER — HOSPITAL ENCOUNTER (OUTPATIENT)
Dept: ONCOLOGY | Facility: HOSPITAL | Age: 59
Discharge: HOME | End: 2024-07-08
Payer: OTHER GOVERNMENT

## 2024-07-08 DIAGNOSIS — R71.8 ELEVATED HEMATOCRIT: ICD-10-CM

## 2024-07-08 DIAGNOSIS — E78.2 MIXED HYPERLIPIDEMIA: ICD-10-CM

## 2024-07-08 LAB
FERRITIN SERPL-MCNC: 102 NG/ML (ref 13–150)
HCT VFR BLD AUTO: 41.1 % (ref 34–46.6)

## 2024-07-08 PROCEDURE — 36415 COLL VENOUS BLD VENIPUNCTURE: CPT

## 2024-07-08 PROCEDURE — 82728 ASSAY OF FERRITIN: CPT | Performed by: INTERNAL MEDICINE

## 2024-07-08 PROCEDURE — 85014 HEMATOCRIT: CPT

## 2024-07-09 ENCOUNTER — HOSPITAL ENCOUNTER (OUTPATIENT)
Dept: ONCOLOGY | Facility: HOSPITAL | Age: 59
Discharge: HOME | End: 2024-07-09
Admitting: PREVENTIVE MEDICINE
Payer: OTHER GOVERNMENT

## 2024-07-09 VITALS
SYSTOLIC BLOOD PRESSURE: 120 MMHG | HEART RATE: 83 BPM | OXYGEN SATURATION: 98 % | HEIGHT: 59 IN | WEIGHT: 207.2 LBS | RESPIRATION RATE: 16 BRPM | DIASTOLIC BLOOD PRESSURE: 82 MMHG | BODY MASS INDEX: 41.77 KG/M2

## 2024-07-09 DIAGNOSIS — E83.110 HEREDITARY HEMOCHROMATOSIS: ICD-10-CM

## 2024-07-09 DIAGNOSIS — R71.8 ELEVATED HEMATOCRIT: Primary | ICD-10-CM

## 2024-07-09 PROCEDURE — 99195 PHLEBOTOMY: CPT

## 2024-07-10 ENCOUNTER — OFFICE VISIT (OUTPATIENT)
Dept: CARDIOLOGY | Facility: CLINIC | Age: 59
End: 2024-07-10
Payer: OTHER GOVERNMENT

## 2024-07-10 VITALS
SYSTOLIC BLOOD PRESSURE: 104 MMHG | BODY MASS INDEX: 41.93 KG/M2 | WEIGHT: 208 LBS | DIASTOLIC BLOOD PRESSURE: 70 MMHG | HEART RATE: 69 BPM | HEIGHT: 59 IN | OXYGEN SATURATION: 99 %

## 2024-07-10 DIAGNOSIS — E78.2 MIXED HYPERLIPIDEMIA: ICD-10-CM

## 2024-07-10 DIAGNOSIS — R03.0 ELEVATED BLOOD PRESSURE READING WITHOUT DIAGNOSIS OF HYPERTENSION: ICD-10-CM

## 2024-07-10 DIAGNOSIS — Z01.810 PREOP CARDIOVASCULAR EXAM: Primary | ICD-10-CM

## 2024-07-10 DIAGNOSIS — E83.110 HEREDITARY HEMOCHROMATOSIS: ICD-10-CM

## 2024-07-10 PROCEDURE — 99214 OFFICE O/P EST MOD 30 MIN: CPT | Performed by: INTERNAL MEDICINE

## 2024-07-10 PROCEDURE — 93000 ELECTROCARDIOGRAM COMPLETE: CPT | Performed by: INTERNAL MEDICINE

## 2024-07-10 NOTE — PROGRESS NOTES
Cardiology Office Visit      Encounter Date:  07/10/2024    Patient ID:   Merissa Yusuf is a 58 y.o. female.      Reason For Followup:  Preop evaluation for right knee surgery  Shortness of breath    Brief Clinical History:  Dear Dr. Duarte, Melissa Petty MD    I had the pleasure of seeing Merissa Yusuf today. As you are well aware, this is a 58 y.o. female with past medical history that is significant for history of no known coronary artery disease history of hyperlipidemia history of obesity resented with symptoms of dizziness and lightheadedness    Patient had a cardiac evaluation and middle of 2019 with an echocardiogram with normal LV systolic function and stress test with no ischemic burden      Interval History:  No new complaints  No cardiac symptoms of chest pain shortness of breath is unchanged from baseline  Unable to tolerate Zetia secondary to side effects  Unable to take statin secondary to underlying hemachromatosis  Denies any orthopnea PND  Preop evaluation for knee surgery    Assessment & Plan    Impressions:  Preop evaluation for knee surgery  Shortness of breath  Hyperlipidemia  hemochromatosis  Normal echocardiogram with normal LV systolic function  Stress test with no significant reversible ischemia    Recommendations:  Intolerant to Zetia  Unable to take statin secondary to underlying hemochromatosis  Continue PCSK9 inhibitor to help with hyperlipidemia/lipids are optimal  Lipids are optimal  Likely shortness of breath secondary to underlying diastolic dysfunction and LV hypertrophy  Labs and test results reviewed and discussed with patient  Advised patient to consider a vascular screening study and also coronary artery calcium score for further stratification  Continued aggressive risk factor modification  Recent labs and workup reviewed and discussed with patient  From cardiac standpoint patient is low risk to undergo the planned surgical procedure  Follow up in office in 12  "months        Vitals:  Vitals:    07/10/24 1302   BP: 104/70   Pulse: 69   SpO2: 99%   Weight: 94.3 kg (208 lb)   Height: 149.9 cm (59\")       Physical Exam:    General: Alert, cooperative, no distress, appears stated age  Head:  Normocephalic, atraumatic, mucous membranes moist  Eyes:  Conjunctiva/corneas clear, EOM's intact     Neck:  Supple,  no adenopathy;      Lungs: Clear to auscultation bilaterally, no wheezes rhonchi rales are noted  Chest wall: No tenderness  Heart::  Regular rate and rhythm, S1 and S2 normal, no murmur, rub or gallop  Abdomen: Soft, non-tender, nondistended bowel sounds active  Extremities: No cyanosis, clubbing, or edema  Pulses: 2+ and symmetric all extremities  Skin:  No rashes or lesions  Neuro/psych: A&O x3. CN II through XII are grossly intact with appropriate affect              Lab Results   Component Value Date    GLUCOSE 86 04/25/2024    BUN 12 04/25/2024    CREATININE 0.71 04/25/2024    EGFR 97.5 04/25/2024    BCR 16.9 04/25/2024    K 4.2 04/25/2024    CO2 24.0 04/25/2024    CALCIUM 9.7 04/25/2024    ALBUMIN 4.7 04/25/2024    BILITOT 0.3 04/25/2024    AST 13 04/25/2024    ALT 12 04/25/2024     Results for orders placed during the hospital encounter of 04/12/22    Adult Transthoracic Echo Complete W/ Cont if Necessary Per Protocol    Interpretation Summary  · Left ventricular wall thickness is consistent with mild asymmetric hypertrophy.  · Estimated left ventricular EF = 55% Estimated left ventricular EF was in agreement with the calculated left ventricular EF. Left ventricular systolic function is normal.  · Estimated right ventricular systolic pressure from tricuspid regurgitation is normal (<35 mmHg).  · Left ventricular diastolic function is consistent with (grade II w/high LAP) pseudonormalization.     No results found for this or any previous visit.     Lab Results   Component Value Date    CHOL 128 04/25/2024    TRIG 79 04/25/2024    HDL 64 (H) 04/25/2024    LDL 48 " 04/25/2024      Results for orders placed during the hospital encounter of 04/12/22    Stress Test With Myocardial Perfusion One Day    Interpretation Summary  · Mildly impaired functional capacity  · Findings consistent with a normal ECG stress test.  · Left ventricular ejection fraction is normal. (Calculated EF = 70%).  · Myocardial perfusion imaging indicates a normal myocardial perfusion study with no evidence of ischemia.  · Impressions are consistent with a low risk study.   Results for orders placed in visit on 03/27/19    CARDIOVASCULAR STUDIES - CONVERTED    Narrative  48 hr holter      Imported By: Corin Mendiola [facility] 4/12/2019 8:02:03 AM    _____________________________________________________________________    External Attachment:    Type: Image  Comment:  External Document      Electronically signed by Melissa Duarte MD on 04/13/2019 at 7:36 AM  ________________________________________________________________________      Disclaimer: Converted Note message may not contain all data elements that existed in the legacy source system. Please see [employer] Legacy System for the original note details.           Objective:          Allergies:  Allergies   Allergen Reactions    Amoxicillin Swelling    Erythromycin GI Intolerance    Escitalopram Anxiety    Prozac  [Fluoxetine Hcl] Rash    Penicillin G Rash       Medication Review:     Current Outpatient Medications:     acetaminophen (TYLENOL) 500 MG tablet, Take 1 tablet by mouth Every 4 (Four) Hours As Needed., Disp: , Rfl:     albuterol sulfate  (90 Base) MCG/ACT inhaler, Inhale 2 puffs Every 4 (Four) Hours As Needed for Wheezing., Disp: 6.7 g, Rfl: 0    busPIRone (BUSPAR) 15 MG tablet, TAKE 1 TABLET THREE TIMES A DAY FOR ANXIETY (Patient taking differently: TAKE up to THREE TIMES A DAY FOR ANXIETY- usually 2 a day), Disp: 270 tablet, Rfl: 3    Evolocumab (Repatha SureClick) solution auto-injector SureClick injection, Inject 1 ml under  the skin into the appropriate area as directed every 14 days Strength 140mg/ml, Disp: 6 mL, Rfl: 3    lamoTRIgine (LaMICtal) 100 MG tablet, Take 1 tablet by mouth 2 (Two) Times a Day., Disp: 180 tablet, Rfl: 1    montelukast (SINGULAIR) 10 MG tablet, TAKE 1 TABLET EVERY NIGHT, Disp: 90 tablet, Rfl: 3    ondansetron ODT (ZOFRAN-ODT) 8 MG disintegrating tablet, Place 1 tablet on the tongue Every 8 (Eight) Hours As Needed for Nausea or Vomiting., Disp: 30 tablet, Rfl: 5    Semaglutide-Weight Management 0.5 MG/0.5ML solution auto-injector, Inject 0.5 mL under the skin into the appropriate area as directed 1 (One) Time Per Week., Disp: 2 mL, Rfl: 2    Vortioxetine HBr (Trintellix) 10 MG tablet tablet, Take 1 tablet by mouth Daily With Breakfast., Disp: 90 tablet, Rfl: 1    Family History:  Family History   Problem Relation Age of Onset    Coronary artery disease Other     Diabetes Other     Dementia Other     Diabetes Mother     Cancer Mother     Heart disease Father     Liver disease Father     Hypertension Father     Cancer Brother     Breast cancer Neg Hx     Ovarian cancer Neg Hx        Past Medical History:  Past Medical History:   Diagnosis Date    Anesthesia complication     drops B/P, and slow to arouse    Encounter for laboratory testing for COVID-19 virus 07/19/2022    GERD (gastroesophageal reflux disease)     Hereditary hemochromatosis     Hyperlipidemia     Seasonal allergies        Past surgical History:  Past Surgical History:   Procedure Laterality Date    BLADDER SUSPENSION      BREAST BIOPSY Right     CATARACT EXTRACTION  03/2023    FINGER SURGERY  2008    removed cysts from right index finger     HERNIA REPAIR      SUBTOTAL HYSTERECTOMY      TUBAL ABDOMINAL LIGATION      VENTRAL/INCISIONAL HERNIA REPAIR N/A 09/24/2020    Procedure: LAPAROSCOPIC VENTRAL HERNIA;  Surgeon: Reuben Molina MD;  Location: [facility] MAIN OR;  Service: General;  Laterality: N/A;  0855 TAP block    WISDOM TOOTH  EXTRACTION         Social History:  Social History     Socioeconomic History    Marital status:    Tobacco Use    Smoking status: Never     Passive exposure: Past    Smokeless tobacco: Never   Vaping Use    Vaping status: Never Used   Substance and Sexual Activity    Alcohol use: Yes     Alcohol/week: 1.0 standard drink of alcohol     Types: 1 Glasses of wine per week     Comment: rarely    Drug use: Yes     Frequency: 7.0 times per week     Comment: CBD  gummies    Sexual activity: Defer       Review of Systems:  The following systems were reviewed as they relate to the cardiovascular system: Constitutional, Eyes, ENT, Cardiovascular, Respiratory, Gastrointestinal, Integumentary, Neurological, Psychiatric, Hematologic, Endocrine, Musculoskeletal, and Genitourinary. The pertinent cardiovascular findings are reported above with all other cardiovascular points within those systems being negative.    Diagnostic Study Review:     Current Electrocardiogram:    ECG 12 Lead    Date/Time: 7/10/2024 1:16 PM  Performed by: Chely Arnold MD    Authorized by: Chely Arnold MD  Comparison: compared with previous ECG   Similar to previous ECG  Rhythm: sinus rhythm  Rate: normal  BPM: 75  Conduction: conduction normal  ST Segments: ST segments normal  T Waves: T waves normal  QRS axis: normal    Clinical impression: normal ECG                NOTE: The following portions of the patient's history were reviewed and updated this visit as appropriate: allergies, current medications, past family history, past medical history, past social history, past surgical history and problem list.

## 2024-07-12 DIAGNOSIS — F41.8 MIXED ANXIETY DEPRESSIVE DISORDER: Chronic | ICD-10-CM

## 2024-07-15 ENCOUNTER — TRANSCRIBE ORDERS (OUTPATIENT)
Dept: PHYSICAL THERAPY | Facility: CLINIC | Age: 59
End: 2024-07-15
Payer: OTHER GOVERNMENT

## 2024-07-15 DIAGNOSIS — M25.562 LEFT KNEE PAIN, UNSPECIFIED CHRONICITY: Primary | ICD-10-CM

## 2024-07-15 RX ORDER — BUSPIRONE HYDROCHLORIDE 15 MG/1
TABLET ORAL
Qty: 270 TABLET | Refills: 3 | Status: SHIPPED | OUTPATIENT
Start: 2024-07-15

## 2024-07-24 ENCOUNTER — TELEPHONE (OUTPATIENT)
Age: 59
End: 2024-07-24
Payer: OTHER GOVERNMENT

## 2024-07-24 NOTE — TELEPHONE ENCOUNTER
07- called patient to go over colonoscopy screening [facility] for patient to call back

## 2024-07-25 ENCOUNTER — TREATMENT (OUTPATIENT)
Dept: PHYSICAL THERAPY | Facility: CLINIC | Age: 59
End: 2024-07-25
Payer: OTHER GOVERNMENT

## 2024-07-25 DIAGNOSIS — G89.29 CHRONIC PAIN OF LEFT KNEE: Primary | ICD-10-CM

## 2024-07-25 DIAGNOSIS — M25.562 CHRONIC PAIN OF LEFT KNEE: Primary | ICD-10-CM

## 2024-07-25 NOTE — PROGRESS NOTES
Physical Therapy Initial Evaluation and Plan of Care    Patient: Merissa Yusuf   : 1965  Diagnosis/ICD-10 Code:  Chronic pain of left knee [M25.562, G89.29]  Referring practitioner: No ref. provider found  Date of Initial Visit: 2024  Today's Date: 2024  Patient seen for 1 sessions           Subjective Questionnaire:  Knee outcome survey:  35%      Subjective Evaluation    History of Present Illness  Mechanism of injury: Patient presents to physical therapy with cc of chronic bilateral knee pain.  Patient is scheduled for TKA in 2025 on left knee, then getting right TKA later next year.  Patient wishes to improved AROM in bilateral knees and improve muscular strength in BLE's so that she may tolerate more activities currently, as well as have better outcome after surgery.  Patient reports that she is retired but is active with household chores.  Lives in two story home, but stairs not required for entry/exit.       Patient Occupation: retired Pain  Current pain ratin  At worst pain ratin  Location: bilateral knees  Quality: sharp and dull ache  Relieving factors: heat, medications and rest  Aggravating factors: prolonged positioning, standing, stairs, squatting and ambulation  Progression: worsening    Social Support  Lives with: adult children and spouse    Diagnostic Tests  X-ray: abnormal    Patient Goals  Patient goals for therapy: decreased pain, decreased edema, increased strength, increased motion and return to sport/leisure activities           Objective          Palpation   Left   Hypertonic in the distal biceps femoris, distal semimembranosus and distal semitendinosus.   Tenderness of the distal semimembranosus and distal semitendinosus.     Right   Hypertonic in the distal biceps femoris, distal semimembranosus and distal semitendinosus. Tenderness of the distal semimembranosus and distal semitendinosus.     Tenderness   Left Knee   Tenderness in the lateral joint  line, medial joint line, medial patella and plica.     Right Knee   Tenderness in the lateral joint line, medial joint line, medial patella and plica tenderness.     Active Range of Motion   Left Knee   Flexion: 130 degrees   Extension: 16 degrees     Right Knee   Flexion: 129 degrees   Extension: 15 degrees     Strength/Myotome Testing     Left Hip   Planes of Motion   Flexion: 5  Extension: 3+  Abduction: 3+  External rotation: 5    Right Hip   Planes of Motion   Flexion: 5  Extension: 4-  Abduction: 4-  External rotation: 5    Left Knee   Flexion: 4  Extension: 4    Right Knee   Flexion: 4  Extension: 4    Ambulation     Observational Gait   Decreased left stance time and right step length.     Quality of Movement During Gait     Pelvis    Pelvis (Left): Positive Trendelenburg.           Assessment & Plan       Assessment  Impairments: abnormal gait, abnormal or restricted ROM, activity intolerance, impaired physical strength, lacks appropriate home exercise program and pain with function   Functional limitations: lifting, walking, pulling, pushing, uncomfortable because of pain and standing   Assessment details: Patient presents to physical therapy with cc of bilateral knee pain.  Patient demonstrates limited AROM in bilateral knees, weakness in BLE's and abnormal gait mechanics.  Patient is appropriate for PT intervention in order to address these deficits so that she may tolerate household chores and ADL's with less pain/limitation.  Prognosis: good    Goals  Plan Goals: In two weeks, patient will report at least 25% reduction in pain level.   In two weeks, patient will demonstrate knee extension AROM of 10 degrees or less bilaterally.      In four weeks, patient will demonstrate at least 4+/5 muscular strength in BLE's in order to demonstrate ability tolerate walking at least 15 minutes without rest so that she may tolerate going to grocery independently.   In four weeks, patient will demonstrate equal  bilateral stride length with gait.  In four weeks, patient will demonstrate proper technique with HEP.   In four weeks, patient will demonstrate decreased perceived disability by increasing score on knee outcome survey by at least 15%      Plan  Therapy options: will be seen for skilled therapy services  Planned modality interventions: cryotherapy and thermotherapy (hydrocollator packs)  Planned therapy interventions: manual therapy, neuromuscular re-education, soft tissue mobilization, strengthening, stretching, therapeutic activities, joint mobilization, home exercise program, gait training, functional ROM exercises and flexibility  Frequency: 2x week  Duration in weeks: 8  Treatment plan discussed with: patient        Manual Therapy:    8     mins  58544;  Therapeutic Exercise:    20     mins  84903;     Neuromuscular Daniel:        mins  84671;    Therapeutic Activity:          mins  63531;     Gait Training:           mins  42507;     Ultrasound:          mins  76527;    Electrical Stimulation:         mins  66504 ( );  Dry Needling          mins self-pay    Timed Treatment:   28   mins   Total Treatment:     55   mins    PT SIGNATURE: Blayne Becker, ASHLEE   DATE TREATMENT INITIATED: 7/25/2024    Initial Certification  Certification Period: 10/23/2024  I certify that the therapy services are furnished while this patient is under my care.  The services outlined above are required by this patient, and will be reviewed every 90 days.     PHYSICIAN:  Mario Alberto Jett/MD Russell      DATE: 7/25/2024    Please sign and return via fax to [phone].. Thank you, [facility].

## 2024-07-26 RX ORDER — LAMOTRIGINE 100 MG/1
TABLET ORAL
Qty: 180 TABLET | Refills: 3 | Status: SHIPPED | OUTPATIENT
Start: 2024-07-26

## 2024-07-30 ENCOUNTER — PREP FOR SURGERY (OUTPATIENT)
Dept: OTHER | Facility: HOSPITAL | Age: 59
End: 2024-07-30
Payer: OTHER GOVERNMENT

## 2024-07-30 ENCOUNTER — TREATMENT (OUTPATIENT)
Dept: PHYSICAL THERAPY | Facility: CLINIC | Age: 59
End: 2024-07-30
Payer: OTHER GOVERNMENT

## 2024-07-30 DIAGNOSIS — Z12.11 ENCOUNTER FOR SCREENING COLONOSCOPY: Primary | ICD-10-CM

## 2024-07-30 DIAGNOSIS — G89.29 CHRONIC PAIN OF LEFT KNEE: Primary | ICD-10-CM

## 2024-07-30 DIAGNOSIS — M25.562 CHRONIC PAIN OF LEFT KNEE: Primary | ICD-10-CM

## 2024-07-30 RX ORDER — SODIUM, POTASSIUM,MAG SULFATES 17.5-3.13G
SOLUTION, RECONSTITUTED, ORAL ORAL
Qty: 177 ML | Refills: 0 | Status: SHIPPED | OUTPATIENT
Start: 2024-07-30

## 2024-07-30 RX ORDER — SODIUM CHLORIDE, SODIUM LACTATE, POTASSIUM CHLORIDE, CALCIUM CHLORIDE 600; 310; 30; 20 MG/100ML; MG/100ML; MG/100ML; MG/100ML
30 INJECTION, SOLUTION INTRAVENOUS CONTINUOUS
OUTPATIENT
Start: 2024-07-30

## 2024-07-30 NOTE — PROGRESS NOTES
Physical Therapy Daily Progress Note      Patient: Merissa Yusuf   : 1965  Diagnosis/ICD-10 Code:  Chronic pain of left knee [M25.562, G89.29]  Referring practitioner: Mario Alberto Boyce/Russell Henderson*  Date of Initial Visit: Type: THERAPY  Noted: 2024  Today's Date: 2024  Patient seen for 2 sessions             Subjective No significant changes to report today.  Pt brought in an adjustable ankle weight to be shown how to use it.    Objective   See Exercise, Manual, and Modality Logs for complete treatment.       Assessment/Plan  Pt tolerated progression and addition of exercises well today.  Pt was educated on use of ankle weight for LAQ and how to gradually increase/add weight as tolerated.    Progress per Plan of Care           Timed:         Manual Therapy:    8     mins  00827;     Therapeutic Exercise:    30     mins  16284;         Timed Treatment:   38   mins   Total Treatment:     38   mins        Sina Sewell PTA  Physical Therapist Assistant

## 2024-07-31 ENCOUNTER — TELEPHONE (OUTPATIENT)
Age: 59
End: 2024-07-31

## 2024-07-31 ENCOUNTER — TELEMEDICINE (OUTPATIENT)
Dept: PSYCHIATRY | Facility: CLINIC | Age: 59
End: 2024-07-31
Payer: OTHER GOVERNMENT

## 2024-07-31 DIAGNOSIS — F41.1 GAD (GENERALIZED ANXIETY DISORDER): Chronic | ICD-10-CM

## 2024-07-31 DIAGNOSIS — F33.1 MODERATE EPISODE OF RECURRENT MAJOR DEPRESSIVE DISORDER: Primary | Chronic | ICD-10-CM

## 2024-07-31 PROBLEM — Z12.11 ENCOUNTER FOR SCREENING COLONOSCOPY: Status: ACTIVE | Noted: 2024-07-30

## 2024-07-31 NOTE — TELEPHONE ENCOUNTER
Hub staff attempted to follow warm transfer process and was unsuccessful     Caller: Merissa Yusuf    Relationship to patient: Self    Best call back number: [phone] (home)       Patient is needing: SCHEDULE COLONSCOPY

## 2024-07-31 NOTE — PROGRESS NOTES
Subjective   Merissa Yusuf is a 59 y.o.white female who presents today for follow up via telehealth.    This provider is located in [place], [place] using a secure S[employer] Video Visit through [employer]. Patient is being seen remotely via telehealth at their home address in [place], and stated they are in a secure environment for this session. The patient's condition being diagnosed/treated is appropriate for telemedicine. The provider identified herself as well as her credentials.   The patient, and/or patients guardian, consent to be seen remotely, and when consent is given they understand that the consent allows for patient identifiable information to be sent to a third party as needed.   They may refuse to be seen remotely at any time. The electronic data is encrypted and password protected, and the patient and/or guardian has been advised of the potential risks to privacy not withstanding such measures.   PT Identifiers used: Name and .    You have chosen to receive care through a telephone visit. Do you consent to use a telephone visit for your medical care today? Yes      Chief Complaint:  Anxiety and depression    History of Present Illness:    Bought a larger home in  and moved their daughter and YOBANI and kids in with them, still moving things from their old home and feels overwhelmed with all of the purging/unpacking.  They still have not put their old home up for sale.  She feels like she needs an adjustment in her medication.     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   Doing CBD gummies at night for RLS, doing pre-hab for her surgery    Her 39 yr old son  in 2021, she was out of town in [place] with her sister who's  was dying and passed when she got back. His birthday is in October, so those few weeks each year are  "harder,  does well when she keeps herself busy.   Patient is doing better on Trintellix, her daughter said she is happier on it, no restless legs yet on the 5mg  Lamictal has helped her irritability, may need increase  \"My  is driving me crazy\"..he has TBI and can push her buttons  She has hemochromatosis, had to have a phlebotomy done last week.     Anxiety 6/10 due to the move  Depression 5/10, low energy and motivation lately.     Denies SI/HI      The following portions of the patient's history were reviewed and updated as appropriate: allergies, current medications, past family history, past medical history, past social history, past surgical history and problem list.    PAST PSYCHIATRIC HISTORY  Axis I  Affective/Bipoloar Disorder, Anxiety/Panic Disorder  Axis II  None    PAST OUTPATIENT TREATMENT  Diagnosis treated:  Affective Disorder, Anxiety/Panic Disorder  Treatment Type:  Family Therapy, Medication Management  No hospitalizations  Genesight testing done March 2019  Prior Psychiatric Medications:  Lexapro increased anxiety  Prozac, hives  Trintellix has caused her restless legs in the past  Buspar   Viibryd, could not take  Pristiq  Topamax, increased her anxiety and cause SOA  Lamictal  Support Groups:  None  Sequelae Of Mental Disorder:  social isolation, family disruption, emotional distress      Interval History  Improved    Side Effects  Restless legs with Trintellix at 10 mg but wants to try it again.    Past psychiatric history was reviewed and compared to visit and 4/24/24 appropriate updates were made.    Past Medical History:  Past Medical History:   Diagnosis Date    Anesthesia complication     drops B/P, and slow to arouse    Encounter for laboratory testing for COVID-19 virus 07/19/2022    GERD (gastroesophageal reflux disease)     Hereditary hemochromatosis     Hyperlipidemia     Seasonal allergies        Social History:  Social History     Socioeconomic History    Marital status: "    Tobacco Use    Smoking status: Never     Passive exposure: Past    Smokeless tobacco: Never   Vaping Use    Vaping status: Never Used   Substance and Sexual Activity    Alcohol use: Yes     Alcohol/week: 1.0 standard drink of alcohol     Types: 1 Glasses of wine per week     Comment: rarely    Drug use: Yes     Frequency: 7.0 times per week     Comment: CBD  gummies    Sexual activity: Defer       Family History:  Family History   Problem Relation Age of Onset    Coronary artery disease Other     Diabetes Other     Dementia Other     Diabetes Mother     Cancer Mother     Heart disease Father     Liver disease Father     Hypertension Father     Cancer Brother     Breast cancer Neg Hx     Ovarian cancer Neg Hx        Past Surgical History:  Past Surgical History:   Procedure Laterality Date    BLADDER SUSPENSION      BREAST BIOPSY Right     CATARACT EXTRACTION  03/2023    FINGER SURGERY  2008    removed cysts from right index finger     HERNIA REPAIR      SUBTOTAL HYSTERECTOMY      TUBAL ABDOMINAL LIGATION      VENTRAL/INCISIONAL HERNIA REPAIR N/A 09/24/2020    Procedure: LAPAROSCOPIC VENTRAL HERNIA;  Surgeon: Reuben Molina MD;  Location: [facility] OR;  Service: General;  Laterality: N/A;  0855 TAP block    WISDOM TOOTH EXTRACTION         Problem List:  Patient Active Problem List   Diagnosis    Elevated blood pressure reading without diagnosis of hypertension    Hearing deficit    Hyperlipidemia    Knee pain    Low back pain    Memory loss    Morbid (severe) obesity due to excess calories    Obstructive sleep apnea    Thrombocytosis    Vitamin D deficiency    Combined forms of age-related cataract, bilateral    Convergence insufficiency    Corneal pannus of right eye    Corneal pannus    Meibomian gland dysfunction (MGD) of both eyes    Bilateral presbyopia    Presbyopia    Vitreous floaters    Hereditary hemochromatosis    GERD (gastroesophageal reflux disease)    Seasonal allergies    Class 3  severe obesity due to excess calories with serious comorbidity and body mass index (BMI) of 40.0 to 44.9 in adult    Elevated hematocrit    KIRSTIE (generalized anxiety disorder)    Moderate episode of recurrent major depressive disorder    SOB (shortness of breath)    Encounter for laboratory testing for COVID-19 virus    Macular pseudohole of right eye    Partial thickness macular hole of left eye    Primary osteoarthritis of right knee    Primary osteoarthritis of left knee    Multiple atypical skin moles    Bilateral primary osteoarthritis of knee    Encounter for screening colonoscopy       Allergy:   Allergies   Allergen Reactions    Amoxicillin Swelling    Erythromycin GI Intolerance    Escitalopram Anxiety    Prozac  [Fluoxetine Hcl] Rash    Penicillin G Rash        Discontinued Medications:  There are no discontinued medications.        Current Medications:   Current Outpatient Medications   Medication Sig Dispense Refill    acetaminophen (TYLENOL) 500 MG tablet Take 1 tablet by mouth Every 4 (Four) Hours As Needed.      albuterol sulfate  (90 Base) MCG/ACT inhaler Inhale 2 puffs Every 4 (Four) Hours As Needed for Wheezing. 6.7 g 0    busPIRone (BUSPAR) 15 MG tablet TAKE 1 TABLET THREE TIMES A DAY FOR ANXIETY 270 tablet 3    Evolocumab (Repatha SureClick) solution auto-injector SureClick injection Inject 1 ml under the skin into the appropriate area as directed every 14 days Strength 140mg/ml 6 mL 3    lamoTRIgine (LaMICtal) 100 MG tablet TAKE 1 TABLET TWICE A DAY (CORRECTED PRESCRIPTION) 180 tablet 3    montelukast (SINGULAIR) 10 MG tablet TAKE 1 TABLET EVERY NIGHT 90 tablet 3    ondansetron ODT (ZOFRAN-ODT) 8 MG disintegrating tablet Place 1 tablet on the tongue Every 8 (Eight) Hours As Needed for Nausea or Vomiting. 30 tablet 5    Semaglutide-Weight Management 0.5 MG/0.5ML solution auto-injector Inject 0.75 mL under the skin into the appropriate area as directed 1 (One) Time Per Week. 2 mL 1     sodium-potassium-magnesium sulfates (SUPREP) 17.5-3.13-1.6 GM/177ML solution oral solution PLEASE REPLACE WITH GOLYTELY 4000ML IF SUPREP IS NOT APPROVED BY INSURANCE 5 PM the day before your scheduled colonoscopy - Take your 1st dose of bowel prep 5 AM - Take your 2nd dose of bowel prep You may still have watery bowel movements after you finish drinking the solution. 177 mL 0    Vortioxetine HBr (Trintellix) 10 MG tablet tablet Take 1 tablet by mouth Daily With Breakfast. 90 tablet 1     No current facility-administered medications for this visit.         Review of Symptoms:    Psychiatric/Behavioral: Negative for agitation, behavioral problems, confusion, decreased concentration, dysphoric mood, hallucinations, self-injury, sleep disturbance and suicidal ideas. The patient not nervous/anxious and is not hyperactive.        Physical Exam:   not currently breastfeeding.    Mental Status Exam:   Hygiene:   good  Cooperation:  Cooperative  Eye Contact:  Good  Psychomotor Behavior:  Appropriate  Affect:  Appropriate  Mood: Mildly anxious, depressed  Hopelessness: Denies  Speech:  Normal  Thought Process:  Goal directed  Thought Content:  Normal  Suicidal:  None  Homicidal:  None  Hallucinations:  None  Delusion:  None  Memory:  Intact  Orientation:  Person, Place, Time and Situation  Reliability:  good  Insight:  Good  Judgement:  Good  Impulse Control:  Good  Physical/Medical Issues:  No      Mental status exam was reviewed and compared to 4/24/24 visit and appropriate updates were made.    PHQ-9 Depression Screening  Little interest or pleasure in doing things? (P) 0-->not at all   Feeling down, depressed, or hopeless? (P) 0-->not at all   Trouble falling or staying asleep, or sleeping too much? (P) 1-->several days   Feeling tired or having little energy? (P) 0-->not at all   Poor appetite or overeating? (P) 0-->not at all   Feeling bad about yourself - or that you are a failure or have let yourself or your family  down? (P) 0-->not at all   Trouble concentrating on things, such as reading the newspaper or watching television? (P) 0-->not at all   Moving or speaking so slowly that other people could have noticed? Or the opposite - being so fidgety or restless that you have been moving around a lot more than usual? (P) 0-->not at all   Thoughts that you would be better off dead, or of hurting yourself in some way? (P) 0-->not at all   PHQ-9 Total Score (P) 1   If you checked off any problems, how difficult have these problems made it for you to do your work, take care of things at home, or get along with other people? (P) not difficult at all         Never smoker    I advised Merissa of the risks of tobacco use.     Lab Results:   Lab on 07/08/2024   Component Date Value Ref Range Status    Hematocrit 07/08/2024 41.1  34.0 - 46.6 % Final    Ferritin 07/08/2024 102.00  13.00 - 150.00 ng/mL Final   Results Encounter on 05/09/2024   Component Date Value Ref Range Status    Cologuard 05/22/2024 Positive (A)  Negative Final    Comment:   POSITIVE TEST RESULT. A positive Cologuard result should be followed with a colonoscopy or visual examination of the colon. The normal value (reference range) for this assay is negative.    TEST DESCRIPTION: Composite algorithmic analysis of stool DNA-biomarkers with hemoglobin immunoassay.   Quantitative values of individual biomarkers are not reportable and are not associated with individual biomarker result reference ranges. Cologuard is intended for colorectal cancer screening of adults of either sex, 45 years or older, who are at average-risk for colorectal cancer (CRC). Cologuard has been approved for use by the U.S. FDA. The performance of Cologuard was established in a cross sectional study of average-risk adults aged 50-84. Cologuard performance in patients ages 45 to 49 years was estimated by sub-group analysis of near-age groups. Colonoscopies performed for a positive result may find as the  most clinically significant lesion: colorectal cancer [4.0%], advanced adenoma                            (including sessile serrated polyps greater than or equal to 1cm diameter) [20%] or non- advanced adenoma [31%]; or no colorectal neoplasia [45%]. These estimates are derived from a prospective cross-sectional screening study of 10,000 individuals at average risk for colorectal cancer who were screened with both Cologuard and colonoscopy. (Gabriele Ogden al, N Engl J Med 2014;370(14):0097-1451.) Cologuard may produce a false negative or false positive result (no colorectal cancer or precancerous polyp present at colonoscopy follow up). A negative Cologuard test result does not guarantee the absence of CRC or advanced adenoma (pre-cancer). The current Cologuard screening interval is every 3 years. (American Cancer Society and U.S. Multi-Society Task Force). Cologuard performance data in a 10,000 patient pivotal study using colonoscopy as the reference method can be accessed at the following location: www.[facility]/results. Additional description of the Cologuard test process,                            warnings and precautions can be found at www.cologuard.com.         Assessment & Plan   Problems Addressed this Visit          Mental Health    KIRSTIE (generalized anxiety disorder) (Chronic)    Moderate episode of recurrent major depressive disorder - Primary (Chronic)     Diagnoses         Codes Comments    Moderate episode of recurrent major depressive disorder    -  Primary ICD-10-CM: F33.1  ICD-9-CM: 296.32     KIRSTIE (generalized anxiety disorder)     ICD-10-CM: F41.1  ICD-9-CM: 300.02             Visit Diagnoses:    ICD-10-CM ICD-9-CM   1. Moderate episode of recurrent major depressive disorder  F33.1 296.32   2. KIRSTIE (generalized anxiety disorder)  F41.1 300.02             TREATMENT PLAN/GOALS: Continue supportive psychotherapy efforts and medications as indicated. Treatment and medication options discussed  during today's visit. Patient ackowledged and verbally consented to continue with current treatment plan and was educated on the importance of compliance with treatment and follow-up appointments.    MEDICATION ISSUES:  INSPECT reviewed as expected  Discussed medication options and treatment plan of prescribed medication as well as the risks, benefits, and side effects including potential falls, possible impaired driving and metabolic adversities among others. Patient is agreeable to call the office with any worsening of symptoms or onset of side effects. Patient is agreeable to call 911 or go to the nearest ER should he/she begin having SI/HI. No medication side effects or related complaints today.     Patient is doing well, coping better with the loss of her son, helping a lot with her grand kids and denies significant depression currently.   Continue Trintellix 10 mg daily for depression and anxiety and hopefully no restless legs since she has been on the 5 mg for so long.   Continue Buspar 15mg TID prn anxiety, usually only takes it twice daily, only TID on rare occasion  Continue Lamictal 100 mg tabs take one full tab daily for mood stabilizer   Continue L-methylfolate daily      MEDS ORDERED DURING VISIT:  No orders of the defined types were placed in this encounter.      Return in about 3 months (around 10/31/2024) for video visit.           This document has been electronically signed by Laisha Mendiola PA-C  August 2, 2024 05:36 EDT    EMR Dragon transcription disclaimer:  Some of this encounter note is an electronic transcription translation of spoken language to printed text. The electronic translation of spoken language may permit erroneous, or at times, nonsensical words or phrases to be inadvertently transcribed; Although I have reviewed the note for such errors some may still exist.

## 2024-07-31 NOTE — TELEPHONE ENCOUNTER
Semaglutide refill request   [facility]   My phone number is [phone]  I have 1 week supply remaining   I weigh 205 pounds  No abdominal pain  Not pregnant   Current dose .5 ml from lower dose vial ( blue cap)  I have been on this dose for 5 weeks  Took last dose on 7/26/24  I want to go up to next dose

## 2024-08-01 ENCOUNTER — TREATMENT (OUTPATIENT)
Dept: PHYSICAL THERAPY | Facility: CLINIC | Age: 59
End: 2024-08-01
Payer: OTHER GOVERNMENT

## 2024-08-01 DIAGNOSIS — M25.562 CHRONIC PAIN OF LEFT KNEE: Primary | ICD-10-CM

## 2024-08-01 DIAGNOSIS — G89.29 CHRONIC PAIN OF LEFT KNEE: Primary | ICD-10-CM

## 2024-08-01 NOTE — PROGRESS NOTES
Physical Therapy Daily Progress Note      Patient: Merissa Yusuf   : 1965  Diagnosis/ICD-10 Code:  Chronic pain of left knee [M25.562, G89.29]  Referring practitioner: Mario Alberto Boyce/Russell Henderson*  Date of Initial Visit: Type: THERAPY  Noted: 2024  Today's Date: 2024  Patient seen for 3 sessions             Subjective Knees are sore today.    Objective   See Exercise, Manual, and Modality Logs for complete treatment.       Assessment/Plan  Pt responded well overall with progression and addition of exercises.    Progress per Plan of Care           Timed:         Manual Therapy:    5     mins  39534;     Therapeutic Exercise:    30     mins  02089;         Timed Treatment:   35   mins   Total Treatment:     35   mins        Sina Sewell PTA  Physical Therapist Assistant

## 2024-08-06 ENCOUNTER — TREATMENT (OUTPATIENT)
Dept: PHYSICAL THERAPY | Facility: CLINIC | Age: 59
End: 2024-08-06
Payer: OTHER GOVERNMENT

## 2024-08-06 DIAGNOSIS — M25.562 CHRONIC PAIN OF LEFT KNEE: Primary | ICD-10-CM

## 2024-08-06 DIAGNOSIS — G89.29 CHRONIC PAIN OF LEFT KNEE: Primary | ICD-10-CM

## 2024-08-06 NOTE — PROGRESS NOTES
Physical Therapy Daily Progress Note    Patient: Merissa Yusuf   : 1965  Diagnosis/ICD-10 Code:  Chronic pain of left knee [M25.562, G89.29]  Referring practitioner: Mario Alberto Boyce/Russell Henderson*  Date of Initial Visit: Type: THERAPY  Noted: 2024  Today's Date: 2024  Patient seen for 4 sessions         Merissa Yusuf reports:  Left knee feeling better.  No new complaints.     Objective          Active Range of Motion   Left Knee   Flexion: 130 degrees   Extension: 15 degrees     Right Knee   Flexion: 130 degrees   Extension: 10 degrees       See Exercise, Manual, and Modality Logs for complete treatment.       Assessment/Plan    Tolerates exercise progression well this visit.  Noted improved knee extension AROM after manual therapy.  Progressing well.    Progress per Plan of Care           Manual Therapy:    10     mins  83160;  Therapeutic Exercise:    25     mins  83175;     Neuromuscular Daniel:        mins  94449;    Therapeutic Activity:          mins  07511;     Gait Training:           mins  67155;     Ultrasound:          mins  84635;    Electrical Stimulation:         mins  68296 ( );  Dry Needling          mins self-pay    Timed Treatment:   35   mins   Total Treatment:     35   mins    Blayne Becker PT  Physical Therapist

## 2024-08-08 ENCOUNTER — TREATMENT (OUTPATIENT)
Dept: PHYSICAL THERAPY | Facility: CLINIC | Age: 59
End: 2024-08-08
Payer: OTHER GOVERNMENT

## 2024-08-08 DIAGNOSIS — G89.29 CHRONIC PAIN OF LEFT KNEE: Primary | ICD-10-CM

## 2024-08-08 DIAGNOSIS — M25.562 CHRONIC PAIN OF LEFT KNEE: Primary | ICD-10-CM

## 2024-08-08 NOTE — PROGRESS NOTES
Physical Therapy Daily Progress Note      Patient: Merissa Yusuf   : 1965  Diagnosis/ICD-10 Code:  Chronic pain of left knee [M25.562, G89.29]  Referring practitioner: Mario Alberto Boyce/Russell Henderson*  Date of Initial Visit: Type: THERAPY  Noted: 2024  Today's Date: 2024  Patient seen for 5 sessions             Subjective Pt having pain behind her knee and below her knee cap.      Objective   See Exercise, Manual, and Modality Logs for complete treatment.       Assessment/Plan  Continued to have pain mainly behind her knee today with manual overpressure to help improve knee extension ROM.  Tolerated exercises fairly well overall.    Progress per Plan of Care           Timed:         Manual Therapy:    10     mins  03732;     Therapeutic Exercise:    25     mins  41994;         Timed Treatment:   35   mins   Total Treatment:     35   mins        Sina Sewell PTA  Physical Therapist Assistant

## 2024-08-19 ENCOUNTER — ANESTHESIA (OUTPATIENT)
Dept: GASTROENTEROLOGY | Facility: HOSPITAL | Age: 59
End: 2024-08-19
Payer: OTHER GOVERNMENT

## 2024-08-19 ENCOUNTER — HOSPITAL ENCOUNTER (OUTPATIENT)
Facility: HOSPITAL | Age: 59
Setting detail: HOSPITAL OUTPATIENT SURGERY
Discharge: HOME | End: 2024-08-19
Attending: STUDENT IN AN ORGANIZED HEALTH CARE EDUCATION/TRAINING PROGRAM | Admitting: STUDENT IN AN ORGANIZED HEALTH CARE EDUCATION/TRAINING PROGRAM
Payer: OTHER GOVERNMENT

## 2024-08-19 ENCOUNTER — ANESTHESIA EVENT (OUTPATIENT)
Dept: GASTROENTEROLOGY | Facility: HOSPITAL | Age: 59
End: 2024-08-19
Payer: OTHER GOVERNMENT

## 2024-08-19 VITALS
WEIGHT: 201.8 LBS | DIASTOLIC BLOOD PRESSURE: 67 MMHG | OXYGEN SATURATION: 96 % | RESPIRATION RATE: 15 BRPM | TEMPERATURE: 98.7 F | BODY MASS INDEX: 40.68 KG/M2 | SYSTOLIC BLOOD PRESSURE: 116 MMHG | HEART RATE: 87 BPM | HEIGHT: 59 IN

## 2024-08-19 DIAGNOSIS — Z12.11 ENCOUNTER FOR SCREENING COLONOSCOPY: ICD-10-CM

## 2024-08-19 PROCEDURE — 25010000002 PROPOFOL 500 MG/50ML EMULSION: Performed by: NURSE ANESTHETIST, CERTIFIED REGISTERED

## 2024-08-19 PROCEDURE — 88305 TISSUE EXAM BY PATHOLOGIST: CPT | Performed by: STUDENT IN AN ORGANIZED HEALTH CARE EDUCATION/TRAINING PROGRAM

## 2024-08-19 PROCEDURE — 25010000002 GLYCOPYRROLATE 0.2 MG/ML SOLUTION: Performed by: NURSE ANESTHETIST, CERTIFIED REGISTERED

## 2024-08-19 PROCEDURE — 25810000003 SODIUM CHLORIDE 0.9 % SOLUTION: Performed by: STUDENT IN AN ORGANIZED HEALTH CARE EDUCATION/TRAINING PROGRAM

## 2024-08-19 PROCEDURE — 45380 COLONOSCOPY AND BIOPSY: CPT | Performed by: STUDENT IN AN ORGANIZED HEALTH CARE EDUCATION/TRAINING PROGRAM

## 2024-08-19 RX ORDER — SODIUM CHLORIDE 9 MG/ML
50 INJECTION, SOLUTION INTRAVENOUS CONTINUOUS
Status: DISCONTINUED | OUTPATIENT
Start: 2024-08-19 | End: 2024-08-19 | Stop reason: HOSPADM

## 2024-08-19 RX ORDER — PROPOFOL 10 MG/ML
INJECTION, EMULSION INTRAVENOUS CONTINUOUS PRN
Status: DISCONTINUED | OUTPATIENT
Start: 2024-08-19 | End: 2024-08-19 | Stop reason: SURG

## 2024-08-19 RX ORDER — GLYCOPYRROLATE 0.2 MG/ML
INJECTION INTRAMUSCULAR; INTRAVENOUS AS NEEDED
Status: DISCONTINUED | OUTPATIENT
Start: 2024-08-19 | End: 2024-08-19 | Stop reason: SURG

## 2024-08-19 RX ORDER — DEXMEDETOMIDINE HYDROCHLORIDE 100 UG/ML
INJECTION, SOLUTION INTRAVENOUS AS NEEDED
Status: DISCONTINUED | OUTPATIENT
Start: 2024-08-19 | End: 2024-08-19 | Stop reason: SURG

## 2024-08-19 RX ORDER — PROPOFOL 10 MG/ML
INJECTION, EMULSION INTRAVENOUS AS NEEDED
Status: DISCONTINUED | OUTPATIENT
Start: 2024-08-19 | End: 2024-08-19 | Stop reason: SURG

## 2024-08-19 RX ORDER — LIDOCAINE HYDROCHLORIDE 20 MG/ML
INJECTION, SOLUTION INTRAVENOUS AS NEEDED
Status: DISCONTINUED | OUTPATIENT
Start: 2024-08-19 | End: 2024-08-19 | Stop reason: SURG

## 2024-08-19 RX ADMIN — PROPOFOL INJECTABLE EMULSION 50 MG: 10 INJECTION, EMULSION INTRAVENOUS at 08:56

## 2024-08-19 RX ADMIN — GLYCOPYRROLATE 0.2 MG: 0.2 INJECTION, SOLUTION INTRAMUSCULAR; INTRAVENOUS at 08:54

## 2024-08-19 RX ADMIN — SODIUM CHLORIDE 50 ML/HR: 9 INJECTION, SOLUTION INTRAVENOUS at 08:17

## 2024-08-19 RX ADMIN — PROPOFOL INJECTABLE EMULSION 30 MG: 10 INJECTION, EMULSION INTRAVENOUS at 08:58

## 2024-08-19 RX ADMIN — DEXMEDETOMIDINE HYDROCHLORIDE 10 MCG: 100 INJECTION, SOLUTION INTRAVENOUS at 08:56

## 2024-08-19 RX ADMIN — LIDOCAINE HYDROCHLORIDE 80 MG: 20 INJECTION, SOLUTION INTRAVENOUS at 08:56

## 2024-08-19 RX ADMIN — PROPOFOL INJECTABLE EMULSION 20 MG: 10 INJECTION, EMULSION INTRAVENOUS at 08:57

## 2024-08-19 RX ADMIN — PROPOFOL INJECTABLE EMULSION 125 MCG/KG/MIN: 10 INJECTION, EMULSION INTRAVENOUS at 08:56

## 2024-08-19 RX ADMIN — PROPOFOL INJECTABLE EMULSION 10 MG: 10 INJECTION, EMULSION INTRAVENOUS at 09:07

## 2024-08-19 RX ADMIN — PROPOFOL INJECTABLE EMULSION 20 MG: 10 INJECTION, EMULSION INTRAVENOUS at 09:00

## 2024-08-19 NOTE — ANESTHESIA PREPROCEDURE EVALUATION
Anesthesia Evaluation     Patient summary reviewed and Nursing notes reviewed   NPO Solid Status: > 8 hours             Airway   Mallampati: II  TM distance: >3 FB  Neck ROM: full  No difficulty expected  Dental - normal exam     Pulmonary - normal exam   (+) ,sleep apnea on CPAP  Cardiovascular - normal exam    ECG reviewed    (+) hyperlipidemia  (-) angina, HAN      Neuro/Psych- negative ROS  GI/Hepatic/Renal/Endo    (+) obesity, GERD    Musculoskeletal     Abdominal  - normal exam    Bowel sounds: normal.   Substance History - negative use     OB/GYN negative ob/gyn ROS         Other   arthritis,                 Anesthesia Plan    ASA 3     general       Anesthetic plan, risks, benefits, and alternatives have been provided, discussed and informed consent has been obtained with: patient.    CODE STATUS:

## 2024-08-19 NOTE — OP NOTE
[facility] [place] Colorectal Surgery  Colonoscopy Examination     Name: Merissa Yusuf  : 1965  Date of Colonoscopy: 2024  Procedure: Diagnostic Colonoscopy for Positive Cologuard  Surgeon: Rodolfo Lambert MD   Anesthesia: Sedation   IV Fluids: refer to anesthesia record    Procedure Details:    Prior to the procedure, history and physical exam was performed. Patient's medication and allergies were reviewed. The risk and benefits of the procedure and sedation options and risks were discussed with the patient. All questions were answered and informed consent was obtained.    The patient was brought to the endoscopy suite and placed in the left lateral decubitus position. Conscious sedation was administered by the anesthesia team. Vital signs including blood pressure, heart rate, and oxygen saturation were monitored continuously throughout the procedure.    The colonoscope was introduced through the rectum and advanced through the entire colon under direct visualization. The scope was maneuvered carefully, and the mucosa of the rectum, sigmoid colon, descending colon, transverse colon, ascending colon, and cecum were thoroughly inspected. Adequate insufflation was maintained throughout the procedure for optimal visualization.    Findings:    Rectum: Decreased anal sphincter, enlarged and prolapsing internal hemorrhoids,  no lesions,polyps, or abnormalities.  Sigmoid colon: Pan Diverticulosis with no lesions,polyps, or abnormalities.                                                     Descending colon: Pan Diverticulosis with no lesions,polyps, or abnormalities.     Transverse colon: Pan Diverticulosis with no lesions,polyps, or abnormalities.     Ascending colon: Pan Diverticulosis with no lesions,polyps, or abnormalities.     Cecum: sessile lesion, doesn't appear avm or polyp. Multiple passes of biopsy were taken.     Procedure Images:            Specimens:  The removed biopsies were retrieved and sent for  histopathological examination to the pathology department for further analysis.    Recommendation:     Repeat colonoscopy in 5 years. This might change depending on the path results.  Follow up biopsy results.     Conclusion:  The colonoscopy was completed successfully, and the entire colon was visualized without any complications. The patient tolerated the procedure well and was transferred to the recovery area in stable condition. Post-procedure instructions and follow-up were discussed with the patient and will be provided in written form.    Rodolfo Lambert MD  Colon and Rectal Surgery   [address], [address]  T: [phone]

## 2024-08-19 NOTE — H&P
Colorectal Surgery Preop Note    ID:  Merissa Yusuf;     Chief Complaint  Screening colonoscopy     History of Present Illness  Merissa Yusuf is a 59 y.o. female who is here for Screening colonoscopy      Past Medical History  Past Medical History:   Diagnosis Date    Anesthesia complication     drops B/P, and slow to arouse    Encounter for laboratory testing for COVID-19 virus 07/19/2022    GERD (gastroesophageal reflux disease)     Hereditary hemochromatosis     Hyperlipidemia     Seasonal allergies     Sleep apnea      For further details please see prior notes and Health History Questionnaire scanned in Epic.  Past Surgical History  Past Surgical History:   Procedure Laterality Date    BLADDER SUSPENSION      BREAST BIOPSY Right     CATARACT EXTRACTION  03/2023    FINGER SURGERY  2008    removed cysts from right index finger     HERNIA REPAIR      SUBTOTAL HYSTERECTOMY      TUBAL ABDOMINAL LIGATION      VENTRAL/INCISIONAL HERNIA REPAIR N/A 09/24/2020    Procedure: LAPAROSCOPIC VENTRAL HERNIA;  Surgeon: Reuben Molina MD;  Location: [facility] MAIN OR;  Service: General;  Laterality: N/A;  0855 TAP block    WISDOM TOOTH EXTRACTION       For further details please see prior notes and Health History Questionnaire scanned in Epic.  Family History  Family History   Problem Relation Age of Onset    Coronary artery disease Other     Diabetes Other     Dementia Other     Diabetes Mother     Cancer Mother     Heart disease Father     Liver disease Father     Hypertension Father     Cancer Brother     Breast cancer Neg Hx     Ovarian cancer Neg Hx      For further details please see prior notes and Health History Questionnaire scanned in Epic.  Social History  Social History     Tobacco Use    Smoking status: Never     Passive exposure: Past    Smokeless tobacco: Never   Vaping Use    Vaping status: Never Used   Substance Use Topics    Alcohol use: Yes     Alcohol/week: 1.0 standard drink of alcohol     Types:  1 Glasses of wine per week     Comment: rarely    Drug use: Yes     Frequency: 7.0 times per week     Comment: CBD  gummies     For further details please see prior notes and Health History Questionnaire scanned in Epic.  Medication List  No current facility-administered medications for this encounter.  For further details please see prior notes and Health History Questionnaire scanned in Epic.  Allergies  Allergies   Allergen Reactions    Amoxicillin Swelling    Erythromycin GI Intolerance    Escitalopram Anxiety    Prozac  [Fluoxetine Hcl] Rash    Penicillin G Rash     Review of Systems  Pertinent positives noted in HPI.    Physical Exam  General:  No acute distress  Head: Normocephalic, atraumatic  CV: Regular rate, no edema, extremities warm and well perfused  Pulm: Symmetric chest rise, non-labored breathing  Neuro: Alert and oriented     Abdomen: Please see clinic note  Anorectal: Please see clinic note    Assessment  -Screening colonoscopy       Plan / Recommendations    1. Proceed to endo for screening colonoscopy       Rodolfo Lambert MD  Colon and Rectal Surgery   Mireya Haro

## 2024-08-19 NOTE — ANESTHESIA POSTPROCEDURE EVALUATION
Patient: Merisas Yusuf    Procedure Summary       Date: 08/19/24 Room / Location: [facility] ENDOSCOPY 4 / [facility] ENDOSCOPY    Anesthesia Start: 0852 Anesthesia Stop: 0914    Procedure: COLONOSCOPY WITH BIOPSIES (Rectum) Diagnosis:       Encounter for screening colonoscopy      (Encounter for screening colonoscopy [Z12.11])    Surgeons: Rodolfo Lambert MD Provider: Jeffry Pinto MD    Anesthesia Type: general ASA Status: 3            Anesthesia Type: general    Vitals  Vitals Value Taken Time   /76 08/19/24 0938   Temp     Pulse 78 08/19/24 0940   Resp 15 08/19/24 0928   SpO2 97 % 08/19/24 0940   Vitals shown include unfiled device data.        Post Anesthesia Care and Evaluation    Patient location during evaluation: PHASE II  Patient participation: complete - patient participated  Level of consciousness: awake  Pain score: 1  Pain management: satisfactory to patient  Anesthetic complications: No anesthetic complications  PONV Status: none  Cardiovascular status: acceptable  Respiratory status: acceptable  Hydration status: acceptable

## 2024-08-19 NOTE — DISCHARGE INSTRUCTIONS
A responsible adult should stay with you and you should rest quietly for the rest of the day.    Do not drink alcohol, drive, operate any heavy machinery or power tools or make any legal/important decisions for the next 24 hours.     Progress your diet as tolerated.  If you begin to experience severe pain, increased shortness of breath, racing heartbeat or a fever above 101 F, seek immediate medical attention.     Follow up with MD as instructed. Call office for results in 3 to 5 days if needed.    Specimens:  The removed biopsies were retrieved and sent for histopathological examination to the pathology department for further analysis.     Recommendation:      Repeat colonoscopy in 5 years. This might change depending on the path results.  Follow up biopsy results.      Conclusion:  The colonoscopy was completed successfully, and the entire colon was visualized without any complications. The patient tolerated the procedure well and was transferred to the recovery area in stable condition. Post-procedure instructions and follow-up were discussed with the patient and will be provided in written form.

## 2024-08-20 LAB
LAB AP CASE REPORT: NORMAL
LAB AP DIAGNOSIS COMMENT: NORMAL
PATH REPORT.FINAL DX SPEC: NORMAL
PATH REPORT.GROSS SPEC: NORMAL

## 2024-08-21 ENCOUNTER — OFFICE VISIT (OUTPATIENT)
Dept: BARIATRICS/WEIGHT MGMT | Facility: CLINIC | Age: 59
End: 2024-08-21
Payer: OTHER GOVERNMENT

## 2024-08-21 VITALS
OXYGEN SATURATION: 98 % | BODY MASS INDEX: 41.23 KG/M2 | WEIGHT: 204.5 LBS | HEART RATE: 69 BPM | HEIGHT: 59 IN | DIASTOLIC BLOOD PRESSURE: 76 MMHG | SYSTOLIC BLOOD PRESSURE: 129 MMHG

## 2024-08-21 DIAGNOSIS — Z79.899 MEDICATION MANAGEMENT: ICD-10-CM

## 2024-08-21 DIAGNOSIS — E66.01 OBESITY, CLASS III, BMI 40-49.9 (MORBID OBESITY): Primary | ICD-10-CM

## 2024-08-21 NOTE — PROGRESS NOTES
[facility] BARIATRIC SURGERY  [address] [place] [address]  [address] [place] [address]  Dept: [phone]  8/21/2024      Merissa Yusuf.  [id number]  [id number]  1965  female      Chief Complaint   Patient presents with    Weight Loss     MW #8       The patient is here for month 8 of medical weight management. She had a gain of 3 lbs. The patient states that they are following the recommendations given by our office and dietician including a high lean protein, low carb and low fat diet. We recommended adequate fruits and vegetable intake along with limited portion sizes. Patient is working on eliminating fast foods, fried foods, sweets and soda. Merissa Yusuf has been increasing her daily water intake. She has been exercising: walking some .  Wt Readings from Last 10 Encounters:   08/21/24 92.8 kg (204 lb 8 oz)   08/19/24 91.5 kg (201 lb 12.8 oz)   07/10/24 94.3 kg (208 lb)   07/09/24 94 kg (207 lb 3.2 oz)   06/19/24 95.4 kg (210 lb 6.4 oz)   06/05/24 94.8 kg (209 lb)   05/23/24 94.7 kg (208 lb 12.8 oz)   04/25/24 98 kg (216 lb)   04/18/24 97.7 kg (215 lb 6.4 oz)   04/15/24 98.9 kg (218 lb)     Patient states they have made positive changes including trying to eat more protein   The patient admits to be struggling with some nausea/ vomiting with medication     Breakfast: raisin bran  Lunch: ham and cheese cracker, grapes   Dinner: chicken and rice or potatoes , veggies  Snacks: cheese and peanut butter crackers, premier protein shakes   Drinks: water   Exercise: PT for knees     Current dose of compounded semaglutide 0.75 mg weekly, tried to go up to the 1 mg dosage but had increased nausea and vomiting, so backed dose back down           Review of Systems   Constitutional:  Positive for activity change and appetite change.   Respiratory: Negative.     Cardiovascular: Negative.    Gastrointestinal:  Positive for nausea and  vomiting.   Musculoskeletal:         Knee pain     Vitals:    08/21/24 1408   BP: 129/76   Pulse: 69   SpO2: 98%         Body mass index is 41.3 kg/m².    The following portions of the patient's history were reviewed and updated as appropriate: active problem list, medication list, allergies, social history, notes from last encounter  Past Medical History:   Diagnosis Date    Anesthesia complication     drops B/P, and slow to arouse    Encounter for laboratory testing for COVID-19 virus 07/19/2022    GERD (gastroesophageal reflux disease)     Hereditary hemochromatosis     Hyperlipidemia     Seasonal allergies     Sleep apnea      Past Surgical History:   Procedure Laterality Date    BLADDER SUSPENSION      BREAST BIOPSY Right     CATARACT EXTRACTION  03/2023    COLONOSCOPY N/A 8/19/2024    Procedure: COLONOSCOPY WITH BIOPSIES;  Surgeon: Rodolfo Lambert MD;  Location: [facility] ENDOSCOPY;  Service: General;  Laterality: N/A;  POST-DIVERTICULOSIS, CECAL LESION    FINGER SURGERY  2008    removed cysts from right index finger     HERNIA REPAIR      SUBTOTAL HYSTERECTOMY      TUBAL ABDOMINAL LIGATION      VENTRAL/INCISIONAL HERNIA REPAIR N/A 09/24/2020    Procedure: LAPAROSCOPIC VENTRAL HERNIA;  Surgeon: Reuben Molina MD;  Location: [facility] MAIN OR;  Service: General;  Laterality: N/A;  0855 TAP block    WISDOM TOOTH EXTRACTION          Physical Exam  Constitutional:       Appearance: Normal appearance. She is obese.   Pulmonary:      Effort: Pulmonary effort is normal.   Abdominal:      General: Abdomen is flat.   Skin:     General: Skin is warm and dry.   Neurological:      General: No focal deficit present.      Mental Status: She is alert and oriented to person, place, and time.   Psychiatric:         Mood and Affect: Mood normal.         Behavior: Behavior normal.         Thought Content: Thought content normal.         Judgment: Judgment normal.         Discussion/Plan:  BMI 41.30, Class 3 obesity, medication  management: compounded semaglutide     Obesity/Morbid Obesity: Currently the patient's weight is increased. Treatment plan includes prescribed diet, prescribed exercise regimen and behavior modification.     Medication options for weight loss were discussed with the pt and based upon the patient's history and insurance , compounded semaglutide 0.75 mg dosage will be prescribed/ continued. Pt has completed several months of this medication and is on 0.75 mg  dosage. Pt tried to increase to 1 mg weekly but had a lot of nausea and vomiting and as such back the dose back down to 0.75 mg weekly.       Pt  denies any hx of multiple endocrine neoplasia type 2 or medullary thyroid cancer for herself or her immediate family with GLP-1's. Pt encouraged to call the office with any nausea/ vomiting or abdominal pain with GLP-1's. Pt also informed constipation can happen with these medications due to slowing of gut. Pt encouraged to take a stool softener/ laxative if constipation occurs.       I reviewed the appropriate dietary choices with the patient and encouraged the necessary changes. Recommended at least 70 grams of protein per day, around 35 grams of fats and less than 100 grams of carbohydrates. Reviewed calorie intake if patient wanted to calorie count and/or had BMR. Instructed patient to drink half of body weight in ounces per day and exercise a minimum of 150 minutes per week including both cardio and strength training. Discussed the option of keeping a food journal which will help patient become more aware of the nutritional value of foods .     The patient was given written materials from our office for diet plans and medications.   I answered all of the patients questions regarding dietary changes, exercise, and medications.   The patient will follow up in 1 month. The total time spent face to face was 20 minutes with 15 minutes spent counseling.      CHARLINE Quesada  [facility]ariatrics

## 2024-08-22 ENCOUNTER — TREATMENT (OUTPATIENT)
Dept: PHYSICAL THERAPY | Facility: CLINIC | Age: 59
End: 2024-08-22
Payer: OTHER GOVERNMENT

## 2024-08-22 DIAGNOSIS — G89.29 CHRONIC PAIN OF LEFT KNEE: Primary | ICD-10-CM

## 2024-08-22 DIAGNOSIS — M25.562 CHRONIC PAIN OF LEFT KNEE: Primary | ICD-10-CM

## 2024-08-22 NOTE — PROGRESS NOTES
Physical Therapy Daily Progress Note      Patient: Merissa Yusuf   : 1965  Diagnosis/ICD-10 Code:  Chronic pain of left knee [M25.562, G89.29]  Referring practitioner: Mario Alberto Boyce/Russell Henderson*  Date of Initial Visit: Type: THERAPY  Noted: 2024  Today's Date: 2024  Patient seen for 6 sessions         Merissa Yusuf reports:  Left knee is a little sore near popliteal fossa the past few days.  Also reports pain in distal right quad today.     Objective   See Exercise, Manual, and Modality Logs for complete treatment.       Assessment/Plan    Tolerates manual therapy and exercise well this visit.  Patient demonstrates improved left knee extension AROM this visit.  Progressing well.    Progress per Plan of Care           Manual Therapy:    15     mins  62982;  Therapeutic Exercise:    15     mins  51127;     Neuromuscular Daniel:    10    mins  43360;    Therapeutic Activity:          mins  00673;     Gait Training:           mins  14074;     Ultrasound:          mins  49862;    Electrical Stimulation:         mins  83483 ( );  Dry Needling          mins self-pay    Timed Treatment:   40   mins   Total Treatment:     40   mins    Blayne Becker PT  Physical Therapist

## 2024-08-27 ENCOUNTER — TREATMENT (OUTPATIENT)
Dept: PHYSICAL THERAPY | Facility: CLINIC | Age: 59
End: 2024-08-27
Payer: OTHER GOVERNMENT

## 2024-08-27 ENCOUNTER — TELEPHONE (OUTPATIENT)
Age: 59
End: 2024-08-27
Payer: OTHER GOVERNMENT

## 2024-08-27 DIAGNOSIS — M25.562 CHRONIC PAIN OF LEFT KNEE: Primary | ICD-10-CM

## 2024-08-27 DIAGNOSIS — G89.29 CHRONIC PAIN OF LEFT KNEE: Primary | ICD-10-CM

## 2024-08-27 NOTE — PROGRESS NOTES
Physical Therapy Daily Progress Note    Patient: Merissa Yusuf   : 1965  Diagnosis/ICD-10 Code:  Chronic pain of left knee [M25.562, G89.29]  Referring practitioner: Mario Alberto Boyce/Russell Henderson*  Date of Initial Visit: Type: THERAPY  Noted: 2024  Today's Date: 2024  Patient seen for 7 sessions         Merissa Yusuf reports:  Knee pain has been less, however feels that knees have been giving out on her when walking.  No falls reported but feeling of unsteadiness has been occurring.    Objective   See Exercise, Manual, and Modality Logs for complete treatment.     132 degrees knee flexion AROM geo     Assessment/Plan    Tolerates manual therapy and exercise progression well this visit.  Patient educated on progression of HEP, given new printout.  Patient fatigued in BLE at end of visit.    Progress per Plan of Care           Manual Therapy:    10     mins  04735;  Therapeutic Exercise:    20     mins  42895;     Neuromuscular Daniel:    8    mins  28078;    Therapeutic Activity:          mins  82413;     Gait Training:           mins  09448;     Ultrasound:          mins  62077;    Electrical Stimulation:         mins  68246 ( );  Dry Needling          mins self-pay    Timed Treatment:   38   mins   Total Treatment:     38   mins    Blayne Becker PT  Physical Therapist

## 2024-08-27 NOTE — TELEPHONE ENCOUNTER
Spoke to pt and she is aware that she needs to repeat colonoscopy in 5 years and her path came back benign

## 2024-08-27 NOTE — PROGRESS NOTES
Sleep medicine follow-up visit    Merissa Yusuf   1965  59 y.o. female   DATE OF SERVICE: 8/28/2024     DAVE  F/U on bipap compliance, patient states she is benefiting from therapy, she uses a nasal mask and goes through [facility] for supplies.   She wants to switch her bipap it is filing up with water at night, BIPAP machine   The patient currently has a heated tubing and states that it is not plugging up or is not functioning like it used to.  I notified her that I would send a message to Dedra to get her an appointment with [facility] for mask tubing and filter and to evaluate her machine.     Sleep testing history:    On NPSG at [facility] , 08/15/2017 patient had Severe obstructive sleep apnea syndrome with apnea-hypopnea index of 28.8 per sleep hour, minimum SpO2 of 85%    The compliance data reviewed and the patient is on auto SV therapy therapy at Max pressure 22 cm H2O, min EPAP 7 cm H2O, max EPAP 15 cm/H2O and compliance data indicates excellent compliance with 98.9% usage for more than 4 hours with an average usage of 7 hours hours 24 minutes. AHI down to 2.0 with CPAP therapy and average EPAP 7.4 cm of water.  The patient's hypersomnia also resolved with [place] Sleepiness Scale score of 8 with CPAP therapy.  The patient feels great and is benefiting from it and is compliant.       EPWORTH SLEEPINESS SCALE  Sitting and reading JS [place] Sleepiness: 0 WatchingTV JS [place] Sleepiness: 3   Sitting, inactive, in a public place JS [place] Sleepiness: 2  As a passenger in a car for 1 hour w/o a break  JS [place] Sleepiness: 0  Lying down to rest in the afternoon  JS [place] Sleepiness: 3   Sitting and talking to someone  JS [place] Sleepiness: 0  Sitting quietly after a lunch  JS [place] Sleepiness: 0  In a car, while stopped for traffic or a light  JS [place] Sleepiness: 0  Total 8      Review of Systems   Constitutional:  Positive for appetite change and fatigue.   Eyes:  Positive for itching.   Respiratory:   Positive for apnea and shortness of breath.    Endocrine: Positive for heat intolerance.   Musculoskeletal:  Positive for gait problem, joint swelling and neck pain.   Allergic/Immunologic: Positive for environmental allergies.   Neurological:  Positive for dizziness and light-headedness.   Hematological:  Bruises/bleeds easily.   Psychiatric/Behavioral:  Positive for confusion and decreased concentration.      I reviewed and addressed ROS entered by MA.        The following portions of the patient's history were reviewed and updated as appropriate: allergies, current medications, past family history, past medical history, past social history, past surgical history and problem list.      Family History   Problem Relation Age of Onset    Coronary artery disease Other     Diabetes Other     Dementia Other     Diabetes Mother     Cancer Mother     Heart disease Father     Liver disease Father     Hypertension Father     Cancer Brother     Breast cancer Neg Hx     Ovarian cancer Neg Hx        Past Medical History:   Diagnosis Date    Anesthesia complication     drops B/P, and slow to arouse    Encounter for laboratory testing for COVID-19 virus 07/19/2022    GERD (gastroesophageal reflux disease)     Hereditary hemochromatosis     Hyperlipidemia     Seasonal allergies     Sleep apnea        Social History     Socioeconomic History    Marital status:    Tobacco Use    Smoking status: Never     Passive exposure: Past    Smokeless tobacco: Never   Vaping Use    Vaping status: Never Used   Substance and Sexual Activity    Alcohol use: Yes     Alcohol/week: 1.0 standard drink of alcohol     Types: 1 Glasses of wine per week     Comment: rarely    Drug use: Yes     Frequency: 7.0 times per week     Comment: CBD  gummies    Sexual activity: Defer         Current Outpatient Medications:     acetaminophen (TYLENOL) 500 MG tablet, Take 1 tablet by mouth Every 4 (Four) Hours As Needed., Disp: , Rfl:     albuterol sulfate   (90 Base) MCG/ACT inhaler, Inhale 2 puffs Every 4 (Four) Hours As Needed for Wheezing., Disp: 6.7 g, Rfl: 0    busPIRone (BUSPAR) 15 MG tablet, TAKE 1 TABLET THREE TIMES A DAY FOR ANXIETY, Disp: 270 tablet, Rfl: 3    Evolocumab (Repatha SureClick) solution auto-injector SureClick injection, Inject 1 ml under the skin into the appropriate area as directed every 14 days Strength 140mg/ml, Disp: 6 mL, Rfl: 3    lamoTRIgine (LaMICtal) 100 MG tablet, TAKE 1 TABLET TWICE A DAY (CORRECTED PRESCRIPTION), Disp: 180 tablet, Rfl: 3    montelukast (SINGULAIR) 10 MG tablet, TAKE 1 TABLET EVERY NIGHT, Disp: 90 tablet, Rfl: 3    ondansetron ODT (ZOFRAN-ODT) 8 MG disintegrating tablet, Place 1 tablet on the tongue Every 8 (Eight) Hours As Needed for Nausea or Vomiting., Disp: 30 tablet, Rfl: 5    Semaglutide-Weight Management 0.5 MG/0.5ML solution auto-injector, Inject 0.75 mL under the skin into the appropriate area as directed 1 (One) Time Per Week., Disp: 2 mL, Rfl: 1    sodium-potassium-magnesium sulfates (SUPREP) 17.5-3.13-1.6 GM/177ML solution oral solution, PLEASE REPLACE WITH GOLYTELY 4000ML IF SUPREP IS NOT APPROVED BY INSURANCE 5 PM the day before your scheduled colonoscopy - Take your 1st dose of bowel prep 5 AM - Take your 2nd dose of bowel prep You may still have watery bowel movements after you finish drinking the solution., Disp: 177 mL, Rfl: 0    Vortioxetine HBr (Trintellix) 10 MG tablet tablet, Take 1 tablet by mouth Daily With Breakfast., Disp: 90 tablet, Rfl: 1    Allergies   Allergen Reactions    Amoxicillin Swelling    Erythromycin GI Intolerance    Escitalopram Anxiety    Prozac  [Fluoxetine Hcl] Rash    Penicillin G Rash        PHYSICAL EXAMINATION:  Vitals:    08/28/24 1112   BP: 112/75   Pulse: 78      Body mass index is 41.2 kg/m².       HEENT: Normal.    CARDIAC: Normal.   LUNGS: Clear to auscultation.   EXTREMITIES: No edema.     Diagnoses and all orders for this visit:    1. Obstructive sleep  apnea (Primary)    Message sent to Dedra for the patient to have her machine evaluated by [facility].  The patient was cautioned that obstructive sleep disordered breathing might be aggravated by certain conditions such as post anesthetic states or respiratory allergies.  Medications such as sedatives, hypnotics, muscle relaxants, opiate analgesics and or alcohol can aggravate the underlying obstructive sleep disordered breathing.If the patient is significantly drowsy or inattentive during the daytime, should be advised to avoid situations such as driving in which safety could be compromised by impairment of alertness.  Do not drive if drowsy. Mask, Tubing, and Filters per 4-6 months DME. Call with any questions or concerns.      Return in about 1 year (around 8/28/2025).    I spent 19 minutes caring for Merissa on this date of service. This time includes time spent by me in the following activities: preparing for the visit, reviewing tests, obtaining and/or reviewing a separately obtained history, performing a medically appropriate examination and/or evaluation, counseling and educating the patient/family/caregiver, ordering medications, tests, or procedures, and documenting information in the medical record.      This document has been electronically signed by Pattie PERRY on August 28, 2024 12:09 EDT

## 2024-08-28 ENCOUNTER — OFFICE VISIT (OUTPATIENT)
Dept: NEUROLOGY | Facility: CLINIC | Age: 59
End: 2024-08-28
Payer: OTHER GOVERNMENT

## 2024-08-28 VITALS
DIASTOLIC BLOOD PRESSURE: 75 MMHG | BODY MASS INDEX: 41.12 KG/M2 | SYSTOLIC BLOOD PRESSURE: 112 MMHG | WEIGHT: 204 LBS | HEART RATE: 78 BPM | HEIGHT: 59 IN

## 2024-08-28 DIAGNOSIS — G47.33 OBSTRUCTIVE SLEEP APNEA: Primary | ICD-10-CM

## 2024-08-28 PROCEDURE — 99212 OFFICE O/P EST SF 10 MIN: CPT | Performed by: NURSE PRACTITIONER

## 2024-09-03 ENCOUNTER — TREATMENT (OUTPATIENT)
Dept: PHYSICAL THERAPY | Facility: CLINIC | Age: 59
End: 2024-09-03
Payer: OTHER GOVERNMENT

## 2024-09-03 DIAGNOSIS — M25.562 CHRONIC PAIN OF LEFT KNEE: Primary | ICD-10-CM

## 2024-09-03 DIAGNOSIS — G89.29 CHRONIC PAIN OF LEFT KNEE: Primary | ICD-10-CM

## 2024-09-03 PROCEDURE — 97110 THERAPEUTIC EXERCISES: CPT | Performed by: PHYSICAL THERAPIST

## 2024-09-03 PROCEDURE — 97140 MANUAL THERAPY 1/> REGIONS: CPT | Performed by: PHYSICAL THERAPIST

## 2024-09-03 NOTE — PROGRESS NOTES
Physical Therapy Daily Progress Note      Patient: Merissa Yusuf   : 1965  Diagnosis/ICD-10 Code:  Chronic pain of left knee [M25.562, G89.29]  Referring practitioner: Mario Alberto Boyce/Russell Henderson*  Date of Initial Visit: Type: THERAPY  Noted: 2024  Today's Date: 9/3/2024  Patient seen for 8 sessions         Merissa Yusuf reports: Left knee feeling better.  Reports compliance with HEP and has been walking more.    Objective   See Exercise, Manual, and Modality Logs for complete treatment.       Assessment/Plan    Patient demonstrates improved AROM in geo knees since onset of PT intervention.  Demonstrates proper technique with HEP.  Patient is appropriate to continue with I HEP to continue strengthening BLE in preparation for TKA in January.    D/C to I HEP           Manual Therapy:    10     mins  11423;  Therapeutic Exercise:    30     mins  47165;     Neuromuscular Daniel:        mins  26255;    Therapeutic Activity:          mins  68456;     Gait Training:           mins  91666;     Ultrasound:          mins  57940;    Electrical Stimulation:         mins  24104 ( );  Dry Needling         mins self-pay    Timed Treatment:   40   mins   Total Treatment:     40   mins    Blayne Becker PT  Physical Therapist

## 2024-09-26 ENCOUNTER — TELEPHONE (OUTPATIENT)
Dept: BARIATRICS/WEIGHT MGMT | Facility: CLINIC | Age: 59
End: 2024-09-26
Payer: OTHER GOVERNMENT

## 2024-09-26 RX ORDER — SEMAGLUTIDE 1 MG/.5ML
1 INJECTION, SOLUTION SUBCUTANEOUS WEEKLY
Qty: 2 ML | Refills: 0 | Status: SHIPPED | OUTPATIENT
Start: 2024-09-26

## 2024-10-03 RX ORDER — VORTIOXETINE 10 MG/1
10 TABLET, FILM COATED ORAL
Qty: 90 TABLET | Refills: 3 | Status: SHIPPED | OUTPATIENT
Start: 2024-10-03

## 2024-10-04 ENCOUNTER — TELEPHONE (OUTPATIENT)
Dept: ONCOLOGY | Facility: CLINIC | Age: 59
End: 2024-10-04

## 2024-10-04 NOTE — TELEPHONE ENCOUNTER
Caller: Merissa Yusuf    Relationship: Self    Best call back number: [phone]    Who are you requesting to speak with (clinical staff, provider,  specific staff member): scheduling    Do you know the name of the person who called: patient    What was the call regarding: pt needs to reschedule 10/15's appts

## 2024-10-08 ENCOUNTER — LAB (OUTPATIENT)
Dept: LAB | Facility: HOSPITAL | Age: 59
End: 2024-10-08
Payer: OTHER GOVERNMENT

## 2024-10-08 DIAGNOSIS — E83.110 HEREDITARY HEMOCHROMATOSIS: ICD-10-CM

## 2024-10-08 LAB
ALBUMIN SERPL-MCNC: 4.4 G/DL (ref 3.5–5.2)
ALBUMIN/GLOB SERPL: 1.8 G/DL
ALP SERPL-CCNC: 94 U/L (ref 39–117)
ALT SERPL W P-5'-P-CCNC: 9 U/L (ref 1–33)
ANION GAP SERPL CALCULATED.3IONS-SCNC: 10 MMOL/L (ref 5–15)
AST SERPL-CCNC: 18 U/L (ref 1–32)
BASOPHILS # BLD AUTO: 0.03 10*3/MM3 (ref 0–0.2)
BASOPHILS NFR BLD AUTO: 0.5 % (ref 0–1.5)
BILIRUB SERPL-MCNC: 0.3 MG/DL (ref 0–1.2)
BUN SERPL-MCNC: 14 MG/DL (ref 8–23)
BUN/CREAT SERPL: 19.2 (ref 7–25)
CALCIUM SPEC-SCNC: 9.5 MG/DL (ref 8.6–10.5)
CHLORIDE SERPL-SCNC: 104 MMOL/L (ref 98–107)
CO2 SERPL-SCNC: 26 MMOL/L (ref 22–29)
CREAT SERPL-MCNC: 0.73 MG/DL (ref 0.57–1)
DEPRECATED RDW RBC AUTO: 45.9 FL (ref 37–54)
EGFRCR SERPLBLD CKD-EPI 2021: 93.7 ML/MIN/1.73
EOSINOPHIL # BLD AUTO: 0.16 10*3/MM3 (ref 0–0.4)
EOSINOPHIL NFR BLD AUTO: 2.5 % (ref 0.3–6.2)
ERYTHROCYTE [DISTWIDTH] IN BLOOD BY AUTOMATED COUNT: 11.9 % (ref 12.3–15.4)
FERRITIN SERPL-MCNC: 69.3 NG/ML (ref 13–150)
GLOBULIN UR ELPH-MCNC: 2.5 GM/DL
GLUCOSE SERPL-MCNC: 104 MG/DL (ref 65–99)
HCT VFR BLD AUTO: 41 % (ref 34–46.6)
HGB BLD-MCNC: 13.7 G/DL (ref 12–15.9)
IRON 24H UR-MRATE: 134 MCG/DL (ref 37–145)
IRON SATN MFR SERPL: 42 % (ref 20–50)
LYMPHOCYTES # BLD AUTO: 2.02 10*3/MM3 (ref 0.7–3.1)
LYMPHOCYTES NFR BLD AUTO: 31.6 % (ref 19.6–45.3)
MCH RBC QN AUTO: 35.6 PG (ref 26.6–33)
MCHC RBC AUTO-ENTMCNC: 33.4 G/DL (ref 31.5–35.7)
MCV RBC AUTO: 106.5 FL (ref 79–97)
MONOCYTES # BLD AUTO: 0.51 10*3/MM3 (ref 0.1–0.9)
MONOCYTES NFR BLD AUTO: 8 % (ref 5–12)
NEUTROPHILS NFR BLD AUTO: 3.68 10*3/MM3 (ref 1.7–7)
NEUTROPHILS NFR BLD AUTO: 57.4 % (ref 42.7–76)
PLATELET # BLD AUTO: 394 10*3/MM3 (ref 140–450)
PMV BLD AUTO: 8.4 FL (ref 6–12)
POTASSIUM SERPL-SCNC: 4.5 MMOL/L (ref 3.5–5.2)
PROT SERPL-MCNC: 6.9 G/DL (ref 6–8.5)
RBC # BLD AUTO: 3.85 10*6/MM3 (ref 3.77–5.28)
SODIUM SERPL-SCNC: 140 MMOL/L (ref 136–145)
TIBC SERPL-MCNC: 320 MCG/DL (ref 298–536)
TRANSFERRIN SERPL-MCNC: 215 MG/DL (ref 200–360)
WBC NRBC COR # BLD AUTO: 6.4 10*3/MM3 (ref 3.4–10.8)

## 2024-10-08 PROCEDURE — 84466 ASSAY OF TRANSFERRIN: CPT | Performed by: NURSE PRACTITIONER

## 2024-10-08 PROCEDURE — 82728 ASSAY OF FERRITIN: CPT | Performed by: NURSE PRACTITIONER

## 2024-10-08 PROCEDURE — 83540 ASSAY OF IRON: CPT | Performed by: NURSE PRACTITIONER

## 2024-10-08 PROCEDURE — 36415 COLL VENOUS BLD VENIPUNCTURE: CPT

## 2024-10-08 PROCEDURE — 80053 COMPREHEN METABOLIC PANEL: CPT | Performed by: NURSE PRACTITIONER

## 2024-10-08 PROCEDURE — 85025 COMPLETE CBC W/AUTO DIFF WBC: CPT

## 2024-10-13 NOTE — PROGRESS NOTES
HEMATOLOGY ONCOLOGY OUTPATIENT FOLLOW UP       Patient name: Merissa Yusuf  : 1965  MRN: [id number]  Primary Care Physician: Melissa Duarte MD  Referring Physician: No ref. provider found  Reason For Consult: Hereditary hemochromatosis, homozygous mutation in H63D.  Iron overload  Macrocytosis    History of Present Illness:  Ms. Yusuf has a history of COPD and also depression.  Laboratory work up was obtained in 2019 which was abnormal.  Patient was sent to the [facility] and seen initially on 3/13/19.   3/5/19 - WBC 4.7, hemoglobin 14.1, MCV elevated to 103 and platelet count of 592,000.  Vitamin D  20.  Sed rate 35.  TSH 1.62.  Vitamin B12 476.  Creatinine 0.8.    3/13/19 - ZACKERY-2 mutation negative.  Hemochromatosis gene mutation, homozygous mutation in H63D.  3/13/19 - Vitamin B12 507.  Ferritin 178.  Creatinine 0.7.  Iron level 156.  Iron binding  capacity 257.  Percentage iron saturation 61.   2020 Ferritin 304 iron 177 iron saturation 56 TIBC 316  2020 iron 247 iron saturation 82 iron binding capacity 302 ferritin 271 hemoglobin 13.7 WBC 6.1 platelet count 372.  2020 CMP is normal including LFTs.  TSH 3.01  10/2020 ferritin 233.40 iron sat 43  2021 ferritin 341.40, iron sat 76  2021 -ferritin 112  3/8/2022 -CBC with hemoglobin 13.5, hematocrit 38.7.  Ferritin 59.7, iron saturation 35  2022 - Hb 13.7, ferritin 62 iron 36  4/3/2023 ferritin 76, iron sat 38,   2023-ferritin 152.40, iron 191, iron sat 61, hemoglobin 14.0, hematocrit 39.4.  2024-ferritin 98.03, hemoglobin 13.3, hematocrit 39.0  4/15/2024 f/u Merissa is here today for her routine follow-up.  She reports that she is doing well and has no new complaints.  She tries to utilize diet modification to help keep her iron levels under control. She has began Wegovy for weight loss in anticipation of bilateral knee replacement surgery once she meets the BMI goal. She is having some  left ear pain and is concerned for infection.   4/25/2024 TSH 1.470      Subjective:  -10/8/2024 WBC 6.4, hemoglobin 13.7/hematocrit 41 with .5 RDW elevated 11.9 (stable) with platelets 394K.  Iron 134, iron saturation 42% with normal transferrin and TIBC, ferritin 69.3 CMP with normal creatinine liver function tests    -10/15/2024 patient reports for her routine follow-up, has had some bruising on her legs; taking a lot of Advil as her knees are killing her, at minimum three a day, usually four; hopefully can get her first knee surgery beginning of the year.  She reports that otherwise she has new complaints.      Past Medical History:   Diagnosis Date    Anesthesia complication     drops B/P, and slow to arouse    Encounter for laboratory testing for COVID-19 virus 07/19/2022    GERD (gastroesophageal reflux disease)     Hereditary hemochromatosis     Hyperlipidemia     Seasonal allergies     Sleep apnea      Past Surgical History:   Procedure Laterality Date    BLADDER SUSPENSION      BREAST BIOPSY Right     CATARACT EXTRACTION  03/2023    COLONOSCOPY N/A 8/19/2024    Procedure: COLONOSCOPY WITH BIOPSIES;  Surgeon: Rodolfo Lambert MD;  Location: [facility] ENDOSCOPY;  Service: General;  Laterality: N/A;  POST-DIVERTICULOSIS, CECAL LESION    FINGER SURGERY  2008    removed cysts from right index finger     HERNIA REPAIR      SUBTOTAL HYSTERECTOMY      TUBAL ABDOMINAL LIGATION      VENTRAL/INCISIONAL HERNIA REPAIR N/A 09/24/2020    Procedure: LAPAROSCOPIC VENTRAL HERNIA;  Surgeon: Reuben Molina MD;  Location: [facility] MAIN OR;  Service: General;  Laterality: N/A;  0855 TAP block    WISDOM TOOTH EXTRACTION         Current Outpatient Medications:     acetaminophen (TYLENOL) 500 MG tablet, Take 1 tablet by mouth Every 4 (Four) Hours As Needed., Disp: , Rfl:     albuterol sulfate  (90 Base) MCG/ACT inhaler, Inhale 2 puffs Every 4 (Four) Hours As Needed for Wheezing., Disp: 6.7 g, Rfl: 0    busPIRone  (BUSPAR) 15 MG tablet, TAKE 1 TABLET THREE TIMES A DAY FOR ANXIETY, Disp: 270 tablet, Rfl: 3    Evolocumab (Repatha SureClick) solution auto-injector SureClick injection, Inject 1 ml under the skin into the appropriate area as directed every 14 days Strength 140mg/ml, Disp: 6 mL, Rfl: 3    lamoTRIgine (LaMICtal) 100 MG tablet, TAKE 1 TABLET TWICE A DAY (CORRECTED PRESCRIPTION), Disp: 180 tablet, Rfl: 3    montelukast (SINGULAIR) 10 MG tablet, TAKE 1 TABLET EVERY NIGHT, Disp: 90 tablet, Rfl: 3    ondansetron ODT (ZOFRAN-ODT) 8 MG disintegrating tablet, Place 1 tablet on the tongue Every 8 (Eight) Hours As Needed for Nausea or Vomiting., Disp: 30 tablet, Rfl: 5    Semaglutide-Weight Management (Wegovy) 1 MG/0.5ML solution auto-injector, Inject 0.5 mL under the skin into the appropriate area as directed 1 (One) Time Per Week., Disp: 2 mL, Rfl: 0    Trintellix 10 MG tablet tablet, TAKE 1 TABLET DAILY WITH BREAKFAST, Disp: 90 tablet, Rfl: 3    Allergies   Allergen Reactions    Amoxicillin Swelling    Erythromycin GI Intolerance    Escitalopram Anxiety    Prozac  [Fluoxetine Hcl] Rash    Penicillin G Rash     Family History   Problem Relation Age of Onset    Coronary artery disease Other     Diabetes Other     Dementia Other     Diabetes Mother     Cancer Mother     Heart disease Father     Liver disease Father     Hypertension Father     Cancer Brother     Breast cancer Neg Hx     Ovarian cancer Neg Hx      Cancer-related family history includes Cancer in her brother and mother. There is no history of Breast cancer or Ovarian cancer.    Social History     Tobacco Use    Smoking status: Never     Passive exposure: Past    Smokeless tobacco: Never   Vaping Use    Vaping status: Never Used   Substance Use Topics    Alcohol use: Yes     Alcohol/week: 1.0 standard drink of alcohol     Types: 1 Glasses of wine per week     Comment: rarely    Drug use: Not Currently     Frequency: 7.0 times per week     Comment: CBD  gummies      Social History     Social History Narrative    Not on file      Review of Systems:  Constitutional: Denies any weakness, fatigue, lack of appetite, excessive appetite, weight change, chills or fever.  Eyes: Reports no blurry vision, no double vision, no pain in the eyes, dry eyes or tearing.  ENT: Reports no decreased hearing capacity, ear pain or tinnitus. Denies any epistaxis, nasal congestion, mouth sores or bleeding, tooth or jaw pain, sore throat or hoarseness.  Respiratory: Denies any cough, sputum production or hemoptysis. There is no wheezing, shortness of breath or any other respiratory complaints.  Cardiovascular: Denies any chest pain, shortness of breath when lying down, shortness of breath with activity, palpitations, or leg swelling.  Breasts: Denies any lumps, bumps, pain, nipple changes or discharge in the breasts.  Gastrointestinal: There are no complaints of abdominal pain, difficulty swallowing or painful swallowing, anorexia, nausea, vomiting, diarrhea, constipation, heartburn, blood in the stools, dark stools, or change in bowel habits or hemorrhoids.  Genitourinary: Denies any pain or burning on urination, blood in the urine or frequent urination. .  Musculoskeletal: chronic painful knees b/l, Denies painful joints otherwise no swelling of the joints, muscle or back pain. Denies any pain or tingling in the legs, hands or feet.  Neuropsychiatric: Denies any nervousness, depressed mood, headaches, blackouts, dizziness, weakness of limbs, loss of sensation, loss of balance, loss of coordination or difficulty in speaking.  Cutaneous: Denies any dry skin, itching, rash, change in the color of the skin, or any other cutaneous complaints.  Hematologic/Lymphatic: Denies any bruising or bleeding. Report no recent development of palpable or painful lymph nodes.  Allergy/Immunology: Denies rash/hayfever symptoms or any recent history of pneumonia, recurrent sinusitis, urinary infection or any other  "history of an ongoing infection.  Endocrine: Reports no intolerance to heat or cold, excessive thirst or excessive urination.    Objective:  Vital signs:  Vitals:    10/15/24 1330   BP: 124/82   Pulse: 77   Temp: 98.2 °F (36.8 °C)   TempSrc: Oral   SpO2: 97%   Weight: 92.2 kg (203 lb 3.2 oz)   Height: 149.9 cm (59.02\")   PainSc: 0-No pain     Body mass index is 41.02 kg/m².      Physical Exam:   ECO  GENERAL: The patient is a pleasant middle aged overweight female who appears their stated age and is awake, in no acute distress, alert and oriented x3.  SKIN: No rash or ulcers.   HEME/LYMPHATICS: No bruising or petechiae on visual inspection.  HEAD/FACE: atraumatic and normocephalic. Normal hair.  EYES: PERRLA/EOMI. No scleral icterus. No tearing or dry eyes.  ENT: External ears normal with no evidence of discharge. No evidence of ulceration or bleeding in the nostrils. Mouth has no ulcers, bleeding or inflammation of the gums, floor or roof of the mouth with normal dentition.  NECK: Supple, symmetric.   CHEST/RESPIRATORY: Expansion maintained bilaterally and symmetrically. On auscultation, clear breath sounds in both lungs. No wheezes, rhonchi or rales.  BREAST: Deferred.  CARDIOVASCULAR: On auscultation, regular rate and rhythm. No murmurs, gallops or rubs are heard.  GASTROINTESTINAL/ABDOMEN: Symmetric. On auscultation of the abdomen, there are normal bowel sounds in all four quadrants.   GENITOURINARY: Deferred.  NEUROLOGIC: Alert and oriented time, person, and place, with no apparent changes in recent or remote memory. Cranial nerves II-XII are grossly intact. Sensation is maintained in the extremities. Strength and tone appear normal for age and equal in the extremities. Gait is normal.  MUSCULOSKELETAL: No cyanosis, clubbing or edema in the extremities.   PSYCHIATRIC: The patient maintains judgment and has good insight. The patient has no changes in mood or affection.    Lab Results - Last 18 Months   Lab " "Units 10/08/24  1213 07/08/24  1127 04/01/24  1008 01/12/24  1019   WBC 10*3/mm3 6.40  --  7.43 6.25   HEMOGLOBIN g/dL 13.7  --  13.3 13.4   HEMATOCRIT % 41.0 41.1 39.0 40.3   PLATELETS 10*3/mm3 394  --  369 409   MCV fL 106.5*  --  105.7* 106.3*     Lab Results - Last 18 Months   Lab Units 10/08/24  1213 04/25/24  1033 01/12/24  1019   SODIUM mmol/L 140 139 141   POTASSIUM mmol/L 4.5 4.2 4.4   CHLORIDE mmol/L 104 104 104   CO2 mmol/L 26.0 24.0 25.0   BUN mg/dL 14 12 14   CREATININE mg/dL 0.73 0.71 0.78   CALCIUM mg/dL 9.5 9.7 9.9   BILIRUBIN mg/dL 0.3 0.3 0.5   ALK PHOS U/L 94 92 69   ALT (SGPT) U/L 9 12 15   AST (SGOT) U/L 18 13 17   GLUCOSE mg/dL 104* 86 99     Lab Results   Component Value Date    GLUCOSE 104 (H) 10/08/2024    BUN 14 10/08/2024    CREATININE 0.73 10/08/2024    EGFRIFNONA 81 09/21/2021    EGFRIFAFRI >60 02/26/2019    BCR 19.2 10/08/2024    K 4.5 10/08/2024    CO2 26.0 10/08/2024    CALCIUM 9.5 10/08/2024    ALBUMIN 4.4 10/08/2024    LABIL2 1.5 03/13/2019    AST 18 10/08/2024    ALT 9 10/08/2024     Lab Results   Component Value Date    IRON 134 10/08/2024    TIBC 320 10/08/2024    FERRITIN 69.30 10/08/2024     Lab Results   Component Value Date    FOLATE >24.8 (H) 08/24/2017     Lab Results   Component Value Date    RETICCTPCT 1.67 (H) 07/03/2017     Lab Results   Component Value Date    IWVCIIGJ06 1,181 (H) 04/25/2024     No results found for: \"SPEP\", \"UPEP\"  Uric Acid   Date Value Ref Range Status   04/19/2017 5.0 2.6 - 8.0 mg/dL Final     Lab Results   Component Value Date    SEDRATE 12 01/13/2023     Lab Results   Component Value Date    SEDRATE 12 01/13/2023     Assessment & Plan     Patient is a 59-year-old female with iron overload secondary to homozygous H63D mutation for hemochromatosis.    Hemochromatosis: homozygous H63D   -She has a propensity towards iron overload.  However less likely to cause significant liver damage.  Ideally ferritin should be below 75 to prevent liver damage.  " We will continue to monitor.   Continue phlebotomies for hematocrit >42 and ferritin > 100   Last phlebotomy was 1/12/2024    Hemochromatosis therapeutic phlebotomy  Phase 2: Maintenance (preventing reaccumulation)    Pace: Therapeutic phlebotomy 400 ml each as needed, a lifelong requirement.    Goals: ferritin <100, ideally 75 with Hct >42.    -prephlebotomy fingerstick H/H +/-CBC at each visit to avoid anemia, ferritin +/- TS Q1-2 txts.    HOLD: for Hct < or = 42 defer therapy 4-8 weeks until Hct recovers.    We will recheck with CBC and ferritin in 3-4 months with phlebotomy if needed and follow-up (after her knee surgery)      Thank you very much for providing the opportunity to participate in this patient’s care. Please do not hesitate to call if there are any other questions.

## 2024-10-14 DIAGNOSIS — R71.8 ELEVATED HEMATOCRIT: ICD-10-CM

## 2024-10-14 DIAGNOSIS — E78.2 MIXED HYPERLIPIDEMIA: Primary | ICD-10-CM

## 2024-10-15 ENCOUNTER — OFFICE VISIT (OUTPATIENT)
Dept: ONCOLOGY | Facility: CLINIC | Age: 59
End: 2024-10-15
Payer: OTHER GOVERNMENT

## 2024-10-15 ENCOUNTER — HOSPITAL ENCOUNTER (OUTPATIENT)
Dept: ONCOLOGY | Facility: HOSPITAL | Age: 59
Discharge: HOME | End: 2024-10-15
Payer: OTHER GOVERNMENT

## 2024-10-15 VITALS
DIASTOLIC BLOOD PRESSURE: 82 MMHG | HEIGHT: 59 IN | BODY MASS INDEX: 40.96 KG/M2 | OXYGEN SATURATION: 97 % | WEIGHT: 203.2 LBS | HEART RATE: 77 BPM | SYSTOLIC BLOOD PRESSURE: 124 MMHG | TEMPERATURE: 98.2 F

## 2024-10-15 DIAGNOSIS — E83.110 HEREDITARY HEMOCHROMATOSIS: Primary | ICD-10-CM

## 2024-10-15 NOTE — PATIENT INSTRUCTIONS
RTC in mid Feb with labs a few days ahead      -High-iron meats and other proteins - Beef, veal, lamb, pork, beef or chicken liver, clams, sardines, oysters, shrimp, tofu, baked beans with pork, lentils, tahini, tempeh. Beans such as kidney, lima, navy, or white.  -High-iron vegetables - Spinach, soybeans, canned pumpkin.  -High-iron grains - Whole-wheat breads, cereals, and bagels. Flour tortillas, biscuits, English or bran muffins, iron-fortified bran, pretzels, frozen waffles. Hot cereals like oatmeal, cream of wheat, or grits.  -Other high-iron foods - Pumpkin seeds.    -Moderate-iron meats and other proteins - Chicken breast, turkey, eggs, fresh or canned tuna or mackerel.  -Moderate-iron vegetables - Asparagus, Frontier sprouts, mushrooms, green peas, white or sweet potato with the skin, tomato sauce, beets, beans such as garbanzo or lima. Greens such as aletha, turnip, kale, beet, or Swiss chard.  -Moderate-iron grains - Eleanor bread, egg noodles, whole-wheat pasta, hamburger and hot dog buns.  -Moderate-iron fruits - Dried apricots, dried figs, prune juice.  -Other moderate-iron foods - Molasses, soy milk, seeds such as sesame or sunflower. Nuts such as almonds, pistachios, cashews, and walnuts.  (Adapted from UTD 9/2024 pt HO)

## 2024-10-21 ENCOUNTER — OFFICE VISIT (OUTPATIENT)
Dept: BARIATRICS/WEIGHT MGMT | Facility: CLINIC | Age: 59
End: 2024-10-21
Payer: OTHER GOVERNMENT

## 2024-10-21 VITALS
RESPIRATION RATE: 16 BRPM | DIASTOLIC BLOOD PRESSURE: 84 MMHG | BODY MASS INDEX: 39.92 KG/M2 | WEIGHT: 198 LBS | HEIGHT: 59 IN | HEART RATE: 79 BPM | SYSTOLIC BLOOD PRESSURE: 135 MMHG | OXYGEN SATURATION: 100 %

## 2024-10-21 DIAGNOSIS — E66.812 OBESITY, CLASS II, BMI 35-39.9: Primary | ICD-10-CM

## 2024-10-21 DIAGNOSIS — Z79.899 MEDICATION MANAGEMENT: ICD-10-CM

## 2024-10-21 PROCEDURE — 99213 OFFICE O/P EST LOW 20 MIN: CPT | Performed by: NURSE PRACTITIONER

## 2024-10-21 RX ORDER — AMOXICILLIN 250 MG
1 CAPSULE ORAL DAILY
Qty: 30 TABLET | Refills: 2 | Status: SHIPPED | OUTPATIENT
Start: 2024-10-21

## 2024-10-21 NOTE — PROGRESS NOTES
[facility]  [address] [place] [address]  [address] [place] [address]  Dept: [phone]  10/21/2024      Merissa Yusuf.  [id number]  [id number]  1965  female          The patient is here for month 9 of medical weight management. She had a loss of 4 lbs. The patient states that she is following the recommendations given by our office and dietician including a high lean protein, low carb and low fat diet. We recommended adequate fruits and vegetable intake along with limited portion sizes. Patient is working on eliminating fast foods, fried foods, sweets and soda. Merissa DUVAL Madelia has been increasing her daily water intake. She has been exercising: walking some, limited with exercise, knee replacement in January .  Wt Readings from Last 10 Encounters:   10/21/24 89.8 kg (198 lb)   10/15/24 92.2 kg (203 lb 3.2 oz)   08/28/24 92.5 kg (204 lb)   08/21/24 92.8 kg (204 lb 8 oz)   08/19/24 91.5 kg (201 lb 12.8 oz)   07/10/24 94.3 kg (208 lb)   07/09/24 94 kg (207 lb 3.2 oz)   06/19/24 95.4 kg (210 lb 6.4 oz)   06/05/24 94.8 kg (209 lb)   05/23/24 94.7 kg (208 lb 12.8 oz)     Patient states they have made positive changes including smaller portion size   The patient admits to be struggling with chocolate cravings , struggling with fluid intake , struggling with finding foods low in iron     Having knee surgery in January       Breakfast :struggling to find something low in iron    Lunch: package of 6 peanut butter crackers , lunchable   Dinner: meat and potato, eating out usually  Snacks: nuts   Drinks: water some , milk with supper, tea    Compounded semaglutide - has completed 1 month on the 1 mg dosage. Previous nausea/ vomiting has gotten better. Now only having nausea on day of taking medication, some constipation       Review of Systems   Constitutional:  Positive for activity change and appetite change.    Respiratory: Negative.     Cardiovascular: Negative.    Gastrointestinal:  Positive for constipation and nausea.   Musculoskeletal:         Knee pain     Vitals:    10/21/24 1315   BP: 135/84   Pulse: 79   Resp: 16   SpO2: 100%         Body mass index is 39.96 kg/m².    The following portions of the patient's history were reviewed and updated as appropriate: active problem list, medication list, allergies, social history, notes from last encounter  Past Medical History:   Diagnosis Date    Anesthesia complication     drops B/P, and slow to arouse    Encounter for laboratory testing for COVID-19 virus 07/19/2022    GERD (gastroesophageal reflux disease)     Hereditary hemochromatosis     Hyperlipidemia     Seasonal allergies     Sleep apnea      Past Surgical History:   Procedure Laterality Date    BLADDER SUSPENSION      BREAST BIOPSY Right     CATARACT EXTRACTION  03/2023    COLONOSCOPY N/A 8/19/2024    Procedure: COLONOSCOPY WITH BIOPSIES;  Surgeon: Rodolfo Lambert MD;  Location: [facility] ENDOSCOPY;  Service: General;  Laterality: N/A;  POST-DIVERTICULOSIS, CECAL LESION    FINGER SURGERY  2008    removed cysts from right index finger     HERNIA REPAIR      SUBTOTAL HYSTERECTOMY      TUBAL ABDOMINAL LIGATION      VENTRAL/INCISIONAL HERNIA REPAIR N/A 09/24/2020    Procedure: LAPAROSCOPIC VENTRAL HERNIA;  Surgeon: Reuben Molina MD;  Location: [facility] MAIN OR;  Service: General;  Laterality: N/A;  0855 TAP block    WISDOM TOOTH EXTRACTION          Physical Exam  Constitutional:       Appearance: Normal appearance. She is obese.   Pulmonary:      Effort: Pulmonary effort is normal.   Abdominal:      General: Abdomen is flat.   Neurological:      General: No focal deficit present.      Mental Status: She is alert and oriented to person, place, and time.   Psychiatric:         Mood and Affect: Mood normal.         Behavior: Behavior normal.         Thought Content: Thought content normal.         Judgment:  Judgment normal.         Discussion/Plan:  BMI 39.96 , Class  2 obesity, medication management: compounded semaglutide   Obesity/Morbid Obesity: Currently the patient's weight is decreased. Treatment plan includes prescribed diet, prescribed exercise regimen and behavior modification.     Medication options for weight loss were discussed with the pt and based upon the patient's history and insurance , compounded semaglutide will be prescribed/ continued. Pt was previously having a lot of nausea but this has gotten much better. She takes Zofran on day of her injection and this helps with the nausea. Will refill to next dose 1.5 mg weekly compounded semaglutide. This is using the 5 mg semaglutide concentration. Plan to follow up in Feb 2025.       Pt  denies any hx of multiple endocrine neoplasia type 2 or medullary thyroid cancer for herself or her immediate family with GLP-1's. Pt encouraged to call the office with any nausea/ vomiting or abdominal pain with GLP-1's. Pt also informed constipation can happen with these medications due to slowing of gut. Pt encouraged to take a stool softener/ laxative if constipation occurs.     I reviewed the appropriate dietary choices with the patient and encouraged the necessary changes. Recommended at least 70 grams of protein per day, around 35 grams of fats and less than 100 grams of carbohydrates. Reviewed calorie intake if patient wanted to calorie count and/or had BMR. Instructed patient to drink half of body weight in ounces per day and exercise a minimum of 150 minutes per week including both cardio and strength training. Discussed the option of keeping a food journal which will help patient become more aware of the nutritional value of foods .     The patient was given written materials from our office for diet plans and medications.   I answered all of the patients questions regarding dietary changes, exercise, and medications.   The patient will follow up in 1 month.  The total time spent face to face was 20 minutes with 15 minutes spent counseling.        Izabella Green APRTESSA  [facility]

## 2024-10-24 RX ORDER — ONDANSETRON 8 MG/1
TABLET, ORALLY DISINTEGRATING ORAL
Qty: 30 TABLET | Refills: 4 | Status: SHIPPED | OUTPATIENT
Start: 2024-10-24

## 2024-10-27 NOTE — PATIENT INSTRUCTIONS
Health Maintenance Due   Topic Date Due    ZOSTER VACCINE (1 of 2) Never done    INFLUENZA VACCINE  08/01/2024    COVID-19 Vaccine (5 - 2023-24 season) 09/01/2024    ANNUAL PHYSICAL  10/27/2024

## 2024-10-30 ENCOUNTER — LAB (OUTPATIENT)
Dept: FAMILY MEDICINE CLINIC | Facility: CLINIC | Age: 59
End: 2024-10-30
Payer: OTHER GOVERNMENT

## 2024-10-30 ENCOUNTER — OFFICE VISIT (OUTPATIENT)
Dept: FAMILY MEDICINE CLINIC | Facility: CLINIC | Age: 59
End: 2024-10-30
Payer: OTHER GOVERNMENT

## 2024-10-30 ENCOUNTER — TELEMEDICINE (OUTPATIENT)
Dept: PSYCHIATRY | Facility: CLINIC | Age: 59
End: 2024-10-30
Payer: OTHER GOVERNMENT

## 2024-10-30 VITALS
DIASTOLIC BLOOD PRESSURE: 74 MMHG | OXYGEN SATURATION: 95 % | WEIGHT: 200.2 LBS | BODY MASS INDEX: 40.36 KG/M2 | TEMPERATURE: 97.8 F | SYSTOLIC BLOOD PRESSURE: 115 MMHG | HEIGHT: 59 IN | HEART RATE: 79 BPM

## 2024-10-30 DIAGNOSIS — E78.2 MIXED HYPERLIPIDEMIA: ICD-10-CM

## 2024-10-30 DIAGNOSIS — F33.1 MODERATE EPISODE OF RECURRENT MAJOR DEPRESSIVE DISORDER: Primary | Chronic | ICD-10-CM

## 2024-10-30 DIAGNOSIS — Z00.01 ENCOUNTER FOR ANNUAL GENERAL MEDICAL EXAMINATION WITH ABNORMAL FINDINGS IN ADULT: Primary | ICD-10-CM

## 2024-10-30 DIAGNOSIS — D22.9 MULTIPLE ATYPICAL SKIN MOLES: ICD-10-CM

## 2024-10-30 DIAGNOSIS — E83.110 HEREDITARY HEMOCHROMATOSIS: ICD-10-CM

## 2024-10-30 DIAGNOSIS — Z23 NEED FOR COVID-19 VACCINE: ICD-10-CM

## 2024-10-30 DIAGNOSIS — F41.1 GAD (GENERALIZED ANXIETY DISORDER): Chronic | ICD-10-CM

## 2024-10-30 DIAGNOSIS — E66.01 CLASS 3 SEVERE OBESITY DUE TO EXCESS CALORIES WITH SERIOUS COMORBIDITY AND BODY MASS INDEX (BMI) OF 40.0 TO 44.9 IN ADULT: ICD-10-CM

## 2024-10-30 DIAGNOSIS — Z23 NEED FOR INFLUENZA VACCINATION: ICD-10-CM

## 2024-10-30 DIAGNOSIS — E66.813 CLASS 3 SEVERE OBESITY DUE TO EXCESS CALORIES WITH SERIOUS COMORBIDITY AND BODY MASS INDEX (BMI) OF 40.0 TO 44.9 IN ADULT: ICD-10-CM

## 2024-10-30 DIAGNOSIS — R41.3 MEMORY LOSS: ICD-10-CM

## 2024-10-30 DIAGNOSIS — G47.33 OBSTRUCTIVE SLEEP APNEA: ICD-10-CM

## 2024-10-30 DIAGNOSIS — F33.1 MODERATE EPISODE OF RECURRENT MAJOR DEPRESSIVE DISORDER: Chronic | ICD-10-CM

## 2024-10-30 DIAGNOSIS — K21.9 GASTROESOPHAGEAL REFLUX DISEASE, UNSPECIFIED WHETHER ESOPHAGITIS PRESENT: ICD-10-CM

## 2024-10-30 DIAGNOSIS — R03.0 ELEVATED BLOOD PRESSURE READING WITHOUT DIAGNOSIS OF HYPERTENSION: ICD-10-CM

## 2024-10-30 LAB
ALBUMIN SERPL-MCNC: 4.4 G/DL (ref 3.5–5.2)
ALBUMIN/GLOB SERPL: 1.5 G/DL
ALP SERPL-CCNC: 91 U/L (ref 39–117)
ALT SERPL W P-5'-P-CCNC: 12 U/L (ref 1–33)
ANION GAP SERPL CALCULATED.3IONS-SCNC: 10 MMOL/L (ref 5–15)
AST SERPL-CCNC: 15 U/L (ref 1–32)
BILIRUB SERPL-MCNC: 0.3 MG/DL (ref 0–1.2)
BUN SERPL-MCNC: 20 MG/DL (ref 8–23)
BUN/CREAT SERPL: 23.3 (ref 7–25)
CALCIUM SPEC-SCNC: 10 MG/DL (ref 8.6–10.5)
CHLORIDE SERPL-SCNC: 103 MMOL/L (ref 98–107)
CHOLEST SERPL-MCNC: 150 MG/DL (ref 0–200)
CO2 SERPL-SCNC: 25 MMOL/L (ref 22–29)
CREAT SERPL-MCNC: 0.86 MG/DL (ref 0.57–1)
EGFRCR SERPLBLD CKD-EPI 2021: 77 ML/MIN/1.73
GLOBULIN UR ELPH-MCNC: 2.9 GM/DL
GLUCOSE SERPL-MCNC: 72 MG/DL (ref 65–99)
HDLC SERPL-MCNC: 64 MG/DL (ref 40–60)
LDLC SERPL CALC-MCNC: 67 MG/DL (ref 0–100)
LDLC/HDLC SERPL: 1.02 {RATIO}
MAGNESIUM SERPL-MCNC: 2.6 MG/DL (ref 1.6–2.4)
POTASSIUM SERPL-SCNC: 5.1 MMOL/L (ref 3.5–5.2)
PROT SERPL-MCNC: 7.3 G/DL (ref 6–8.5)
SODIUM SERPL-SCNC: 138 MMOL/L (ref 136–145)
TRIGL SERPL-MCNC: 103 MG/DL (ref 0–150)
TSH SERPL DL<=0.05 MIU/L-ACNC: 1.33 UIU/ML (ref 0.27–4.2)
VIT B12 BLD-MCNC: 1263 PG/ML (ref 211–946)
VLDLC SERPL-MCNC: 19 MG/DL (ref 5–40)

## 2024-10-30 PROCEDURE — 80053 COMPREHEN METABOLIC PANEL: CPT | Performed by: PREVENTIVE MEDICINE

## 2024-10-30 PROCEDURE — 90471 IMMUNIZATION ADMIN: CPT | Performed by: PREVENTIVE MEDICINE

## 2024-10-30 PROCEDURE — 80061 LIPID PANEL: CPT | Performed by: PREVENTIVE MEDICINE

## 2024-10-30 PROCEDURE — 91320 SARSCV2 VAC 30MCG TRS-SUC IM: CPT | Performed by: PREVENTIVE MEDICINE

## 2024-10-30 PROCEDURE — 83735 ASSAY OF MAGNESIUM: CPT | Performed by: PREVENTIVE MEDICINE

## 2024-10-30 PROCEDURE — 82607 VITAMIN B-12: CPT | Performed by: PREVENTIVE MEDICINE

## 2024-10-30 PROCEDURE — 90480 ADMN SARSCOV2 VAC 1/ONLY CMP: CPT | Performed by: PREVENTIVE MEDICINE

## 2024-10-30 PROCEDURE — 36415 COLL VENOUS BLD VENIPUNCTURE: CPT

## 2024-10-30 PROCEDURE — 90656 IIV3 VACC NO PRSV 0.5 ML IM: CPT | Performed by: PREVENTIVE MEDICINE

## 2024-10-30 PROCEDURE — 99396 PREV VISIT EST AGE 40-64: CPT | Performed by: PREVENTIVE MEDICINE

## 2024-10-30 PROCEDURE — 84443 ASSAY THYROID STIM HORMONE: CPT | Performed by: PREVENTIVE MEDICINE

## 2024-10-30 NOTE — PROGRESS NOTES
Subjective   Merissa Yusuf is a 59 y.o.white female who presents today for follow up via telehealth.    This provider is located in [place], [place] using a secure [employer] Video Visit through [employer]. Patient is being seen remotely via telehealth at their home address in [place], and stated they are in a secure environment for this session. The patient's condition being diagnosed/treated is appropriate for telemedicine. The provider identified herself as well as her credentials.   The patient, and/or patients guardian, consent to be seen remotely, and when consent is given they understand that the consent allows for patient identifiable information to be sent to a third party as needed.   They may refuse to be seen remotely at any time. The electronic data is encrypted and password protected, and the patient and/or guardian has been advised of the potential risks to privacy not withstanding such measures.   PT Identifiers used: Name and .    You have chosen to receive care through a telephone visit. Do you consent to use a telephone visit for your medical care today? Yes      Chief Complaint:  Anxiety and depression    History of Present Illness:    Bought a larger home in  and moved their daughter and YOBANI and kids in with them,   Has lost 30 lbs on Semaglutide compound   Her YOBANI is from [place] and not working, needs techniques on how to deal with him b/c he is struggling and difficult to manage and won't do therapy himself.  Schedule to have her first TKR in January.     Doing CBD gummies at night for RLS, doing pre-hab for her surgery    Her 39 yr old son  in 2021, she was out of town in [place] with her sister who's  was dying and passed when she got back. His birthday is in October, so those few weeks each year are harder,  does well when she keeps herself busy.   Patient is doing better on Trintellix, her daughter said she is happier on it, no restless legs yet on the  "5mg  Lamictal has helped her irritability, may need increase  \"My  is driving me crazy\"..he has TBI and can push her buttons  She has hemochromatosis, had to have a phlebotomy done last week.     Anxiety 6/10 due to the move  Depression 5/10, low energy and motivation lately.     Denies SI/HI      The following portions of the patient's history were reviewed and updated as appropriate: allergies, current medications, past family history, past medical history, past social history, past surgical history and problem list.    PAST PSYCHIATRIC HISTORY  Axis I  Affective/Bipoloar Disorder, Anxiety/Panic Disorder  Axis II  None    PAST OUTPATIENT TREATMENT  Diagnosis treated:  Affective Disorder, Anxiety/Panic Disorder  Treatment Type:  Family Therapy, Medication Management  No hospitalizations  Genesight testing done March 2019  Prior Psychiatric Medications:  Lexapro increased anxiety  Prozac, hives  Trintellix has caused her restless legs in the past  Buspar   Viibryd, could not take  Pristiq  Topamax, increased her anxiety and cause SOA  Lamictal  Support Groups:  None  Sequelae Of Mental Disorder:  social isolation, family disruption, emotional distress      Interval History  Improved    Side Effects  Restless legs with Trintellix at 10 mg but wants to try it again.    Past psychiatric history was reviewed and compared to visit and 7/31/24 appropriate updates were made.    Past Medical History:  Past Medical History:   Diagnosis Date    Anesthesia complication     drops B/P, and slow to arouse    GERD (gastroesophageal reflux disease)     Hereditary hemochromatosis     Hyperlipidemia     Seasonal allergies        Social History:  Social History     Socioeconomic History    Marital status:    Tobacco Use    Smoking status: Never     Passive exposure: Past    Smokeless tobacco: Never   Vaping Use    Vaping status: Never Used   Substance and Sexual Activity    Alcohol use: Yes     Alcohol/week: 1.0 standard " drink of alcohol     Types: 1 Glasses of wine per week     Comment: rarely    Drug use: Not Currently     Frequency: 7.0 times per week     Comment: CBD  gummies    Sexual activity: Defer       Family History:  Family History   Problem Relation Age of Onset    Coronary artery disease Other     Diabetes Other     Dementia Other     Diabetes Mother     Cancer Mother     Heart disease Father     Liver disease Father     Hypertension Father     Cancer Brother     Breast cancer Neg Hx     Ovarian cancer Neg Hx        Past Surgical History:  Past Surgical History:   Procedure Laterality Date    BLADDER SUSPENSION      BREAST BIOPSY Right     CATARACT EXTRACTION  03/2023    COLONOSCOPY N/A 8/19/2024    Procedure: COLONOSCOPY WITH BIOPSIES;  Surgeon: Rodolfo Lambert MD;  Location: [facility] ENDOSCOPY;  Service: General;  Laterality: N/A;  POST-DIVERTICULOSIS, CECAL LESION    FINGER SURGERY  2008    removed cysts from right index finger     HERNIA REPAIR      SUBTOTAL HYSTERECTOMY      TUBAL ABDOMINAL LIGATION      VENTRAL/INCISIONAL HERNIA REPAIR N/A 09/24/2020    Procedure: LAPAROSCOPIC VENTRAL HERNIA;  Surgeon: Reuben Molina MD;  Location: [facility] MAIN OR;  Service: General;  Laterality: N/A;  0855 TAP block    WISDOM TOOTH EXTRACTION         Problem List:  Patient Active Problem List   Diagnosis    Elevated blood pressure reading without diagnosis of hypertension    Hearing deficit    Hyperlipidemia    Low back pain    Memory loss    Morbid (severe) obesity due to excess calories    Obstructive sleep apnea    Thrombocytosis    Vitamin D deficiency    Combined forms of age-related cataract, bilateral    Convergence insufficiency    Corneal pannus of right eye    Corneal pannus    Meibomian gland dysfunction (MGD) of both eyes    Bilateral presbyopia    Presbyopia    Vitreous floaters    Hereditary hemochromatosis    GERD (gastroesophageal reflux disease)    Seasonal allergies    Class 3 severe obesity due to excess  calories with serious comorbidity and body mass index (BMI) of 40.0 to 44.9 in adult    Elevated hematocrit    KIRSTIE (generalized anxiety disorder)    Moderate episode of recurrent major depressive disorder    SOB (shortness of breath)    Macular pseudohole of right eye    Partial thickness macular hole of left eye    Multiple atypical skin moles    Bilateral primary osteoarthritis of knee       Allergy:   Allergies   Allergen Reactions    Amoxicillin Swelling    Erythromycin GI Intolerance    Escitalopram Anxiety    Prozac  [Fluoxetine Hcl] Rash    Penicillin G Rash        Discontinued Medications:  There are no discontinued medications.        Current Medications:   Current Outpatient Medications   Medication Sig Dispense Refill    acetaminophen (TYLENOL) 500 MG tablet Take 1 tablet by mouth Every 4 (Four) Hours As Needed.      albuterol sulfate  (90 Base) MCG/ACT inhaler Inhale 2 puffs Every 4 (Four) Hours As Needed for Wheezing. 6.7 g 0    busPIRone (BUSPAR) 15 MG tablet TAKE 1 TABLET THREE TIMES A DAY FOR ANXIETY 270 tablet 3    Evolocumab (Repatha SureClick) solution auto-injector SureClick injection Inject 1 ml under the skin into the appropriate area as directed every 14 days Strength 140mg/ml 6 mL 3    lamoTRIgine (LaMICtal) 100 MG tablet TAKE 1 TABLET TWICE A DAY (CORRECTED PRESCRIPTION) 180 tablet 3    montelukast (SINGULAIR) 10 MG tablet TAKE 1 TABLET EVERY NIGHT 90 tablet 3    ondansetron ODT (ZOFRAN-ODT) 8 MG disintegrating tablet PLACE 1 TABLET ON THE TONGUE EVERY 8 HOURS AS NEEDED FOR NAUSEA OR VOMITING 30 tablet 4    Semaglutide-Weight Management 1.7 MG/0.75ML solution auto-injector Inject 0.66 mL under the skin into the appropriate area as directed 1 (One) Time Per Week. 2 mL 0    sennosides-docusate (senna-docusate sodium) 8.6-50 MG per tablet Take 1 tablet by mouth Daily. 30 tablet 2    Trintellix 10 MG tablet tablet TAKE 1 TABLET DAILY WITH BREAKFAST 90 tablet 3     No current  facility-administered medications for this visit.         Review of Symptoms:    Psychiatric/Behavioral: Negative for agitation, behavioral problems, confusion, decreased concentration, dysphoric mood, hallucinations, self-injury, sleep disturbance and suicidal ideas. The patient not nervous/anxious and is not hyperactive.        Physical Exam:   not currently breastfeeding.    Mental Status Exam:   Hygiene:   good  Cooperation:  Cooperative  Eye Contact:  Good  Psychomotor Behavior:  Appropriate  Affect:  Appropriate  Mood: Mildly anxious, depressed  Hopelessness: Denies  Speech:  Normal  Thought Process:  Goal directed  Thought Content:  Normal  Suicidal:  None  Homicidal:  None  Hallucinations:  None  Delusion:  None  Memory:  Intact  Orientation:  Person, Place, Time and Situation  Reliability:  good  Insight:  Good  Judgement:  Good  Impulse Control:  Good  Physical/Medical Issues:  No      Mental status exam was reviewed and compared to 7/31/24 visit and appropriate updates were made.    PHQ-9 Depression Screening  Little interest or pleasure in doing things? (Patient-Rptd) Several days   Feeling down, depressed, or hopeless? (Patient-Rptd) Several days   PHQ-2 Total Score (Patient-Rptd) 2   Trouble falling or staying asleep, or sleeping too much? (Patient-Rptd) Several days   Feeling tired or having little energy? (Patient-Rptd) Not at all   Poor appetite or overeating? (Patient-Rptd) Not at all   Feeling bad about yourself - or that you are a failure or have let yourself or your family down? (Patient-Rptd) Not at all   Trouble concentrating on things, such as reading the newspaper or watching television? (Patient-Rptd) Not at all   Moving or speaking so slowly that other people could have noticed? Or the opposite - being so fidgety or restless that you have been moving around a lot more than usual? (Patient-Rptd) Not at all   Thoughts that you would be better off dead, or of hurting yourself in some way?  (Patient-Rptd) Not at all   PHQ-9 Total Score (Patient-Rptd) 3   If you checked off any problems, how difficult have these problems made it for you to do your work, take care of things at home, or get along with other people? (Patient-Rptd) Not difficult at all           Never smoker    I advised Merissa of the risks of tobacco use.     Lab Results:   Lab on 10/30/2024   Component Date Value Ref Range Status    Glucose 10/30/2024 72  65 - 99 mg/dL Final    BUN 10/30/2024 20  8 - 23 mg/dL Final    Creatinine 10/30/2024 0.86  0.57 - 1.00 mg/dL Final    Sodium 10/30/2024 138  136 - 145 mmol/L Final    Potassium 10/30/2024 5.1  3.5 - 5.2 mmol/L Final    Chloride 10/30/2024 103  98 - 107 mmol/L Final    CO2 10/30/2024 25.0  22.0 - 29.0 mmol/L Final    Calcium 10/30/2024 10.0  8.6 - 10.5 mg/dL Final    Total Protein 10/30/2024 7.3  6.0 - 8.5 g/dL Final    Albumin 10/30/2024 4.4  3.5 - 5.2 g/dL Final    ALT (SGPT) 10/30/2024 12  1 - 33 U/L Final    AST (SGOT) 10/30/2024 15  1 - 32 U/L Final    Alkaline Phosphatase 10/30/2024 91  39 - 117 U/L Final    Total Bilirubin 10/30/2024 0.3  0.0 - 1.2 mg/dL Final    Globulin 10/30/2024 2.9  gm/dL Final    A/G Ratio 10/30/2024 1.5  g/dL Final    BUN/Creatinine Ratio 10/30/2024 23.3  7.0 - 25.0 Final    Anion Gap 10/30/2024 10.0  5.0 - 15.0 mmol/L Final    eGFR 10/30/2024 77.0  >60.0 mL/min/1.73 Final    Magnesium 10/30/2024 2.6 (H)  1.6 - 2.4 mg/dL Final    Total Cholesterol 10/30/2024 150  0 - 200 mg/dL Final    Triglycerides 10/30/2024 103  0 - 150 mg/dL Final    HDL Cholesterol 10/30/2024 64 (H)  40 - 60 mg/dL Final    LDL Cholesterol  10/30/2024 67  0 - 100 mg/dL Final    VLDL Cholesterol 10/30/2024 19  5 - 40 mg/dL Final    LDL/HDL Ratio 10/30/2024 1.02   Final    TSH 10/30/2024 1.330  0.270 - 4.200 uIU/mL Final    Vitamin B-12 10/30/2024 1,263 (H)  211 - 946 pg/mL Final   Lab on 10/08/2024   Component Date Value Ref Range Status    Ferritin 10/08/2024 69.30  13.00 - 150.00 ng/mL  Final    Iron 10/08/2024 134  37 - 145 mcg/dL Final    Iron Saturation (TSAT) 10/08/2024 42  20 - 50 % Final    Transferrin 10/08/2024 215  200 - 360 mg/dL Final    TIBC 10/08/2024 320  298 - 536 mcg/dL Final    Glucose 10/08/2024 104 (H)  65 - 99 mg/dL Final    BUN 10/08/2024 14  8 - 23 mg/dL Final    Creatinine 10/08/2024 0.73  0.57 - 1.00 mg/dL Final    Sodium 10/08/2024 140  136 - 145 mmol/L Final    Potassium 10/08/2024 4.5  3.5 - 5.2 mmol/L Final    Chloride 10/08/2024 104  98 - 107 mmol/L Final    CO2 10/08/2024 26.0  22.0 - 29.0 mmol/L Final    Calcium 10/08/2024 9.5  8.6 - 10.5 mg/dL Final    Total Protein 10/08/2024 6.9  6.0 - 8.5 g/dL Final    Albumin 10/08/2024 4.4  3.5 - 5.2 g/dL Final    ALT (SGPT) 10/08/2024 9  1 - 33 U/L Final    AST (SGOT) 10/08/2024 18  1 - 32 U/L Final    Alkaline Phosphatase 10/08/2024 94  39 - 117 U/L Final    Total Bilirubin 10/08/2024 0.3  0.0 - 1.2 mg/dL Final    Globulin 10/08/2024 2.5  gm/dL Final    A/G Ratio 10/08/2024 1.8  g/dL Final    BUN/Creatinine Ratio 10/08/2024 19.2  7.0 - 25.0 Final    Anion Gap 10/08/2024 10.0  5.0 - 15.0 mmol/L Final    eGFR 10/08/2024 93.7  >60.0 mL/min/1.73 Final    WBC 10/08/2024 6.40  3.40 - 10.80 10*3/mm3 Final    RBC 10/08/2024 3.85  3.77 - 5.28 10*6/mm3 Final    Hemoglobin 10/08/2024 13.7  12.0 - 15.9 g/dL Final    Hematocrit 10/08/2024 41.0  34.0 - 46.6 % Final    MCV 10/08/2024 106.5 (H)  79.0 - 97.0 fL Final    MCH 10/08/2024 35.6 (H)  26.6 - 33.0 pg Final    MCHC 10/08/2024 33.4  31.5 - 35.7 g/dL Final    RDW 10/08/2024 11.9 (L)  12.3 - 15.4 % Final    RDW-SD 10/08/2024 45.9  37.0 - 54.0 fl Final    MPV 10/08/2024 8.4  6.0 - 12.0 fL Final    Platelets 10/08/2024 394  140 - 450 10*3/mm3 Final    Neutrophil % 10/08/2024 57.4  42.7 - 76.0 % Final    Lymphocyte % 10/08/2024 31.6  19.6 - 45.3 % Final    Monocyte % 10/08/2024 8.0  5.0 - 12.0 % Final    Eosinophil % 10/08/2024 2.5  0.3 - 6.2 % Final    Basophil % 10/08/2024 0.5  0.0 - 1.5 %  Final    Neutrophils, Absolute 10/08/2024 3.68  1.70 - 7.00 10*3/mm3 Final    Lymphocytes, Absolute 10/08/2024 2.02  0.70 - 3.10 10*3/mm3 Final    Monocytes, Absolute 10/08/2024 0.51  0.10 - 0.90 10*3/mm3 Final    Eosinophils, Absolute 10/08/2024 0.16  0.00 - 0.40 10*3/mm3 Final    Basophils, Absolute 10/08/2024 0.03  0.00 - 0.20 10*3/mm3 Final   Admission on 08/19/2024, Discharged on 08/19/2024   Component Date Value Ref Range Status    Case Report 08/19/2024    Final                    Value:Surgical Pathology Report                         Case: [id number]                                  Authorizing Provider:  Rodolfo Lambert MD           Collected:           08/19/2024 09:05 AM          Ordering Location:     [facility]  Received:            08/19/2024 12:47 PM                                 SUITES                                                                       Pathologist:           Jeffry Hicks MD                                                             Specimen:    Large Intestine, Cecum, cecal biopsies                                                     Final Diagnosis 08/19/2024    Final                    Value:This result contains rich text formatting which cannot be displayed here.    Comment 08/19/2024    Final                    Value:This result contains rich text formatting which cannot be displayed here.    Gross Description 08/19/2024    Final                    Value:This result contains rich text formatting which cannot be displayed here.       Assessment & Plan   Problems Addressed this Visit          Mental Health    KIRSTIE (generalized anxiety disorder) (Chronic)    Moderate episode of recurrent major depressive disorder - Primary (Chronic)     Diagnoses         Codes Comments    Moderate episode of recurrent major depressive disorder    -  Primary ICD-10-CM: F33.1  ICD-9-CM: 296.32     KIRSTIE (generalized anxiety disorder)     ICD-10-CM: F41.1  ICD-9-CM: 300.02              Visit Diagnoses:    ICD-10-CM ICD-9-CM   1. Moderate episode of recurrent major depressive disorder  F33.1 296.32   2. KIRSTIE (generalized anxiety disorder)  F41.1 300.02           TREATMENT PLAN/GOALS: Continue supportive psychotherapy efforts and medications as indicated. Treatment and medication options discussed during today's visit. Patient ackowledged and verbally consented to continue with current treatment plan and was educated on the importance of compliance with treatment and follow-up appointments.    MEDICATION ISSUES:  INSPECT reviewed as expected  Discussed medication options and treatment plan of prescribed medication as well as the risks, benefits, and side effects including potential falls, possible impaired driving and metabolic adversities among others. Patient is agreeable to call the office with any worsening of symptoms or onset of side effects. Patient is agreeable to call 911 or go to the nearest ER should he/she begin having SI/HI. No medication side effects or related complaints today.     Patient is doing well on current meds but would like therapy to deal with her YOBANI  Having TKR on Jan 14, 2025 with [facility]   Continue Trintellix 10 mg daily for depression and anxiety and hopefully no restless legs since she has been on the 5 mg for so long.   Continue Buspar 15mg TID prn anxiety, usually only takes it twice daily, only TID on rare occasion  Continue Lamictal 100 mg tabs take one full tab daily for mood stabilizer   Continue L-methylfolate daily  Will send list of local therapist options for her to call and get an appt      MEDS ORDERED DURING VISIT:  No orders of the defined types were placed in this encounter.      Return in about 4 months (around 2/28/2025) for video visit.           This document has been electronically signed by Laisha Mendiola PA-C  November 1, 2024 08:14 EDT    EMR Dragon transcription disclaimer:  Some of this encounter note is an electronic transcription  translation of spoken language to printed text. The electronic translation of spoken language may permit erroneous, or at times, nonsensical words or phrases to be inadvertently transcribed; Although I have reviewed the note for such errors some may still exist.

## 2024-10-30 NOTE — PROGRESS NOTES
Subjective   Merissa Yusuf is a 59 y.o. female presents for   Chief Complaint   Patient presents with    Annual Exam     Pt is Fasting  Pt wants flu vaccine    Hyperlipidemia     Pt will take covid vaccine  Concerns about colace       Health Maintenance Due   Topic Date Due    ZOSTER VACCINE (1 of 2) Never done    ANNUAL PHYSICAL  10/27/2024     Patient was here for her annual age-specific physical and was advised to wear sunscreen and a seatbelt.  Hyperlipidemia       History of Present Illness  The patient is a 59-year-old female presenting today for her annual physical for .    She reports a floater in her right eye, which is under the care of an ophthalmologist and appears to be reducing in size. She has had dental and eye check-ups within the past year.    She practices safety measures such as wearing sunscreen and seatbelts. She reports no respiratory symptoms such as coughing up blood, sputum, or wheezing. She mentions a slight drainage after sleeping with the windows open.    She has been informed of having dense breasts. She is currently on a compounded medication and was recently prescribed a stimulant similar to Colace, but it has not been effective. She has tried Smooth Move tea and pills, which have been beneficial.    Her obstructive sleep apnea is well-managed. She has had her skin moles examined. She reports feeling stressed due to recent changes in her living situation but does not endorse any suicidal or homicidal thoughts.    She is mindful of her saturated fat intake. Her ferritin levels were checked a few weeks ago and were within normal range, negating the need for phlebotomy. She avoids greasy foods to prevent heartburn. She is fasting today.    She has always struggled with memory issues. She has lost approximately 30 pounds. She is scheduled for left knee replacement surgery in 01/2025.    She needs a flu shot and a COVID-19 shot.    ALLERGIES  She is allergic to PROZAC, LEXAPRO,  "and ERYTHROMYCIN.    Vitals:    10/30/24 0921 10/30/24 0923   BP: 122/74 115/74   BP Location: Right arm Left arm   Patient Position: Sitting Sitting   Cuff Size: Adult Adult   Pulse: 83 79   Temp: 97.8 °F (36.6 °C)    TempSrc: Temporal    SpO2: 93% 95%   Weight: 90.8 kg (200 lb 3.2 oz)    Height: 149.9 cm (59\")      Body mass index is 40.44 kg/m².    Current Outpatient Medications on File Prior to Visit   Medication Sig Dispense Refill    acetaminophen (TYLENOL) 500 MG tablet Take 1 tablet by mouth Every 4 (Four) Hours As Needed.      albuterol sulfate  (90 Base) MCG/ACT inhaler Inhale 2 puffs Every 4 (Four) Hours As Needed for Wheezing. 6.7 g 0    busPIRone (BUSPAR) 15 MG tablet TAKE 1 TABLET THREE TIMES A DAY FOR ANXIETY 270 tablet 3    Evolocumab (Repatha SureClick) solution auto-injector SureClick injection Inject 1 ml under the skin into the appropriate area as directed every 14 days Strength 140mg/ml 6 mL 3    lamoTRIgine (LaMICtal) 100 MG tablet TAKE 1 TABLET TWICE A DAY (CORRECTED PRESCRIPTION) 180 tablet 3    montelukast (SINGULAIR) 10 MG tablet TAKE 1 TABLET EVERY NIGHT 90 tablet 3    ondansetron ODT (ZOFRAN-ODT) 8 MG disintegrating tablet PLACE 1 TABLET ON THE TONGUE EVERY 8 HOURS AS NEEDED FOR NAUSEA OR VOMITING 30 tablet 4    Semaglutide-Weight Management 1.7 MG/0.75ML solution auto-injector Inject 0.66 mL under the skin into the appropriate area as directed 1 (One) Time Per Week. 2 mL 0    sennosides-docusate (senna-docusate sodium) 8.6-50 MG per tablet Take 1 tablet by mouth Daily. 30 tablet 2    Trintellix 10 MG tablet tablet TAKE 1 TABLET DAILY WITH BREAKFAST 90 tablet 3     No current facility-administered medications on file prior to visit.       The following portions of the patient's history were reviewed and updated as appropriate: allergies, current medications, past family history, past medical history, past social history, past surgical history, and problem list.    Review of Systems "   Gastrointestinal:  Positive for constipation and GERD.   Neurological:  Positive for memory problem.   Psychiatric/Behavioral:  Positive for sleep disturbance and depressed mood.        Objective   Physical Exam  Vitals reviewed.   Constitutional:       General: She is not in acute distress.     Appearance: She is well-developed. She is obese. She is not ill-appearing or toxic-appearing.   HENT:      Head: Normocephalic and atraumatic.      Right Ear: Tympanic membrane, ear canal and external ear normal.      Left Ear: Tympanic membrane, ear canal and external ear normal.      Nose: Nose normal.      Mouth/Throat:      Mouth: Mucous membranes are moist.      Pharynx: No posterior oropharyngeal erythema.   Eyes:      Extraocular Movements: Extraocular movements intact.      Conjunctiva/sclera: Conjunctivae normal.      Pupils: Pupils are equal, round, and reactive to light.   Cardiovascular:      Rate and Rhythm: Normal rate and regular rhythm.      Heart sounds: Normal heart sounds.   Pulmonary:      Effort: Pulmonary effort is normal.      Breath sounds: Normal breath sounds.   Abdominal:      General: Bowel sounds are normal. There is no distension.      Palpations: Abdomen is soft. There is no mass.      Tenderness: There is no abdominal tenderness.   Musculoskeletal:         General: Normal range of motion.      Cervical back: Neck supple.   Skin:     General: Skin is warm.   Neurological:      General: No focal deficit present.      Mental Status: She is alert and oriented to person, place, and time.   Psychiatric:         Mood and Affect: Mood normal.         Behavior: Behavior normal.       Physical Exam  Throat appears normal.  Carotid arteries are normal.  Lungs are clear.  Heart exhibits a normal rate and rhythm.    PHQ-9 Total Score:    Results  Laboratory Studies  Ferritin was in the normal range. Blood sugar was 104.         Assessment & Plan   Diagnoses and all orders for this visit:    1. Encounter  for annual general medical examination with abnormal findings in adult (Primary)    2. Obstructive sleep apnea    3. Multiple atypical skin moles    4. Moderate episode of recurrent major depressive disorder  -     Magnesium; Future  -     TSH Rfx On Abnormal To Free T4; Future  -     Vitamin B12; Future    5. Mixed hyperlipidemia  -     Comprehensive Metabolic Panel; Future  -     Lipid Panel; Future    6. Hereditary hemochromatosis    7. Gastroesophageal reflux disease, unspecified whether esophagitis present    8. Elevated blood pressure reading without diagnosis of hypertension    9. Class 3 severe obesity due to excess calories with serious comorbidity and body mass index (BMI) of 40.0 to 44.9 in adult    10. Memory loss    11. Need for influenza vaccination  -     Cancel: Fluzone High-Dose 65+yrs (7258-1074)  -     Fluzone >6mos (9073-6243)    12. Need for COVID-19 vaccine  -     COVID-19 (Pfizer) 12yrs+ (COMIRNATY)      Assessment & Plan  1. Annual physical.  Her blood pressure readings are within the normal range today. A cholesterol panel will be ordered. She is advised to monitor her portion sizes and continue her walking regimen.    2. Obstructive Sleep Apnea.  She reports that her obstructive sleep apnea is doing okay. No changes in treatment are necessary at this time.    3. Multiple atypical skin moles.  She confirms that she has had her skin moles checked recently, and no further action is required at this time.    4. Moderate episode of recurrent major depressive disorder.  She reports that her mood has been okay despite some stress related to living arrangements. She does not feel homicidal or suicidal. No changes in her current treatment plan are necessary.    5. Mixed hyperlipidemia.  She is advised to continue watching her saturated fat intake. A cholesterol panel will be ordered.    6. Hereditary hemochromatosis.  Her recent ferritin levels were within the normal range, and she did not require a  phlebotomy.    7. Gastroesophageal Reflux Disease (GERD).  She reports that avoiding greasy foods has helped manage her symptoms. No changes in treatment are necessary.    8. Elevated blood pressure without diagnosis of hypertension.  Her blood pressure readings are within the normal range today. No further action is required.    9. Class III, severe obesity due to excess calories.  She has lost almost 30 pounds and is advised to continue monitoring her portion sizes and maintaining her walking regimen.    10. Memory loss.  She reports that her memory loss continues to be an issue but has been lifelong. She is advised to write things down to help manage her symptoms.    11. Constipation.  She is currently taking a stimulant prescribed by Roshni Veliz but finds the dosage ineffective. She is advised to discuss increasing the dose with Roshni Veliz and to document this discussion in her MyChart.    12. Need for flu shot.  She received her flu shot today.    13. Need for Covid shot.  She received her Covid shot today.    14. Health Maintenance.  She is advised to perform monthly breast self-exams and to schedule a mammogram due to having dense breasts.    Follow-up  Return in 6 months for follow-up.    Patient Instructions     Health Maintenance Due   Topic Date Due    ZOSTER VACCINE (1 of 2) Never done    INFLUENZA VACCINE  08/01/2024    COVID-19 Vaccine (5 - 2023-24 season) 09/01/2024    ANNUAL PHYSICAL  10/27/2024           Patient or patient representative verbalized consent for the use of Ambient Listening during the visit with  Melissa Duarte MD for chart documentation. 10/30/2024  12:05 EDT

## 2024-10-31 ENCOUNTER — TELEPHONE (OUTPATIENT)
Dept: FAMILY MEDICINE CLINIC | Facility: CLINIC | Age: 59
End: 2024-10-31
Payer: OTHER GOVERNMENT

## 2024-10-31 NOTE — PROGRESS NOTES
B12 and magnesium are both elevated I would decrease the dose a day or 2/week call if any other questions or concerns

## 2024-10-31 NOTE — TELEPHONE ENCOUNTER
HUB TO RELAY:  ----- Message from Melissa Duarte sent at 10/31/2024  6:59 AM EDT -----  B12 and magnesium are both elevated I would decrease the dose a day or 2/week call if any other questions or concerns

## 2024-11-07 RX ORDER — AMOXICILLIN 250 MG
2 CAPSULE ORAL 2 TIMES DAILY
Qty: 120 TABLET | Refills: 2 | Status: SHIPPED | OUTPATIENT
Start: 2024-11-07

## 2025-01-15 ENCOUNTER — TELEPHONE (OUTPATIENT)
Dept: FAMILY MEDICINE CLINIC | Facility: CLINIC | Age: 60
End: 2025-01-15
Payer: OTHER GOVERNMENT

## 2025-01-15 NOTE — TELEPHONE ENCOUNTER
Please call patient and make sure she is doing better from the knee replacement that caused her to undergo surgery with vianey Escobedo.  We would be glad to see her if she needs anything otherwise she should follow-up with home health and physical therapy as well as Dr. Jett.

## 2025-01-30 ENCOUNTER — TREATMENT (OUTPATIENT)
Dept: PHYSICAL THERAPY | Facility: CLINIC | Age: 60
End: 2025-01-30
Payer: OTHER GOVERNMENT

## 2025-01-30 DIAGNOSIS — Z47.1 AFTERCARE FOLLOWING LEFT KNEE JOINT REPLACEMENT SURGERY: Primary | ICD-10-CM

## 2025-01-30 DIAGNOSIS — R26.9 GAIT DISTURBANCE: ICD-10-CM

## 2025-01-30 DIAGNOSIS — M25.562 ACUTE PAIN OF LEFT KNEE: ICD-10-CM

## 2025-01-30 DIAGNOSIS — Z96.652 AFTERCARE FOLLOWING LEFT KNEE JOINT REPLACEMENT SURGERY: Primary | ICD-10-CM

## 2025-01-30 PROCEDURE — 97110 THERAPEUTIC EXERCISES: CPT | Performed by: PHYSICAL THERAPIST

## 2025-01-30 PROCEDURE — 97140 MANUAL THERAPY 1/> REGIONS: CPT | Performed by: PHYSICAL THERAPIST

## 2025-01-30 PROCEDURE — 97161 PT EVAL LOW COMPLEX 20 MIN: CPT | Performed by: PHYSICAL THERAPIST

## 2025-01-30 NOTE — PROGRESS NOTES
Physical Therapy Initial Evaluation and Plan of Care    Patient: Merissa Yusuf   : 1965  Diagnosis/ICD-10 Code:  Aftercare following left knee joint replacement surgery [Z47.1, Z96.652]  Referring practitioner: Mario Alberto Boyce/Russell Rahman  Date of initial visit : 2025  Today's Date: 2025  Patient seen for 1  session    Visit Diagnoses:    ICD-10-CM ICD-9-CM   1. Aftercare following left knee joint replacement surgery  Z47.1 V54.81    Z96.652 V43.65   2. Acute pain of left knee  M25.562 719.46   3. Gait disturbance  R26.9 781.2        Subjective Evaluation    History of Present Illness  Mechanism of injury: Patient is a 59 year old female who present status post left total knee replacement 2 weeks ago.  Reports doing well with some pain but has been able to discontinue use of narcotics and managing well.  Reports is using her cane for walking and has discontinued use of her walker.  Reports plans to have her right knee replaced as well once her rehab is completed on her left knee.  Goals for full ability to walk/hike without pain as prior in both knees.     Subjective comment: Knee Outcome Survey - 71%  Patient Occupation: Retired Quality of life: good    Pain  Current pain ratin  At best pain ratin  At worst pain ratin  Pain location: Left knee.  Quality: dull ache  Relieving factors: ice, medications and rest  Aggravating factors: squatting, ambulation and prolonged positioning (Bending her left knee)  Progression: improved    Social Support  Lives in: multiple-level home  Lives with: spouse    Treatments  Treatments tried: Home health PT.  Current treatment: physical therapy  Patient Goals  Patient goals for therapy: decreased edema, decreased pain, improved balance, increased motion, increased strength, independence with ADLs/IADLs and return to sport/leisure activities           Objective          Active Range of Motion   Left Knee   Flexion: 85 degrees   Extensor la degrees      Additional Active Range of Motion Details  Incision healing well on left knee - no evidence of infection    Patellar Mobility   Left Knee Patellar tendons within functional limits include the medial, lateral and superior. Hypomobile in the left inferior patellar tendon(s).     Strength/Myotome Testing     Left Knee   Flexion: 4-  Extension: 2+  Quadriceps contraction: fair    Right Knee   Quadriceps contraction: good    Ambulation     Comments   Ambulates with straight cane in R UE - slight decrease knee FL and heel strike, stance time L LE     Functional Assessment     Comments  TUG - 11.9 seconds   FTSST - 18.5 seconds          Assessment & Plan       Assessment  Impairments: abnormal gait, abnormal or restricted ROM, activity intolerance, impaired balance, impaired physical strength, lacks appropriate home exercise program, pain with function and weight-bearing intolerance   Functional limitations: carrying objects, lifting, walking, uncomfortable because of pain and standing (Squatting, stairs, uneven ground)  Assessment details: Presents with limits in L knee ROM, strength, altered gait, decreased patellar mobility, decreased ability for stair ascent/descent and L knee pain status post L total knee replacement.  She would benefit from skilled PT to address the above and restore to prior level of function.   Prognosis: good    Goals  Plan Goals: ST. Left knee AROM improved to 3-100 over 2 weeks.   2. Gait mechanics improved with increased stance time and push off L LE over 2 weeks.   3. Independent and compliant with HEP over 2 weeks.     LT. Left knee AROM improved to 0-115 degrees or greater over 10 weeks.   2. TUG and Five Time Sit to Stand times at or below age adjusted norms over 10 weeks.   3. Gait mechanics WNL w/o use of assitive device over 10 weeks.   4. Able to ascend/descend stairs reciprocally with use of rail and good mechanics over 10 weeks.   5. L knee strength both EXT/FL 5/5 over  10 weeks.     Plan  Therapy options: will be seen for skilled therapy services  Planned modality interventions: cryotherapy, electrical stimulation/Russian stimulation, TENS, thermotherapy (hydrocollator packs) and dry needling  Planned therapy interventions: manual therapy, balance/weight-bearing training, neuromuscular re-education, soft tissue mobilization, functional ROM exercises, strengthening, flexibility, gait training, stretching, therapeutic activities, transfer training, joint mobilization and home exercise program  Frequency: 2x week  Duration in weeks: 10  Treatment plan discussed with: patient and family        History # of Personal Factors and/or Comorbidities: MODERATE (1-2)  Examination of Body System(s): # of elements: MODERATE (3)  Clinical Presentation: STABLE   Clinical Decision Making: LOW        Timed:         Manual Therapy:    8     mins  01468;     Therapeutic Exercise:    15     mins  96938;         Un-Timed:  Low Eval     20     Mins  04369      Timed Treatment:   23   mins   Total Treatment:     43   mins          PT SIGNATURE: Issac Dixon PT, DPT   [place] Lic #[id number]    DATE TREATMENT INITIATED: 1/30/2025    Initial Certification  Certification Period: 4/30/2025  I certify that the therapy services are furnished while this patient is under my care.  The services outlined above are required by this patient, and will be reviewed every 90 days.     PHYSICIAN: Mario Alberto Jett/MD Russell      DATE:     Please sign and return via fax to [phone].. Thank you, [facility].

## 2025-02-03 ENCOUNTER — TREATMENT (OUTPATIENT)
Dept: PHYSICAL THERAPY | Facility: CLINIC | Age: 60
End: 2025-02-03
Payer: OTHER GOVERNMENT

## 2025-02-03 DIAGNOSIS — Z47.1 AFTERCARE FOLLOWING LEFT KNEE JOINT REPLACEMENT SURGERY: Primary | ICD-10-CM

## 2025-02-03 DIAGNOSIS — R26.9 GAIT DISTURBANCE: ICD-10-CM

## 2025-02-03 DIAGNOSIS — Z96.652 AFTERCARE FOLLOWING LEFT KNEE JOINT REPLACEMENT SURGERY: Primary | ICD-10-CM

## 2025-02-03 DIAGNOSIS — M25.562 ACUTE PAIN OF LEFT KNEE: ICD-10-CM

## 2025-02-03 PROCEDURE — 97110 THERAPEUTIC EXERCISES: CPT | Performed by: PHYSICAL THERAPIST

## 2025-02-03 PROCEDURE — 97112 NEUROMUSCULAR REEDUCATION: CPT | Performed by: PHYSICAL THERAPIST

## 2025-02-03 PROCEDURE — 97140 MANUAL THERAPY 1/> REGIONS: CPT | Performed by: PHYSICAL THERAPIST

## 2025-02-03 NOTE — PROGRESS NOTES
Physical Therapy Daily Treatment Note  Visit: 2    Merissa Yusuf reports: did well with last visit - no new issues.   YOB: 1965  Referring practitioner : Mario Alberto Boyce/Russell Henderson*  Date of Initial: Type: THERAPY  Noted: 1/30/2025  Today's date: 2/3/2025  Patient seen for 2 sessions    Visit Diagnoses:    ICD-10-CM ICD-9-CM   1. Aftercare following left knee joint replacement surgery  Z47.1 V54.81    Z96.652 V43.65   2. Gait disturbance  R26.9 781.2   3. Acute pain of left knee  M25.562 719.46       Subjective       Objective   L knee ROM flexion - 100 end of session  See Exercise, Manual, and Modality Logs for complete treatment.       Assessment/Plan    Improved L knee FL, gait mechanics, pain post tx.     Plan:    Continue             Timed:         Manual Therapy:    9     mins  79426;     Therapeutic Exercise:    20     mins  60263;     Neuromuscular Daniel:    10    mins  67507;           Timed Treatment:   39   mins   Total Treatment:     39   mins         Issac Dixon PT, DPT  Physical Therapist  [place] Lic #[id number]

## 2025-02-05 ENCOUNTER — TREATMENT (OUTPATIENT)
Dept: PHYSICAL THERAPY | Facility: CLINIC | Age: 60
End: 2025-02-05
Payer: OTHER GOVERNMENT

## 2025-02-05 DIAGNOSIS — Z96.652 AFTERCARE FOLLOWING LEFT KNEE JOINT REPLACEMENT SURGERY: Primary | ICD-10-CM

## 2025-02-05 DIAGNOSIS — M25.562 ACUTE PAIN OF LEFT KNEE: ICD-10-CM

## 2025-02-05 DIAGNOSIS — Z47.1 AFTERCARE FOLLOWING LEFT KNEE JOINT REPLACEMENT SURGERY: Primary | ICD-10-CM

## 2025-02-05 DIAGNOSIS — R26.9 GAIT DISTURBANCE: ICD-10-CM

## 2025-02-05 NOTE — PROGRESS NOTES
Physical Therapy Daily Treatment Note  Visit: 3    Merissa Yusuf reports: still stiff with bending her left knee.  Reports did well with last visit.   YOB: 1965  Referring practitioner : Mario Alberto Boyce/Russell Henderson*  Date of Initial: Type: THERAPY  Noted: 1/30/2025  Today's date: 2/5/2025  Patient seen for 3 sessions    Visit Diagnoses:    ICD-10-CM ICD-9-CM   1. Aftercare following left knee joint replacement surgery  Z47.1 V54.81    Z96.652 V43.65   2. Gait disturbance  R26.9 781.2   3. Acute pain of left knee  M25.562 719.46       Subjective       Objective   L  knee AROM 90 beginning - 105 degrees end of visit  See Exercise, Manual, and Modality Logs for complete treatment.       Assessment/Plan    Improved L knee ROM post tx.  Moderate pain still with endranges of L knee FL.  Recommended increased HEP frequency to at least 3 x per day.     Plan:    Continue             Timed:         Manual Therapy:    10     mins  30126;     Therapeutic Exercise:    32     mins  57828;     Neuromuscular Daniel:    12    mins  97346;           Un-Timed:  Electrical Stimulation:    12     mins  06865 ( );        Timed Treatment:   54   mins   Total Treatment:     66   mins         Issac Dixon PT, DPT  Physical Therapist  [place] Lic #[id number]

## 2025-02-06 ENCOUNTER — LAB (OUTPATIENT)
Dept: LAB | Facility: HOSPITAL | Age: 60
End: 2025-02-06
Payer: OTHER GOVERNMENT

## 2025-02-06 DIAGNOSIS — R71.8 ELEVATED HEMATOCRIT: ICD-10-CM

## 2025-02-06 DIAGNOSIS — E83.110 HEREDITARY HEMOCHROMATOSIS: ICD-10-CM

## 2025-02-06 DIAGNOSIS — E78.2 MIXED HYPERLIPIDEMIA: ICD-10-CM

## 2025-02-06 LAB
BASOPHILS # BLD AUTO: 0.06 10*3/MM3 (ref 0–0.2)
BASOPHILS NFR BLD AUTO: 1.1 % (ref 0–1.5)
DEPRECATED RDW RBC AUTO: 49.1 FL (ref 37–54)
EOSINOPHIL # BLD AUTO: 0.32 10*3/MM3 (ref 0–0.4)
EOSINOPHIL NFR BLD AUTO: 5.6 % (ref 0.3–6.2)
ERYTHROCYTE [DISTWIDTH] IN BLOOD BY AUTOMATED COUNT: 12.6 % (ref 12.3–15.4)
FERRITIN SERPL-MCNC: 124 NG/ML (ref 13–150)
HCT VFR BLD AUTO: 40.9 % (ref 34–46.6)
HGB BLD-MCNC: 13.3 G/DL (ref 12–15.9)
IRON 24H UR-MRATE: 145 MCG/DL (ref 37–145)
IRON SATN MFR SERPL: 41 % (ref 20–50)
LYMPHOCYTES # BLD AUTO: 2.13 10*3/MM3 (ref 0.7–3.1)
LYMPHOCYTES NFR BLD AUTO: 37.4 % (ref 19.6–45.3)
MCH RBC QN AUTO: 35.8 PG (ref 26.6–33)
MCHC RBC AUTO-ENTMCNC: 32.5 G/DL (ref 31.5–35.7)
MCV RBC AUTO: 110.2 FL (ref 79–97)
MONOCYTES # BLD AUTO: 0.61 10*3/MM3 (ref 0.1–0.9)
MONOCYTES NFR BLD AUTO: 10.7 % (ref 5–12)
NEUTROPHILS NFR BLD AUTO: 2.58 10*3/MM3 (ref 1.7–7)
NEUTROPHILS NFR BLD AUTO: 45.2 % (ref 42.7–76)
PLATELET # BLD AUTO: 531 10*3/MM3 (ref 140–450)
PMV BLD AUTO: 8.1 FL (ref 6–12)
RBC # BLD AUTO: 3.71 10*6/MM3 (ref 3.77–5.28)
TIBC SERPL-MCNC: 355 MCG/DL (ref 298–536)
TRANSFERRIN SERPL-MCNC: 238 MG/DL (ref 200–360)
WBC NRBC COR # BLD AUTO: 5.7 10*3/MM3 (ref 3.4–10.8)

## 2025-02-06 PROCEDURE — 84466 ASSAY OF TRANSFERRIN: CPT | Performed by: INTERNAL MEDICINE

## 2025-02-06 PROCEDURE — 36415 COLL VENOUS BLD VENIPUNCTURE: CPT

## 2025-02-06 PROCEDURE — 83540 ASSAY OF IRON: CPT | Performed by: INTERNAL MEDICINE

## 2025-02-06 PROCEDURE — 82728 ASSAY OF FERRITIN: CPT | Performed by: INTERNAL MEDICINE

## 2025-02-06 PROCEDURE — 85025 COMPLETE CBC W/AUTO DIFF WBC: CPT

## 2025-02-07 ENCOUNTER — TELEPHONE (OUTPATIENT)
Dept: ORTHOPEDICS | Facility: OTHER | Age: 60
End: 2025-02-07
Payer: OTHER GOVERNMENT

## 2025-02-09 PROBLEM — D75.89 MACROCYTOSIS WITHOUT ANEMIA: Status: ACTIVE | Noted: 2025-02-09

## 2025-02-10 ENCOUNTER — TREATMENT (OUTPATIENT)
Dept: PHYSICAL THERAPY | Facility: CLINIC | Age: 60
End: 2025-02-10
Payer: OTHER GOVERNMENT

## 2025-02-10 DIAGNOSIS — M25.562 ACUTE PAIN OF LEFT KNEE: ICD-10-CM

## 2025-02-10 DIAGNOSIS — Z96.652 AFTERCARE FOLLOWING LEFT KNEE JOINT REPLACEMENT SURGERY: Primary | ICD-10-CM

## 2025-02-10 DIAGNOSIS — R26.9 GAIT DISTURBANCE: ICD-10-CM

## 2025-02-10 DIAGNOSIS — Z47.1 AFTERCARE FOLLOWING LEFT KNEE JOINT REPLACEMENT SURGERY: Primary | ICD-10-CM

## 2025-02-10 PROCEDURE — 97535 SELF CARE MNGMENT TRAINING: CPT | Performed by: PHYSICAL THERAPIST

## 2025-02-10 PROCEDURE — 97140 MANUAL THERAPY 1/> REGIONS: CPT | Performed by: PHYSICAL THERAPIST

## 2025-02-10 PROCEDURE — 97112 NEUROMUSCULAR REEDUCATION: CPT | Performed by: PHYSICAL THERAPIST

## 2025-02-10 PROCEDURE — 97110 THERAPEUTIC EXERCISES: CPT | Performed by: PHYSICAL THERAPIST

## 2025-02-10 NOTE — PROGRESS NOTES
HEMATOLOGY ONCOLOGY OUTPATIENT FOLLOW UP       Patient name: Merissa Yusuf  : 1965  MRN: [id number]  Primary Care Physician: Melissa Duarte MD  Referring Physician: Melissa Duarte MD  Reason For Consult: Hereditary hemochromatosis, homozygous mutation in H63D.  Iron overload,Macrocytosis, thrombocytosis      History of Present Illness:  Ms. Yusuf has a history of COPD and also depression.  Laboratory work up was obtained in 2019 which was abnormal.  Patient was sent to the [facility] and seen initially on 3/13/19.   3/5/19 - WBC 4.7, hemoglobin 14.1, MCV elevated to 103 and platelet count of 592,000.  Vitamin D  20.  Sed rate 35.  TSH 1.62.  Vitamin B12 476.  Creatinine 0.8.    3/13/19 - ZACKERY-2 mutation negative.  Hemochromatosis gene mutation, homozygous mutation in H63D.  3/13/19 - Vitamin B12 507.  Ferritin 178.  Creatinine 0.7.  Iron level 156.  Iron binding  capacity 257.  Percentage iron saturation 61.   2020 Ferritin 304 iron 177 iron saturation 56 TIBC 316  2020 iron 247 iron saturation 82 iron binding capacity 302 ferritin 271 hemoglobin 13.7 WBC 6.1 platelet count 372.  2020 CMP is normal including LFTs.  TSH 3.01  10/2020 ferritin 233.40 iron sat 43  2021 ferritin 341.40, iron sat 76  2021 -ferritin 112  3/8/2022 -CBC with hemoglobin 13.5, hematocrit 38.7.  Ferritin 59.7, iron saturation 35  2022 - Hb 13.7, ferritin 62 iron 36  4/3/2023 ferritin 76, iron sat 38,   2023-ferritin 152.40, iron 191, iron sat 61, hemoglobin 14.0, hematocrit 39.4.  2024-ferritin 98.03, hemoglobin 13.3, hematocrit 39.0  4/15/2024 f/u Merissa is here today for her routine follow-up.  She reports that she is doing well and has no new complaints.  She tries to utilize diet modification to help keep her iron levels under control. She has began Wegovy for weight loss in anticipation of bilateral knee replacement surgery once she meets the BMI goal.  She is having some left ear pain and is concerned for infection.   4/25/2024 TSH 1.470    -10/8/2024 WBC 6.4, hemoglobin 13.7/hematocrit 41 with .5 RDW elevated 11.9 (stable) with platelets 394K.  Iron 134, iron saturation 42% with normal transferrin and TIBC, ferritin 69.3 CMP with normal creatinine liver function tests    -10/15/2024 patient reports for her routine follow-up, has had some bruising on her legs; taking a lot of Advil as her knees are killing her, at minimum three a day, usually four; hopefully can get her first knee surgery beginning of the year.  She reports that otherwise she has new complaints.        Subjective:  -2/6/2025 WBC 5.7 (normal differential), hemoglobin 15.3/hematocrit 40.9 with MCV even higher at 110.2, stable elevated MCH at 35.8 RDW 12.6% (stable) with platelets now new elevated at 531 K.  Iron 145, iron saturation 41% % with normal transferrin and TIBC, ferritin 69.3     -2/11/2025 patient reports for her routine follow-up, has previously had some bruising on her legs; holding Advil s/p first knee surgery and in PT so the bruising is much improved.  Denies any alcohol use. She reports that otherwise she has new complaints.      Past Medical History:   Diagnosis Date    Anesthesia complication     drops B/P, and slow to arouse    GERD (gastroesophageal reflux disease)     Hereditary hemochromatosis     Hyperlipidemia     Seasonal allergies        Past Surgical History:   Procedure Laterality Date    BLADDER SUSPENSION      BREAST BIOPSY Right     CATARACT EXTRACTION  03/2023    COLONOSCOPY N/A 08/19/2024    Procedure: COLONOSCOPY WITH BIOPSIES;  Surgeon: Rodolfo Lambert MD;  [facility];  Service: General;  Laterality: N/A;  POST-DIVERTICULOSIS, CECAL LESION    FINGER SURGERY  2008    removed cysts from right index finger     HERNIA REPAIR      REPLACEMENT TOTAL KNEE Left 01/14/2025    SUBTOTAL HYSTERECTOMY      TUBAL ABDOMINAL LIGATION      VENTRAL/INCISIONAL  HERNIA REPAIR N/A 09/24/2020    Procedure: LAPAROSCOPIC VENTRAL HERNIA;  Surgeon: Reuben Molina MD;  Location: [facility] MAIN OR;  Service: General;  Laterality: N/A;  0855 TAP block    WISDOM TOOTH EXTRACTION         Current Outpatient Medications:     acetaminophen (TYLENOL) 500 MG tablet, Take 1 tablet by mouth Every 4 (Four) Hours As Needed., Disp: , Rfl:     busPIRone (BUSPAR) 15 MG tablet, TAKE 1 TABLET THREE TIMES A DAY FOR ANXIETY, Disp: 270 tablet, Rfl: 3    celecoxib (CeleBREX) 200 MG capsule, , Disp: , Rfl:     Evolocumab (Repatha SureClick) solution auto-injector SureClick injection, Inject 1 ml under the skin into the appropriate area as directed every 14 days Strength 140mg/ml, Disp: 6 mL, Rfl: 3    lamoTRIgine (LaMICtal) 100 MG tablet, TAKE 1 TABLET TWICE A DAY (CORRECTED PRESCRIPTION), Disp: 180 tablet, Rfl: 3    montelukast (SINGULAIR) 10 MG tablet, TAKE 1 TABLET EVERY NIGHT, Disp: 90 tablet, Rfl: 3    ondansetron ODT (ZOFRAN-ODT) 8 MG disintegrating tablet, PLACE 1 TABLET ON THE TONGUE EVERY 8 HOURS AS NEEDED FOR NAUSEA OR VOMITING, Disp: 30 tablet, Rfl: 4    Semaglutide-Weight Management 1.7 MG/0.75ML solution auto-injector, Inject 0.66 mL under the skin into the appropriate area as directed 1 (One) Time Per Week., Disp: 2 mL, Rfl: 3    Trintellix 10 MG tablet tablet, TAKE 1 TABLET DAILY WITH BREAKFAST, Disp: 90 tablet, Rfl: 3    albuterol sulfate  (90 Base) MCG/ACT inhaler, Inhale 2 puffs Every 4 (Four) Hours As Needed for Wheezing. (Patient not taking: Reported on 2/11/2025), Disp: 6.7 g, Rfl: 0    ondansetron (ZOFRAN) 4 MG tablet, , Disp: , Rfl:     Semaglutide, 1 MG/DOSE, (Ozempic, 1 MG/DOSE,) 4 MG/3ML solution pen-injector, , Disp: , Rfl:     sennosides-docusate (senna-docusate sodium) 8.6-50 MG per tablet, Take 2 tablets by mouth 2 (Two) Times a Day. (Patient not taking: Reported on 2/11/2025), Disp: 120 tablet, Rfl: 2    Allergies   Allergen Reactions    Amoxicillin Swelling     Erythromycin GI Intolerance    Escitalopram Anxiety    Prozac  [Fluoxetine Hcl] Rash    Penicillin G Rash       Family History   Problem Relation Age of Onset    Coronary artery disease Other     Diabetes Other     Dementia Other     Diabetes Mother     Cancer Mother     Heart disease Father     Liver disease Father     Hypertension Father     Cancer Brother     Breast cancer Neg Hx     Ovarian cancer Neg Hx        Cancer-related family history includes Cancer in her brother and mother. There is no history of Breast cancer or Ovarian cancer.    Social History     Tobacco Use    Smoking status: Never     Passive exposure: Past    Smokeless tobacco: Never   Vaping Use    Vaping status: Never Used   Substance Use Topics    Alcohol use: Yes     Alcohol/week: 1.0 standard drink of alcohol     Types: 1 Glasses of wine per week     Comment: rarely    Drug use: Not Currently     Frequency: 7.0 times per week     Comment: CBD  gummies     Social History     Social History Narrative    Not on file      Review of Systems:  Constitutional: Denies any weakness, fatigue, lack of appetite, excessive appetite, weight change, chills or fever.  Eyes: Reports no blurry vision, no double vision, no pain in the eyes, dry eyes or tearing.  ENT: Reports no decreased hearing capacity, ear pain or tinnitus. Denies any epistaxis, nasal congestion, mouth sores or bleeding, tooth or jaw pain, sore throat or hoarseness.  Respiratory: Denies any cough, sputum production or hemoptysis. There is no wheezing, shortness of breath or any other respiratory complaints.  Cardiovascular: Denies any chest pain, shortness of breath when lying down, shortness of breath with activity, palpitations, or leg swelling.  Breasts: Denies any lumps, bumps, pain, nipple changes or discharge in the breasts.  Gastrointestinal: There are no complaints of abdominal pain, difficulty swallowing or painful swallowing, anorexia, nausea, vomiting, diarrhea, constipation,  "heartburn, blood in the stools, dark stools, or change in bowel habits or hemorrhoids.  Genitourinary: Denies any pain or burning on urination, blood in the urine or frequent urination. .  Musculoskeletal: chronic painful knees b/l, s/p left TKR Denies painful joints otherwise no swelling of the joints, muscle or back pain. Denies any pain or tingling in the legs, hands or feet.  Neuropsychiatric: Denies any nervousness, depressed mood, headaches, blackouts, dizziness, weakness of limbs, loss of sensation, loss of balance, loss of coordination or difficulty in speaking.  Cutaneous: Denies any dry skin, itching, rash, change in the color of the skin, or any other cutaneous complaints.  Hematologic/Lymphatic: Denies any bruising or bleeding. Report no recent development of palpable or painful lymph nodes.  Allergy/Immunology: Denies rash/hayfever symptoms or any recent history of pneumonia, recurrent sinusitis, urinary infection or any other history of an ongoing infection.  Endocrine: Reports no intolerance to heat or cold, excessive thirst or excessive urination.    Objective:  Vital signs:  Vitals:    25 1353   BP: 103/70   Pulse: 69   Temp: 97.9 °F (36.6 °C)   TempSrc: Oral   SpO2: 98%   Weight: 87.6 kg (193 lb 3.2 oz)   Height: 149.9 cm (59.02\")   PainSc: 0-No pain     Body mass index is 39 kg/m².      Physical Exam:   ECO  GENERAL: The patient is a pleasant middle aged overweight female who appears their stated age and is awake, in no acute distress, alert and oriented x3.  SKIN: No rash or ulcers.   HEME/LYMPHATICS: No bruising or petechiae on visual inspection.  HEAD/FACE: atraumatic and normocephalic. Normal hair.  EYES: PERRLA/EOMI. No scleral icterus. No tearing or dry eyes.  ENT: External ears normal with no evidence of discharge. No evidence of ulceration or bleeding in the nostrils. Mouth has no ulcers, bleeding or inflammation of the gums, floor or roof of the mouth with normal " dentition.  NECK: Supple, symmetric.   CHEST/RESPIRATORY: Expansion maintained bilaterally and symmetrically. On auscultation, clear breath sounds in both lungs. No wheezes, rhonchi or rales.  BREAST: Deferred.  CARDIOVASCULAR: On auscultation, regular rate and rhythm. No murmurs, gallops or rubs are heard.  GASTROINTESTINAL/ABDOMEN: Symmetric. On auscultation of the abdomen, there are normal bowel sounds in all four quadrants.   GENITOURINARY: Deferred.  NEUROLOGIC: Alert and oriented time, person, and place, with no apparent changes in recent or remote memory. Cranial nerves II-XII are grossly intact. Sensation is maintained in the extremities. Strength and tone appear normal for age and equal in the extremities. Gait is normal.  MUSCULOSKELETAL: No cyanosis, clubbing or edema in the extremities. Pt is s/p left total knee replacement, with steri-strips still in place. Still a little swelling.  PSYCHIATRIC: The patient maintains judgment and has good insight. The patient has no changes in mood or affection.      Lab Results - Last 18 Months   Lab Units 02/06/25  1255 10/08/24  1213 07/08/24  1127 04/01/24  1008   WBC 10*3/mm3 5.70 6.40  --  7.43   HEMOGLOBIN g/dL 13.3 13.7  --  13.3   HEMATOCRIT % 40.9 41.0 41.1 39.0   PLATELETS 10*3/mm3 531* 394  --  369   MCV fL 110.2* 106.5*  --  105.7*     Lab Results - Last 18 Months   Lab Units 10/30/24  0958 10/08/24  1213 04/25/24  1033   SODIUM mmol/L 138 140 139   POTASSIUM mmol/L 5.1 4.5 4.2   CHLORIDE mmol/L 103 104 104   CO2 mmol/L 25.0 26.0 24.0   BUN mg/dL 20 14 12   CREATININE mg/dL 0.86 0.73 0.71   CALCIUM mg/dL 10.0 9.5 9.7   BILIRUBIN mg/dL 0.3 0.3 0.3   ALK PHOS U/L 91 94 92   ALT (SGPT) U/L 12 9 12   AST (SGOT) U/L 15 18 13   GLUCOSE mg/dL 72 104* 86       Lab Results   Component Value Date    GLUCOSE 72 10/30/2024    BUN 20 10/30/2024    CREATININE 0.86 10/30/2024    EGFRIFNONA 81 09/21/2021    EGFRIFAFRI >60 02/26/2019    BCR 23.3 10/30/2024    K 5.1  "10/30/2024    CO2 25.0 10/30/2024    CALCIUM 10.0 10/30/2024    ALBUMIN 4.4 10/30/2024    LABIL2 1.5 03/13/2019    AST 15 10/30/2024    ALT 12 10/30/2024       Lab Results - Last 18 Months   Lab Units 12/18/24  1437   INR  1.11   APTT Second 31       Lab Results   Component Value Date    IRON 145 02/06/2025    TIBC 355 02/06/2025    FERRITIN 124.00 02/06/2025       Lab Results   Component Value Date    FOLATE >24.8 (H) 08/24/2017       No results found for: \"OCCULTBLD\"    Lab Results   Component Value Date    RETICCTPCT 1.67 (H) 07/03/2017     Lab Results   Component Value Date    XNZDTEBD66 1,263 (H) 10/30/2024     No results found for: \"SPEP\", \"UPEP\"  Uric Acid   Date Value Ref Range Status   04/19/2017 5.0 2.6 - 8.0 mg/dL Final     Lab Results   Component Value Date    SEDRATE 12 01/13/2023     No results found for: \"FIBRINOGEN\", \"HAPTOGLOBIN\"  Lab Results   Component Value Date    PTT 31 12/18/2024    INR 1.11 12/18/2024     No results found for: \"\"  No results found for: \"CEA\"  No components found for: \"CA-19-9\"  No results found for: \"PSA\"  Lab Results   Component Value Date    SEDRATE 12 01/13/2023       Assessment & Plan   59-year-old female with iron overload secondary to homozygous H63D mutation for hemochromatosis.    1. Hemochromatosis: homozygous H63D   -She has a propensity towards iron overload.  However less likely to cause significant liver damage.  Ideally ferritin should be below 75 to prevent liver damage.  We will continue to monitor.   Continue phlebotomies for hematocrit >42 and ferritin > 100   Last phlebotomy was 1/12/2024  -2/6/25 labs not indicated phlebotomy    Hemochromatosis therapeutic phlebotomy  Phase 2: Maintenance (preventing reaccumulation)    Pace: Therapeutic phlebotomy 400 ml each as needed, a lifelong requirement.    Goals: ferritin <100, ideally 75 with Hct >42.    -prephlebotomy fingerstick H/H +/-CBC at each visit to avoid anemia, ferritin +/- TS Q1-2 txts.    HOLD: " for Hct < or = 42 defer therapy 4-8 weeks until Hct recovers.      2. increasing chronic macrocytosis differential includes folate or B12 deficiency, worsening thyroid (TSH normal at 10/2024), no recent folate (but she has been on folate), vitamin B12 elevated for 1 year.    -No recent reticulocytes  -CMP done 12/2024 with no indication of renal or liver disease   -2/2025 Denies EtOH  -2/2025 Common drugs to cause macrocytosis reviewed and negative;    -ordered CBC, reticulocytes, folate, iron profile, ferritin for a few days prior to RTC in mid May (with there phleb spot after), a few weeks prior to her right total knee replacement 6/3/2025      3. Thrombocytosis, more recent: Differential includes inflammation, infection, primary bone marrow disorder  -reactive, pt has TK replacement one month ago on PT so likely from such    We will recheck with CBC and ferritin in 3-4 months with phlebotomy if needed and follow-up (after her knee surgery)        Thank you very much for providing the opportunity to participate in this patient’s care. Please do not hesitate to call if there are any other questions.

## 2025-02-10 NOTE — PROGRESS NOTES
Physical Therapy Daily Progress Note      Patient: Merissa Yusuf   : 1965  Diagnosis/ICD-10 Code:  Aftercare following left knee joint replacement surgery [Z47.1, Z96.652]  Referring practitioner: Mario Alberto Boyce/Russell Henderson*  Date of Initial Visit: Type: THERAPY  Noted: 2025  Today's Date: 2/10/2025  Patient seen for 4 sessions             Subjective Knee is a little more stiff today.  She had to take her grandson to the hospital about 3 am this morning, getting released about 6 am.    Objective   See Exercise, Manual, and Modality Logs for complete treatment.       Assessment/Plan  Pt had mod TTP in left VMO with STM.  Tolerated exercises fairly well overall.  Seated knee flexion with foot on [place] hurts the most, but seated leg press into SB feels the best.  Adjusted and educated pt on use of home TENS unit that she brought in today.    Progress per Plan of Care           Timed:         Manual Therapy:    13     mins  04125;     Therapeutic Exercise:    35     mins  13117;     Neuromuscular Daniel:    10    mins  21301;    Self Care                       10     mins   40683        Timed Treatment:   68   mins   Total Treatment:     68   mins        Sina Sewell PTA  Physical Therapist Assistant

## 2025-02-11 ENCOUNTER — OFFICE VISIT (OUTPATIENT)
Dept: ONCOLOGY | Facility: CLINIC | Age: 60
End: 2025-02-11
Payer: OTHER GOVERNMENT

## 2025-02-11 VITALS
OXYGEN SATURATION: 98 % | TEMPERATURE: 97.9 F | WEIGHT: 193.2 LBS | HEART RATE: 69 BPM | HEIGHT: 59 IN | BODY MASS INDEX: 38.95 KG/M2 | SYSTOLIC BLOOD PRESSURE: 103 MMHG | DIASTOLIC BLOOD PRESSURE: 70 MMHG

## 2025-02-11 DIAGNOSIS — D75.839 THROMBOCYTOSIS: ICD-10-CM

## 2025-02-11 DIAGNOSIS — E83.110 HEREDITARY HEMOCHROMATOSIS: Primary | ICD-10-CM

## 2025-02-11 DIAGNOSIS — D75.89 MACROCYTOSIS WITHOUT ANEMIA: ICD-10-CM

## 2025-02-11 RX ORDER — SEMAGLUTIDE 1.34 MG/ML
INJECTION, SOLUTION SUBCUTANEOUS
COMMUNITY
End: 2025-02-17

## 2025-02-11 RX ORDER — CELECOXIB 200 MG/1
CAPSULE ORAL
COMMUNITY
Start: 2025-01-14

## 2025-02-11 RX ORDER — ONDANSETRON 4 MG/1
TABLET, FILM COATED ORAL
COMMUNITY
Start: 2025-01-14

## 2025-02-12 ENCOUNTER — TREATMENT (OUTPATIENT)
Dept: PHYSICAL THERAPY | Facility: CLINIC | Age: 60
End: 2025-02-12
Payer: OTHER GOVERNMENT

## 2025-02-12 DIAGNOSIS — R26.9 GAIT DISTURBANCE: ICD-10-CM

## 2025-02-12 DIAGNOSIS — Z96.652 AFTERCARE FOLLOWING LEFT KNEE JOINT REPLACEMENT SURGERY: Primary | ICD-10-CM

## 2025-02-12 DIAGNOSIS — M25.562 ACUTE PAIN OF LEFT KNEE: ICD-10-CM

## 2025-02-12 DIAGNOSIS — Z47.1 AFTERCARE FOLLOWING LEFT KNEE JOINT REPLACEMENT SURGERY: Primary | ICD-10-CM

## 2025-02-12 NOTE — PROGRESS NOTES
Physical Therapy Daily Treatment Note  Visit: 5    Merissa Yusuf reports: no new issues today.   YOB: 1965  Referring practitioner : Mario Alberto Boyce/Russell Henderson*  Date of Initial: Type: THERAPY  Noted: 1/30/2025  Today's date: 2/12/2025  Patient seen for 5 sessions    Visit Diagnoses:    ICD-10-CM ICD-9-CM   1. Aftercare following left knee joint replacement surgery  Z47.1 V54.81    Z96.652 V43.65   2. Gait disturbance  R26.9 781.2   3. Acute pain of left knee  M25.562 719.46       Subjective       Objective   L knee FL AAROM 105 end of visit  See Exercise, Manual, and Modality Logs for complete treatment.       Assessment/Plan    Limited but improving L knee FL AROM.  Educated on continued/increased frequency of ROM exercise for HEP along with limiting extended standing or WB if causes increased swelling.     Plan:    Continue             Timed:         Manual Therapy:    12     mins  03827;     Therapeutic Exercise:    22     mins  50304;     Neuromuscular Daniel:    9    mins  22295;    Therapeutic Activity:     11     mins  01495;           Timed Treatment:   54   mins   Total Treatment:     54   mins         Issac Dixon PT, DPT  Physical Therapist  [place] Lic #[id number]

## 2025-02-14 ENCOUNTER — TREATMENT (OUTPATIENT)
Dept: PHYSICAL THERAPY | Facility: CLINIC | Age: 60
End: 2025-02-14
Payer: OTHER GOVERNMENT

## 2025-02-14 DIAGNOSIS — Z96.652 AFTERCARE FOLLOWING LEFT KNEE JOINT REPLACEMENT SURGERY: Primary | ICD-10-CM

## 2025-02-14 DIAGNOSIS — M25.562 ACUTE PAIN OF LEFT KNEE: ICD-10-CM

## 2025-02-14 DIAGNOSIS — Z47.1 AFTERCARE FOLLOWING LEFT KNEE JOINT REPLACEMENT SURGERY: Primary | ICD-10-CM

## 2025-02-14 DIAGNOSIS — R26.9 GAIT DISTURBANCE: ICD-10-CM

## 2025-02-14 RX ORDER — ONDANSETRON 8 MG/1
TABLET, ORALLY DISINTEGRATING ORAL
Qty: 30 TABLET | Refills: 35 | Status: SHIPPED | OUTPATIENT
Start: 2025-02-14

## 2025-02-14 NOTE — PROGRESS NOTES
Physical Therapy Daily Treatment Note  Visit: 6    Merissa Yusuf reports: can tell she is beginning to be able to bend he left knee more easily though still painful as she pushes ROM more.   YOB: 1965  Referring practitioner : Mario Alberto Boyce/Russell Henderson*  Date of Initial: Type: THERAPY  Noted: 1/30/2025  Today's date: 2/14/2025  Patient seen for 6 sessions    Visit Diagnoses:    ICD-10-CM ICD-9-CM   1. Aftercare following left knee joint replacement surgery  Z47.1 V54.81    Z96.652 V43.65   2. Acute pain of left knee  M25.562 719.46   3. Gait disturbance  R26.9 781.2       Subjective       Objective   RLknee flexion AROM 95 beginning of visit 110 end of visit   See Exercise, Manual, and Modality Logs for complete treatment.       Assessment/Plan    Improving L knee ROM and gait mechanics.  Still painful at endrange of knee FL and worse with progressive stretching.  Educated and reviewed HEP with good understanding.     Plan:    Continue current           Timed:         Manual Therapy:    12     mins  48747;     Therapeutic Exercise:    17     mins  21089;     Neuromuscular Daniel:    10    mins  71635;    Therapeutic Activity:     15     mins  86806;         Timed Treatment:   54   mins   Total Treatment:     54   mins         Issac Dixon PT, DPT  Physical Therapist  [place] Lic #[id number]

## 2025-02-17 ENCOUNTER — TREATMENT (OUTPATIENT)
Dept: PHYSICAL THERAPY | Facility: CLINIC | Age: 60
End: 2025-02-17
Payer: OTHER GOVERNMENT

## 2025-02-17 ENCOUNTER — OFFICE VISIT (OUTPATIENT)
Dept: BARIATRICS/WEIGHT MGMT | Facility: CLINIC | Age: 60
End: 2025-02-17
Payer: OTHER GOVERNMENT

## 2025-02-17 VITALS
SYSTOLIC BLOOD PRESSURE: 109 MMHG | BODY MASS INDEX: 38.34 KG/M2 | RESPIRATION RATE: 18 BRPM | DIASTOLIC BLOOD PRESSURE: 75 MMHG | HEART RATE: 85 BPM | HEIGHT: 59 IN | WEIGHT: 190.2 LBS | OXYGEN SATURATION: 97 %

## 2025-02-17 DIAGNOSIS — Z79.899 MEDICATION MANAGEMENT: ICD-10-CM

## 2025-02-17 DIAGNOSIS — Z96.652 AFTERCARE FOLLOWING LEFT KNEE JOINT REPLACEMENT SURGERY: Primary | ICD-10-CM

## 2025-02-17 DIAGNOSIS — M25.562 ACUTE PAIN OF LEFT KNEE: ICD-10-CM

## 2025-02-17 DIAGNOSIS — Z47.1 AFTERCARE FOLLOWING LEFT KNEE JOINT REPLACEMENT SURGERY: Primary | ICD-10-CM

## 2025-02-17 DIAGNOSIS — E66.812 OBESITY, CLASS II, BMI 35-39.9: Primary | ICD-10-CM

## 2025-02-17 DIAGNOSIS — R26.9 GAIT DISTURBANCE: ICD-10-CM

## 2025-02-17 PROCEDURE — 97140 MANUAL THERAPY 1/> REGIONS: CPT | Performed by: PHYSICAL THERAPIST

## 2025-02-17 PROCEDURE — 97112 NEUROMUSCULAR REEDUCATION: CPT | Performed by: PHYSICAL THERAPIST

## 2025-02-17 PROCEDURE — 97530 THERAPEUTIC ACTIVITIES: CPT | Performed by: PHYSICAL THERAPIST

## 2025-02-17 PROCEDURE — 97110 THERAPEUTIC EXERCISES: CPT | Performed by: PHYSICAL THERAPIST

## 2025-02-17 PROCEDURE — 99213 OFFICE O/P EST LOW 20 MIN: CPT | Performed by: NURSE PRACTITIONER

## 2025-02-17 NOTE — PROGRESS NOTES
Physical Therapy Daily Treatment Note  Visit: 7    Merissa Yusuf reports: sore with ROM exercise still but can tell improvement in her L knee.   YOB: 1965  Referring practitioner : Mario Alberto Boyce/Russell Henderson*  Date of Initial: Type: THERAPY  Noted: 1/30/2025  Today's date: 2/17/2025  Patient seen for 7 sessions    Visit Diagnoses:    ICD-10-CM ICD-9-CM   1. Aftercare following left knee joint replacement surgery  Z47.1 V54.81    Z96.652 V43.65   2. Acute pain of left knee  M25.562 719.46   3. Gait disturbance  R26.9 781.2       Subjective       Objective   L knee  degrees end of visit  See Exercise, Manual, and Modality Logs for complete treatment.       Assessment/Plan    Improving L knee FL though still limited by exercise volume with pain/swelling.  Good HEP compliance.     Plan:    Continue           Timed:         Manual Therapy:    12     mins  45765;     Therapeutic Exercise:    22     mins  83325;     Neuromuscular Daniel:    8    mins  28104;    Therapeutic Activity:     12     mins  32807;           Timed Treatment:   54   mins   Total Treatment:     54   mins         Issac Dixon PT, DPT  Physical Therapist  [place] Lic #[id number]

## 2025-02-17 NOTE — PROGRESS NOTES
MGK BAR SURG [facility] GROUP BARIATRIC SURGERY  [address] [place] [address]  [address] [place] [address]  Dept: [phone]  2/17/2025      Merissa Yusuf.  [id number]  [id number]  1965  female      MWM continued   Weight loss since starting MWM -35 pounds     Merissa Yusuf is a 59 y.o. female with morbid obesity with co-morbidities including:  sleep apnea, HLD, GERD, OA.     The patient is here for visit 10 of medical weight management. She had a loss of 8 lbs. The patient states that they are following the recommendations given by our office and dietician including a high lean protein, low carb and low fat diet. We recommended adequate fruits and vegetable intake along with limited portion sizes. Patient is working on eliminating fast foods, fried foods, sweets and soda. Merissa Yusuf has been increasing her daily water intake. She has been exercising: walking with a cane, knee replacement 1 month ago, in PT.  Wt Readings from Last 10 Encounters:   02/17/25 86.3 kg (190 lb 3.2 oz)   02/11/25 87.6 kg (193 lb 3.2 oz)   10/30/24 90.8 kg (200 lb 3.2 oz)   10/21/24 89.8 kg (198 lb)   10/15/24 92.2 kg (203 lb 3.2 oz)   08/28/24 92.5 kg (204 lb)   08/21/24 92.8 kg (204 lb 8 oz)   08/19/24 91.5 kg (201 lb 12.8 oz)   07/10/24 94.3 kg (208 lb)   07/09/24 94 kg (207 lb 3.2 oz)     Patient states they have made positive changes including weight loss   The patient admits to be struggling with none    Trying to get back to increased protein  Premier protein shakes and protein bars   3 meals a day   Snacks: protein snacks   Drinks: protein shakes, water, coke prn   Exercise : walking with cane/ in pt  for knee, knee surgery 1 month ago     Some constipation, no nausea/ vomiting/ minimal gerd - some last night with chili   Medication helping with hunger and snacking and portion size   On compounded semaglutide 1.5 mg weekly        Review of Systems    Constitutional:  Positive for activity change and appetite change.   Respiratory: Negative.     Cardiovascular: Negative.    Gastrointestinal:  Positive for constipation.   Musculoskeletal:         Recent knee replacement, walking with cane on ice      Vitals:    02/17/25 1305   BP: 109/75   Pulse: 85   Resp: 18   SpO2: 97%       Body mass index is 38.39 kg/m².    The following portions of the patient's history were reviewed and updated as appropriate: active problem list, medication list, allergies, social history, notes from last encounter  Past Medical History:   Diagnosis Date    Anesthesia complication     drops B/P, and slow to arouse    GERD (gastroesophageal reflux disease)     Hereditary hemochromatosis     Hyperlipidemia     Seasonal allergies      Past Surgical History:   Procedure Laterality Date    BLADDER SUSPENSION      BREAST BIOPSY Right     CATARACT EXTRACTION  03/2023    COLONOSCOPY N/A 08/19/2024    Procedure: COLONOSCOPY WITH BIOPSIES;  Surgeon: Rodolfo Lambert MD;  Location: [facility] ENDOSCOPY;  Service: General;  Laterality: N/A;  POST-DIVERTICULOSIS, CECAL LESION    FINGER SURGERY  2008    removed cysts from right index finger     HERNIA REPAIR      REPLACEMENT TOTAL KNEE Left 01/14/2025    SUBTOTAL HYSTERECTOMY      TUBAL ABDOMINAL LIGATION      VENTRAL/INCISIONAL HERNIA REPAIR N/A 09/24/2020    Procedure: LAPAROSCOPIC VENTRAL HERNIA;  Surgeon: Reuben Molina MD;  Location: [facility] MAIN OR;  Service: General;  Laterality: N/A;  0855 TAP block    WISDOM TOOTH EXTRACTION          Physical Exam  Constitutional:       Appearance: Normal appearance. She is obese.   Pulmonary:      Effort: Pulmonary effort is normal.   Abdominal:      General: Abdomen is flat.   Neurological:      General: No focal deficit present.      Mental Status: She is alert and oriented to person, place, and time.   Psychiatric:         Mood and Affect: Mood normal.         Behavior: Behavior normal.         Thought  "Content: Thought content normal.         Judgment: Judgment normal.         Discussion/Plan:  BMI 38.39, Class 2 obesity, medication management: compounded semaglutide    Obesity/Morbid Obesity: Currently the patient's weight is decreased. Treatment plan includes prescribed diet, prescribed exercise regimen and behavior modification.     Medication options for weight loss were discussed with the pt and based upon the patient's history and insurance , compounded semaglutide will be prescribed/ continued. Pt has completed several months of this medication and is on 1.5 mg dosage. She has done 2 weeks at this dose. Ok to do 2 more weeks at this dose then can increase to 2 mg weekly x 4 weeks and lastly 2.4 mg weekly thereafter. She reports some constipation  but states \" she has always had this.\" Has not gotten worse. Will refill compounded semaglutide rx for pt today.  Follow up in 3 months.     Pt  denies any hx of multiple endocrine neoplasia type 2 or medullary thyroid cancer for herself or her immediate family with GLP-1's. Pt encouraged to call the office with any nausea/ vomiting or abdominal pain with GLP-1's. Pt also informed constipation can happen with these medications due to slowing of gut. Pt encouraged to take a stool softener/ laxative if constipation occurs.     Will refill compounded semaglutide medication for pt today.     I reviewed the appropriate dietary choices with the patient and encouraged the necessary changes. Recommended at least 70 grams of protein per day, around 35 grams of fats and less than 100 grams of carbohydrates. Reviewed calorie intake if patient wanted to calorie count and/or had BMR. Instructed patient to drink half of body weight in ounces per day and exercise a minimum of 150 minutes per week including both cardio and strength training. Discussed the option of keeping a food journal which will help patient become more aware of the nutritional value of foods .     The patient " was given written materials from our office for diet plans and medications.   I answered all of the patients questions regarding dietary changes, exercise, and medications.   The patient will follow up in 3 months. The total time spent face to face was 20 minutes with 15 minutes spent counseling.        CHARLINE Quesada  [facility]

## 2025-02-19 ENCOUNTER — TREATMENT (OUTPATIENT)
Dept: PHYSICAL THERAPY | Facility: CLINIC | Age: 60
End: 2025-02-19
Payer: OTHER GOVERNMENT

## 2025-02-19 DIAGNOSIS — M25.562 ACUTE PAIN OF LEFT KNEE: ICD-10-CM

## 2025-02-19 DIAGNOSIS — Z47.1 AFTERCARE FOLLOWING LEFT KNEE JOINT REPLACEMENT SURGERY: Primary | ICD-10-CM

## 2025-02-19 DIAGNOSIS — R26.9 GAIT DISTURBANCE: ICD-10-CM

## 2025-02-19 DIAGNOSIS — Z96.652 AFTERCARE FOLLOWING LEFT KNEE JOINT REPLACEMENT SURGERY: Primary | ICD-10-CM

## 2025-02-19 NOTE — PROGRESS NOTES
Physical Therapy Daily Treatment Note  Visit: 8    Merissa Yusuf reports: sore after last visit in her left knee.   YOB: 1965  Referring practitioner : Mario Alberto Boyce/Russell Henderson*  Date of Initial: Type: THERAPY  Noted: 1/30/2025  Today's date: 2/19/2025  Patient seen for 8 sessions    Visit Diagnoses:    ICD-10-CM ICD-9-CM   1. Aftercare following left knee joint replacement surgery  Z47.1 V54.81    Z96.652 V43.65   2. Acute pain of left knee  M25.562 719.46   3. Gait disturbance  R26.9 781.2       Subjective       Objective   L knee FL AROM 109 end of visit  See Exercise, Manual, and Modality Logs for complete treatment.       Assessment/Plan    L knee FL ROM and quad strength improving.  Good HEP understanding and compliance.     Plan:    Continue           Timed:         Manual Therapy:    8     mins  64669;     Therapeutic Exercise:    24     mins  43492;     Neuromuscular Daniel:    11    mins  53588;    Therapeutic Activity:     10     mins  99601;         Timed Treatment:   53   mins   Total Treatment:     53   mins         Issac Dixon PT, DPT  Physical Therapist  [place] Lic #[id number]

## 2025-02-21 ENCOUNTER — TREATMENT (OUTPATIENT)
Dept: PHYSICAL THERAPY | Facility: CLINIC | Age: 60
End: 2025-02-21
Payer: OTHER GOVERNMENT

## 2025-02-21 DIAGNOSIS — M25.562 ACUTE PAIN OF LEFT KNEE: ICD-10-CM

## 2025-02-21 DIAGNOSIS — R26.9 GAIT DISTURBANCE: ICD-10-CM

## 2025-02-21 DIAGNOSIS — Z47.1 AFTERCARE FOLLOWING LEFT KNEE JOINT REPLACEMENT SURGERY: Primary | ICD-10-CM

## 2025-02-21 DIAGNOSIS — Z96.652 AFTERCARE FOLLOWING LEFT KNEE JOINT REPLACEMENT SURGERY: Primary | ICD-10-CM

## 2025-02-21 NOTE — PROGRESS NOTES
Physical Therapy Daily Treatment Note  Visit: 9    Merissa Yusuf reports: no new issues today   YOB: 1965  Referring practitioner : Mario Alberto Boyce/Russell Henderson*  Date of Initial: Type: THERAPY  Noted: 1/30/2025  Today's date: 2/21/2025  Patient seen for 9 sessions    Visit Diagnoses:    ICD-10-CM ICD-9-CM   1. Aftercare following left knee joint replacement surgery  Z47.1 V54.81    Z96.652 V43.65   2. Acute pain of left knee  M25.562 719.46   3. Gait disturbance  R26.9 781.2       Subjective       Objective   See Exercise, Manual, and Modality Logs for complete treatment.       Assessment/Plan    Improving L knee ROM/strength/gait mechanics. Still significant knee pain with L knee FL at endrage but improved intrasession.     Plan:    Continue               Timed:         Manual Therapy:    8     mins  34322;     Therapeutic Exercise:    20     mins  47316;     Neuromuscular Daniel:    10    mins  14021;        Timed Treatment:   38   mins   Total Treatment:     38   mins         Issac Dixon PT, DPT  Physical Therapist  [place] Lic #[id number]

## 2025-02-24 ENCOUNTER — TREATMENT (OUTPATIENT)
Dept: PHYSICAL THERAPY | Facility: CLINIC | Age: 60
End: 2025-02-24
Payer: OTHER GOVERNMENT

## 2025-02-24 DIAGNOSIS — Z47.1 AFTERCARE FOLLOWING LEFT KNEE JOINT REPLACEMENT SURGERY: Primary | ICD-10-CM

## 2025-02-24 DIAGNOSIS — R26.9 GAIT DISTURBANCE: ICD-10-CM

## 2025-02-24 DIAGNOSIS — Z96.652 AFTERCARE FOLLOWING LEFT KNEE JOINT REPLACEMENT SURGERY: Primary | ICD-10-CM

## 2025-02-24 DIAGNOSIS — M25.562 ACUTE PAIN OF LEFT KNEE: ICD-10-CM

## 2025-02-24 PROCEDURE — 97110 THERAPEUTIC EXERCISES: CPT | Performed by: PHYSICAL THERAPIST

## 2025-02-24 PROCEDURE — 97530 THERAPEUTIC ACTIVITIES: CPT | Performed by: PHYSICAL THERAPIST

## 2025-02-24 PROCEDURE — 97112 NEUROMUSCULAR REEDUCATION: CPT | Performed by: PHYSICAL THERAPIST

## 2025-02-24 PROCEDURE — 97140 MANUAL THERAPY 1/> REGIONS: CPT | Performed by: PHYSICAL THERAPIST

## 2025-02-24 NOTE — PROGRESS NOTES
Re-Assessment / Progress Note    Patient: Merissa Yusuf   : 1965  Diagnosis/ICD-10 Code:  Aftercare following left knee joint replacement surgery [Z47.1, Z96.652]  Referring practitioner: Mario Alberto Boyce/Russell Henderson*  Date of Initial Visit: Episode Type: THERAPY  Noted: 2025    Today's Date: 2025  Patient seen for 10 sessions.      Subjective:   Merissa Yusuf reports: is noticing improvement over time in her ability to bend/straighten her knee, walk and get up and down from a chair. Reports still painful standing from a low chair, going up and down stairs and standing after sitting for a prolonged period of time.   Subjective Questionnaire:Knee Outcome Survey - to complete next visit  Clinical Progress: improved  Home Program Compliance: Yes  Treatment has included: therapeutic exercise, neuromuscular re-education, manual therapy, therapeutic activity, gait training, electrical stimulation, moist heat, and cryotherapy    Subjective   Objective          Active Range of Motion   Left Knee   Flexion: 115 degrees   Extensor la degrees     Strength/Myotome Testing     Left Knee   Flexion: 4+  Extension: 4-  Quadriceps contraction: good      Assessment & Plan       Assessment  Impairments: abnormal or restricted ROM, impaired balance, impaired physical strength and pain with function   Functional limitations: carrying objects, lifting, walking, pulling and pushing (Stairs, squatting, uneven ground)  Assessment details: Presents with continued but improved limits in left knee ROM, strength, gait, squatting, stairs status post L TKR.  Progressing towards or had met all STG/LTG and would benefit from continued skilled PT to meet all goals and prior function.   Prognosis: good    Plan  Therapy options: will be seen for skilled therapy services  Planned modality interventions: As appropriate.  Planned therapy interventions: manual therapy, balance/weight-bearing training, flexibility, soft tissue  mobilization, functional ROM exercises, strengthening, stretching, therapeutic activities, gait training, home exercise program and transfer training  Frequency: 2x week  Duration in weeks: 4  Treatment plan discussed with: patient      Progress toward previous goals:  All met or  progressed towards     Goals    Short-term goals (STG):   1. Left knee AROM improved to 3-100 over 2 weeks. - Met  2. Gait mechanics improved with increased stance time and push off L LE over 2 weeks. - Met  3. Independent and compliant with HEP over 2 weeks. - Met       Long-term goals (LTG):   1. Left knee AROM improved to 0-115 degrees or greater over 10 weeks. - Met  2. TUG and Five Time Sit to Stand times at or below age adjusted norms over 10 weeks. - Progressed towards  3. Gait mechanics WNL w/o use of assitive device over 10 weeks. - Progressed towards   4. Able to ascend/descend stairs reciprocally with use of rail and good mechanics over 10 weeks. - Progressed towards   5. L knee strength both EXT/FL 5/5 over 10 weeks. - Progressed towards           Recommendations: Continue with recommendations to continue PT to amee LTG and prior function  Timeframe: 1 month  Prognosis to achieve goals: good    PT Signature: Issac Dixon, PT, DPT          Based upon review of the patient's progress and continued therapy plan, it is my medical opinion that Merissa Yusuf should continue physical therapy treatment at [facility] PHYS[facility]  [address] [place] [address] [address] [place] [place] [address] [phone].        Timed:         Manual Therapy:    9     mins  47567;     Therapeutic Exercise:    25     mins  95157;     Neuromuscular Daniel:    10    mins  77697;    Therapeutic Activity:     11     mins  99798;           Timed Treatment:   55   mins   Total Treatment:     55   mins

## 2025-02-26 ENCOUNTER — TELEMEDICINE (OUTPATIENT)
Dept: PSYCHIATRY | Facility: CLINIC | Age: 60
End: 2025-02-26
Payer: OTHER GOVERNMENT

## 2025-02-26 ENCOUNTER — TREATMENT (OUTPATIENT)
Dept: PHYSICAL THERAPY | Facility: CLINIC | Age: 60
End: 2025-02-26
Payer: OTHER GOVERNMENT

## 2025-02-26 DIAGNOSIS — Z96.652 AFTERCARE FOLLOWING LEFT KNEE JOINT REPLACEMENT SURGERY: Primary | ICD-10-CM

## 2025-02-26 DIAGNOSIS — F41.1 GAD (GENERALIZED ANXIETY DISORDER): Chronic | ICD-10-CM

## 2025-02-26 DIAGNOSIS — Z47.1 AFTERCARE FOLLOWING LEFT KNEE JOINT REPLACEMENT SURGERY: Primary | ICD-10-CM

## 2025-02-26 DIAGNOSIS — F33.1 MODERATE EPISODE OF RECURRENT MAJOR DEPRESSIVE DISORDER: Primary | Chronic | ICD-10-CM

## 2025-02-26 DIAGNOSIS — R26.9 GAIT DISTURBANCE: ICD-10-CM

## 2025-02-26 DIAGNOSIS — M25.562 ACUTE PAIN OF LEFT KNEE: ICD-10-CM

## 2025-02-26 NOTE — PROGRESS NOTES
Physical Therapy Daily Treatment Note  Visit: 11    Merissa Yusuf reports: improving with less pain/stiffness in her left knee.  Reports she can tell improved range especially with knee FL.   YOB: 1965  Referring practitioner : Mario Alberto Boyce/Russell Henderson*  Date of Initial: Type: THERAPY  Noted: 1/30/2025  Today's date: 2/26/2025  Patient seen for 11 sessions    Visit Diagnoses:    ICD-10-CM ICD-9-CM   1. Aftercare following left knee joint replacement surgery  Z47.1 V54.81    Z96.652 V43.65   2. Acute pain of left knee  M25.562 719.46   3. Gait disturbance  R26.9 781.2       Subjective       Objective   See Exercise, Manual, and Modality Logs for complete treatment.       Assessment/Plan    Improving R knee FL, strength, gait mechanics and ability for squatting/stairs.     Plan:    Continue             Timed:         Manual Therapy:    11     mins  38242;     Therapeutic Exercise:    18     mins  39793;     Neuromuscular Daniel:    10    mins  78812;          Timed Treatment:   39   mins   Total Treatment:     39   mins         Issac Dixon PT, DPT  Physical Therapist  [place] Lic #[id number]

## 2025-02-26 NOTE — PROGRESS NOTES
Subjective   Merissa Yusuf is a 59 y.o.white female who presents today for follow up via telehealth.    This provider is located at home address in [place], [place] for [facility] (through [facility]), [address], [place] [address] using a secure [employer] Video Visit through [employer]. Patient is being seen remotely via telehealth at their home address in [place], and stated they are in a secure environment for this session. Provider is currently licensed and credentialed in both the [facility] and [place].The patient's condition being diagnosed/treated is appropriate for telemedicine. The provider identified herself, as well as, her credentials.   The patient, and/or patients guardian, consent to be seen remotely, and when consent is given they understand that the consent allows for patient identifiable information to be sent to a third party as needed.   They may refuse to be seen remotely at any time. The electronic data is encrypted and password protected, and the patient and/or guardian has been advised of the potential risks to privacy not withstanding such measures.   Patient identifiers used: Name and .    You have chosen to receive care through a telehealth visit.  Do you consent to use a video/audio connection for your medical care today? Yes    The visit included audio and video interaction.  No technical issues occurred during the visit.     Chief Complaint   Patient presents with    Anxiety    Depression    Med Management       History of Present Illness:    Bought a larger home in  and moved their daughter and YOBANI and kids in with them,   Had first TKR in 2025 and prorgressing well, will have the other knee done .   Her anxiety has been higher, taking her third Buspar a lot of days lately  Has lost 30 lbs on Semaglutide compound   Her YOBANI is from [place] and now working part-time as  for the kids school, needs  "techniques on how to deal with him b/c he is struggling and difficult to manage and won't do therapy himself.    Her 39 yr old son  in 2021, she was out of town in [place] with her sister who's  was dying and passed when she got back. His birthday is in October, so those few weeks each year are harder,  does well when she keeps herself busy.   Patient is doing better on Trintellix, her daughter said she is happier on it, no restless legs yet on the 5mg  Lamictal has helped her irritability, may need increase  \"My  is driving me crazy\"..he has TBI and can push her buttons  She has hemochromatosis, had to have a phlebotomy done last week.     Anxiety 3/10  Depression 0/10     Denies SI/HI  Sleeping okay, some nights has trouble falling asleep      The following portions of the patient's history were reviewed and updated as appropriate: allergies, current medications, past family history, past medical history, past social history, past surgical history and problem list.    PAST PSYCHIATRIC HISTORY  Axis I  Affective/Bipoloar Disorder, Anxiety/Panic Disorder  Axis II  None    PAST OUTPATIENT TREATMENT  Diagnosis treated:  Affective Disorder, Anxiety/Panic Disorder  Treatment Type:  Family Therapy, Medication Management  No hospitalizations  Genesight testing done 2019  Prior Psychiatric Medications:  Lexapro increased anxiety  Prozac, hives  Trintellix has caused her restless legs in the past  Buspar   Viibryd, could not take  Pristiq  Topamax, increased her anxiety and cause SOA  Lamictal  Support Groups:  None  Sequelae Of Mental Disorder:  social isolation, family disruption, emotional distress      Interval History  Improved    Side Effects  Restless legs with Trintellix at 10 mg but wants to try it again.    Past psychiatric history was reviewed and compared to visit and 10/30/24 appropriate updates were made.    Past Medical History:  Past Medical History:   Diagnosis Date "    Anesthesia complication     drops B/P, and slow to arouse    GERD (gastroesophageal reflux disease)     Hereditary hemochromatosis     Hyperlipidemia     Seasonal allergies        Social History:  Social History     Socioeconomic History    Marital status:    Tobacco Use    Smoking status: Never     Passive exposure: Past    Smokeless tobacco: Never   Vaping Use    Vaping status: Never Used   Substance and Sexual Activity    Alcohol use: Yes     Alcohol/week: 1.0 standard drink of alcohol     Types: 1 Glasses of wine per week     Comment: rarely    Drug use: Not Currently     Frequency: 7.0 times per week     Comment: CBD  gummies    Sexual activity: Defer       Family History:  Family History   Problem Relation Age of Onset    Coronary artery disease Other     Diabetes Other     Dementia Other     Diabetes Mother     Cancer Mother     Heart disease Father     Liver disease Father     Hypertension Father     Cancer Brother     Breast cancer Neg Hx     Ovarian cancer Neg Hx        Past Surgical History:  Past Surgical History:   Procedure Laterality Date    BLADDER SUSPENSION      BREAST BIOPSY Right     CATARACT EXTRACTION  03/2023    COLONOSCOPY N/A 08/19/2024    Procedure: COLONOSCOPY WITH BIOPSIES;  Surgeon: Rodolfo Lambert MD;  Location: [facility] ENDOSCOPY;  Service: General;  Laterality: N/A;  POST-DIVERTICULOSIS, CECAL LESION    FINGER SURGERY  2008    removed cysts from right index finger     HERNIA REPAIR      REPLACEMENT TOTAL KNEE Left 01/14/2025    SUBTOTAL HYSTERECTOMY      TUBAL ABDOMINAL LIGATION      VENTRAL/INCISIONAL HERNIA REPAIR N/A 09/24/2020    Procedure: LAPAROSCOPIC VENTRAL HERNIA;  Surgeon: Reuben Molina MD;  Location: [facility] MAIN OR;  Service: General;  Laterality: N/A;  0855 TAP block    WISDOM TOOTH EXTRACTION         Problem List:  Patient Active Problem List   Diagnosis    Elevated blood pressure reading without diagnosis of hypertension    Hearing deficit     Hyperlipidemia    Low back pain    Memory loss    Morbid (severe) obesity due to excess calories    Obstructive sleep apnea    Thrombocytosis    Vitamin D deficiency    Combined forms of age-related cataract, bilateral    Convergence insufficiency    Corneal pannus of right eye    Corneal pannus    Meibomian gland dysfunction (MGD) of both eyes    Bilateral presbyopia    Presbyopia    Vitreous floaters    Hereditary hemochromatosis    GERD (gastroesophageal reflux disease)    Seasonal allergies    Class 3 severe obesity due to excess calories with serious comorbidity and body mass index (BMI) of 40.0 to 44.9 in adult    Elevated hematocrit    KIRSTIE (generalized anxiety disorder)    Moderate episode of recurrent major depressive disorder    SOB (shortness of breath)    Macular pseudohole of right eye    Partial thickness macular hole of left eye    Multiple atypical skin moles    Bilateral primary osteoarthritis of knee    Macrocytosis without anemia       Allergy:   Allergies   Allergen Reactions    Amoxicillin Swelling    Erythromycin GI Intolerance    Escitalopram Anxiety    Prozac  [Fluoxetine Hcl] Rash    Penicillin G Rash        Discontinued Medications:  There are no discontinued medications.        Current Medications:   Current Outpatient Medications   Medication Sig Dispense Refill    acetaminophen (TYLENOL) 500 MG tablet Take 1 tablet by mouth Every 4 (Four) Hours As Needed.      albuterol sulfate  (90 Base) MCG/ACT inhaler Inhale 2 puffs Every 4 (Four) Hours As Needed for Wheezing. (Patient not taking: Reported on 2/17/2025) 6.7 g 0    busPIRone (BUSPAR) 15 MG tablet TAKE 1 TABLET THREE TIMES A DAY FOR ANXIETY 270 tablet 3    celecoxib (CeleBREX) 200 MG capsule       Evolocumab (Repatha SureClick) solution auto-injector SureClick injection Inject 1 ml under the skin into the appropriate area as directed every 14 days Strength 140mg/ml 6 mL 3    lamoTRIgine (LaMICtal) 100 MG tablet TAKE 1 TABLET TWICE  A DAY (CORRECTED PRESCRIPTION) 180 tablet 3    montelukast (SINGULAIR) 10 MG tablet TAKE 1 TABLET EVERY NIGHT 90 tablet 3    ondansetron (ZOFRAN) 4 MG tablet  (Patient not taking: Reported on 2/17/2025)      ondansetron ODT (ZOFRAN-ODT) 8 MG disintegrating tablet PLACE 1 TABLET ON THE TONGUE EVERY 8 HOURS AS NEEDED FOR NAUSEA OR VOMITING 30 tablet 35    Semaglutide-Weight Management 1.7 MG/0.75ML solution auto-injector Inject 0.66 mL under the skin into the appropriate area as directed 1 (One) Time Per Week. 2 mL 3    sennosides-docusate (senna-docusate sodium) 8.6-50 MG per tablet Take 2 tablets by mouth 2 (Two) Times a Day. (Patient not taking: Reported on 2/17/2025) 120 tablet 2    Trintellix 10 MG tablet tablet TAKE 1 TABLET DAILY WITH BREAKFAST 90 tablet 3     No current facility-administered medications for this visit.         Review of Symptoms:    Psychiatric/Behavioral: Negative for agitation, behavioral problems, confusion, decreased concentration, dysphoric mood, hallucinations, self-injury, sleep disturbance and suicidal ideas. The patient not nervous/anxious and is not hyperactive.        Physical Exam:   not currently breastfeeding.    Mental Status Exam:   Hygiene:   good  Cooperation:  Cooperative  Eye Contact:  Good  Psychomotor Behavior:  Appropriate  Affect:  Appropriate  Mood: Mildly anxious  Hopelessness: Denies  Speech:  Normal  Thought Process:  Goal directed  Thought Content:  Normal  Suicidal:  None  Homicidal:  None  Hallucinations:  None  Delusion:  None  Memory:  Intact  Orientation:  Person, Place, Time and Situation  Reliability:  good  Insight:  Good  Judgement:  Good  Impulse Control:  Good  Physical/Medical Issues:  No      Mental status exam was reviewed and compared to 10/30/24 visit and appropriate updates were made.    PHQ-9 Depression Screening  Little interest or pleasure in doing things? (Patient-Rptd) Not at all   Feeling down, depressed, or hopeless? (Patient-Rptd) Not at  all   PHQ-2 Total Score (Patient-Rptd) 0   Trouble falling or staying asleep, or sleeping too much? (Patient-Rptd) Over half   Feeling tired or having little energy? (Patient-Rptd) Not at all   Poor appetite or overeating? (Patient-Rptd) Not at all   Feeling bad about yourself - or that you are a failure or have let yourself or your family down? (Patient-Rptd) Not at all   Trouble concentrating on things, such as reading the newspaper or watching television? (Patient-Rptd) Several days   Moving or speaking so slowly that other people could have noticed? Or the opposite - being so fidgety or restless that you have been moving around a lot more than usual? (Patient-Rptd) Not at all   Thoughts that you would be better off dead, or of hurting yourself in some way? (Patient-Rptd) Not at all   PHQ-9 Total Score (Patient-Rptd) 3   If you checked off any problems, how difficult have these problems made it for you to do your work, take care of things at home, or get along with other people? (Patient-Rptd) Not difficult at all           Never smoker    I advised Merissa of the risks of tobacco use.     Lab Results:   Lab on 02/06/2025   Component Date Value Ref Range Status    Ferritin 02/06/2025 124.00  13.00 - 150.00 ng/mL Final    Iron 02/06/2025 145  37 - 145 mcg/dL Final    Iron Saturation (TSAT) 02/06/2025 41  20 - 50 % Final    Transferrin 02/06/2025 238  200 - 360 mg/dL Final    TIBC 02/06/2025 355  298 - 536 mcg/dL Final    WBC 02/06/2025 5.70  3.40 - 10.80 10*3/mm3 Final    RBC 02/06/2025 3.71 (L)  3.77 - 5.28 10*6/mm3 Final    Hemoglobin 02/06/2025 13.3  12.0 - 15.9 g/dL Final    Hematocrit 02/06/2025 40.9  34.0 - 46.6 % Final    MCV 02/06/2025 110.2 (H)  79.0 - 97.0 fL Final    MCH 02/06/2025 35.8 (H)  26.6 - 33.0 pg Final    MCHC 02/06/2025 32.5  31.5 - 35.7 g/dL Final    RDW 02/06/2025 12.6  12.3 - 15.4 % Final    RDW-SD 02/06/2025 49.1  37.0 - 54.0 fl Final    MPV 02/06/2025 8.1  6.0 - 12.0 fL Final    Platelets  02/06/2025 531 (H)  140 - 450 10*3/mm3 Final    Neutrophil % 02/06/2025 45.2  42.7 - 76.0 % Final    Lymphocyte % 02/06/2025 37.4  19.6 - 45.3 % Final    Monocyte % 02/06/2025 10.7  5.0 - 12.0 % Final    Eosinophil % 02/06/2025 5.6  0.3 - 6.2 % Final    Basophil % 02/06/2025 1.1  0.0 - 1.5 % Final    Neutrophils, Absolute 02/06/2025 2.58  1.70 - 7.00 10*3/mm3 Final    Lymphocytes, Absolute 02/06/2025 2.13  0.70 - 3.10 10*3/mm3 Final    Monocytes, Absolute 02/06/2025 0.61  0.10 - 0.90 10*3/mm3 Final    Eosinophils, Absolute 02/06/2025 0.32  0.00 - 0.40 10*3/mm3 Final    Basophils, Absolute 02/06/2025 0.06  0.00 - 0.20 10*3/mm3 Final       Assessment & Plan   Problems Addressed this Visit          Mental Health    KIRSTIE (generalized anxiety disorder) (Chronic)    Moderate episode of recurrent major depressive disorder - Primary (Chronic)     Diagnoses         Codes Comments    Moderate episode of recurrent major depressive disorder    -  Primary ICD-10-CM: F33.1  ICD-9-CM: 296.32     KIRSTIE (generalized anxiety disorder)     ICD-10-CM: F41.1  ICD-9-CM: 300.02             Visit Diagnoses:    ICD-10-CM ICD-9-CM   1. Moderate episode of recurrent major depressive disorder  F33.1 296.32   2. KIRSTIE (generalized anxiety disorder)  F41.1 300.02       TREATMENT PLAN/GOALS: Continue supportive psychotherapy efforts and medications as indicated. Treatment and medication options discussed during today's visit. Patient ackowledged and verbally consented to continue with current treatment plan and was educated on the importance of compliance with treatment and follow-up appointments.    MEDICATION ISSUES:  INSPECT reviewed as expected  Discussed medication options and treatment plan of prescribed medication as well as the risks, benefits, and side effects including potential falls, possible impaired driving and metabolic adversities among others. Patient is agreeable to call the office with any worsening of symptoms or onset of side  effects. Patient is agreeable to call 911 or go to the nearest ER should he/she begin having SI/HI. No medication side effects or related complaints today.     Patient is doing well on current meds but would like therapy to deal with her YOBANI  Had TKR on Jan 14, 2025 with [facility] and will have the other knee done June 2025  Continue Trintellix 10 mg daily for depression and anxiety and hopefully no restless legs since she has been on the 5 mg for so long.   Continue Buspar 15mg TID prn anxiety, usually only takes it twice daily, only TID on rare occasion  Continue Lamictal 100 mg tabs take one full tab daily for mood stabilizer   Continue L-methylfolate daily      MEDS ORDERED DURING VISIT:  No orders of the defined types were placed in this encounter.      Return in about 3 months (around 5/26/2025) for video visit.           This document has been electronically signed by Laisha Mendiola PA-C  February 26, 2025 11:53 EST    EMR Dragon transcription disclaimer:  Some of this encounter note is an electronic transcription translation of spoken language to printed text. The electronic translation of spoken language may permit erroneous, or at times, nonsensical words or phrases to be inadvertently transcribed; Although I have reviewed the note for such errors some may still exist.

## 2025-02-28 ENCOUNTER — TREATMENT (OUTPATIENT)
Dept: PHYSICAL THERAPY | Facility: CLINIC | Age: 60
End: 2025-02-28
Payer: OTHER GOVERNMENT

## 2025-02-28 DIAGNOSIS — R26.9 GAIT DISTURBANCE: ICD-10-CM

## 2025-02-28 DIAGNOSIS — M25.562 ACUTE PAIN OF LEFT KNEE: ICD-10-CM

## 2025-02-28 DIAGNOSIS — Z96.652 AFTERCARE FOLLOWING LEFT KNEE JOINT REPLACEMENT SURGERY: Primary | ICD-10-CM

## 2025-02-28 DIAGNOSIS — Z47.1 AFTERCARE FOLLOWING LEFT KNEE JOINT REPLACEMENT SURGERY: Primary | ICD-10-CM

## 2025-02-28 NOTE — PROGRESS NOTES
Physical Therapy Daily Treatment Note  Visit: 12    Merissa Yusuf reports: stopped taking Celebrex per decreased pain in her left knee but has been more sore the past several days since.   YOB: 1965  Referring practitioner : Mario Alberto Boyce/Russell Henderson*  Date of Initial: Type: THERAPY  Noted: 1/30/2025  Today's date: 2/28/2025  Patient seen for 12 sessions    Visit Diagnoses:    ICD-10-CM ICD-9-CM   1. Aftercare following left knee joint replacement surgery  Z47.1 V54.81    Z96.652 V43.65   2. Acute pain of left knee  M25.562 719.46   3. Gait disturbance  R26.9 781.2       Subjective       Objective   See Exercise, Manual, and Modality Logs for complete treatment.       Assessment/Plan    Limited WB progressions per increased initial L knee pain. L knee pain/ROM improved post tx.     Plan:    Continue             Timed:         Manual Therapy:    10     mins  25940;     Therapeutic Exercise:    24     mins  27797;     Therapeutic Activity:     8     mins  09232;           Un-Timed:  Electrical Stimulation:    15     mins  41084 ( );        Timed Treatment:   42   mins   Total Treatment:     57   mins         Issac Dixon, PT, DPT  Physical Therapist  [place] Lic #[id number]

## 2025-03-05 ENCOUNTER — TREATMENT (OUTPATIENT)
Dept: PHYSICAL THERAPY | Facility: CLINIC | Age: 60
End: 2025-03-05
Payer: OTHER GOVERNMENT

## 2025-03-05 DIAGNOSIS — Z47.1 AFTERCARE FOLLOWING LEFT KNEE JOINT REPLACEMENT SURGERY: Primary | ICD-10-CM

## 2025-03-05 DIAGNOSIS — R26.9 GAIT DISTURBANCE: ICD-10-CM

## 2025-03-05 DIAGNOSIS — M25.562 ACUTE PAIN OF LEFT KNEE: ICD-10-CM

## 2025-03-05 DIAGNOSIS — Z96.652 AFTERCARE FOLLOWING LEFT KNEE JOINT REPLACEMENT SURGERY: Primary | ICD-10-CM

## 2025-03-05 NOTE — PROGRESS NOTES
Physical Therapy Daily Treatment Note  Visit: 13    Merissa Yusuf reports: can tell improvement in her ability to bend her knee.  Reports has MD follow up later today.  YOB: 1965  Referring practitioner : Mario Alberto Boyce/Russell Henderson*  Date of Initial: Type: THERAPY  Noted: 1/30/2025  Today's date: 3/5/2025  Patient seen for 13 sessions    Visit Diagnoses:    ICD-10-CM ICD-9-CM   1. Aftercare following left knee joint replacement surgery  Z47.1 V54.81    Z96.652 V43.65   2. Acute pain of left knee  M25.562 719.46   3. Gait disturbance  R26.9 781.2       Subjective       Objective   L knee AROM 0-118 end of visit  L knee strength EXT 4+/5, FL 5-/5  Gait mechanics WNL     See Exercise, Manual, and Modality Logs for complete treatment.       Assessment/Plan    Improving L knee strength, ROM and activity tolerance.  R knee pain beginning to limits activity at time more than L.     Plan:    Continue               Timed:         Manual Therapy:    10    mins  18056;     Therapeutic Exercise:    25     mins  81600;     Neuromuscular Daniel:    8    mins  55865;    Therapeutic Activity:     10     mins  32178;              Un-Timed:  Electrical Stimulation:    15     mins  15843 ( );        Timed Treatment:   55   mins   Total Treatment:     68   mins         Issac Dixon PT, DPT  Physical Therapist  [place] Lic #[id number]

## 2025-03-07 ENCOUNTER — TREATMENT (OUTPATIENT)
Dept: PHYSICAL THERAPY | Facility: CLINIC | Age: 60
End: 2025-03-07
Payer: OTHER GOVERNMENT

## 2025-03-07 DIAGNOSIS — Z47.1 AFTERCARE FOLLOWING LEFT KNEE JOINT REPLACEMENT SURGERY: Primary | ICD-10-CM

## 2025-03-07 DIAGNOSIS — M25.562 ACUTE PAIN OF LEFT KNEE: ICD-10-CM

## 2025-03-07 DIAGNOSIS — R26.9 GAIT DISTURBANCE: ICD-10-CM

## 2025-03-07 DIAGNOSIS — Z96.652 AFTERCARE FOLLOWING LEFT KNEE JOINT REPLACEMENT SURGERY: Primary | ICD-10-CM

## 2025-03-07 NOTE — PROGRESS NOTES
Physical Therapy Daily Treatment Note  Visit: 14    Merissa Yusuf reports: no new issues today.   YOB: 1965  Referring practitioner : Mario Alberto Boyce/Russell Henderson*  Date of Initial: Type: THERAPY  Noted: 1/30/2025  Today's date: 3/7/2025  Patient seen for 14 sessions    Visit Diagnoses:    ICD-10-CM ICD-9-CM   1. Aftercare following left knee joint replacement surgery  Z47.1 V54.81    Z96.652 V43.65   2. Acute pain of left knee  M25.562 719.46   3. Gait disturbance  R26.9 781.2       Subjective       Objective   See Exercise, Manual, and Modality Logs for complete treatment.       Assessment/Plan    Near full L knee ROM and improving strength, ability for reciprocal stair ascent/descent and squatting.      Plan:    Continue            Timed:         Manual Therapy:    5    mins  73618;     Therapeutic Exercise:    20     mins  83210;     Neuromuscular Daniel:    10    mins  75476;    Therapeutic Activity:     8     mins  38425;           Timed Treatment:   43   mins   Total Treatment:     43   mins         Issac Dixon PT, DPT  Physical Therapist  [place] Lic #[id number]

## 2025-03-10 RX ORDER — MONTELUKAST SODIUM 10 MG/1
10 TABLET ORAL NIGHTLY
Qty: 90 TABLET | Refills: 3 | Status: SHIPPED | OUTPATIENT
Start: 2025-03-10

## 2025-03-11 ENCOUNTER — TREATMENT (OUTPATIENT)
Dept: PHYSICAL THERAPY | Facility: CLINIC | Age: 60
End: 2025-03-11
Payer: OTHER GOVERNMENT

## 2025-03-11 DIAGNOSIS — R26.9 GAIT DISTURBANCE: ICD-10-CM

## 2025-03-11 DIAGNOSIS — Z96.652 AFTERCARE FOLLOWING LEFT KNEE JOINT REPLACEMENT SURGERY: Primary | ICD-10-CM

## 2025-03-11 DIAGNOSIS — Z47.1 AFTERCARE FOLLOWING LEFT KNEE JOINT REPLACEMENT SURGERY: Primary | ICD-10-CM

## 2025-03-11 DIAGNOSIS — M25.562 ACUTE PAIN OF LEFT KNEE: ICD-10-CM

## 2025-03-11 NOTE — PROGRESS NOTES
Physical Therapy Daily Treatment Note  Visit: 15    Merissa Yusuf reports: returned to MD and pleased with progress and said she was walking with confidence on he left knee.   YOB: 1965  Referring practitioner : Mario Alberto Boyce/Russell Henderson*  Date of Initial: Type: THERAPY  Noted: 1/30/2025  Today's date: 3/11/2025  Patient seen for 15 sessions    Visit Diagnoses:    ICD-10-CM ICD-9-CM   1. Aftercare following left knee joint replacement surgery  Z47.1 V54.81    Z96.652 V43.65   2. Acute pain of left knee  M25.562 719.46   3. Gait disturbance  R26.9 781.2       Subjective       Objective   L knee AROM - 2-118 end of visit  See Exercise, Manual, and Modality Logs for complete treatment.       Assessment/Plan    Improving L knee ROM, strength and ability for squatting, stairs, single leg balance.     Plan:    Continue               Timed:         Manual Therapy:    10     mins  63859;     Therapeutic Exercise:    18     mins  08019;     Therapeutic Activity:     12     mins  13219;         Timed Treatment:   40   mins   Total Treatment:     40   mins         Issac Dixon PT, DPT  Physical Therapist  [place] Lic #[id number]

## 2025-03-11 NOTE — TELEPHONE ENCOUNTER
Medication requested (name): Semiglutide      Current medication dose:      Pharmacy where request should be sent:    [facility]   Additional details provided by patient:      Best call back number: [phone]     Does the patient have less than a 3 day supply:  No     For weight loss medication refill request please ask the patient the following questions as well:     What is your current weight 189     Are you experiencing any abdominal pain, nausea, or vomiting no     Female patients: Are you currently pregnant or any chance of pregnancy no     How long have you been on your current dose     2weeks  Do you want to increase your dose or stay the same: increase after 2 weeks     When did you take the medication last 3/7/25     How many doses do you have remaining 1     Next appointment date:    5/19/25

## 2025-03-13 ENCOUNTER — TREATMENT (OUTPATIENT)
Dept: PHYSICAL THERAPY | Facility: CLINIC | Age: 60
End: 2025-03-13
Payer: OTHER GOVERNMENT

## 2025-03-13 DIAGNOSIS — Z96.652 AFTERCARE FOLLOWING LEFT KNEE JOINT REPLACEMENT SURGERY: Primary | ICD-10-CM

## 2025-03-13 DIAGNOSIS — R26.9 GAIT DISTURBANCE: ICD-10-CM

## 2025-03-13 DIAGNOSIS — Z47.1 AFTERCARE FOLLOWING LEFT KNEE JOINT REPLACEMENT SURGERY: Primary | ICD-10-CM

## 2025-03-13 DIAGNOSIS — M25.562 ACUTE PAIN OF LEFT KNEE: ICD-10-CM

## 2025-03-13 NOTE — PROGRESS NOTES
Physical Therapy Daily Treatment Note  Visit: 16    Merissa Yusuf reports: still sore in her right knee today but did well with last session.  Reports she can tell she is getting stronger in her left leg.   YOB: 1965  Referring practitioner : Mario Alberto Boyce/Russell Henderson*  Date of Initial: Type: THERAPY  Noted: 1/30/2025  Today's date: 3/13/2025  Patient seen for 16 sessions    Visit Diagnoses:    ICD-10-CM ICD-9-CM   1. Aftercare following left knee joint replacement surgery  Z47.1 V54.81    Z96.652 V43.65   2. Acute pain of left knee  M25.562 719.46   3. Gait disturbance  R26.9 781.2       Subjective       Objective   L knee FL AROM 120 degrees end of session.   See Exercise, Manual, and Modality Logs for complete treatment.       Assessment/Plan    Improving L knee ROM, strength and activity tolerance. Progressed balance activity with increased focus on single leg stance and movement outside sagittal plane.     Plan:    Continue           Timed:         Manual Therapy:    8     mins  88683;     Therapeutic Exercise:    24     mins  93701;     Neuromuscular Daniel:    12    mins  00008;    Therapeutic Activity:     10     mins  29474;           Timed Treatment:   54   mins   Total Treatment:     54   mins         Issac Dixon PT, DPT  Physical Therapist  [place] Lic #[id number]

## 2025-03-18 ENCOUNTER — TREATMENT (OUTPATIENT)
Dept: PHYSICAL THERAPY | Facility: CLINIC | Age: 60
End: 2025-03-18
Payer: OTHER GOVERNMENT

## 2025-03-18 DIAGNOSIS — Z47.1 AFTERCARE FOLLOWING LEFT KNEE JOINT REPLACEMENT SURGERY: Primary | ICD-10-CM

## 2025-03-18 DIAGNOSIS — M25.562 ACUTE PAIN OF LEFT KNEE: ICD-10-CM

## 2025-03-18 DIAGNOSIS — Z96.652 AFTERCARE FOLLOWING LEFT KNEE JOINT REPLACEMENT SURGERY: Primary | ICD-10-CM

## 2025-03-18 DIAGNOSIS — R26.9 GAIT DISTURBANCE: ICD-10-CM

## 2025-03-18 NOTE — PROGRESS NOTES
Physical Therapy Daily Treatment Note  Visit: 17    Merissa Yusuf reports: no new issues today.   YOB: 1965  Referring practitioner : Mario Alberto Boyce/Russell Henderson*  Date of Initial: Type: THERAPY  Noted: 1/30/2025  Today's date: 3/18/2025  Patient seen for 17 sessions    Visit Diagnoses:    ICD-10-CM ICD-9-CM   1. Aftercare following left knee joint replacement surgery  Z47.1 V54.81    Z96.652 V43.65   2. Acute pain of left knee  M25.562 719.46   3. Gait disturbance  R26.9 781.2       Subjective       Objective   L knee FL - 120 end of visit  See Exercise, Manual, and Modality Logs for complete treatment.       Assessment/Plan    Improving L knee ROM, strength/function.  More limited with resistive activity per R knee pain today.     Plan:    Continue             Timed:         Manual Therapy:    9     mins  15862;     Therapeutic Exercise:    25     mins  02023;      Therapeutic Activity:     10     mins  97487;           Timed Treatment:   44   mins   Total Treatment:     44   mins         Issac Dixon PT, DPT  Physical Therapist  [place] Lic #[id number]

## 2025-03-20 ENCOUNTER — TREATMENT (OUTPATIENT)
Dept: PHYSICAL THERAPY | Facility: CLINIC | Age: 60
End: 2025-03-20
Payer: OTHER GOVERNMENT

## 2025-03-20 DIAGNOSIS — Z96.652 AFTERCARE FOLLOWING LEFT KNEE JOINT REPLACEMENT SURGERY: Primary | ICD-10-CM

## 2025-03-20 DIAGNOSIS — R26.9 GAIT DISTURBANCE: ICD-10-CM

## 2025-03-20 DIAGNOSIS — M25.562 ACUTE PAIN OF LEFT KNEE: ICD-10-CM

## 2025-03-20 DIAGNOSIS — Z47.1 AFTERCARE FOLLOWING LEFT KNEE JOINT REPLACEMENT SURGERY: Primary | ICD-10-CM

## 2025-03-20 NOTE — PROGRESS NOTES
Physical Therapy Daily Treatment Note  Visit: 18    Merissa Yusuf reports: no new issues today.   YOB: 1965  Referring practitioner : Mario Alberto Boyce/Russell Henderson*  Date of Initial: Type: THERAPY  Noted: 1/30/2025  Today's date: 3/20/2025  Patient seen for 18 sessions    Visit Diagnoses:    ICD-10-CM ICD-9-CM   1. Aftercare following left knee joint replacement surgery  Z47.1 V54.81    Z96.652 V43.65   2. Acute pain of left knee  M25.562 719.46   3. Gait disturbance  R26.9 781.2       Subjective       Objective   See Exercise, Manual, and Modality Logs for complete treatment.       Assessment/Plan    Progressing well with plan to follow up after next week with at least 1 additional visit and possible DC to [facility].              Timed:         Manual Therapy:    8     mins  42470;     Therapeutic Exercise:    28     mins  59385;     Neuromuscular Daniel:    10    mins  56422;    Therapeutic Activity:     8     mins  24772;           Timed Treatment:   54   mins   Total Treatment:     54   mins         Issac Dixon PT, DPT  Physical Therapist  [place] Lic #[id number]

## 2025-04-01 ENCOUNTER — TREATMENT (OUTPATIENT)
Dept: PHYSICAL THERAPY | Facility: CLINIC | Age: 60
End: 2025-04-01
Payer: OTHER GOVERNMENT

## 2025-04-01 DIAGNOSIS — M25.562 ACUTE PAIN OF LEFT KNEE: ICD-10-CM

## 2025-04-01 DIAGNOSIS — Z96.652 AFTERCARE FOLLOWING LEFT KNEE JOINT REPLACEMENT SURGERY: Primary | ICD-10-CM

## 2025-04-01 DIAGNOSIS — R26.9 GAIT DISTURBANCE: ICD-10-CM

## 2025-04-01 DIAGNOSIS — Z47.1 AFTERCARE FOLLOWING LEFT KNEE JOINT REPLACEMENT SURGERY: Primary | ICD-10-CM

## 2025-04-01 PROCEDURE — 97112 NEUROMUSCULAR REEDUCATION: CPT | Performed by: PHYSICAL THERAPIST

## 2025-04-01 PROCEDURE — 97530 THERAPEUTIC ACTIVITIES: CPT | Performed by: PHYSICAL THERAPIST

## 2025-04-01 PROCEDURE — 97110 THERAPEUTIC EXERCISES: CPT | Performed by: PHYSICAL THERAPIST

## 2025-04-01 NOTE — PROGRESS NOTES
Physical Therapy Daily Treatment Note  Visit: 19    Merissa Yusuf reports: right knee limiting her more than L now and was able to walk increased distance on recent vacation but required cane per R knee pain.   YOB: 1965  Referring practitioner : Mario Alberto Boyce/Russell Henderson*  Date of Initial: Type: THERAPY  Noted: 2025  Today's date: 2025  Patient seen for 19 sessions    Visit Diagnoses:    ICD-10-CM ICD-9-CM   1. Aftercare following left knee joint replacement surgery  Z47.1 V54.81    Z96.652 V43.65   2. Acute pain of left knee  M25.562 719.46   3. Gait disturbance  R26.9 781.2       Subjective       Objective   LT. Left knee AROM improved to 0-115 degrees or greater over 10 weeks. - Met  2. TUG and Five Time Sit to Stand times at or below age adjusted norms over 10 weeks. - Progressed towards  3. Gait mechanics WNL w/o use of assitive device over 10 weeks. - Met  4. Able to ascend/descend stairs reciprocally with use of rail and good mechanics over 10 weeks. - Progressed towards   5. L knee strength both EXT/FL 5/5 over 10 weeks. - Progressed towards       See Exercise, Manual, and Modality Logs for complete treatment.       Assessment/Plan    L knee ROM/strength function improving with greater limits on R which is scheduled for joint replacement soon.  Plan to decrease visit frequency with likely DC to [facility] only soon.     Plan:    Continue           Timed:         Manual Therapy:    2     mins  00132;     Therapeutic Exercise:    18     mins  27316;     Neuromuscular Daniel:    10    mins  60586;    Therapeutic Activity:     10     mins  32140;         Timed Treatment:   40   mins   Total Treatment:     40   mins         Issac Dixon PT, DPT  Physical Therapist  [place] Lic #[id number]

## 2025-04-08 ENCOUNTER — TREATMENT (OUTPATIENT)
Dept: PHYSICAL THERAPY | Facility: CLINIC | Age: 60
End: 2025-04-08
Payer: OTHER GOVERNMENT

## 2025-04-08 DIAGNOSIS — R26.9 GAIT DISTURBANCE: ICD-10-CM

## 2025-04-08 DIAGNOSIS — Z96.652 AFTERCARE FOLLOWING LEFT KNEE JOINT REPLACEMENT SURGERY: Primary | ICD-10-CM

## 2025-04-08 DIAGNOSIS — M25.562 ACUTE PAIN OF LEFT KNEE: ICD-10-CM

## 2025-04-08 DIAGNOSIS — Z47.1 AFTERCARE FOLLOWING LEFT KNEE JOINT REPLACEMENT SURGERY: Primary | ICD-10-CM

## 2025-04-08 NOTE — PROGRESS NOTES
Physical Therapy Daily Treatment Note  Visit: 20    Merissa Yusuf reports: her left knee is feeling better overall with more limits in her right knee in standing.   YOB: 1965  Referring practitioner : Mario Alberto Boyce/Russell Henderson*  Date of Initial: Type: THERAPY  Noted: 1/30/2025  Today's date: 4/8/2025  Patient seen for 20 sessions    Visit Diagnoses:    ICD-10-CM ICD-9-CM   1. Aftercare following left knee joint replacement surgery  Z47.1 V54.81    Z96.652 V43.65   2. Acute pain of left knee  M25.562 719.46   3. Gait disturbance  R26.9 781.2       Subjective       Objective   L knee AROM 7-113 beginning - 2-119 end of visit  L knee strength EXT 5-/5, knee FL 5-/5  See Exercise, Manual, and Modality Logs for complete treatment.       Assessment/Plan    Improving L knee ROM/strength and activity tolerance with more pain in her right knee with squatting, stairs and standing.  Plans to trial [facility] only at this time.            Timed:         Manual Therapy:    9     mins  67113;     Therapeutic Exercise:    20     mins  94504;       Therapeutic Activity:     10     mins  94048;           Timed Treatment:   39   mins   Total Treatment:     39   mins         Issac Dixon PT, DPT  Physical Therapist  [place] Lic #[id number]

## 2025-04-10 ENCOUNTER — TELEPHONE (OUTPATIENT)
Dept: NEUROLOGY | Facility: CLINIC | Age: 60
End: 2025-04-10
Payer: OTHER GOVERNMENT

## 2025-04-10 ENCOUNTER — OFFICE VISIT (OUTPATIENT)
Dept: BARIATRICS/WEIGHT MGMT | Facility: CLINIC | Age: 60
End: 2025-04-10
Payer: OTHER GOVERNMENT

## 2025-04-10 VITALS
HEART RATE: 92 BPM | WEIGHT: 188.7 LBS | SYSTOLIC BLOOD PRESSURE: 103 MMHG | OXYGEN SATURATION: 97 % | RESPIRATION RATE: 18 BRPM | HEIGHT: 59 IN | DIASTOLIC BLOOD PRESSURE: 57 MMHG | BODY MASS INDEX: 38.04 KG/M2

## 2025-04-10 DIAGNOSIS — E66.812 OBESITY, CLASS II, BMI 35-39.9: Primary | ICD-10-CM

## 2025-04-10 PROCEDURE — 99213 OFFICE O/P EST LOW 20 MIN: CPT | Performed by: SURGERY

## 2025-04-10 NOTE — TELEPHONE ENCOUNTER
Caller: Merissa Yusuf    Relationship: Self    Best call back number: [phone] (home)   What was the call regarding: PT SAW HER WEIGHT LOSS PROVIDER AND IN ORDER FOR HER TO BE ABLE TO GET APPROVAL FOR WEGOVY, SHE WOULD NEED A NOTE PUT IN HER MEDICAL CHART FROM HER NEURO STATING THAT WEIGHT LOSS WOULD IMPROVE HER SLEEP APNEA.     PLEASE REVIEW  THANK YOU     Is it okay if the provider responds through MyChart: YES

## 2025-04-14 NOTE — PROGRESS NOTES
[facility] BAR SURG [facility] BARIATRIC SURGERY  [address] [place] [address]  [address] [place] [address]  Dept: [phone]  4/14/2025      Merissa Yusuf.  [id number]  [id number]  1965  female      Chief Complaint   Patient presents with    Follow-up     Med check       The patient is here for month 2 of medical weight management. She had a loss of 2 lbs. The patient states that she is following the recommendations given by our office and dietician including a high lean protein, low carb and low fat diet. We recommended adequate fruits and vegetable intake along with limited portion sizes. Patient is working on eliminating fast foods, fried foods, sweets and soda. Merissa Yusuf has been increasing her daily water intake. She has been exercising: walking.  Wt Readings from Last 10 Encounters:   04/10/25 85.6 kg (188 lb 11.2 oz)   02/17/25 86.3 kg (190 lb 3.2 oz)   02/11/25 87.6 kg (193 lb 3.2 oz)   10/30/24 90.8 kg (200 lb 3.2 oz)   10/21/24 89.8 kg (198 lb)   10/15/24 92.2 kg (203 lb 3.2 oz)   08/28/24 92.5 kg (204 lb)   08/21/24 92.8 kg (204 lb 8 oz)   08/19/24 91.5 kg (201 lb 12.8 oz)   07/10/24 94.3 kg (208 lb)     Patient states they have made positive changes including walking and eating smaller portions  The patient admits to be struggling with snacking at night    Review of Systems  Vitals:    04/10/25 1048   BP: 103/57   Pulse: 92   Resp: 18   SpO2: 97%   Body mass index is 38.11 kg/m².    The following portions of the patient's history were reviewed and updated as appropriate: active problem list  Past Medical History:   Diagnosis Date    Allergic     Amoxicillin    Anesthesia complication     drops B/P, and slow to arouse    Anxiety 1/31/2011    Arthritis 1/1/2003    In 40's    Cataract     Both have been corrected    Chronic constipation     Allways have had    Chronic pain disorder     Depression 1/31/2011    GERD (gastroesophageal  reflux disease)     Hereditary hemochromatosis     Hyperlipidemia     Seasonal allergies      Past Surgical History:   Procedure Laterality Date    ABDOMINAL SURGERY      BLADDER SUSPENSION      BREAST BIOPSY Right     CATARACT EXTRACTION  03/2023    COLONOSCOPY N/A 08/19/2024    Procedure: COLONOSCOPY WITH BIOPSIES;  Surgeon: Rodolfo Lambert MD;  Location: [facility] ENDOSCOPY;  Service: General;  Laterality: N/A;  POST-DIVERTICULOSIS, CECAL LESION    FINGER SURGERY  2008    removed cysts from right index finger     HERNIA REPAIR      JOINT REPLACEMENT  1/14/25    Other knee to be replaced in June    REPLACEMENT TOTAL KNEE Left 01/14/2025    SUBTOTAL HYSTERECTOMY      TUBAL ABDOMINAL LIGATION      VENTRAL/INCISIONAL HERNIA REPAIR N/A 09/24/2020    Procedure: LAPAROSCOPIC VENTRAL HERNIA;  Surgeon: Reuben Molina MD;  Location: [facility] MAIN OR;  Service: General;  Laterality: N/A;  0855 TAP block    WISDOM TOOTH EXTRACTION          Physical Exam    Discussion/Plan:  BMI , Class 2 obesity, medication management:   Obesity/Morbid Obesity: Currently the patient's weight is decreasing. Treatment plan includes prescribed diet, prescribed exercise regimen and behavior modification.     Comorbidity includes obstructive sleep apnea.      Medication options for weight loss were discussed with the pt and based upon the patient's history and insurance , Zepbound will be prescribed/ continued.     Pt denies any hx of uncontrolled HTN, tachycardia, seizures, hyperthyroidism, glaucoma or chance of pregnancy with phentermine. Pt instructed to call the office and stop the medication ( phentermine) with any chest pain, shortness of breath, seizures or chance of pregnancy. Pt informed max duration of phentermine is 6 months due to increased risk of cardiovascular problems with long term usage.     Pt  denies any hx of multiple endocrine neoplasia type 2 or medullary thyroid cancer for herself or her immediate family with GLP-1's.  Pt encouraged to call the office with any nausea/ vomiting or abdominal pain with GLP-1's. Pt also informed constipation can happen with these medications due to slowing of gut. Pt encouraged to take a stool softener/ laxative if constipation occurs.     I reviewed the appropriate dietary choices with the patient and encouraged the necessary changes. Recommended at least 70 grams of protein per day, around 35 grams of fats and less than 100 grams of carbohydrates. Reviewed calorie intake if patient wanted to calorie count and/or had BMR. Instructed patient to drink half of body weight in ounces per day and exercise a minimum of 150 minutes per week including both cardio and strength training. Discussed the option of keeping a food journal which will help patient become more aware of the nutritional value of foods .     The patient was given written materials from our office for diet plans and medications.   I answered all of the patients questions regarding dietary changes, exercise, and medications.   The patient will follow up in 1 month. The total time spent face to face was 30 minutes with 15 minutes spent counseling.    No diagnosis found.    Patience Ma MD  4/14/2025

## 2025-04-30 PROBLEM — D22.72 MELANOCYTIC NEVUS OF LEFT LOWER EXTREMITY: Status: ACTIVE | Noted: 2023-12-12

## 2025-04-30 PROBLEM — L85.3 ASTEATOSIS CUTIS: Status: ACTIVE | Noted: 2023-12-13

## 2025-04-30 PROBLEM — D23.9 BENIGN NEOPLASM OF SKIN: Status: ACTIVE | Noted: 2023-12-12

## 2025-04-30 PROBLEM — D22.62 MELANOCYTIC NEVUS OF LEFT UPPER EXTREMITY: Status: ACTIVE | Noted: 2023-12-12

## 2025-04-30 PROBLEM — H35.039 HYPERTENSIVE RETINOPATHY: Status: ACTIVE | Noted: 2023-12-05

## 2025-04-30 PROBLEM — D22.61 MELANOCYTIC NEVUS OF RIGHT UPPER EXTREMITY: Status: ACTIVE | Noted: 2023-12-12

## 2025-04-30 PROBLEM — Z96.652 HISTORY OF TOTAL LEFT KNEE REPLACEMENT: Status: ACTIVE | Noted: 2025-01-29

## 2025-04-30 PROBLEM — H43.819 POSTERIOR VITREOUS DETACHMENT: Status: ACTIVE | Noted: 2024-09-27

## 2025-04-30 PROBLEM — D22.5 MELANOCYTIC NEVUS OF TRUNK: Status: ACTIVE | Noted: 2023-12-12

## 2025-04-30 PROBLEM — L81.9 DISORDER OF PIGMENTATION: Status: ACTIVE | Noted: 2023-12-12

## 2025-04-30 PROBLEM — H35.349 MACULAR HOLE: Status: ACTIVE | Noted: 2024-09-27

## 2025-04-30 NOTE — PATIENT INSTRUCTIONS
Health Maintenance Due   Topic Date Due    ZOSTER VACCINE (1 of 2) Never done    MAMMOGRAM  06/25/2025

## 2025-05-01 ENCOUNTER — LAB (OUTPATIENT)
Dept: FAMILY MEDICINE CLINIC | Facility: CLINIC | Age: 60
End: 2025-05-01
Payer: OTHER GOVERNMENT

## 2025-05-01 ENCOUNTER — OFFICE VISIT (OUTPATIENT)
Dept: FAMILY MEDICINE CLINIC | Facility: CLINIC | Age: 60
End: 2025-05-01
Payer: OTHER GOVERNMENT

## 2025-05-01 VITALS
HEIGHT: 59 IN | HEART RATE: 80 BPM | BODY MASS INDEX: 37.16 KG/M2 | WEIGHT: 184.3 LBS | TEMPERATURE: 98 F | OXYGEN SATURATION: 95 % | SYSTOLIC BLOOD PRESSURE: 118 MMHG | DIASTOLIC BLOOD PRESSURE: 80 MMHG

## 2025-05-01 DIAGNOSIS — R03.0 ELEVATED BLOOD PRESSURE READING WITHOUT DIAGNOSIS OF HYPERTENSION: ICD-10-CM

## 2025-05-01 DIAGNOSIS — E83.110 HEREDITARY HEMOCHROMATOSIS: ICD-10-CM

## 2025-05-01 DIAGNOSIS — E78.2 MIXED HYPERLIPIDEMIA: ICD-10-CM

## 2025-05-01 DIAGNOSIS — E55.9 VITAMIN D DEFICIENCY: ICD-10-CM

## 2025-05-01 DIAGNOSIS — R41.3 MEMORY LOSS: ICD-10-CM

## 2025-05-01 DIAGNOSIS — E66.01 CLASS 2 SEVERE OBESITY DUE TO EXCESS CALORIES WITH SERIOUS COMORBIDITY AND BODY MASS INDEX (BMI) OF 37.0 TO 37.9 IN ADULT: ICD-10-CM

## 2025-05-01 DIAGNOSIS — F33.1 MODERATE EPISODE OF RECURRENT MAJOR DEPRESSIVE DISORDER: Chronic | ICD-10-CM

## 2025-05-01 DIAGNOSIS — Z12.31 ENCOUNTER FOR SCREENING MAMMOGRAM FOR MALIGNANT NEOPLASM OF BREAST: ICD-10-CM

## 2025-05-01 DIAGNOSIS — E66.812 CLASS 2 SEVERE OBESITY DUE TO EXCESS CALORIES WITH SERIOUS COMORBIDITY AND BODY MASS INDEX (BMI) OF 37.0 TO 37.9 IN ADULT: ICD-10-CM

## 2025-05-01 DIAGNOSIS — K21.9 GASTROESOPHAGEAL REFLUX DISEASE, UNSPECIFIED WHETHER ESOPHAGITIS PRESENT: ICD-10-CM

## 2025-05-01 DIAGNOSIS — G47.33 OBSTRUCTIVE SLEEP APNEA: ICD-10-CM

## 2025-05-01 DIAGNOSIS — E78.2 MIXED HYPERLIPIDEMIA: Primary | ICD-10-CM

## 2025-05-01 PROBLEM — Z90.710 ACQUIRED ABSENCE OF BOTH CERVIX AND UTERUS: Status: ACTIVE | Noted: 2025-01-15

## 2025-05-01 LAB
25(OH)D3 SERPL-MCNC: 41.9 NG/ML (ref 30–100)
ALBUMIN SERPL-MCNC: 4.6 G/DL (ref 3.5–5.2)
ALBUMIN/GLOB SERPL: 1.7 G/DL
ALP SERPL-CCNC: 95 U/L (ref 39–117)
ALT SERPL W P-5'-P-CCNC: 7 U/L (ref 1–33)
ANION GAP SERPL CALCULATED.3IONS-SCNC: 10.3 MMOL/L (ref 5–15)
AST SERPL-CCNC: 15 U/L (ref 1–32)
BASOPHILS # BLD AUTO: 0.05 10*3/MM3 (ref 0–0.2)
BASOPHILS NFR BLD AUTO: 0.9 % (ref 0–1.5)
BILIRUB SERPL-MCNC: 0.4 MG/DL (ref 0–1.2)
BUN SERPL-MCNC: 21 MG/DL (ref 8–23)
BUN/CREAT SERPL: 28 (ref 7–25)
CALCIUM SPEC-SCNC: 10.1 MG/DL (ref 8.6–10.5)
CHLORIDE SERPL-SCNC: 104 MMOL/L (ref 98–107)
CHOLEST SERPL-MCNC: 183 MG/DL (ref 0–200)
CO2 SERPL-SCNC: 23.7 MMOL/L (ref 22–29)
CREAT SERPL-MCNC: 0.75 MG/DL (ref 0.57–1)
DEPRECATED RDW RBC AUTO: 43.2 FL (ref 37–54)
EGFRCR SERPLBLD CKD-EPI 2021: 90.7 ML/MIN/1.73
EOSINOPHIL # BLD AUTO: 0.21 10*3/MM3 (ref 0–0.4)
EOSINOPHIL NFR BLD AUTO: 3.7 % (ref 0.3–6.2)
ERYTHROCYTE [DISTWIDTH] IN BLOOD BY AUTOMATED COUNT: 11.7 % (ref 12.3–15.4)
GLOBULIN UR ELPH-MCNC: 2.7 GM/DL
GLUCOSE SERPL-MCNC: 81 MG/DL (ref 65–99)
HCT VFR BLD AUTO: 38.3 % (ref 34–46.6)
HDLC SERPL-MCNC: 76 MG/DL (ref 40–60)
HGB BLD-MCNC: 13.4 G/DL (ref 12–15.9)
IMM GRANULOCYTES # BLD AUTO: 0.03 10*3/MM3 (ref 0–0.05)
IMM GRANULOCYTES NFR BLD AUTO: 0.5 % (ref 0–0.5)
LDLC SERPL CALC-MCNC: 87 MG/DL (ref 0–100)
LDLC/HDLC SERPL: 1.1 {RATIO}
LYMPHOCYTES # BLD AUTO: 1.94 10*3/MM3 (ref 0.7–3.1)
LYMPHOCYTES NFR BLD AUTO: 34.2 % (ref 19.6–45.3)
MAGNESIUM SERPL-MCNC: 2.3 MG/DL (ref 1.6–2.4)
MCH RBC QN AUTO: 35.8 PG (ref 26.6–33)
MCHC RBC AUTO-ENTMCNC: 35 G/DL (ref 31.5–35.7)
MCV RBC AUTO: 102.4 FL (ref 79–97)
MONOCYTES # BLD AUTO: 0.53 10*3/MM3 (ref 0.1–0.9)
MONOCYTES NFR BLD AUTO: 9.3 % (ref 5–12)
NEUTROPHILS NFR BLD AUTO: 2.91 10*3/MM3 (ref 1.7–7)
NEUTROPHILS NFR BLD AUTO: 51.4 % (ref 42.7–76)
NRBC BLD AUTO-RTO: 0 /100 WBC (ref 0–0.2)
PLATELET # BLD AUTO: 445 10*3/MM3 (ref 140–450)
PMV BLD AUTO: 9.1 FL (ref 6–12)
POTASSIUM SERPL-SCNC: 4.1 MMOL/L (ref 3.5–5.2)
PROT SERPL-MCNC: 7.3 G/DL (ref 6–8.5)
RBC # BLD AUTO: 3.74 10*6/MM3 (ref 3.77–5.28)
SODIUM SERPL-SCNC: 138 MMOL/L (ref 136–145)
TRIGL SERPL-MCNC: 118 MG/DL (ref 0–150)
TSH SERPL DL<=0.05 MIU/L-ACNC: 1.26 UIU/ML (ref 0.27–4.2)
VIT B12 BLD-MCNC: 610 PG/ML (ref 211–946)
VLDLC SERPL-MCNC: 20 MG/DL (ref 5–40)
WBC NRBC COR # BLD AUTO: 5.67 10*3/MM3 (ref 3.4–10.8)

## 2025-05-01 PROCEDURE — 80061 LIPID PANEL: CPT | Performed by: PREVENTIVE MEDICINE

## 2025-05-01 PROCEDURE — 82607 VITAMIN B-12: CPT | Performed by: PREVENTIVE MEDICINE

## 2025-05-01 PROCEDURE — 84443 ASSAY THYROID STIM HORMONE: CPT | Performed by: PREVENTIVE MEDICINE

## 2025-05-01 PROCEDURE — 85025 COMPLETE CBC W/AUTO DIFF WBC: CPT | Performed by: PREVENTIVE MEDICINE

## 2025-05-01 PROCEDURE — 80053 COMPREHEN METABOLIC PANEL: CPT | Performed by: PREVENTIVE MEDICINE

## 2025-05-01 PROCEDURE — 83735 ASSAY OF MAGNESIUM: CPT | Performed by: PREVENTIVE MEDICINE

## 2025-05-01 PROCEDURE — 82306 VITAMIN D 25 HYDROXY: CPT | Performed by: PREVENTIVE MEDICINE

## 2025-05-01 PROCEDURE — 36415 COLL VENOUS BLD VENIPUNCTURE: CPT

## 2025-05-01 NOTE — PROGRESS NOTES
Subjective   Merissa Yusuf is a 59 y.o. female presents for   Chief Complaint   Patient presents with    Primary Care Follow-Up     Is fasting    Hypertension       Health Maintenance Due   Topic Date Due    ZOSTER VACCINE (1 of 2) Never done    MAMMOGRAM  06/25/2025       Primary Care Follow-UpPertinent negatives include no depressed mood.   Hypertension     History of Present Illness  The patient is a 59-year-old female who is here today to follow up on hyperlipidemia, vitamin D deficiency, hereditary hemochromatosis, GERD, class II severe obesity due to excess calories with serious comorbidities and a body mass index of 37, moderate episode of recurrent major depressive disorder, memory loss, encounter for screening mammogram, elevated blood pressure reading without diagnosis of hypertension, and obstructive sleep apnea.    Weight loss has been achieved through compounded Wegovy, which is taken through the bariatric center. No chest pressure or palpitations are reported. Regular self-breast examinations are conducted, though not monthly. A lifelong history of constipation is noted without recent changes. No unusual vaginal discharge, systemic symptoms such as fever, chills, or night sweats, or urinary symptoms such as dysuria or hematuria are reported. Occasional malodorous urine is noted, attributed to high protein intake. Vitamin D supplementation is currently not being taken. Blood work is scheduled prior to the next hematology appointment. Nausea occurs if food is not consumed upon waking. Postoperative pain was initially managed with oxycodone and tramadol but is now controlled with Tylenol due to excessive drowsiness from the former medications. Heartburn is well-controlled unless spicy food is consumed. Mood and depression are stable with current medications. Memory issues such as forgetting tasks are reported, but no disorientation or getting lost. Occasional forgetfulness while cooking is noted. Living  "with her daughter and three grandchildren is found to be beneficial.    Mindfulness of saturated fat intake and attempts to walk for 20 minutes daily are reported, though limited by right knee pain. CPAP therapy is frequently skipped due to drainage and coughing at night. Albuterol for wheezing is prescribed but not used. Singulair is taken nightly, and Xyzal, Claritin, and Allegra are alternated.    Vitals:    05/01/25 0905 05/01/25 0907   BP: 114/80 118/80   BP Location: Left arm Right arm   Patient Position: Sitting Sitting   Cuff Size: Adult Adult   Pulse: 78 80   Temp: 98 °F (36.7 °C)    TempSrc: Infrared    SpO2: 95%    Weight: 83.6 kg (184 lb 4.8 oz)    Height: 149.9 cm (59.02\")      Body mass index is 37.2 kg/m².    Current Outpatient Medications on File Prior to Visit   Medication Sig Dispense Refill    acetaminophen (TYLENOL) 500 MG tablet Take 1 tablet by mouth Every 4 (Four) Hours As Needed.      albuterol sulfate  (90 Base) MCG/ACT inhaler Inhale 2 puffs Every 4 (Four) Hours As Needed for Wheezing. 6.7 g 0    busPIRone (BUSPAR) 15 MG tablet TAKE 1 TABLET THREE TIMES A DAY FOR ANXIETY 270 tablet 3    Evolocumab (Repatha SureClick) solution auto-injector SureClick injection Inject 1 ml under the skin into the appropriate area as directed every 14 days Strength 140mg/ml 6 mL 3    lamoTRIgine (LaMICtal) 100 MG tablet TAKE 1 TABLET TWICE A DAY (CORRECTED PRESCRIPTION) 180 tablet 3    montelukast (SINGULAIR) 10 MG tablet TAKE 1 TABLET EVERY NIGHT 90 tablet 3    ondansetron (ZOFRAN) 4 MG tablet       ondansetron ODT (ZOFRAN-ODT) 8 MG disintegrating tablet PLACE 1 TABLET ON THE TONGUE EVERY 8 HOURS AS NEEDED FOR NAUSEA OR VOMITING 30 tablet 35    Semaglutide, 2 MG/DOSE, (OZEMPIC) 8 MG/3ML solution pen-injector Inject 2 mg under the skin into the appropriate area as directed 1 (One) Time Per Week. Compounded Semaglutide from  [facility] Vial Strength: 5mg/ml ( 5 mg semaglutide/ 0.25 mg b12)/ml " Prescription dosage                                                              Amount to administer ( in ML)    /   units 0.25mg semaglutide 0.05 ml weekly 5 units 0.5 mg semaglutide 0.1ml weekly 10 units 1 mg semaglutide 0.2 ml weekly 20 units 1.5 mg semaglutide 0.3 ml weekly 30 units 2 mg semaglutide 0.4 ml weekly 40 units 2.4 mg semaglutide 0.48 ml weekly 48 units 3 mL 3    sennosides-docusate (senna-docusate sodium) 8.6-50 MG per tablet Take 2 tablets by mouth 2 (Two) Times a Day. 120 tablet 2    Tirzepatide-Weight Management (ZEPBOUND) 5 MG/0.5ML solution auto-injector Inject 0.5 mL under the skin into the appropriate area as directed 1 (One) Time Per Week. 2 mL 0    Trintellix 10 MG tablet tablet TAKE 1 TABLET DAILY WITH BREAKFAST 90 tablet 3     No current facility-administered medications on file prior to visit.       The following portions of the patient's history were reviewed and updated as appropriate: allergies, current medications, past family history, past medical history, past social history, past surgical history, and problem list.    Review of Systems   Gastrointestinal:  Positive for GERD.   Neurological:  Positive for memory problem.   Psychiatric/Behavioral:  Positive for sleep disturbance. Negative for depressed mood.        Objective   Physical Exam  Vitals reviewed.   Constitutional:       General: She is not in acute distress.     Appearance: She is well-developed. She is obese. She is not ill-appearing or toxic-appearing.   HENT:      Head: Normocephalic and atraumatic.      Right Ear: Tympanic membrane, ear canal and external ear normal.      Left Ear: Tympanic membrane, ear canal and external ear normal.      Nose: Nose normal.      Mouth/Throat:      Mouth: Mucous membranes are moist.      Pharynx: No posterior oropharyngeal erythema.   Eyes:      Extraocular Movements: Extraocular movements intact.      Conjunctiva/sclera: Conjunctivae normal.      Pupils: Pupils are equal, round, and  reactive to light.   Neck:      Vascular: No carotid bruit.   Cardiovascular:      Rate and Rhythm: Normal rate and regular rhythm.      Heart sounds: Normal heart sounds.   Pulmonary:      Effort: Pulmonary effort is normal.      Breath sounds: Normal breath sounds.   Abdominal:      General: Bowel sounds are normal. There is no distension.      Palpations: Abdomen is soft. There is no mass.      Tenderness: There is no abdominal tenderness.   Musculoskeletal:         General: Normal range of motion.      Cervical back: Neck supple. No tenderness.   Lymphadenopathy:      Cervical: No cervical adenopathy.   Skin:     General: Skin is warm.   Neurological:      General: No focal deficit present.      Mental Status: She is alert and oriented to person, place, and time.   Psychiatric:         Mood and Affect: Mood normal.         Behavior: Behavior normal.       Physical Exam  Neck: Carotid arteries auscultated, no bruits  Respiratory: Clear to auscultation, no wheezing, rales or rhonchi    PHQ-9 Total Score:    Results           Assessment & Plan   Diagnoses and all orders for this visit:    1. Mixed hyperlipidemia (Primary)  -     Comprehensive Metabolic Panel; Future  -     Lipid Panel; Future    2. Encounter for screening mammogram for malignant neoplasm of breast  -     Mammo Screening Digital Tomosynthesis Bilateral With CAD; Future    3. Elevated blood pressure reading without diagnosis of hypertension    4. Obstructive sleep apnea    5. Vitamin D deficiency  -     Vitamin D,25-Hydroxy; Future    6. Hereditary hemochromatosis    7. Gastroesophageal reflux disease, unspecified whether esophagitis present  -     CBC Auto Differential; Future  -     Magnesium; Future    8. Class 2 severe obesity due to excess calories with serious comorbidity and body mass index (BMI) of 37.0 to 37.9 in adult  Assessment & Plan:  Patient's (Body mass index is 37.2 kg/m².) indicates that they are morbidly/severely obese (BMI > 40 or  > 35 with obesity - related health condition) with health conditions that include obstructive sleep apnea and hypertension . Weight is improving with lifestyle modifications. BMI  is above average; BMI management plan is completed. We discussed portion control and increasing exercise.       9. Moderate episode of recurrent major depressive disorder  -     TSH Rfx On Abnormal To Free T4; Future  -     Vitamin B12; Future    10. Memory loss      Assessment & Plan  1. Hyperlipidemia.  - Advised to continue current medication regimen and maintain a diet low in saturated fats.  - Lipid panel will be ordered to monitor her condition.    2. Vitamin D deficiency.  - Not currently taking vitamin D supplements.  - Vitamin D level test will be conducted today.    3. Hereditary hemochromatosis.  - Under the care of a hematologist for this condition.  - Labs will be coordinated with her hematologist to monitor her iron levels.    4. Gastroesophageal reflux disease (GERD).  - Experiences heartburn only with spicy foods.  - Advised to avoid spicy foods to manage symptoms.    5. Class II severe obesity due to excess calories with serious comorbidities and a body mass index of 37.  - Using compounded Wegovy through the bariatric center for weight loss.  - Advised to continue this treatment and aim for 20 minutes of walking daily.    6. Moderate episode of recurrent major depressive disorder.  - Current medications are effective.  - Advised to continue current treatment plan.    7. Memory loss.  - Reports occasional forgetfulness but no significant worsening.  - No new treatment required at this time.    8. Encounter for screening mammogram.  - Due for a mammogram at the end of June 2025.  - Order has been placed, and she will be contacted for scheduling.    9. Elevated blood pressure reading without diagnosis of hypertension.  - Blood pressure reading today was 117/80 mmHg.  - No hypertension diagnosis made at this time.    10.  Obstructive sleep apnea.  - Inconsistent with CPAP use due to drainage and coughing.  - Advised to use CPAP regularly when possible.    Patient Instructions     Health Maintenance Due   Topic Date Due    ZOSTER VACCINE (1 of 2) Never done    MAMMOGRAM  06/25/2025           Patient or patient representative verbalized consent for the use of Ambient Listening during the visit with  Melissa Duarte MD for chart documentation. 5/1/2025  12:15 EDT

## 2025-05-01 NOTE — ASSESSMENT & PLAN NOTE
Patient's (Body mass index is 37.2 kg/m².) indicates that they are morbidly/severely obese (BMI > 40 or > 35 with obesity - related health condition) with health conditions that include obstructive sleep apnea and hypertension . Weight is improving with lifestyle modifications. BMI  is above average; BMI management plan is completed. We discussed portion control and increasing exercise.

## 2025-05-06 NOTE — PROGRESS NOTES
HEMATOLOGY ONCOLOGY OUTPATIENT FOLLOW UP       Patient name: Merissa Yusuf  : 1965  MRN: [id number]  Primary Care Physician: Melissa Duarte MD  Referring Physician: No ref. provider found  Reason For Consult: Hereditary hemochromatosis, homozygous mutation in H63D.  Iron overload,Macrocytosis, thrombocytosis      History of Present Illness:  Ms. Yusuf presented with a history of COPD and also depression.  Laboratory work up was obtained in 2019 which was abnormal.  Patient was sent to the [facility] and seen initially on 3/13/19.   3/5/19 - WBC 4.7, hemoglobin 14.1, MCV elevated to 103 and platelet count of 592,000.  Vitamin D  20.  Sed rate 35.  TSH 1.62.  Vitamin B12 476.  Creatinine 0.8.    3/13/19 - ZACKERY-2 mutation negative.  Hemochromatosis gene mutation, homozygous mutation in H63D.  3/13/19 - Vitamin B12 507.  Ferritin 178.  Creatinine 0.7.  Iron level 156.  Iron binding  capacity 257.  Percentage iron saturation 61.   2020 Ferritin 304 iron 177 iron saturation 56 TIBC 316  2020 iron 247 iron saturation 82 iron binding capacity 302 ferritin 271 hemoglobin 13.7 WBC 6.1 platelet count 372.  2020 CMP is normal including LFTs.  TSH 3.01  10/2020 ferritin 233.40 iron sat 43  2021 ferritin 341.40, iron sat 76  2021 -ferritin 112  3/8/2022 -CBC with hemoglobin 13.5, hematocrit 38.7.  Ferritin 59.7, iron saturation 35  2022 - Hb 13.7, ferritin 62 iron 36  4/3/2023 ferritin 76, iron sat 38,   2023-ferritin 152.40, iron 191, iron sat 61, hemoglobin 14.0, hematocrit 39.4.  2024-ferritin 98.03, hemoglobin 13.3, hematocrit 39.0  4/15/2024 f/u Merissa is here today for her routine follow-up.  She reports that she is doing well and has no new complaints.  She tries to utilize diet modification to help keep her iron levels under control. She has began Wegovy for weight loss in anticipation of bilateral knee replacement surgery once she meets the BMI  goal. She is having some left ear pain and is concerned for infection.   4/25/2024 TSH 1.470    -10/8/2024 WBC 6.4, hemoglobin 13.7/hematocrit 41 with .5 RDW elevated 11.9 (stable) with platelets 394K.  Iron 134, iron saturation 42% with normal transferrin and TIBC, ferritin 69.3 CMP with normal creatinine liver function tests    -10/15/2024 patient reports for her routine follow-up, has had some bruising on her legs; taking a lot of Advil as her knees are killing her, at minimum three a day, usually four; hopefully can get her first knee surgery beginning of the year.  She reports that otherwise she has new complaints.      -2/6/2025 WBC 5.7 (normal differential), hemoglobin 15.3/hematocrit 40.9 with MCV even higher at 110.2, stable elevated MCH at 35.8 RDW 12.6% (stable) with platelets now new elevated at 531 K.  Iron 145, iron saturation 41% % with normal transferrin and TIBC, ferritin 69.3     -2/11/2025 patient reports for her routine follow-up, has previously had some bruising on her legs; holding Advil s/p first knee surgery and in PT so the bruising is much improved.  Denies any alcohol use. She reports that otherwise she has new complaints.        Subjective:  -5/12/2025 CBC with WBC 5.85 (normal differential)-hemoglobin 12.7/hematocrit 37.3 with .8 (H, stable-in her range) MCH 35.7 (high-in her range) with platelets 407K, iron 162 (high) iron saturation 51% (high) with normal transferrin and TIBC, ferritin 100 (normal) folate >20, reticulocytes 2.19 (high)    -5/15/2025 discharge from [facility] with PT/INR PTT all within normal limits (11.5/1.0/29.4), CMP within normal limits including ALT (11), platelet count in their lab showed platelets of 492K (high), UA normal, sufficient for vitamin D    -5/19 /2025 patient reports for her routine follow-up, has previously had some bruising on her legs; knows needs to holding s/p first knee surgery (L) done with PT and swelling and bruising is much  improved. Ready for R knee in 2 weeks!!  Denies any alcohol use. She reports that otherwise she has new complaints.     Past Medical History:   Diagnosis Date    Allergic     Amoxicillin    Anesthesia complication     drops B/P, and slow to arouse    Anxiety 1/31/2011    Arthritis 1/1/2003    In 40's    Cataract     Both have been corrected    Chronic constipation     Allways have had    Chronic pain disorder     Depression 1/31/2011    GERD (gastroesophageal reflux disease)     Hereditary hemochromatosis     Hyperlipidemia     Seasonal allergies        Past Surgical History:   Procedure Laterality Date    ABDOMINAL SURGERY      BLADDER SUSPENSION      BREAST BIOPSY Right     CATARACT EXTRACTION  03/2023    COLONOSCOPY N/A 08/19/2024    Procedure: COLONOSCOPY WITH BIOPSIES;  Surgeon: Rodolfo Lambert MD;  Location: [facility] ENDOSCOPY;  Service: General;  Laterality: N/A;  POST-DIVERTICULOSIS, CECAL LESION    FINGER SURGERY  2008    removed cysts from right index finger     HERNIA REPAIR      JOINT REPLACEMENT  1/14/25    Other knee to be replaced in June    REPLACEMENT TOTAL KNEE Left 01/14/2025    SUBTOTAL HYSTERECTOMY      TUBAL ABDOMINAL LIGATION      VENTRAL/INCISIONAL HERNIA REPAIR N/A 09/24/2020    Procedure: LAPAROSCOPIC VENTRAL HERNIA;  Surgeon: Reuben Molina MD;  Location: [facility] MAIN OR;  Service: General;  Laterality: N/A;  0855 TAP block    WISDOM TOOTH EXTRACTION           Current Outpatient Medications:     acetaminophen (TYLENOL) 500 MG tablet, Take 1 tablet by mouth Every 4 (Four) Hours As Needed., Disp: , Rfl:     busPIRone (BUSPAR) 15 MG tablet, TAKE 1 TABLET THREE TIMES A DAY FOR ANXIETY, Disp: 270 tablet, Rfl: 3    Evolocumab (Repatha SureClick) solution auto-injector SureClick injection, Inject 1 ml under the skin into the appropriate area as directed every 14 days Strength 140mg/ml, Disp: 6 mL, Rfl: 3    ibuprofen (Advil) 200 MG tablet, 2 tablets., Disp: , Rfl:     lamoTRIgine (LaMICtal)  100 MG tablet, TAKE 1 TABLET TWICE A DAY (CORRECTED PRESCRIPTION), Disp: 180 tablet, Rfl: 3    levocetirizine (Xyzal Allergy 24HR) 5 MG tablet, 1 tablet., Disp: , Rfl:     montelukast (SINGULAIR) 10 MG tablet, TAKE 1 TABLET EVERY NIGHT, Disp: 90 tablet, Rfl: 3    ondansetron ODT (ZOFRAN-ODT) 8 MG disintegrating tablet, PLACE 1 TABLET ON THE TONGUE EVERY 8 HOURS AS NEEDED FOR NAUSEA OR VOMITING, Disp: 30 tablet, Rfl: 35    Semaglutide, 2 MG/DOSE, (OZEMPIC) 8 MG/3ML solution pen-injector, Inject 2 mg under the skin into the appropriate area as directed 1 (One) Time Per Week. Compounded Semaglutide from  [facility] Vial Strength: 5mg/ml ( 5 mg semaglutide/ 0.25 mg b12)/ml Prescription dosage                                                              Amount to administer ( in ML)    /   units 0.25mg semaglutide 0.05 ml weekly 5 units 0.5 mg semaglutide 0.1ml weekly 10 units 1 mg semaglutide 0.2 ml weekly 20 units 1.5 mg semaglutide 0.3 ml weekly 30 units 2 mg semaglutide 0.4 ml weekly 40 units 2.4 mg semaglutide 0.48 ml weekly 48 units, Disp: 3 mL, Rfl: 3    Trintellix 10 MG tablet tablet, TAKE 1 TABLET DAILY WITH BREAKFAST, Disp: 90 tablet, Rfl: 3    sennosides-docusate (senna-docusate sodium) 8.6-50 MG per tablet, Take 2 tablets by mouth 2 (Two) Times a Day. (Patient not taking: Reported on 5/19/2025), Disp: 120 tablet, Rfl: 2    Tirzepatide-Weight Management (ZEPBOUND) 5 MG/0.5ML solution auto-injector, Inject 0.5 mL under the skin into the appropriate area as directed 1 (One) Time Per Week. (Patient not taking: Reported on 5/19/2025), Disp: 2 mL, Rfl: 0    Allergies   Allergen Reactions    Amoxicillin Swelling    Erythromycin GI Intolerance    Escitalopram Anxiety    Prozac  [Fluoxetine Hcl] Rash    Penicillin G Rash       Family History   Problem Relation Age of Onset    Coronary artery disease Other     Diabetes Other     Dementia Other     Diabetes Mother     Cancer Mother     Dementia Mother      Heart disease Father     Liver disease Father     Hypertension Father     Alcohol abuse Father     Cancer Brother     Breast cancer Neg Hx     Ovarian cancer Neg Hx      Cancer-related family history includes Cancer in her brother and mother. There is no history of Breast cancer or Ovarian cancer.    Social History     Tobacco Use    Smoking status: Never     Passive exposure: Past    Smokeless tobacco: Never   Vaping Use    Vaping status: Never Used   Substance Use Topics    Alcohol use: Yes     Alcohol/week: 1.0 standard drink of alcohol     Types: 1 Glasses of wine per week     Comment: rarely    Drug use: Not Currently     Frequency: 7.0 times per week     Comment: CBD  gummies     Social History     Social History Narrative    Not on file      Review of Systems:  Constitutional: Denies any weakness, fatigue, lack of appetite, excessive appetite, weight change, chills or fever.  Eyes: Reports no blurry vision, no double vision, no pain in the eyes, dry eyes or tearing.  ENT: Reports no decreased hearing capacity, ear pain or tinnitus. Denies any epistaxis, nasal congestion, mouth sores or bleeding, tooth or jaw pain, sore throat or hoarseness.  Respiratory: Denies any cough, sputum production or hemoptysis. There is no wheezing, shortness of breath or any other respiratory complaints.  Cardiovascular: Denies any chest pain, shortness of breath when lying down, shortness of breath with activity, palpitations, or leg swelling.  Breasts: Denies any lumps, bumps, pain, nipple changes or discharge in the breasts.  Gastrointestinal: There are no complaints of abdominal pain, difficulty swallowing or painful swallowing, anorexia, nausea, vomiting, diarrhea, constipation, heartburn, blood in the stools, dark stools, or change in bowel habits or hemorrhoids.  Genitourinary: Denies any pain or burning on urination, blood in the urine or frequent urination. .  Musculoskeletal: chronic painful knees, s/p left TKR (healed,  "feels much better) Denies painful joints otherwise no swelling of the joints, muscle or back pain. Denies any pain or tingling in the legs, hands or feet.  Neuropsychiatric: Denies any nervousness, depressed mood, headaches, blackouts, dizziness, weakness of limbs, loss of sensation, loss of balance, loss of coordination or difficulty in speaking.  Cutaneous: Denies any dry skin, itching, rash, change in the color of the skin, or any other cutaneous complaints.  Hematologic/Lymphatic: Denies any bruising or bleeding. Report no recent development of palpable or painful lymph nodes.  Allergy/Immunology: Denies rash/hayfever symptoms or any recent history of pneumonia, recurrent sinusitis, urinary infection or any other history of an ongoing infection.  Endocrine: Reports no intolerance to heat or cold, excessive thirst or excessive urination.    Objective:  Vital signs:  Vitals:    25 1054   BP: 107/74   Pulse: 89   Temp: 98.3 °F (36.8 °C)   TempSrc: Oral   SpO2: 97%   Weight: 86.4 kg (190 lb 6.4 oz)   Height: 149.9 cm (59.02\")   PainSc: 2    PainLoc: Knee  Comment: right     Body mass index is 38.44 kg/m².      Physical Exam:   ECO  GENERAL: The patient is a pleasant middle aged overweight female who appears their stated age and is awake, in no acute distress, alert and oriented x3.  SKIN: No rash or ulcers.   HEME/LYMPHATICS: No bruising or petechiae on visual inspection.  HEAD/FACE: atraumatic and normocephalic. Normal hair.  EYES: PERRLA/EOMI. No scleral icterus. No tearing or dry eyes.  ENT: External ears normal with no evidence of discharge. No evidence of ulceration or bleeding in the nostrils. Mouth has no ulcers, bleeding or inflammation of the gums, floor or roof of the mouth with normal dentition.  NECK: Supple, symmetric.   CHEST/RESPIRATORY: Expansion maintained bilaterally and symmetrically. On auscultation, clear breath sounds in both lungs. No wheezes, rhonchi or rales.  BREAST: " Deferred.  CARDIOVASCULAR: On auscultation, regular rate and rhythm. No murmurs, gallops or rubs are heard.  GASTROINTESTINAL/ABDOMEN: Symmetric. On auscultation of the abdomen, there are normal bowel sounds in all four quadrants.   GENITOURINARY: Deferred.  NEUROLOGIC: Alert and oriented time, person, and place, with no apparent changes in recent or remote memory. Cranial nerves II-XII are grossly intact. Sensation is maintained in the extremities. Strength and tone appear normal for age and equal in the extremities. Gait is normal.  MUSCULOSKELETAL: No cyanosis, clubbing or edema in the extremities. Pt is s/p left total knee replacement, well healed, no swelling.  PSYCHIATRIC: The patient maintains judgment and has good insight. The patient has no changes in mood or affection..    Lab Results - Last 18 Months   Lab Units 05/12/25  1048 05/01/25  1010 02/06/25  1255   WBC 10*3/mm3 5.85 5.67 5.70   HEMOGLOBIN g/dL 12.7 13.4 13.3   HEMATOCRIT % 37.3 38.3 40.9   PLATELETS 10*3/mm3 407 445 531*   MCV fL 104.8* 102.4* 110.2*     Lab Results - Last 18 Months   Lab Units 05/01/25  1010 10/30/24  0958 10/08/24  1213   SODIUM mmol/L 138 138 140   POTASSIUM mmol/L 4.1 5.1 4.5   CHLORIDE mmol/L 104 103 104   CO2 mmol/L 23.7 25.0 26.0   BUN mg/dL 21 20 14   CREATININE mg/dL 0.75 0.86 0.73   CALCIUM mg/dL 10.1 10.0 9.5   BILIRUBIN mg/dL 0.4 0.3 0.3   ALK PHOS U/L 95 91 94   ALT (SGPT) U/L 7 12 9   AST (SGOT) U/L 15 15 18   GLUCOSE mg/dL 81 72 104*       Lab Results   Component Value Date    GLUCOSE 81 05/01/2025    BUN 21 05/01/2025    CREATININE 0.75 05/01/2025    EGFRIFNONA 81 09/21/2021    EGFRIFAFRI >60 02/26/2019    BCR 28.0 (H) 05/01/2025    K 4.1 05/01/2025    CO2 23.7 05/01/2025    CALCIUM 10.1 05/01/2025    ALBUMIN 4.6 05/01/2025    AST 15 05/01/2025    ALT 7 05/01/2025       Lab Results - Last 18 Months   Lab Units 05/15/25  1307 12/18/24  1437   INR  1 1.11   APTT Second 29.4 31       Lab Results   Component Value  "Date    IRON 162 (H) 05/12/2025    TIBC 320 05/12/2025    FERRITIN 100.00 05/12/2025       Lab Results   Component Value Date    FOLATE >20.00 05/12/2025       No results found for: \"OCCULTBLD\"    Lab Results   Component Value Date    RETICCTPCT 2.19 (H) 05/12/2025     Lab Results   Component Value Date    RZKBXMBS09 610 05/01/2025     No results found for: \"SPEP\", \"UPEP\"  Uric Acid   Date Value Ref Range Status   04/19/2017 5.0 2.6 - 8.0 mg/dL Final     Lab Results   Component Value Date    SEDRATE 12 01/13/2023     No results found for: \"FIBRINOGEN\", \"HAPTOGLOBIN\"  Lab Results   Component Value Date    PTT 29.4 05/15/2025    INR 1 05/15/2025     No results found for: \"\"  No results found for: \"CEA\"  No components found for: \"CA-19-9\"  No results found for: \"PSA\"  Lab Results   Component Value Date    SEDRATE 12 01/13/2023       Assessment & Plan     59-year-old female with a history of COPD and also depression with iron overload secondary to homozygous H63D mutation for hemochromatosis.    1. Hemochromatosis: homozygous H63D   -She has a propensity towards iron overload.  However less likely to cause significant liver damage.  Ideally ferritin should be below 75 to prevent liver damage.  We will continue to monitor.   Continue phlebotomies for hematocrit >42 and ferritin > 100   Last phlebotomy was 1/12/2024  -2/6/25 labs not indicated phlebotomy  -5/12/2025 CBC with hgb 12.7/hct 37.3 with .8 (H, stable-in her range) MCH 35.7 (high-in her range) with platelets 407K, iron 162 (high) iron saturation 51% (high) with normal transferrin and TIBC, ferritin 100 (normal) folate >20, reticulocytes 2.19 (high)  -5/19/2025 Will HOLD as ferritin borderline and normal hgb/hct with surgery in 2 weeks: will schedule for RTC in 14-16 weeks with labs a few days prior.    -5/15/2025 discharge from [facility] with PT/INR PTT all within normal limits (11.5/1.0/29.4), C MP within normal limits, platelet count in their " lab showed platelets of 492K (high), UA normal, sufficient for vitamin D    Hemochromatosis therapeutic phlebotomy  Phase 2: Maintenance (preventing reaccumulation)  Pace: Therapeutic phlebotomy 400 ml each as needed, a lifelong requirement.  Goals: ferritin <100, ideally 75 with Hct <42.  -prephlebotomy fingerstick H/H +/-CBC at each visit to avoid anemia, ferritin +/- TS Q1-2 txts.  HOLD: for Hct < or = 42 defer therapy 4-8 weeks until Hct recovers.    -5/19/2025  HOLD as ferritin borderline and normal hgb/hct with surgery in 2 weeks; infusion notified: will schedule for RTC in 14-16 weeks with labs a few days prior and potential therphleb after MD appt.      2. increasing chronic macrocytosis differential includes hematochromatosis; not folate or B12 deficiency, worsening thyroid (TSH normal at 10/2024), no recent folate (but she has been on folate), vitamin B12 elevated for 1 year.    -No recent reticulocytes  -CMP done 12/2024 with no indication of renal or liver disease   -2/2025 Denies EtOH  -2/2025 Common drugs to cause macrocytosis reviewed and negative;  -5/12/2025 CBC with hgb 12.7/hct 37.3 with .8 (H, stable-in her range) MCH 35.7 (high-in her range) with platelets 407K, iron 162 (high) iron saturation 51% (high) with normal transferrin and TIBC, ferritin 100 (normal) folate >20, reticulocytes 2.19 (high)    -**still getting right total knee replacement 6/3/2025-will continue to monitor      3. Thrombocytosis, more recent: Differential includes inflammation, infection, primary bone marrow disorder  -was reactive, pt had TK replacement one month prior  -plts now WNL    We will recheck with CBC and ferritin in 3-4 months with phlebotomy if needed and follow-up (after her knee surgery)      Thank you very much for providing the opportunity to participate in this patient’s care. Please do not hesitate to call if there are any other questions.

## 2025-05-12 ENCOUNTER — LAB (OUTPATIENT)
Dept: LAB | Facility: HOSPITAL | Age: 60
End: 2025-05-12
Payer: OTHER GOVERNMENT

## 2025-05-12 DIAGNOSIS — D75.839 THROMBOCYTOSIS: ICD-10-CM

## 2025-05-12 DIAGNOSIS — D75.89 MACROCYTOSIS WITHOUT ANEMIA: ICD-10-CM

## 2025-05-12 DIAGNOSIS — E83.110 HEREDITARY HEMOCHROMATOSIS: ICD-10-CM

## 2025-05-12 LAB
BASOPHILS # BLD AUTO: 0.04 10*3/MM3 (ref 0–0.2)
BASOPHILS NFR BLD AUTO: 0.7 % (ref 0–1.5)
DEPRECATED RDW RBC AUTO: 44.5 FL (ref 37–54)
EOSINOPHIL # BLD AUTO: 0.26 10*3/MM3 (ref 0–0.4)
EOSINOPHIL NFR BLD AUTO: 4.4 % (ref 0.3–6.2)
ERYTHROCYTE [DISTWIDTH] IN BLOOD BY AUTOMATED COUNT: 12.1 % (ref 12.3–15.4)
FERRITIN SERPL-MCNC: 100 NG/ML (ref 13–150)
FOLATE SERPL-MCNC: >20 NG/ML (ref 4.78–24.2)
HCT VFR BLD AUTO: 37.3 % (ref 34–46.6)
HGB BLD-MCNC: 12.7 G/DL (ref 12–15.9)
IRON 24H UR-MRATE: 162 MCG/DL (ref 37–145)
IRON SATN MFR SERPL: 51 % (ref 20–50)
LYMPHOCYTES # BLD AUTO: 1.99 10*3/MM3 (ref 0.7–3.1)
LYMPHOCYTES NFR BLD AUTO: 34 % (ref 19.6–45.3)
MCH RBC QN AUTO: 35.7 PG (ref 26.6–33)
MCHC RBC AUTO-ENTMCNC: 34 G/DL (ref 31.5–35.7)
MCV RBC AUTO: 104.8 FL (ref 79–97)
MONOCYTES # BLD AUTO: 0.49 10*3/MM3 (ref 0.1–0.9)
MONOCYTES NFR BLD AUTO: 8.4 % (ref 5–12)
NEUTROPHILS NFR BLD AUTO: 3.07 10*3/MM3 (ref 1.7–7)
NEUTROPHILS NFR BLD AUTO: 52.5 % (ref 42.7–76)
PLATELET # BLD AUTO: 407 10*3/MM3 (ref 140–450)
PMV BLD AUTO: 8.3 FL (ref 6–12)
RBC # BLD AUTO: 3.56 10*6/MM3 (ref 3.77–5.28)
RETICS # AUTO: 0.08 10*6/MM3 (ref 0.02–0.13)
RETICS/RBC NFR AUTO: 2.19 % (ref 0.7–1.9)
TIBC SERPL-MCNC: 320 MCG/DL (ref 298–536)
TRANSFERRIN SERPL-MCNC: 215 MG/DL (ref 200–360)
WBC NRBC COR # BLD AUTO: 5.85 10*3/MM3 (ref 3.4–10.8)

## 2025-05-12 PROCEDURE — 85045 AUTOMATED RETICULOCYTE COUNT: CPT | Performed by: INTERNAL MEDICINE

## 2025-05-12 PROCEDURE — 84466 ASSAY OF TRANSFERRIN: CPT | Performed by: INTERNAL MEDICINE

## 2025-05-12 PROCEDURE — 85025 COMPLETE CBC W/AUTO DIFF WBC: CPT

## 2025-05-12 PROCEDURE — 83540 ASSAY OF IRON: CPT | Performed by: INTERNAL MEDICINE

## 2025-05-12 PROCEDURE — 82746 ASSAY OF FOLIC ACID SERUM: CPT | Performed by: INTERNAL MEDICINE

## 2025-05-12 PROCEDURE — 82728 ASSAY OF FERRITIN: CPT | Performed by: INTERNAL MEDICINE

## 2025-05-12 PROCEDURE — 36415 COLL VENOUS BLD VENIPUNCTURE: CPT

## 2025-05-19 ENCOUNTER — APPOINTMENT (OUTPATIENT)
Dept: LAB | Facility: HOSPITAL | Age: 60
End: 2025-05-19
Payer: OTHER GOVERNMENT

## 2025-05-19 ENCOUNTER — OFFICE VISIT (OUTPATIENT)
Dept: ONCOLOGY | Facility: CLINIC | Age: 60
End: 2025-05-19
Payer: OTHER GOVERNMENT

## 2025-05-19 ENCOUNTER — APPOINTMENT (OUTPATIENT)
Dept: ONCOLOGY | Facility: HOSPITAL | Age: 60
End: 2025-05-19
Payer: OTHER GOVERNMENT

## 2025-05-19 ENCOUNTER — OFFICE VISIT (OUTPATIENT)
Dept: BARIATRICS/WEIGHT MGMT | Facility: CLINIC | Age: 60
End: 2025-05-19
Payer: OTHER GOVERNMENT

## 2025-05-19 VITALS
HEIGHT: 59 IN | SYSTOLIC BLOOD PRESSURE: 107 MMHG | BODY MASS INDEX: 38.38 KG/M2 | TEMPERATURE: 98.3 F | OXYGEN SATURATION: 97 % | WEIGHT: 190.4 LBS | HEART RATE: 89 BPM | DIASTOLIC BLOOD PRESSURE: 74 MMHG

## 2025-05-19 VITALS — HEIGHT: 59 IN | WEIGHT: 186.4 LBS | BODY MASS INDEX: 37.58 KG/M2

## 2025-05-19 DIAGNOSIS — D75.89 MACROCYTOSIS WITHOUT ANEMIA: ICD-10-CM

## 2025-05-19 DIAGNOSIS — D75.839 THROMBOCYTOSIS: ICD-10-CM

## 2025-05-19 DIAGNOSIS — E66.812 OBESITY, CLASS II, BMI 35-39.9: Primary | ICD-10-CM

## 2025-05-19 DIAGNOSIS — E83.110 HEREDITARY HEMOCHROMATOSIS: Primary | ICD-10-CM

## 2025-05-19 PROCEDURE — 99214 OFFICE O/P EST MOD 30 MIN: CPT | Performed by: INTERNAL MEDICINE

## 2025-05-19 PROCEDURE — 97803 MED NUTRITION INDIV SUBSEQ: CPT | Performed by: DIETITIAN, REGISTERED

## 2025-05-19 RX ORDER — IBUPROFEN 200 MG
400 TABLET ORAL
COMMUNITY
Start: 2024-12-18

## 2025-05-19 RX ORDER — LEVOCETIRIZINE DIHYDROCHLORIDE 5 MG/1
5 TABLET, FILM COATED ORAL
COMMUNITY
Start: 2024-12-18

## 2025-05-19 NOTE — PATIENT INSTRUCTIONS
RTC in mid-late Aug with labs a few days prior, please call if any worsening anemia symptoms or noticeable bleeding/bruising in interim

## 2025-05-19 NOTE — PROGRESS NOTES
Nutrition Services    Patient Name: Merissa Yusuf  YOB: 1965  MRN: [id number]  Date of Service: 05/19/25      ICD-10-CM ICD-9-CM   1. Obesity, Class II, BMI 35-39.9  E66.812 278.00          NUTRITION ASSESSMENT - BARIATRIC SURGERY      Reason for Visit MWM continued, follow up     H&P      Past Medical History:   Diagnosis Date    Allergic     Amoxicillin    Anesthesia complication     drops B/P, and slow to arouse    Anxiety 1/31/2011    Arthritis 1/1/2003    In 40's    Cataract     Both have been corrected    Chronic constipation     Allways have had    Chronic pain disorder     Depression 1/31/2011    GERD (gastroesophageal reflux disease)     Hereditary hemochromatosis     Hyperlipidemia     Seasonal allergies        Past Surgical History:   Procedure Laterality Date    ABDOMINAL SURGERY      BLADDER SUSPENSION      BREAST BIOPSY Right     CATARACT EXTRACTION  03/2023    COLONOSCOPY N/A 08/19/2024    Procedure: COLONOSCOPY WITH BIOPSIES;  Surgeon: Rodolfo Lambert MD;  Location: [facility] ENDOSCOPY;  Service: General;  Laterality: N/A;  POST-DIVERTICULOSIS, CECAL LESION    FINGER SURGERY  2008    removed cysts from right index finger     HERNIA REPAIR      JOINT REPLACEMENT  1/14/25    Other knee to be replaced in June    REPLACEMENT TOTAL KNEE Left 01/14/2025    SUBTOTAL HYSTERECTOMY      TUBAL ABDOMINAL LIGATION      VENTRAL/INCISIONAL HERNIA REPAIR N/A 09/24/2020    Procedure: LAPAROSCOPIC VENTRAL HERNIA;  Surgeon: Reuben Molina MD;  Location: [facility] MAIN OR;  Service: General;  Laterality: N/A;  0855 TAP block    WISDOM TOOTH EXTRACTION          Previous Goals          Encounter Information        Visit Narrative     Patient reports she vomits occasionally in the morning. Patient tires to eat something bland when feel nauseous. Patient reports she has been snacking more on cookies d/t sister being in town. Patient states she is not drinking often. Encouraged patient to monitor snacking  "and increase water intake.      Patient unsure what she is going to do med wise. Message sent to clinical team d/t denial received for Zepbound.     Diet Recall:   Breakfast: toast d/t nausea  Lunch: 1/2 turkey sandwich with grapes or chips  Dinner: chicken with sides (potato)  Snacks: trail mix, cookie  Beverages: water - minimal throughout the day, half and half tea    Exercise: no current exercise. Patient had been doing PT but is getting knee surgery soon    Supplements: no MV    Self Monitoring:          Anthropometrics        Current Height, Weight Height: 149.9 cm (59\")  Weight: 84.6 kg (186 lb 6.4 oz) (05/19/25 0928)       Trending Weight Hx      Wt Readings from Last 30 Encounters:   05/19/25 0928 84.6 kg (186 lb 6.4 oz)   05/01/25 0905 83.6 kg (184 lb 4.8 oz)   04/10/25 1048 85.6 kg (188 lb 11.2 oz)   02/17/25 1305 86.3 kg (190 lb 3.2 oz)   02/11/25 1353 87.6 kg (193 lb 3.2 oz)   10/30/24 0921 90.8 kg (200 lb 3.2 oz)   10/21/24 1315 89.8 kg (198 lb)   10/15/24 1330 92.2 kg (203 lb 3.2 oz)   08/28/24 1112 92.5 kg (204 lb)   08/21/24 1408 92.8 kg (204 lb 8 oz)   08/19/24 0748 91.5 kg (201 lb 12.8 oz)   08/08/24 1057 93 kg (205 lb)   07/10/24 1302 94.3 kg (208 lb)   07/09/24 1432 94 kg (207 lb 3.2 oz)   06/19/24 1428 95.4 kg (210 lb 6.4 oz)   06/05/24 1246 94.8 kg (209 lb)   05/23/24 1324 94.7 kg (208 lb 12.8 oz)   04/25/24 0955 98 kg (216 lb)   04/18/24 0929 97.7 kg (215 lb 6.4 oz)   04/15/24 1511 98.9 kg (218 lb)   03/21/24 1059 99.2 kg (218 lb 12.8 oz)   02/26/24 1430 101 kg (222 lb)   01/29/24 1104 102 kg (223 lb 12.8 oz)   01/12/24 1319 103 kg (228 lb)   01/11/24 1002 102 kg (225 lb 3.2 oz)   10/27/23 0818 103 kg (226 lb 9.6 oz)   10/18/23 1349 103 kg (227 lb 3.2 oz)   10/09/23 1042 104 kg (228 lb 12.8 oz)   08/04/23 0847 104 kg (230 lb)   07/12/23 1332 104 kg (229 lb 12.8 oz)   06/05/23 1139 106 kg (233 lb 12.8 oz)      BMI kg/m2 Body mass index is 37.65 kg/m².       Nutrition Diagnosis         " Nutrition Dx Statement Overweight/obesity RT multifactorial biochemical, behavioral and environmental contributors to disease AEB BMI 37.63 kg/m^2         Nutrition Intervention         Nutrition Intervention Nutrition education related to diet modification and physical activity        Monitor/Evaluation        New Goals Patient to increase water intake  Patient to cut back on snacks and aim to have protein at all snacks       Total time spent with pt 15 minutes of which 15 minutes were spent on education.       Electronically signed by:  Susu Samuel RD  05/19/25 10:57 EDT

## 2025-06-02 DIAGNOSIS — F41.8 MIXED ANXIETY DEPRESSIVE DISORDER: Chronic | ICD-10-CM

## 2025-06-03 RX ORDER — BUSPIRONE HYDROCHLORIDE 15 MG/1
TABLET ORAL
Qty: 270 TABLET | Refills: 3 | Status: SHIPPED | OUTPATIENT
Start: 2025-06-03

## 2025-06-20 ENCOUNTER — OFFICE VISIT (OUTPATIENT)
Dept: BARIATRICS/WEIGHT MGMT | Facility: CLINIC | Age: 60
End: 2025-06-20
Payer: OTHER GOVERNMENT

## 2025-06-20 VITALS
OXYGEN SATURATION: 98 % | DIASTOLIC BLOOD PRESSURE: 63 MMHG | WEIGHT: 193 LBS | SYSTOLIC BLOOD PRESSURE: 96 MMHG | BODY MASS INDEX: 38.91 KG/M2 | HEIGHT: 59 IN | HEART RATE: 83 BPM

## 2025-06-20 DIAGNOSIS — Z71.3 WEIGHT LOSS COUNSELING, ENCOUNTER FOR: ICD-10-CM

## 2025-06-20 DIAGNOSIS — Z79.899 MEDICATION MANAGEMENT: ICD-10-CM

## 2025-06-20 DIAGNOSIS — E66.812 OBESITY, CLASS II, BMI 35-39.9: Primary | ICD-10-CM

## 2025-06-20 PROCEDURE — 99214 OFFICE O/P EST MOD 30 MIN: CPT | Performed by: NURSE PRACTITIONER

## 2025-06-20 RX ORDER — CELECOXIB 200 MG/1
CAPSULE ORAL
COMMUNITY
Start: 2025-06-03

## 2025-06-20 RX ORDER — PREGABALIN 75 MG/1
CAPSULE ORAL
COMMUNITY
Start: 2025-06-03

## 2025-06-20 NOTE — PROGRESS NOTES
MGK BAR SURG [facility] BARIATRIC SURGERY  [address] [place] [address]  [address] [place] [address]  Dept: [phone]  6/20/2025      Merissa Yusuf.  [id number]  [id number]  1965  female      Chief Complaint   Patient presents with    Follow-up     MWM-12       Merissa Yusuf is a 59 y.o. female with morbid obesity with co-morbidities including:  sleep apnea, HLD        The patient is here for visit 12 of medical weight management. She had a gain of 7 lbs. Body fat percentage 46%. The patient states that they are following the recommendations given by our office and dietician including a high lean protein, low carb and low fat diet. We recommended adequate fruits and vegetable intake along with limited portion sizes. Patient is working on eliminating fast foods, fried foods, sweets and soda. Merissa Yusuf has been increasing her daily water intake. She has been exercising: Physical therapy for knee replacement 2 weeks ago.  Wt Readings from Last 10 Encounters:   06/20/25 87.5 kg (193 lb)   05/19/25 86.4 kg (190 lb 6.4 oz)   05/19/25 84.6 kg (186 lb 6.4 oz)   05/01/25 83.6 kg (184 lb 4.8 oz)   04/10/25 85.6 kg (188 lb 11.2 oz)   02/17/25 86.3 kg (190 lb 3.2 oz)   02/11/25 87.6 kg (193 lb 3.2 oz)   10/30/24 90.8 kg (200 lb 3.2 oz)   10/21/24 89.8 kg (198 lb)   10/15/24 92.2 kg (203 lb 3.2 oz)     Patient states they have made positive changes including doing well since knee replacement   The patient admits to be struggling with none      Breakfast: cereal or eggs on toast  Lunch: chicken salad on crackers or 1/2 sandwich  Dinner: chicken and veggies  Snacks: ice cream or nuts or fruit or pb crackers   Exercise: PT for knee replacement     Current dose compounded semaglutide 2.4 mg weekly    Review of Systems   Constitutional:  Positive for activity change and appetite change.   Respiratory: Negative.     Cardiovascular: Negative.     Gastrointestinal: Negative.    Musculoskeletal: Negative.      Vitals:    06/20/25 1010   BP: 96/63   Pulse: 83   SpO2: 98%         Body mass index is 38.95 kg/m².    The following portions of the patient's history were reviewed and updated as appropriate: active problem list, medication list, allergies, social history, notes from last encounter  Past Medical History:   Diagnosis Date    Allergic     Amoxicillin    Anesthesia complication     drops B/P, and slow to arouse    Anxiety 1/31/2011    Arthritis 1/1/2003    In 40's    Cataract     Both have been corrected    Chronic constipation     Allways have had    Chronic pain disorder     Depression 1/31/2011    GERD (gastroesophageal reflux disease)     Hereditary hemochromatosis     Hyperlipidemia     Seasonal allergies      Past Surgical History:   Procedure Laterality Date    ABDOMINAL SURGERY      BLADDER SUSPENSION      BREAST BIOPSY Right     CATARACT EXTRACTION  03/2023    COLONOSCOPY N/A 08/19/2024    Procedure: COLONOSCOPY WITH BIOPSIES;  Surgeon: Rodolfo Lambert MD;  Location: [facility] ENDOSCOPY;  Service: General;  Laterality: N/A;  POST-DIVERTICULOSIS, CECAL LESION    FINGER SURGERY  2008    removed cysts from right index finger     HERNIA REPAIR      JOINT REPLACEMENT  1/14/25    Other knee to be replaced in June    REPLACEMENT TOTAL KNEE Left 01/14/2025    6/3/2025 right    SUBTOTAL HYSTERECTOMY      TUBAL ABDOMINAL LIGATION      VENTRAL/INCISIONAL HERNIA REPAIR N/A 09/24/2020    Procedure: LAPAROSCOPIC VENTRAL HERNIA;  Surgeon: Reuben Molina MD;  Location: [facility] MAIN OR;  Service: General;  Laterality: N/A;  0855 TAP block    WISDOM TOOTH EXTRACTION          Physical Exam  Constitutional:       Appearance: Normal appearance. She is obese.   Pulmonary:      Effort: Pulmonary effort is normal.   Abdominal:      General: Abdomen is flat.   Neurological:      General: No focal deficit present.      Mental Status: She is alert and oriented to  person, place, and time.   Psychiatric:         Mood and Affect: Mood normal.         Behavior: Behavior normal.         Thought Content: Thought content normal.         Judgment: Judgment normal.     Walking with a cane due to recent knee replacement     Discussion/Plan:  BMI 38.95, Class 2 obesity, medication management: phentermine, compounded semaglutide, zepbound    Obesity/Morbid Obesity: Currently the patient's weight is increased. Treatment plan includes prescribed diet, prescribed exercise regimen and behavior modification.     Medication options for weight loss were discussed with the pt. She has around 1 more dose of the compounded semaglutide left before running out of medication. She has been on the compounded semaglutide 2.4 mg weekly. Side effects have seemed to subside. I did prescribe zepbound for the pt at last visit since this office is no longer going to prescribe compounded semaglutide. Her insurance is requiring her to try 1 non glp-1 medication before being approved for zepbound. I spoke with the pt about trying phentermine. Pt did report she has a significant family hx of cardiac problems but none personally. She does have anxiety and is concerned that this medication will make her anxiety worse. Will prescribe phentermine 8 mg daily for the pt. If after 3 months she has not lost > 5% of body weight, can then appeal for zepbound. Instructed pt to monitor for increased anxiety on medication as well as side effects listed below. Ok to finish out the compounded semaglutide she has left and can overlap phentermine with compound by 1-2 weeks if needed to help decrease the chance of rebound weight gain when stopping the medication.      Pt denies any hx of uncontrolled HTN, tachycardia, seizures, hyperthyroidism, glaucoma or chance of pregnancy with phentermine. Pt instructed to call the office and stop the medication ( phentermine) with any chest pain, shortness of breath, seizures or chance of  pregnancy. Pt informed max duration of phentermine is 6 months due to increased risk of cardiovascular problems with long term usage.     Pt  denies any hx of multiple endocrine neoplasia type 2 or medullary thyroid cancer for herself or her immediate family with GLP-1's. Pt encouraged to call the office with any nausea/ vomiting or abdominal pain with GLP-1's. Pt also informed constipation can happen with these medications due to slowing of gut. Pt encouraged to take a stool softener/ laxative if constipation occurs.         I reviewed the appropriate dietary choices with the patient and encouraged the necessary changes. Recommended at least 70 grams of protein per day, around 35 grams of fats and less than 100 grams of carbohydrates. Reviewed calorie intake if patient wanted to calorie count and/or had BMR. Instructed patient to drink half of body weight in ounces per day and exercise a minimum of 150 minutes per week including both cardio and strength training. Discussed the option of keeping a food journal which will help patient become more aware of the nutritional value of foods .     The patient was given written materials from our office for diet plans and medications.   I answered all of the patients questions regarding dietary changes, exercise, and medications.   The patient will follow up in 1 month. The total time spent face to face was 30 minutes with 15 minutes spent counseling.        CHARLINE Quesada  [facility]

## 2025-06-22 LAB
NCCN CRITERIA FLAG: NORMAL
TYRER CUZICK SCORE: 4

## 2025-06-23 ENCOUNTER — TREATMENT (OUTPATIENT)
Dept: PHYSICAL THERAPY | Facility: CLINIC | Age: 60
End: 2025-06-23
Payer: OTHER GOVERNMENT

## 2025-06-23 DIAGNOSIS — R26.9 GAIT DISTURBANCE: ICD-10-CM

## 2025-06-23 DIAGNOSIS — Z47.1 AFTERCARE FOLLOWING RIGHT KNEE JOINT REPLACEMENT SURGERY: ICD-10-CM

## 2025-06-23 DIAGNOSIS — M25.561 ACUTE PAIN OF RIGHT KNEE: Primary | ICD-10-CM

## 2025-06-23 DIAGNOSIS — Z96.651 AFTERCARE FOLLOWING RIGHT KNEE JOINT REPLACEMENT SURGERY: ICD-10-CM

## 2025-06-23 PROCEDURE — 97110 THERAPEUTIC EXERCISES: CPT | Performed by: PHYSICAL THERAPIST

## 2025-06-23 PROCEDURE — 97112 NEUROMUSCULAR REEDUCATION: CPT | Performed by: PHYSICAL THERAPIST

## 2025-06-23 PROCEDURE — 97162 PT EVAL MOD COMPLEX 30 MIN: CPT | Performed by: PHYSICAL THERAPIST

## 2025-06-23 NOTE — PROGRESS NOTES
Physical Therapy Initial Evaluation and Plan of Care    Patient: Merissa Yusuf   : 1965  Diagnosis/ICD-10 Code:  Acute pain of right knee [M25.561]  Referring practitioner: Mario Alberto Boyce/Russell Rahman  Date of initial visit : 2025  Today's Date: 2025  Patient seen for 1  session    Visit Diagnoses:    ICD-10-CM ICD-9-CM   1. Acute pain of right knee  M25.561 719.46   2. Gait disturbance  R26.9 781.2   3. Aftercare following right knee joint replacement surgery  Z47.1 V54.81    Z96.651 V43.65        Subjective Evaluation    History of Present Illness  Mechanism of injury: Patient is a 59 year old female who presents status post right total knee replacement on 6/3/25.  Reports has home health until last week and did well with the ability to bend her knee 105 degrees at her last session.  Prior history of L TKR at the beginning of .  Goals for return to prior function w/o pain.    Subjective comment: Knee Outcome Survey - 78%  Patient Occupation: Retired Quality of life: good    Pain  Current pain ratin  At best pain ratin  At worst pain ratin  Location: Right knee  Quality: dull ache and sharp  Relieving factors: change in position, ice and medications  Aggravating factors: standing, squatting and ambulation  Progression: improved    Social Support  Lives in: multiple-level home  Lives with: spouse    Treatments  Previous treatment: physical therapy (Surgery)  Current treatment: physical therapy  Discharged from (in last 30 days): home health care  Patient Goals  Patient goals for therapy: decreased edema, decreased pain, improved balance, increased strength, independence with ADLs/IADLs and return to sport/leisure activities           Objective          Active Range of Motion     Right Knee   Flexion: 105 degrees   Extensor la degrees     Patellar Mobility     Right Knee Patellar tendons within functional limits include the medial and lateral. Hypomobile in the superior and  inferior patellar tendon(s).     Strength/Myotome Testing     Left Knee   Quadriceps contraction: good    Right Knee   Flexion: 3+  Extension: 2+  Quadriceps contraction: fair    Ambulation     Comments   Short stance phase, limited push off R LE    Functional Assessment     Comments  TUG 10.91 seconds  FTSST - unable w/o UE           Assessment & Plan       Assessment  Impairments: abnormal gait, abnormal or restricted ROM, activity intolerance, impaired balance, impaired physical strength, lacks appropriate home exercise program and pain with function   Functional limitations: carrying objects, lifting, walking and uncomfortable because of pain (Stairs, uneven ground, squatting)  Assessment details: Presents with limits in right knee ROM, EXT/FL strength of R knee, altered gait, decreased patellar mobility and decreased ability for stair navigation, squatting, uneven ground status post right total knee replacement. She would benefit from skilled PT to address the above and restore to prior function.   Prognosis: good    Goals  Plan Goals: ST. Right knee AROM improved into EXT 5 degree lag or less over 3 weeks.   2. Right knee AROM into FL improved to 113 degrees or greater over 3 weeks.   3. Independent and compliant with HEP over 3 weeks.    LT. R knee AROM improved to 0-120 degrees over 12 weeks.   2. R knee strength improved to 5-/5 both EXT/FL to allow better gait mechanics, squatting ability  and ability to navigate stairs over 12 weeks.   3. Knee Outcome Survey - 95% or greater over 12 weeks.       Plan  Therapy options: will be seen for skilled therapy services  Planned modality interventions: electrical stimulation/Russian stimulation, TENS and thermotherapy (hydrocollator packs)  Planned therapy interventions: manual therapy, neuromuscular re-education, balance/weight-bearing training, flexibility, soft tissue mobilization, functional ROM exercises, strengthening, stretching, gait training, home  exercise program, therapeutic activities and joint mobilization  Frequency: 2x week  Duration in weeks: 12  Treatment plan discussed with: patient      History # of Personal Factors and/or Comorbidities: MODERATE (1-2)  Examination of Body System(s): # of elements: MODERATE (3)  Clinical Presentation: EVOLVING  Clinical Decision Making: MODERATE       Timed:         Manual Therapy:    3     mins  44106;     Therapeutic Exercise:    15     mins  08600;     Neuromuscular Daniel:    8    mins  16460;        Un-Timed:  Mod Eval     20     Mins  25012        Timed Treatment:   26   mins   Total Treatment:     46   mins          PT SIGNATURE: Issac Dixon PT, DPT   [place] Lic #[id number]    DATE TREATMENT INITIATED: 6/23/2025    Initial Certification  Certification Period: 9/21/2025  I certify that the therapy services are furnished while this patient is under my care.  The services outlined above are required by this patient, and will be reviewed every 90 days.     PHYSICIAN: Mario Alberto Jett/MD Russell      DATE:     Please sign and return via fax to [phone].. Thank you, [facility].

## 2025-06-26 ENCOUNTER — TREATMENT (OUTPATIENT)
Dept: PHYSICAL THERAPY | Facility: CLINIC | Age: 60
End: 2025-06-26
Payer: OTHER GOVERNMENT

## 2025-06-26 DIAGNOSIS — M25.561 ACUTE PAIN OF RIGHT KNEE: Primary | ICD-10-CM

## 2025-06-26 DIAGNOSIS — R26.9 GAIT DISTURBANCE: ICD-10-CM

## 2025-06-26 DIAGNOSIS — Z96.651 AFTERCARE FOLLOWING RIGHT KNEE JOINT REPLACEMENT SURGERY: ICD-10-CM

## 2025-06-26 DIAGNOSIS — Z47.1 AFTERCARE FOLLOWING RIGHT KNEE JOINT REPLACEMENT SURGERY: ICD-10-CM

## 2025-06-26 NOTE — PROGRESS NOTES
Physical Therapy Daily Treatment Note  Visit: 2    Merissa Yusuf reports: feeling better today as she was nauseous when showing up for her visit yesterday.   YOB: 1965  Referring practitioner : Mario Alberto Boyce/Russell Henderson*  Date of Initial: Type: THERAPY  Noted: 6/23/2025  Today's date: 6/26/2025  Patient seen for 2 sessions    Visit Diagnoses:    ICD-10-CM ICD-9-CM   1. Acute pain of right knee  M25.561 719.46   2. Gait disturbance  R26.9 781.2   3. Aftercare following right knee joint replacement surgery  Z47.1 V54.81    Z96.651 V43.65       Subjective       Objective   R knee FL AROM 110 degrees end of session  See Exercise, Manual, and Modality Logs for complete treatment.       Assessment/Plan    Improving R knee ROM, active quad contraction, gait.     Plan:    Continue             Timed:         Manual Therapy:    10     mins  00589;     Therapeutic Exercise:    20     mins  51800;     Neuromuscular Daniel:    10    mins  10800;          Timed Treatment:   40   mins   Total Treatment:     40   mins         Issac Dixon PT, DPT  Physical Therapist  [place] Lic #[id number]

## 2025-06-27 ENCOUNTER — HOSPITAL ENCOUNTER (OUTPATIENT)
Dept: MAMMOGRAPHY | Facility: HOSPITAL | Age: 60
Discharge: HOME | End: 2025-06-27
Admitting: PREVENTIVE MEDICINE
Payer: OTHER GOVERNMENT

## 2025-06-27 DIAGNOSIS — Z12.31 ENCOUNTER FOR SCREENING MAMMOGRAM FOR MALIGNANT NEOPLASM OF BREAST: ICD-10-CM

## 2025-06-27 PROCEDURE — 77063 BREAST TOMOSYNTHESIS BI: CPT

## 2025-06-27 PROCEDURE — 77067 SCR MAMMO BI INCL CAD: CPT

## 2025-07-01 ENCOUNTER — TREATMENT (OUTPATIENT)
Dept: PHYSICAL THERAPY | Facility: CLINIC | Age: 60
End: 2025-07-01
Payer: OTHER GOVERNMENT

## 2025-07-01 DIAGNOSIS — R26.9 GAIT DISTURBANCE: ICD-10-CM

## 2025-07-01 DIAGNOSIS — Z47.1 AFTERCARE FOLLOWING RIGHT KNEE JOINT REPLACEMENT SURGERY: ICD-10-CM

## 2025-07-01 DIAGNOSIS — M25.561 ACUTE PAIN OF RIGHT KNEE: Primary | ICD-10-CM

## 2025-07-01 DIAGNOSIS — Z96.651 AFTERCARE FOLLOWING RIGHT KNEE JOINT REPLACEMENT SURGERY: ICD-10-CM

## 2025-07-01 NOTE — PROGRESS NOTES
Physical Therapy Daily Treatment Note  Visit: 3    Merissa Yusuf reports: no new issues today.   YOB: 1965  Referring practitioner : Mario Alberto Boyce/Russell Henderson*  Date of Initial: Type: THERAPY  Noted: 6/23/2025  Today's date: 7/1/2025  Patient seen for 3 sessions    Visit Diagnoses:    ICD-10-CM ICD-9-CM   1. Acute pain of right knee  M25.561 719.46   2. Gait disturbance  R26.9 781.2   3. Aftercare following right knee joint replacement surgery  Z47.1 V54.81    Z96.651 V43.65       Subjective       Objective   See Exercise, Manual, and Modality Logs for complete treatment.       Assessment/Plan    Some right medial knee discomfort with active quad contraction using NMES so discontinued this visit.   Progression of WB and ROM well tolerated during session with decreased R knee pain.                Timed:         Manual Therapy:    5     mins  85778;     Therapeutic Exercise:    15     mins  02437;     Neuromuscular Daniel:    10    mins  87939;    Therapeutic Activity:     8     mins  51468;           Timed Treatment:   38   mins   Total Treatment:     38   mins         Issac Dixon PT, DPT  Physical Therapist  [place] Lic #[id number]

## 2025-07-03 ENCOUNTER — TREATMENT (OUTPATIENT)
Dept: PHYSICAL THERAPY | Facility: CLINIC | Age: 60
End: 2025-07-03
Payer: OTHER GOVERNMENT

## 2025-07-03 DIAGNOSIS — M25.561 ACUTE PAIN OF RIGHT KNEE: Primary | ICD-10-CM

## 2025-07-03 DIAGNOSIS — R26.9 GAIT DISTURBANCE: ICD-10-CM

## 2025-07-03 DIAGNOSIS — Z47.1 AFTERCARE FOLLOWING RIGHT KNEE JOINT REPLACEMENT SURGERY: ICD-10-CM

## 2025-07-03 DIAGNOSIS — Z96.651 AFTERCARE FOLLOWING RIGHT KNEE JOINT REPLACEMENT SURGERY: ICD-10-CM

## 2025-07-03 NOTE — PROGRESS NOTES
Physical Therapy Daily Treatment Note  Visit: 4    Merissa Yusuf reports: sore on the inside of her right knee after last session.   YOB: 1965  Referring practitioner : Mario Alberto Boyce/Russell Henderson*  Date of Initial: Type: THERAPY  Noted: 6/23/2025  Today's date: 7/3/2025  Patient seen for 4 sessions    Visit Diagnoses:    ICD-10-CM ICD-9-CM   1. Acute pain of right knee  M25.561 719.46   2. Gait disturbance  R26.9 781.2   3. Aftercare following right knee joint replacement surgery  Z47.1 V54.81    Z96.651 V43.65       Subjective       Objective   See Exercise, Manual, and Modality Logs for complete treatment.       Assessment/Plan    More limited WB tolerance on R knee/LE per medial knee pain though improved intrasession.  ROM improving w/o increased pain in R knee.     Plan:    Continue           Timed:         Manual Therapy:    10     mins  57726;     Therapeutic Exercise:    28     mins  84964;         Timed Treatment:   38   mins   Total Treatment:     38   mins         Issac Dixon PT, DPT  Physical Therapist  [place] Lic #[id number]

## 2025-07-07 RX ORDER — EVOLOCUMAB 140 MG/ML
INJECTION, SOLUTION SUBCUTANEOUS
Qty: 6 ML | Refills: 3 | Status: SHIPPED | OUTPATIENT
Start: 2025-07-07

## 2025-07-08 ENCOUNTER — TREATMENT (OUTPATIENT)
Dept: PHYSICAL THERAPY | Facility: CLINIC | Age: 60
End: 2025-07-08
Payer: OTHER GOVERNMENT

## 2025-07-08 DIAGNOSIS — Z47.1 AFTERCARE FOLLOWING RIGHT KNEE JOINT REPLACEMENT SURGERY: ICD-10-CM

## 2025-07-08 DIAGNOSIS — Z96.651 AFTERCARE FOLLOWING RIGHT KNEE JOINT REPLACEMENT SURGERY: ICD-10-CM

## 2025-07-08 DIAGNOSIS — M25.561 ACUTE PAIN OF RIGHT KNEE: Primary | ICD-10-CM

## 2025-07-08 DIAGNOSIS — R26.9 GAIT DISTURBANCE: ICD-10-CM

## 2025-07-08 NOTE — PROGRESS NOTES
Physical Therapy Daily Treatment Note  Visit: 5    Merissa Yusuf reports: still some soreness on the inside of her right knee but improved from last visit.   YOB: 1965  Referring practitioner : Mario Alberto Boyce/Russell Henderson*  Date of Initial: Type: THERAPY  Noted: 6/23/2025  Today's date: 7/8/2025  Patient seen for 5 sessions    Visit Diagnoses:    ICD-10-CM ICD-9-CM   1. Acute pain of right knee  M25.561 719.46   2. Gait disturbance  R26.9 781.2   3. Aftercare following right knee joint replacement surgery  Z47.1 V54.81    Z96.651 V43.65       Subjective       Objective   See Exercise, Manual, and Modality Logs for complete treatment.       Assessment/Plan    Improved weightbearing tolerance with R LE including squatting on [employer] gym.  Good HEP compliance.  To begin pool exercise if cleared per incision healing from MD.     Plan:    Continue           Timed:         Manual Therapy:    12     mins  73417;     Therapeutic Exercise:    20     mins  34022;     Therapeutic Activity:     8     mins  92180;         Timed Treatment:   40   mins   Total Treatment:     40   mins         Issac Dixon PT, DPT  Physical Therapist  [place] Lic #[id number]

## 2025-07-10 ENCOUNTER — TREATMENT (OUTPATIENT)
Dept: PHYSICAL THERAPY | Facility: CLINIC | Age: 60
End: 2025-07-10
Payer: OTHER GOVERNMENT

## 2025-07-10 DIAGNOSIS — Z96.651 AFTERCARE FOLLOWING RIGHT KNEE JOINT REPLACEMENT SURGERY: ICD-10-CM

## 2025-07-10 DIAGNOSIS — M25.561 ACUTE PAIN OF RIGHT KNEE: Primary | ICD-10-CM

## 2025-07-10 DIAGNOSIS — Z47.1 AFTERCARE FOLLOWING RIGHT KNEE JOINT REPLACEMENT SURGERY: ICD-10-CM

## 2025-07-10 DIAGNOSIS — R26.9 GAIT DISTURBANCE: ICD-10-CM

## 2025-07-10 NOTE — PROGRESS NOTES
Physical Therapy Daily Treatment Note  Visit: 6    Merissa Yusuf reports: still painful on the inside of her right knee but less than most recent visit.   YOB: 1965  Referring practitioner : Mario Alberto Boyce/Russell Henderson*  Date of Initial: Type: THERAPY  Noted: 6/23/2025  Today's date: 7/10/2025  Patient seen for 6 sessions    Visit Diagnoses:    ICD-10-CM ICD-9-CM   1. Acute pain of right knee  M25.561 719.46   2. Aftercare following right knee joint replacement surgery  Z47.1 V54.81    Z96.651 V43.65   3. Gait disturbance  R26.9 781.2       Subjective       Objective   R knee AROM 9-112 degrees end of session    See Exercise, Manual, and Modality Logs for complete treatment.       Assessment/Plan    Improving R knee ROM and pain end of session.     Plan:    Continue           Timed:         Manual Therapy:    11     mins  24087;     Therapeutic Exercise:    20     mins  94207;     Therapeutic Activity:     8     mins  98676;           Un-Timed:  Electrical Stimulation:    15     mins  25531 ( );        Timed Treatment:   39   mins   Total Treatment:     54   mins         Issac Dixon, PT, DPT  Physical Therapist  [place] Lic #[id number]

## 2025-07-17 ENCOUNTER — TREATMENT (OUTPATIENT)
Dept: PHYSICAL THERAPY | Facility: CLINIC | Age: 60
End: 2025-07-17
Payer: OTHER GOVERNMENT

## 2025-07-17 DIAGNOSIS — Z96.651 AFTERCARE FOLLOWING RIGHT KNEE JOINT REPLACEMENT SURGERY: ICD-10-CM

## 2025-07-17 DIAGNOSIS — R26.9 GAIT DISTURBANCE: ICD-10-CM

## 2025-07-17 DIAGNOSIS — Z47.1 AFTERCARE FOLLOWING RIGHT KNEE JOINT REPLACEMENT SURGERY: ICD-10-CM

## 2025-07-17 DIAGNOSIS — M25.561 ACUTE PAIN OF RIGHT KNEE: Primary | ICD-10-CM

## 2025-07-17 NOTE — PROGRESS NOTES
Physical Therapy Daily Treatment Note  Visit: 7    Merissa Yusuf reports: had allergy testing on Monday and recommended not sweating until results read so cancelled that day's PT visit.   YOB: 1965  Referring practitioner : Mario Alberto Boyce/Russell Henderson*  Date of Initial: Type: THERAPY  Noted: 6/23/2025  Today's date: 7/17/2025  Patient seen for 7 sessions    Visit Diagnoses:    ICD-10-CM ICD-9-CM   1. Acute pain of right knee  M25.561 719.46   2. Aftercare following right knee joint replacement surgery  Z47.1 V54.81    Z96.651 V43.65   3. Gait disturbance  R26.9 781.2       Subjective       Objective   See Exercise, Manual, and Modality Logs for complete treatment.       Assessment/Plan    Still some R medial knee pain though improved from most recent visit.  R active quad contraction, squatting/stair ascent ability improving.       Plan:    Continue           Timed:         Manual Therapy:    10     mins  64136;     Therapeutic Exercise:    21     mins  33185;     Therapeutic Activity:     10     mins  21881;           Timed Treatment:   41   mins   Total Treatment:     41   mins         Issac Dixno, PT, DPT  Physical Therapist  [place] Lic #[id number]

## 2025-07-18 ENCOUNTER — PATIENT MESSAGE (OUTPATIENT)
Dept: BARIATRICS/WEIGHT MGMT | Facility: CLINIC | Age: 60
End: 2025-07-18
Payer: OTHER GOVERNMENT

## 2025-07-22 ENCOUNTER — TREATMENT (OUTPATIENT)
Dept: PHYSICAL THERAPY | Facility: CLINIC | Age: 60
End: 2025-07-22
Payer: OTHER GOVERNMENT

## 2025-07-22 DIAGNOSIS — M25.561 ACUTE PAIN OF RIGHT KNEE: Primary | ICD-10-CM

## 2025-07-22 DIAGNOSIS — R26.9 GAIT DISTURBANCE: ICD-10-CM

## 2025-07-22 DIAGNOSIS — Z96.651 AFTERCARE FOLLOWING RIGHT KNEE JOINT REPLACEMENT SURGERY: ICD-10-CM

## 2025-07-22 DIAGNOSIS — Z47.1 AFTERCARE FOLLOWING RIGHT KNEE JOINT REPLACEMENT SURGERY: ICD-10-CM

## 2025-07-22 NOTE — PROGRESS NOTES
Physical Therapy Daily Treatment Note  Visit: 8    Merissa Yusuf reports: no new issues today.   YOB: 1965  Referring practitioner : Mario Alberto Boyce/Russell Henderson*  Date of Initial: Type: THERAPY  Noted: 6/23/2025  Today's date: 7/22/2025  Patient seen for 8 sessions    Visit Diagnoses:    ICD-10-CM ICD-9-CM   1. Acute pain of right knee  M25.561 719.46   2. Aftercare following right knee joint replacement surgery  Z47.1 V54.81    Z96.651 V43.65   3. Gait disturbance  R26.9 781.2       Subjective       Objective   R  knee AROM  beginning - 2-120 end of visit  See Exercise, Manual, and Modality Logs for complete treatment.       Assessment/Plan    Improved R knee ROM, decreased medial knee pain with better tolerance to weightbearing today.     Plan:    Continue         Timed:         Manual Therapy:    10     mins  47506;     Therapeutic Exercise:    20     mins  38797;     Therapeutic Activity:     10     mins  37244;           Timed Treatment:   40   mins   Total Treatment:     40   mins         Issac Dixon PT, DPT  Physical Therapist  [place] Lic #[id number]

## 2025-07-28 ENCOUNTER — TELEPHONE (OUTPATIENT)
Dept: BARIATRICS/WEIGHT MGMT | Facility: CLINIC | Age: 60
End: 2025-07-28

## 2025-07-28 ENCOUNTER — OFFICE VISIT (OUTPATIENT)
Dept: BARIATRICS/WEIGHT MGMT | Facility: CLINIC | Age: 60
End: 2025-07-28
Payer: OTHER GOVERNMENT

## 2025-07-28 VITALS
RESPIRATION RATE: 18 BRPM | BODY MASS INDEX: 39.76 KG/M2 | WEIGHT: 197.2 LBS | HEART RATE: 80 BPM | DIASTOLIC BLOOD PRESSURE: 72 MMHG | OXYGEN SATURATION: 97 % | HEIGHT: 59 IN | SYSTOLIC BLOOD PRESSURE: 111 MMHG

## 2025-07-28 DIAGNOSIS — E66.812 OBESITY, CLASS II, BMI 35-39.9: Primary | ICD-10-CM

## 2025-07-28 DIAGNOSIS — Z79.899 MEDICATION MANAGEMENT: ICD-10-CM

## 2025-07-28 PROCEDURE — 99213 OFFICE O/P EST LOW 20 MIN: CPT | Performed by: NURSE PRACTITIONER

## 2025-07-28 RX ORDER — ONDANSETRON 4 MG/1
TABLET, FILM COATED ORAL
COMMUNITY
Start: 2025-06-03

## 2025-07-28 RX ORDER — OXYCODONE HYDROCHLORIDE 5 MG/1
TABLET ORAL
COMMUNITY
Start: 2025-06-03 | End: 2025-07-28

## 2025-07-28 RX ORDER — TRAMADOL HYDROCHLORIDE 50 MG/1
TABLET ORAL
COMMUNITY
Start: 2025-06-03 | End: 2025-07-28

## 2025-07-28 RX ORDER — CLINDAMYCIN HYDROCHLORIDE 150 MG/1
600 CAPSULE ORAL
COMMUNITY
Start: 2025-06-18

## 2025-07-28 RX ORDER — CEFADROXIL 500 MG/1
CAPSULE ORAL
COMMUNITY
Start: 2025-06-03 | End: 2025-07-28

## 2025-07-28 RX ORDER — TRANEXAMIC ACID 650 MG/1
TABLET ORAL
COMMUNITY
Start: 2025-06-03

## 2025-07-28 RX ORDER — HYDROXYZINE HYDROCHLORIDE 10 MG/1
TABLET, FILM COATED ORAL
COMMUNITY
Start: 2025-07-14

## 2025-07-28 NOTE — PROGRESS NOTES
[facility]  [address] [place] [address]  [address] [place] [address]  Dept: [phone]  7/28/2025      Merissa Yusuf.  [id number]  [id number]  1965  female      Chief Complaint   Patient presents with    Weight Loss     MWM 13       Merissa Yusuf is a 60 y.o. female with morbid obesity with co-morbidities including: HLD        The patient is here for visit 13 of medical weight management. She had a gain of 4 lbs. Body fat percentage 48.9%. The patient states that they are following the recommendations given by our office and dietician including a high lean protein, low carb and low fat diet. We recommended adequate fruits and vegetable intake along with limited portion sizes. Patient is working on eliminating fast foods, fried foods, sweets and soda. Merissa Yusuf has been increasing her daily water intake. She has been exercising: doing physical therapy for her knees 5 days a week.  Wt Readings from Last 10 Encounters:   07/28/25 89.4 kg (197 lb 3.2 oz)   06/20/25 87.5 kg (193 lb)   05/19/25 86.4 kg (190 lb 6.4 oz)   05/19/25 84.6 kg (186 lb 6.4 oz)   05/01/25 83.6 kg (184 lb 4.8 oz)   04/10/25 85.6 kg (188 lb 11.2 oz)   02/17/25 86.3 kg (190 lb 3.2 oz)   02/11/25 87.6 kg (193 lb 3.2 oz)   10/30/24 90.8 kg (200 lb 3.2 oz)   10/21/24 89.8 kg (198 lb)     Patient states they have made positive changes including none  The patient admits to be struggling with not being able to tolerate phentermine, increased hunger with phentermine       Racing thoughts, trouble sleeping, trouble concentrating, increased hunger - phentermine 8 mg weekly     Having more hunger since stopped compounded semaglutide   Breakfast: eggs and toast   Lunch: sandwich   Dinner: chicken and veggies  Snacks: ice cream, cookies, fruit, cheese   Drinks: water, diet soda   Doing physical therapy for her knees 5 days a week         Review of  Systems   Constitutional:  Positive for activity change and appetite change.        Insomnia    Respiratory: Negative.     Cardiovascular: Negative.    Gastrointestinal: Negative.    Musculoskeletal:         Walking with a cane, bilateral knee replacements      Vitals:    07/28/25 1052   BP: 111/72   Pulse: 80   Resp: 18   SpO2: 97%         Body mass index is 39.83 kg/m².    The following portions of the patient's history were reviewed and updated as appropriate: active problem list, medication list, allergies, social history, notes from last encounter  Past Medical History:   Diagnosis Date    Allergic     Amoxicillin    Anesthesia complication     drops B/P, and slow to arouse    Anxiety 1/31/2011    Arthritis 1/1/2003    In 40's    Cataract     Both have been corrected    Chronic constipation     Allways have had    Chronic pain disorder     Depression 1/31/2011    GERD (gastroesophageal reflux disease)     Hereditary hemochromatosis     Hyperlipidemia     Seasonal allergies      Past Surgical History:   Procedure Laterality Date    ABDOMINAL SURGERY      BLADDER SUSPENSION      BREAST BIOPSY Right     CATARACT EXTRACTION  03/2023    COLONOSCOPY N/A 08/19/2024    Procedure: COLONOSCOPY WITH BIOPSIES;  Surgeon: Rodolfo Lambert MD;  Location: [facility] ENDOSCOPY;  Service: General;  Laterality: N/A;  POST-DIVERTICULOSIS, CECAL LESION    FINGER SURGERY  2008    removed cysts from right index finger     HERNIA REPAIR      JOINT REPLACEMENT  1/14/25    Other knee to be replaced in June    REPLACEMENT TOTAL KNEE Left 01/14/2025    6/3/2025 right    SUBTOTAL HYSTERECTOMY      TUBAL ABDOMINAL LIGATION      VENTRAL/INCISIONAL HERNIA REPAIR N/A 09/24/2020    Procedure: LAPAROSCOPIC VENTRAL HERNIA;  Surgeon: Reuben Molina MD;  Location: [facility] MAIN OR;  Service: General;  Laterality: N/A;  [address] T[address] block    WISDOM TOOTH EXTRACTION          Physical Exam  Constitutional:       Appearance: Normal appearance. She is  obese.   Pulmonary:      Effort: Pulmonary effort is normal.   Abdominal:      General: Abdomen is flat.   Neurological:      General: No focal deficit present.      Mental Status: She is alert and oriented to person, place, and time.   Psychiatric:         Mood and Affect: Mood normal.         Behavior: Behavior normal.         Thought Content: Thought content normal.         Judgment: Judgment normal.         Discussion/Plan:  BMI 39.83, Class 2 obesity, medication management: zepbound, phentermine     Obesity/Morbid Obesity: Currently the patient's weight is increased. Treatment plan includes prescribed diet, prescribed exercise regimen and behavior modification.     Medication options for weight loss were discussed with the pt. She took the lowest dose of phentermine ( 8 mg daily) for a few weeks but was not able to tolerate it. States it made he more hungry, she had insomnia and also racing thoughts and jitteriness. She stopped the medication a few weeks ago. She has noticed some weight gain since stopping this as well as the compounded semaglutide. Will attempt to appeal zepbound denial now that pt is not able to tolerate phentermine. Pt did states if zepbound is not approved, would like to start with [facility] paying out of pocket for the medication. Plan to follow up in 1 month for med check/ MWM. Will start at the 2.5 mg dosage since it has been several weeks since the pt last took compounded semaglutide.      Pt denies any hx of uncontrolled HTN, tachycardia, seizures, hyperthyroidism, glaucoma or chance of pregnancy with phentermine. Pt instructed to call the office and stop the medication ( phentermine) with any chest pain, shortness of breath, seizures or chance of pregnancy. Pt informed max duration of phentermine is 6 months due to increased risk of cardiovascular problems with long term usage.     Pt  denies any hx of multiple endocrine neoplasia type 2 or medullary thyroid cancer for herself or  her immediate family with GLP-1's. Pt encouraged to call the office with any nausea/ vomiting or abdominal pain with GLP-1's. Pt also informed constipation can happen with these medications due to slowing of gut. Pt encouraged to take a stool softener/ laxative if constipation occurs.         I reviewed the appropriate dietary choices with the patient and encouraged the necessary changes. Recommended at least 70 grams of protein per day, around 35 grams of fats and less than 100 grams of carbohydrates. Reviewed calorie intake if patient wanted to calorie count and/or had BMR. Instructed patient to drink half of body weight in ounces per day and exercise a minimum of 150 minutes per week including both cardio and strength training. Discussed the option of keeping a food journal which will help patient become more aware of the nutritional value of foods .     The patient was given written materials from our office for diet plans and medications.   I answered all of the patients questions regarding dietary changes, exercise, and medications.   The patient will follow up in 1 month. The total time spent face to face was 20 minutes with 15 minutes spent counseling.      CHARLINE Quesada  [facility]

## 2025-07-28 NOTE — TELEPHONE ENCOUNTER
Ok I switched the pharmacy. New rx for zepbound 2.5 mg weekly [facility]

## 2025-07-29 ENCOUNTER — TREATMENT (OUTPATIENT)
Dept: PHYSICAL THERAPY | Facility: CLINIC | Age: 60
End: 2025-07-29
Payer: OTHER GOVERNMENT

## 2025-07-29 DIAGNOSIS — R26.9 GAIT DISTURBANCE: ICD-10-CM

## 2025-07-29 DIAGNOSIS — M25.561 ACUTE PAIN OF RIGHT KNEE: Primary | ICD-10-CM

## 2025-07-29 DIAGNOSIS — Z96.651 AFTERCARE FOLLOWING RIGHT KNEE JOINT REPLACEMENT SURGERY: ICD-10-CM

## 2025-07-29 DIAGNOSIS — Z47.1 AFTERCARE FOLLOWING RIGHT KNEE JOINT REPLACEMENT SURGERY: ICD-10-CM

## 2025-07-29 NOTE — PROGRESS NOTES
Re-Assessment / Progress Note    Patient: Merissa Yusuf   : 1965  Diagnosis/ICD-10 Code:  Acute pain of right knee [M25.561]  Referring practitioner: Mario Alberto Boyce/Russell Henderson*  Date of Initial Visit: Episode Type: THERAPY  Noted: 2025    Today's Date: 2025  Patient seen for 9 sessions.      Subjective:   Merissa Yusuf reports: better overall but still some soreness on the inside of her right knee.  Reports it feels as if her right knee may give at times but infrequently.   Subjective Questionnaire: Knee Outcome Survey - to complete next visit  Clinical Progress: improved  Home Program Compliance: Yes  Treatment has included: therapeutic exercise, neuromuscular re-education, manual therapy, therapeutic activity, gait training, electrical stimulation, moist heat, and cryotherapy    Subjective   Objective          Active Range of Motion     Right Knee   Flexion: 112 degrees   Extensor la degrees with pain    Strength/Myotome Testing     Right Knee   Flexion: 4+  Extension: 4+ (Painful)  Quadriceps contraction: fair    Ambulation     Comments   Slight decrease stance time and push off R LE, decreased arm swing and trunk rotation bilaterally    Assessment & Plan       Assessment  Impairments: abnormal gait, abnormal or restricted ROM, activity intolerance, impaired physical strength, lacks appropriate home exercise program and pain with function   Functional limitations: carrying objects, lifting, walking, pulling, pushing and uncomfortable because of pain (Squatting, stairs, kneeling)  Assessment details: Presents with continued but improved limits in R knee ROM, strength, gait mechanics and ability for walking/standing and navigating stairs. Limited progression the past 1.5 weeks per increased R medial knee pain but has improved with resumption of progression of WB, balance/strength activity.   Prognosis: good    Plan  Therapy options: will be seen for skilled therapy services  Planned therapy  interventions: As appropriate.  Frequency: 2x week  Duration in weeks: 4  Treatment plan discussed with: patient  Progress toward previous goals: All met or progressed towards     Goals    Short-term goals (STG):   1. Right knee AROM improved into EXT 5 degree lag or less over 3 weeks. - Met  2. Right knee AROM into FL improved to 113 degrees or greater over 3 weeks. - Met  3. Independent and compliant with HEP over 3 weeks. - Met        Long-term goals (LTG):   1. R knee AROM improved to 0-120 degrees over 12 weeks.  - Progressed towards   2. R knee strength improved to 5-/5 both EXT/FL to allow better gait mechanics, squatting ability  and ability to navigate stairs over 12 weeks. - Progressed towards  3. Knee Outcome Survey - 95% or greater over 12 weeks. - Progressed towards            Recommendations: Continue with recommendations to continue PT to meet LTG and prior function  Timeframe: 1 month  Prognosis to achieve goals: good    PT Signature: Issac Dixon, PT, DPT          Based upon review of the patient's progress and continued therapy plan, it is my medical opinion that Merissa Yusuf should continue physical therapy treatment at [facility]  [address] [place] [address] [phone].        Timed:         Manual Therapy:    12     mins  [id number];     Therapeutic Exercise:    20    mins  [id number];     Therapeutic Activity:     10     mins  [id number];           Timed Treatment:   42   mins   Total Treatment:     42   mins

## 2025-08-01 ENCOUNTER — TELEPHONE (OUTPATIENT)
Dept: ONCOLOGY | Facility: CLINIC | Age: 60
End: 2025-08-01
Payer: OTHER GOVERNMENT

## 2025-08-05 ENCOUNTER — OFFICE VISIT (OUTPATIENT)
Dept: CARDIOLOGY | Facility: CLINIC | Age: 60
End: 2025-08-05
Payer: OTHER GOVERNMENT

## 2025-08-05 VITALS
DIASTOLIC BLOOD PRESSURE: 82 MMHG | SYSTOLIC BLOOD PRESSURE: 121 MMHG | HEART RATE: 79 BPM | RESPIRATION RATE: 16 BRPM | BODY MASS INDEX: 41.53 KG/M2 | HEIGHT: 59 IN | OXYGEN SATURATION: 98 % | WEIGHT: 206 LBS

## 2025-08-05 DIAGNOSIS — E78.5 HYPERLIPIDEMIA LDL GOAL <100: Primary | ICD-10-CM

## 2025-08-05 DIAGNOSIS — E78.2 MIXED HYPERLIPIDEMIA: ICD-10-CM

## 2025-08-05 DIAGNOSIS — E83.119 HEMOCHROMATOSIS, UNSPECIFIED HEMOCHROMATOSIS TYPE: ICD-10-CM

## 2025-08-05 DIAGNOSIS — R03.0 ELEVATED BLOOD PRESSURE READING WITHOUT DIAGNOSIS OF HYPERTENSION: ICD-10-CM

## 2025-08-05 RX ORDER — CELECOXIB 200 MG/1
200 CAPSULE ORAL DAILY
COMMUNITY
Start: 2025-07-30

## 2025-08-11 ENCOUNTER — LAB (OUTPATIENT)
Dept: LAB | Facility: HOSPITAL | Age: 60
End: 2025-08-11
Payer: OTHER GOVERNMENT

## 2025-08-11 DIAGNOSIS — D64.9 ANEMIA, UNSPECIFIED TYPE: ICD-10-CM

## 2025-08-11 DIAGNOSIS — E83.110 HEREDITARY HEMOCHROMATOSIS: Primary | ICD-10-CM

## 2025-08-11 LAB
BASOPHILS # BLD AUTO: 0.04 10*3/MM3 (ref 0–0.2)
BASOPHILS NFR BLD AUTO: 0.6 % (ref 0–1.5)
DEPRECATED RDW RBC AUTO: 48.3 FL (ref 37–54)
EOSINOPHIL # BLD AUTO: 0.17 10*3/MM3 (ref 0–0.4)
EOSINOPHIL NFR BLD AUTO: 2.6 % (ref 0.3–6.2)
ERYTHROCYTE [DISTWIDTH] IN BLOOD BY AUTOMATED COUNT: 12 % (ref 12.3–15.4)
FERRITIN SERPL-MCNC: 49.2 NG/ML (ref 13–150)
HCT VFR BLD AUTO: 39.5 % (ref 34–46.6)
HGB BLD-MCNC: 12.9 G/DL (ref 12–15.9)
IMM GRANULOCYTES # BLD AUTO: 0.04 10*3/MM3 (ref 0–0.05)
IMM GRANULOCYTES NFR BLD AUTO: 0.6 % (ref 0–0.5)
IRON 24H UR-MRATE: 140 MCG/DL (ref 37–145)
IRON SATN MFR SERPL: 40 % (ref 20–50)
LYMPHOCYTES # BLD AUTO: 2.18 10*3/MM3 (ref 0.7–3.1)
LYMPHOCYTES NFR BLD AUTO: 33 % (ref 19.6–45.3)
MCH RBC QN AUTO: 35 PG (ref 26.6–33)
MCHC RBC AUTO-ENTMCNC: 32.7 G/DL (ref 31.5–35.7)
MCV RBC AUTO: 107 FL (ref 79–97)
MONOCYTES # BLD AUTO: 0.5 10*3/MM3 (ref 0.1–0.9)
MONOCYTES NFR BLD AUTO: 7.6 % (ref 5–12)
NEUTROPHILS NFR BLD AUTO: 3.67 10*3/MM3 (ref 1.7–7)
NEUTROPHILS NFR BLD AUTO: 55.6 % (ref 42.7–76)
PLATELET # BLD AUTO: 391 10*3/MM3 (ref 140–450)
PMV BLD AUTO: 8.3 FL (ref 6–12)
RBC # BLD AUTO: 3.69 10*6/MM3 (ref 3.77–5.28)
RETICS # AUTO: 0.07 10*6/MM3 (ref 0.02–0.13)
RETICS/RBC NFR AUTO: 1.86 % (ref 0.7–1.9)
TIBC SERPL-MCNC: 346 MCG/DL (ref 298–536)
TRANSFERRIN SERPL-MCNC: 232 MG/DL (ref 200–360)
WBC NRBC COR # BLD AUTO: 6.6 10*3/MM3 (ref 3.4–10.8)

## 2025-08-11 PROCEDURE — 82728 ASSAY OF FERRITIN: CPT | Performed by: STUDENT IN AN ORGANIZED HEALTH CARE EDUCATION/TRAINING PROGRAM

## 2025-08-11 PROCEDURE — 85025 COMPLETE CBC W/AUTO DIFF WBC: CPT

## 2025-08-11 PROCEDURE — 36415 COLL VENOUS BLD VENIPUNCTURE: CPT

## 2025-08-11 PROCEDURE — 84466 ASSAY OF TRANSFERRIN: CPT | Performed by: STUDENT IN AN ORGANIZED HEALTH CARE EDUCATION/TRAINING PROGRAM

## 2025-08-11 PROCEDURE — 83540 ASSAY OF IRON: CPT | Performed by: STUDENT IN AN ORGANIZED HEALTH CARE EDUCATION/TRAINING PROGRAM

## 2025-08-11 PROCEDURE — 85045 AUTOMATED RETICULOCYTE COUNT: CPT | Performed by: STUDENT IN AN ORGANIZED HEALTH CARE EDUCATION/TRAINING PROGRAM

## 2025-08-18 ENCOUNTER — TELEPHONE (OUTPATIENT)
Dept: ONCOLOGY | Facility: CLINIC | Age: 60
End: 2025-08-18
Payer: OTHER GOVERNMENT

## 2025-08-24 PROBLEM — Z96.651 HISTORY OF TOTAL RIGHT KNEE REPLACEMENT: Status: ACTIVE | Noted: 2025-06-18

## 2025-08-24 PROBLEM — L29.9 PRURITUS: Status: ACTIVE | Noted: 2025-07-14

## 2025-08-24 PROBLEM — F39 MOOD DISORDER: Status: ACTIVE | Noted: 2025-08-24

## 2025-08-25 ENCOUNTER — OFFICE VISIT (OUTPATIENT)
Dept: FAMILY MEDICINE CLINIC | Facility: CLINIC | Age: 60
End: 2025-08-25
Payer: OTHER GOVERNMENT

## 2025-08-25 VITALS
TEMPERATURE: 96.6 F | OXYGEN SATURATION: 97 % | BODY MASS INDEX: 41.69 KG/M2 | HEART RATE: 73 BPM | DIASTOLIC BLOOD PRESSURE: 88 MMHG | WEIGHT: 206.8 LBS | SYSTOLIC BLOOD PRESSURE: 127 MMHG | HEIGHT: 59 IN

## 2025-08-25 DIAGNOSIS — F33.1 MODERATE EPISODE OF RECURRENT MAJOR DEPRESSIVE DISORDER: Chronic | ICD-10-CM

## 2025-08-25 DIAGNOSIS — R03.0 ELEVATED BLOOD PRESSURE READING WITHOUT DIAGNOSIS OF HYPERTENSION: ICD-10-CM

## 2025-08-25 DIAGNOSIS — E66.813 CLASS 3 SEVERE OBESITY DUE TO EXCESS CALORIES WITH SERIOUS COMORBIDITY AND BODY MASS INDEX (BMI) OF 40.0 TO 44.9 IN ADULT: ICD-10-CM

## 2025-08-25 DIAGNOSIS — M25.449 SWELLING OF FINGER JOINT, UNSPECIFIED LATERALITY: Primary | ICD-10-CM

## 2025-08-25 PROCEDURE — 90714 TD VACC NO PRESV 7 YRS+ IM: CPT | Performed by: PREVENTIVE MEDICINE

## 2025-08-25 PROCEDURE — 99214 OFFICE O/P EST MOD 30 MIN: CPT | Performed by: PREVENTIVE MEDICINE

## 2025-08-25 PROCEDURE — 90471 IMMUNIZATION ADMIN: CPT | Performed by: PREVENTIVE MEDICINE

## 2025-08-25 RX ORDER — CLINDAMYCIN HYDROCHLORIDE 150 MG/1
150 CAPSULE ORAL 3 TIMES DAILY
Qty: 30 CAPSULE | Refills: 0 | Status: SHIPPED | OUTPATIENT
Start: 2025-08-25

## 2025-08-28 ENCOUNTER — OFFICE VISIT (OUTPATIENT)
Dept: BARIATRICS/WEIGHT MGMT | Facility: CLINIC | Age: 60
End: 2025-08-28
Payer: OTHER GOVERNMENT

## 2025-08-28 VITALS
DIASTOLIC BLOOD PRESSURE: 78 MMHG | SYSTOLIC BLOOD PRESSURE: 114 MMHG | HEART RATE: 74 BPM | BODY MASS INDEX: 40.92 KG/M2 | HEIGHT: 59 IN | RESPIRATION RATE: 18 BRPM | WEIGHT: 203 LBS | OXYGEN SATURATION: 98 %

## 2025-08-28 DIAGNOSIS — E66.812 OBESITY, CLASS II, BMI 35-39.9: Primary | ICD-10-CM

## 2025-08-28 PROCEDURE — 99213 OFFICE O/P EST LOW 20 MIN: CPT | Performed by: SURGERY

## 2025-08-28 RX ORDER — TIRZEPATIDE 5 MG/.5ML
5 INJECTION, SOLUTION SUBCUTANEOUS WEEKLY
Qty: 0.5 ML | Refills: 2 | Status: SHIPPED | OUTPATIENT
Start: 2025-08-28

## (undated) DEVICE — GENERAL LAPAROSCOPY CDS: Brand: MEDLINE INDUSTRIES, INC.

## (undated) DEVICE — ENDOPATH PNEUMONEEDLE INSUFFLATION NEEDLES WITH LUER LOCK CONNECTORS 120MM: Brand: ENDOPATH

## (undated) DEVICE — GLV SURG DERMASSURE GRN LF PF 8.5

## (undated) DEVICE — PASS SUT PRO BARIATRIC XL W/TROC SWABS

## (undated) DEVICE — GOWN,REINFRCE,POLY,SIRUS,BREATH SLV,XXLG: Brand: MEDLINE

## (undated) DEVICE — SINGLE-USE BIOPSY FORCEPS: Brand: RADIAL JAW 4

## (undated) DEVICE — SOL NACL 0.9PCT 1000ML

## (undated) DEVICE — KT SURG TURNOVER 050

## (undated) DEVICE — SUT ETHIB 0/0 MO6 I8IN CX45D

## (undated) DEVICE — ENDOPATH XCEL WITH OPTIVIEW TECHNOLOGY UNIVERSAL TROCAR STABILITY SLEEVES: Brand: ENDOPATH XCEL OPTIVIEW

## (undated) DEVICE — ENDOPATH XCEL BLADELESS TROCARS WITH STABILITY SLEEVES: Brand: ENDOPATH XCEL

## (undated) DEVICE — TBG LAP CONN MEGADYNE EVAC SMOKE STRL

## (undated) DEVICE — 3M™ WARMING BLANKET, UPPER BODY, 10 PER CASE, 42268: Brand: BAIR HUGGER™

## (undated) DEVICE — PK ENDO GI 50

## (undated) DEVICE — GLV SURG SENSICARE SLT PF LF 8 STRL

## (undated) DEVICE — LAPAROSCOPIC GAS CONDITIONING DEVICE.: Brand: INSUFLOW

## (undated) DEVICE — SOL IRRIG H2O 1000ML STRL

## (undated) DEVICE — UNDYED BRAIDED (POLYGLACTIN 910), SYNTHETIC ABSORBABLE SUTURE: Brand: COATED VICRYL

## (undated) DEVICE — CUFF SCD HEMOFORCE SEQ CALF STD MD

## (undated) DEVICE — ENDOPATH XCEL WITH OPTIVIEW TECHNOLOGY BLADELESS TROCARS WITH STABILITY SLEEVES: Brand: ENDOPATH XCEL OPTIVIEW

## (undated) DEVICE — SKIN AFFIX SURG ADHESIVE 72/CS 0.55ML: Brand: MEDLINE